# Patient Record
Sex: FEMALE | Race: OTHER | HISPANIC OR LATINO | URBAN - METROPOLITAN AREA
[De-identification: names, ages, dates, MRNs, and addresses within clinical notes are randomized per-mention and may not be internally consistent; named-entity substitution may affect disease eponyms.]

---

## 2017-06-08 ENCOUNTER — OUTPATIENT (OUTPATIENT)
Dept: OUTPATIENT SERVICES | Facility: HOSPITAL | Age: 82
LOS: 1 days | Discharge: HOME | End: 2017-06-08

## 2017-06-08 DIAGNOSIS — G47.33 OBSTRUCTIVE SLEEP APNEA (ADULT) (PEDIATRIC): ICD-10-CM

## 2017-06-08 DIAGNOSIS — K21.9 GASTRO-ESOPHAGEAL REFLUX DISEASE WITHOUT ESOPHAGITIS: ICD-10-CM

## 2017-06-08 DIAGNOSIS — K92.2 GASTROINTESTINAL HEMORRHAGE, UNSPECIFIED: ICD-10-CM

## 2017-06-08 DIAGNOSIS — E03.9 HYPOTHYROIDISM, UNSPECIFIED: ICD-10-CM

## 2017-06-08 DIAGNOSIS — K57.32 DIVERTICULITIS OF LARGE INTESTINE WITHOUT PERFORATION OR ABSCESS WITHOUT BLEEDING: ICD-10-CM

## 2017-06-08 DIAGNOSIS — M35.3 POLYMYALGIA RHEUMATICA: ICD-10-CM

## 2017-06-08 DIAGNOSIS — I25.10 ATHEROSCLEROTIC HEART DISEASE OF NATIVE CORONARY ARTERY WITHOUT ANGINA PECTORIS: ICD-10-CM

## 2017-06-08 DIAGNOSIS — M48.061 SPINAL STENOSIS, LUMBAR REGION WITHOUT NEUROGENIC CLAUDICATION: ICD-10-CM

## 2017-06-08 DIAGNOSIS — I48.91 UNSPECIFIED ATRIAL FIBRILLATION: ICD-10-CM

## 2017-06-08 DIAGNOSIS — K57.30 DIVERTICULOSIS OF LARGE INTESTINE WITHOUT PERFORATION OR ABSCESS WITHOUT BLEEDING: ICD-10-CM

## 2017-06-08 DIAGNOSIS — E11.9 TYPE 2 DIABETES MELLITUS WITHOUT COMPLICATIONS: ICD-10-CM

## 2017-06-28 DIAGNOSIS — E03.9 HYPOTHYROIDISM, UNSPECIFIED: ICD-10-CM

## 2017-06-28 DIAGNOSIS — R06.00 DYSPNEA, UNSPECIFIED: ICD-10-CM

## 2017-06-28 DIAGNOSIS — I25.10 ATHEROSCLEROTIC HEART DISEASE OF NATIVE CORONARY ARTERY WITHOUT ANGINA PECTORIS: ICD-10-CM

## 2017-06-28 DIAGNOSIS — E11.9 TYPE 2 DIABETES MELLITUS WITHOUT COMPLICATIONS: ICD-10-CM

## 2017-06-28 DIAGNOSIS — Z98.61 CORONARY ANGIOPLASTY STATUS: ICD-10-CM

## 2017-06-28 DIAGNOSIS — Z87.891 PERSONAL HISTORY OF NICOTINE DEPENDENCE: ICD-10-CM

## 2017-06-28 DIAGNOSIS — Z82.49 FAMILY HISTORY OF ISCHEMIC HEART DISEASE AND OTHER DISEASES OF THE CIRCULATORY SYSTEM: ICD-10-CM

## 2017-06-28 DIAGNOSIS — Z79.4 LONG TERM (CURRENT) USE OF INSULIN: ICD-10-CM

## 2017-06-28 DIAGNOSIS — I10 ESSENTIAL (PRIMARY) HYPERTENSION: ICD-10-CM

## 2017-06-28 DIAGNOSIS — E78.4 OTHER HYPERLIPIDEMIA: ICD-10-CM

## 2019-07-10 ENCOUNTER — OUTPATIENT (OUTPATIENT)
Dept: OUTPATIENT SERVICES | Facility: HOSPITAL | Age: 84
LOS: 1 days | Discharge: HOME | End: 2019-07-10

## 2019-07-10 VITALS
HEART RATE: 60 BPM | OXYGEN SATURATION: 97 % | SYSTOLIC BLOOD PRESSURE: 123 MMHG | DIASTOLIC BLOOD PRESSURE: 53 MMHG | HEIGHT: 57 IN | RESPIRATION RATE: 12 BRPM | WEIGHT: 179.9 LBS

## 2019-07-10 DIAGNOSIS — E11.9 TYPE 2 DIABETES MELLITUS WITHOUT COMPLICATIONS: ICD-10-CM

## 2019-07-10 DIAGNOSIS — Z90.89 ACQUIRED ABSENCE OF OTHER ORGANS: Chronic | ICD-10-CM

## 2019-07-10 DIAGNOSIS — I20.8 OTHER FORMS OF ANGINA PECTORIS: ICD-10-CM

## 2019-07-10 DIAGNOSIS — Z79.02 LONG TERM (CURRENT) USE OF ANTITHROMBOTICS/ANTIPLATELETS: ICD-10-CM

## 2019-07-10 DIAGNOSIS — K58.9 IRRITABLE BOWEL SYNDROME WITHOUT DIARRHEA: ICD-10-CM

## 2019-07-10 DIAGNOSIS — M19.90 UNSPECIFIED OSTEOARTHRITIS, UNSPECIFIED SITE: ICD-10-CM

## 2019-07-10 DIAGNOSIS — Z90.49 ACQUIRED ABSENCE OF OTHER SPECIFIED PARTS OF DIGESTIVE TRACT: Chronic | ICD-10-CM

## 2019-07-10 DIAGNOSIS — I25.118 ATHEROSCLEROTIC HEART DISEASE OF NATIVE CORONARY ARTERY WITH OTHER FORMS OF ANGINA PECTORIS: ICD-10-CM

## 2019-07-10 DIAGNOSIS — Z88.5 ALLERGY STATUS TO NARCOTIC AGENT: ICD-10-CM

## 2019-07-10 DIAGNOSIS — Z96.642 PRESENCE OF LEFT ARTIFICIAL HIP JOINT: Chronic | ICD-10-CM

## 2019-07-10 DIAGNOSIS — H26.9 UNSPECIFIED CATARACT: Chronic | ICD-10-CM

## 2019-07-10 DIAGNOSIS — E03.9 HYPOTHYROIDISM, UNSPECIFIED: ICD-10-CM

## 2019-07-10 DIAGNOSIS — Z79.82 LONG TERM (CURRENT) USE OF ASPIRIN: ICD-10-CM

## 2019-07-10 DIAGNOSIS — I10 ESSENTIAL (PRIMARY) HYPERTENSION: ICD-10-CM

## 2019-07-10 DIAGNOSIS — Z95.5 PRESENCE OF CORONARY ANGIOPLASTY IMPLANT AND GRAFT: ICD-10-CM

## 2019-07-10 LAB
GLUCOSE BLDC GLUCOMTR-MCNC: 145 MG/DL — HIGH (ref 70–99)
HCT VFR BLD CALC: 37.7 % — SIGNIFICANT CHANGE UP (ref 37–47)
HGB BLD-MCNC: 12.3 G/DL — SIGNIFICANT CHANGE UP (ref 12–16)
MCHC RBC-ENTMCNC: 30.3 PG — SIGNIFICANT CHANGE UP (ref 27–31)
MCHC RBC-ENTMCNC: 32.6 G/DL — SIGNIFICANT CHANGE UP (ref 32–37)
MCV RBC AUTO: 92.9 FL — SIGNIFICANT CHANGE UP (ref 81–99)
NRBC # BLD: 0 /100 WBCS — SIGNIFICANT CHANGE UP (ref 0–0)
PLATELET # BLD AUTO: 192 K/UL — SIGNIFICANT CHANGE UP (ref 130–400)
RBC # BLD: 4.06 M/UL — LOW (ref 4.2–5.4)
RBC # FLD: 13.6 % — SIGNIFICANT CHANGE UP (ref 11.5–14.5)
WBC # BLD: 8 K/UL — SIGNIFICANT CHANGE UP (ref 4.8–10.8)
WBC # FLD AUTO: 8 K/UL — SIGNIFICANT CHANGE UP (ref 4.8–10.8)

## 2019-07-10 NOTE — CHART NOTE - NSCHARTNOTEFT_GEN_A_CORE
PRE-OP DIAGNOSIS: suspected CAD    PROCEDURE:  [x ] [facility] with coronary angiography                           [ ] [facility]  Physician: Dr Sutton  Assistant: Celina Back    ANESTHESIA TYPE:  [  ]General Anesthesia  [x] Sedation  [  ] Local/Regional    ESTIMATED BLOOD LOSS:    10   mL    CONDITION  [  ] Critical  [  ] Serious  [  ]Fair  [ x ]Good      SPECIMENS REMOVED (IF APPLICABLE):      IV CONTRAST:      50       mL      IMPLANTS (IF APPLICABLE)      FINDINGS    Left Heart Catheterization:  LVEF%: 60%  LVEDP: WNL  [ ] Normal Coronary Arteries  [ ] Luminal Irregularities  [x] Non-obstructive CAD    ACCESS:    [x] right radial artery  [ ] right femoral artery    LEFT HEART CATHETERIZATION:         Hemodynamics: Hemodynamic assessment demonstrates normal LVEDP.     Valves: Aortic valve: No significant aortic gradient.     Coronary circulation: The coronary circulation is right dominant. Left main: Normal. The vessel was normal sized. Angiography showed minor luminal irregularities with no flow limiting lesions. LAD: The vessel was normal sized. Angiography showed minor luminal irregularities with no flow limiting lesions. Patent stent. Circumflex: The vessel was medium sized. Angiography showed minor luminal irregularities with no flow limiting lesions. Patent stent. RCA: The vessel was large sized (dominant). There was a discrete 50 % stenosis at a site with no prior intervention, in the distal third of the vessel segment. There was KEELY grade 3 flow through the vessel (brisk flow).                                  POST-OP DIAGNOSIS    Non-obstructive CAD      PLAN OF CARE  [x] D/C Home today  [ ]  D/C in AM  [ ] Return to In-patient bed  [ ] Admit for observation  [ ] Return for staged procedure:  [ ] CT Surgery consult called  [ ]  Continue DAPT, B-blocker & Statin therapy PRE-OP DIAGNOSIS: suspected CAD    PROCEDURE:  [x ] [facility] with coronary angiography                           [ ] [facility]  Physician: Dr Sutton  Assistant: Celina Back    ANESTHESIA TYPE:  [  ]General Anesthesia  [x] Sedation  [  ] Local/Regional    ESTIMATED BLOOD LOSS:    10   mL    CONDITION  [  ] Critical  [  ] Serious  [  ]Fair  [ x ]Good      SPECIMENS REMOVED (IF APPLICABLE):      IV CONTRAST:      50       mL      IMPLANTS (IF APPLICABLE)      FINDINGS    Left Heart Catheterization:  LVEF%: 60%  LVEDP: WNL  [ ] Normal Coronary Arteries  [ ] Luminal Irregularities  [x] Non-obstructive CAD    ACCESS:    [x] right radial artery  [ ] right femoral artery    LEFT HEART CATHETERIZATION:         Hemodynamics: Hemodynamic assessment demonstrates normal LVEDP.     Valves: Aortic valve: No significant aortic gradient.     Coronary circulation: The coronary circulation is right dominant. Left main: Normal. The vessel was normal sized. Angiography showed minor luminal irregularities with no flow limiting lesions. LAD: The vessel was normal sized. Angiography showed minor luminal irregularities with no flow limiting lesions. Patent stent. Circumflex: The vessel was medium sized. Angiography showed minor luminal irregularities with no flow limiting lesions. Patent stent. RCA: The vessel was large sized (dominant). There was a discrete 50 % stenosis at a site with no prior intervention, in the distal third of the vessel segment. There was KEELY grade 3 flow through the vessel (brisk flow).                                  POST-OP DIAGNOSIS    Non-obstructive CAD      PLAN OF CARE  [x] D/C Home today  [ ]  D/C in AM  [ ] Return to In-patient bed  [ ] Admit for observation  [ ] Return for staged procedure:  [ ] CT Surgery consult called  [ ]  Continue DAPT, B-blocker & Statin therapy    agree

## 2019-07-10 NOTE — H&P CARDIOLOGY - HISTORY OF PRESENT ILLNESS
84 y/old F here for LHC due to CP and + Nuclear stress Test   PMH: MI 2014, Paroxysmal A-Fib, DM, CAD MEAGAN x3 Stents, NSTEMI 10/30/2014, GI bleeding, Hypothyroid, Spinal stenosis, Arthritis, herniated disc, Polymyalgia, Bursitis   PSH: Tonsils, Appendectomy, Gallbladder, cataract, Left hip replacement   FH: (CAD)

## 2019-07-10 NOTE — ASU PATIENT PROFILE, ADULT - PMH
Acute MI    Arthritis    CAD (coronary artery disease)    CAD S/P percutaneous coronary angioplasty    DM (diabetes mellitus), secondary    H/O spinal stenosis    Hypothyroid    IBS (irritable bowel syndrome)    Polymyalgia

## 2019-07-10 NOTE — ASU PATIENT PROFILE, ADULT - PSH
Cataract of both eyes    History of appendectomy    History of cholecystectomy    History of hip replacement, total, left    S/P tonsillectomy

## 2020-03-09 ENCOUNTER — OUTPATIENT (OUTPATIENT)
Dept: OUTPATIENT SERVICES | Facility: HOSPITAL | Age: 85
LOS: 1 days | Discharge: HOME | End: 2020-03-09
Payer: MEDICARE

## 2020-03-09 DIAGNOSIS — Z90.89 ACQUIRED ABSENCE OF OTHER ORGANS: Chronic | ICD-10-CM

## 2020-03-09 DIAGNOSIS — Z12.31 ENCOUNTER FOR SCREENING MAMMOGRAM FOR MALIGNANT NEOPLASM OF BREAST: ICD-10-CM

## 2020-03-09 DIAGNOSIS — Z90.49 ACQUIRED ABSENCE OF OTHER SPECIFIED PARTS OF DIGESTIVE TRACT: Chronic | ICD-10-CM

## 2020-03-09 DIAGNOSIS — H26.9 UNSPECIFIED CATARACT: Chronic | ICD-10-CM

## 2020-03-09 DIAGNOSIS — Z96.642 PRESENCE OF LEFT ARTIFICIAL HIP JOINT: Chronic | ICD-10-CM

## 2020-03-09 PROBLEM — E03.9 HYPOTHYROIDISM, UNSPECIFIED: Chronic | Status: ACTIVE | Noted: 2019-07-10

## 2020-03-09 PROBLEM — Z87.39 PERSONAL HISTORY OF OTHER DISEASES OF THE MUSCULOSKELETAL SYSTEM AND CONNECTIVE TISSUE: Chronic | Status: ACTIVE | Noted: 2019-07-10

## 2020-03-09 PROBLEM — M19.90 UNSPECIFIED OSTEOARTHRITIS, UNSPECIFIED SITE: Chronic | Status: ACTIVE | Noted: 2019-07-10

## 2020-03-09 PROBLEM — K58.9 IRRITABLE BOWEL SYNDROME WITHOUT DIARRHEA: Chronic | Status: ACTIVE | Noted: 2019-07-10

## 2020-03-09 PROBLEM — M35.3 POLYMYALGIA RHEUMATICA: Chronic | Status: ACTIVE | Noted: 2019-07-10

## 2020-03-09 PROBLEM — I21.9 ACUTE MYOCARDIAL INFARCTION, UNSPECIFIED: Chronic | Status: ACTIVE | Noted: 2019-07-10

## 2020-03-09 PROBLEM — I25.10 ATHEROSCLEROTIC HEART DISEASE OF NATIVE CORONARY ARTERY WITHOUT ANGINA PECTORIS: Chronic | Status: ACTIVE | Noted: 2019-07-10

## 2020-03-09 PROBLEM — E13.9 OTHER SPECIFIED DIABETES MELLITUS WITHOUT COMPLICATIONS: Chronic | Status: ACTIVE | Noted: 2019-07-10

## 2020-03-09 PROBLEM — K58.9 IRRITABLE BOWEL SYNDROME, UNSPECIFIED: Chronic | Status: ACTIVE | Noted: 2019-07-10

## 2020-03-09 PROCEDURE — 77063 BREAST TOMOSYNTHESIS BI: CPT | Mod: 26

## 2020-03-09 PROCEDURE — 77067 SCR MAMMO BI INCL CAD: CPT | Mod: 26

## 2021-02-04 ENCOUNTER — EMERGENCY (EMERGENCY)
Facility: HOSPITAL | Age: 86
LOS: 0 days | Discharge: HOME | End: 2021-02-05
Attending: STUDENT IN AN ORGANIZED HEALTH CARE EDUCATION/TRAINING PROGRAM | Admitting: EMERGENCY MEDICINE
Payer: MEDICARE

## 2021-02-04 VITALS
SYSTOLIC BLOOD PRESSURE: 164 MMHG | DIASTOLIC BLOOD PRESSURE: 86 MMHG | RESPIRATION RATE: 17 BRPM | HEART RATE: 77 BPM | TEMPERATURE: 99 F | OXYGEN SATURATION: 99 %

## 2021-02-04 DIAGNOSIS — H26.9 UNSPECIFIED CATARACT: Chronic | ICD-10-CM

## 2021-02-04 DIAGNOSIS — Z96.642 PRESENCE OF LEFT ARTIFICIAL HIP JOINT: Chronic | ICD-10-CM

## 2021-02-04 DIAGNOSIS — Z20.822 CONTACT WITH AND (SUSPECTED) EXPOSURE TO COVID-19: ICD-10-CM

## 2021-02-04 DIAGNOSIS — E11.9 TYPE 2 DIABETES MELLITUS WITHOUT COMPLICATIONS: ICD-10-CM

## 2021-02-04 DIAGNOSIS — R00.2 PALPITATIONS: ICD-10-CM

## 2021-02-04 DIAGNOSIS — Z88.8 ALLERGY STATUS TO OTHER DRUGS, MEDICAMENTS AND BIOLOGICAL SUBSTANCES: ICD-10-CM

## 2021-02-04 DIAGNOSIS — Z90.49 ACQUIRED ABSENCE OF OTHER SPECIFIED PARTS OF DIGESTIVE TRACT: Chronic | ICD-10-CM

## 2021-02-04 DIAGNOSIS — I10 ESSENTIAL (PRIMARY) HYPERTENSION: ICD-10-CM

## 2021-02-04 DIAGNOSIS — E03.9 HYPOTHYROIDISM, UNSPECIFIED: ICD-10-CM

## 2021-02-04 DIAGNOSIS — Z90.89 ACQUIRED ABSENCE OF OTHER ORGANS: Chronic | ICD-10-CM

## 2021-02-04 LAB
ALBUMIN SERPL ELPH-MCNC: 4.3 G/DL — SIGNIFICANT CHANGE UP (ref 3.5–5.2)
ALP SERPL-CCNC: 65 U/L — SIGNIFICANT CHANGE UP (ref 30–115)
ALT FLD-CCNC: 15 U/L — SIGNIFICANT CHANGE UP (ref 0–41)
ANION GAP SERPL CALC-SCNC: 11 MMOL/L — SIGNIFICANT CHANGE UP (ref 7–14)
AST SERPL-CCNC: 18 U/L — SIGNIFICANT CHANGE UP (ref 0–41)
BASOPHILS # BLD AUTO: 0.07 K/UL — SIGNIFICANT CHANGE UP (ref 0–0.2)
BASOPHILS NFR BLD AUTO: 0.8 % — SIGNIFICANT CHANGE UP (ref 0–1)
BILIRUB SERPL-MCNC: 1 MG/DL — SIGNIFICANT CHANGE UP (ref 0.2–1.2)
BUN SERPL-MCNC: 24 MG/DL — HIGH (ref 10–20)
CALCIUM SERPL-MCNC: 9.9 MG/DL — SIGNIFICANT CHANGE UP (ref 8.5–10.1)
CHLORIDE SERPL-SCNC: 103 MMOL/L — SIGNIFICANT CHANGE UP (ref 98–110)
CO2 SERPL-SCNC: 25 MMOL/L — SIGNIFICANT CHANGE UP (ref 17–32)
CREAT SERPL-MCNC: 1.1 MG/DL — SIGNIFICANT CHANGE UP (ref 0.7–1.5)
EOSINOPHIL # BLD AUTO: 0.3 K/UL — SIGNIFICANT CHANGE UP (ref 0–0.7)
EOSINOPHIL NFR BLD AUTO: 3.6 % — SIGNIFICANT CHANGE UP (ref 0–8)
GLUCOSE SERPL-MCNC: 180 MG/DL — HIGH (ref 70–99)
HCT VFR BLD CALC: 39.1 % — SIGNIFICANT CHANGE UP (ref 37–47)
HGB BLD-MCNC: 12.8 G/DL — SIGNIFICANT CHANGE UP (ref 12–16)
IMM GRANULOCYTES NFR BLD AUTO: 0.4 % — HIGH (ref 0.1–0.3)
LIDOCAIN IGE QN: 26 U/L — SIGNIFICANT CHANGE UP (ref 7–60)
LYMPHOCYTES # BLD AUTO: 2.49 K/UL — SIGNIFICANT CHANGE UP (ref 1.2–3.4)
LYMPHOCYTES # BLD AUTO: 29.5 % — SIGNIFICANT CHANGE UP (ref 20.5–51.1)
MAGNESIUM SERPL-MCNC: 2.3 MG/DL — SIGNIFICANT CHANGE UP (ref 1.8–2.4)
MCHC RBC-ENTMCNC: 30.1 PG — SIGNIFICANT CHANGE UP (ref 27–31)
MCHC RBC-ENTMCNC: 32.7 G/DL — SIGNIFICANT CHANGE UP (ref 32–37)
MCV RBC AUTO: 92 FL — SIGNIFICANT CHANGE UP (ref 81–99)
MONOCYTES # BLD AUTO: 0.66 K/UL — HIGH (ref 0.1–0.6)
MONOCYTES NFR BLD AUTO: 7.8 % — SIGNIFICANT CHANGE UP (ref 1.7–9.3)
NEUTROPHILS # BLD AUTO: 4.9 K/UL — SIGNIFICANT CHANGE UP (ref 1.4–6.5)
NEUTROPHILS NFR BLD AUTO: 57.9 % — SIGNIFICANT CHANGE UP (ref 42.2–75.2)
NRBC # BLD: 0 /100 WBCS — SIGNIFICANT CHANGE UP (ref 0–0)
NT-PROBNP SERPL-SCNC: 372 PG/ML — HIGH (ref 0–300)
PLATELET # BLD AUTO: 203 K/UL — SIGNIFICANT CHANGE UP (ref 130–400)
POTASSIUM SERPL-MCNC: 4.3 MMOL/L — SIGNIFICANT CHANGE UP (ref 3.5–5)
POTASSIUM SERPL-SCNC: 4.3 MMOL/L — SIGNIFICANT CHANGE UP (ref 3.5–5)
PROT SERPL-MCNC: 6.6 G/DL — SIGNIFICANT CHANGE UP (ref 6–8)
RBC # BLD: 4.25 M/UL — SIGNIFICANT CHANGE UP (ref 4.2–5.4)
RBC # FLD: 13.9 % — SIGNIFICANT CHANGE UP (ref 11.5–14.5)
SODIUM SERPL-SCNC: 139 MMOL/L — SIGNIFICANT CHANGE UP (ref 135–146)
TROPONIN T SERPL-MCNC: <0.01 NG/ML — SIGNIFICANT CHANGE UP
WBC # BLD: 8.45 K/UL — SIGNIFICANT CHANGE UP (ref 4.8–10.8)
WBC # FLD AUTO: 8.45 K/UL — SIGNIFICANT CHANGE UP (ref 4.8–10.8)

## 2021-02-04 PROCEDURE — 99220: CPT

## 2021-02-04 PROCEDURE — 71045 X-RAY EXAM CHEST 1 VIEW: CPT | Mod: 26

## 2021-02-04 PROCEDURE — 93010 ELECTROCARDIOGRAM REPORT: CPT

## 2021-02-04 NOTE — ED CDU PROVIDER INITIAL DAY NOTE - NS ED ROS FT
Constitutional: No fever, chills.  Eyes: No visual changes.  ENT: No hearing changes.  Neck: No neck pain or stiffness.  Cardiovascular:  see hpi  Pulmonary: No SOB, cough. No hemoptysis.  Abdominal:  No nausea, vomiting, diarrhea.  : No dysuria, frequency.  Neuro: No headache, syncope, dizziness.  MS: No back pain. No calf pain/swelling.  Psych: No suicidal ideations.

## 2021-02-04 NOTE — ED ADULT NURSE NOTE - OBJECTIVE STATEMENT
patient complaints of intermittent chest palpitation x 2 days. Patient denies n/v, SOB, chest pain.  Patient reports that "she has been moving houses and feels that it maybe from the stress of doing this at her age".  Patient denies any injury.

## 2021-02-04 NOTE — ED PROVIDER NOTE - ATTENDING CONTRIBUTION TO CARE
pt co intermitt palps today, worse after dinner. feeling better now. she was afraid she was in afib. called 911. no cp, sob, ab pain, fever, chills, cough. no new leg swelling.    pt is elderly, comfortable, in nad. pink conj neck sup, ctab, rrr, ab soft, nt, nd. no ptting edema. no foc def.  ekg, labs, monitoring.

## 2021-02-04 NOTE — ED PROVIDER NOTE - NS ED ROS FT
Eyes:  No visual changes, eye pain or discharge.  ENMT:  No hearing changes, pain, no sore throat or runny nose, no difficulty swallowing  Cardiac: see HPI  Respiratory:  No cough or respiratory distress. No hemoptysis. No history of asthma or RAD.  GI:  No nausea, vomiting, diarrhea or abdominal pain.  :  No dysuria, frequency or burning.  MS:  No myalgia, muscle weakness, joint pain or back pain.  Neuro:  No headache or weakness.  No LOC.  Skin:  No skin rash.

## 2021-02-04 NOTE — ED CDU PROVIDER INITIAL DAY NOTE - MEDICAL DECISION MAKING DETAILS
pt placed in obs under 2mn protocol for c/f palpitations which felt like her rapid af.  in ed, pt was in NSR. plan for overnight tele, serial ekg/trop. am cards c/s and will c/s pcp to discuss DM meds/insulin

## 2021-02-04 NOTE — ED CDU PROVIDER INITIAL DAY NOTE - PROGRESS NOTE DETAILS
received signout from Dr. Pirere - pt in obs under 2mdt protocol c/o palpitations with hx of paroxysmal Afib; pt denies signif chest pain; last cath done by Dr. Vides 7/2019 - non obstructive cad; will repeat ce/ekg, cardiac monitoring overnight and cardiologist consult in am.

## 2021-02-04 NOTE — ED ADULT NURSE NOTE - ISOLATION TYPE:
Patient tolerated 1 unit PRBC well without complications  Patient to return tomorrow for central labs  Confirmed appt 
None

## 2021-02-04 NOTE — ED CDU PROVIDER INITIAL DAY NOTE - ATTENDING CONTRIBUTION TO CARE
86 yold female to Ed [facility] Paroxysmal afib, Dm, Cad with stents x 3, Gi Bleed, OA, hypothyroidism, spinal stenosis presents w/ palpitations. pt was concerned for rapid afib. no associated cp, sob, n,v,back pain, fever, cough or syncope.  pt also states for the past several weeks, she has had a labile blood sugar. pt follows up with endo but has not been able to get a hold of them for the past few days.     vss  gen- NAD, aaox3  card-rrr  lungs-ctab, no wheezing or rhonchi  abd-sntnd, no guarding or rebound  neuro- full str/sensation, cn ii-xii grossly intact, normal coordination    in ed, labs grossly normal, trop neg, ekg NSR

## 2021-02-04 NOTE — ED PROVIDER NOTE - OBJECTIVE STATEMENT
86y F pmh CAD s/p 3 stents, T2D, HTN presenting with palpitations x1 day.. intermittent. pt denies significant chest pain, n/v/sob, back pain,fever,cough, diaphoresis; pain not similar to prior cad type pain; pt last cath 2019 with non-obstructive cad;

## 2021-02-04 NOTE — ED CDU PROVIDER INITIAL DAY NOTE - PHYSICAL EXAMINATION
Constitutional: Well developed, well nourished. NAD  Head: Normocephalic, atraumatic.  Eyes: PERRL, EOMI.  ENT: No nasal discharge. Mucous membranes dry.  Neck: Supple. Painless ROM.  Cardiovascular:   Regular rate and rhythm.    Pulmonary:   Lungs clear to auscultation bilaterally.   Abdominal: Soft. Nondistended. No rebound, guarding, rigidity.  Extremities. Pelvis stable. No lower extremity edema, symmetric calves.  Skin: No rashes, cyanosis.  Neuro: AAOx3. No focal neurological deficits.  Psych: Normal mood. Normal affect.

## 2021-02-04 NOTE — ED ADULT TRIAGE NOTE - CHIEF COMPLAINT QUOTE
intermittent palpitations for past couple of days, pt reports blood sugar has been either very high or very low.

## 2021-02-04 NOTE — ED CDU PROVIDER INITIAL DAY NOTE - OBJECTIVE STATEMENT
86 yold female to Ed [facility]x Paroxysmal afib, Dm, Cad with stents x 3, Gi Bleed, OA, hypothyroidism, spinal stenosis, c/o palpitations started yesteday described as irreg HR similar to prior episodes of afib; pt denies significant chest pain, n/v/sob, back pain,fever,cough, diaphoresis; pain not similar to prior cad type pain; pt last cath 2019 with non-obstructive cad;

## 2021-02-04 NOTE — ED CDU PROVIDER INITIAL DAY NOTE - INDICATION FOR OBSERVATION
Topical Clindamycin Counseling: Patient counseled that this medication may cause skin irritation or allergic reactions.  In the event of skin irritation, the patient was advised to reduce the amount of the drug applied or use it less frequently.   The patient verbalized understanding of the proper use and possible adverse effects of clindamycin.  All of the patient's questions and concerns were addressed. Rifampin Counseling: I discussed with the patient the risks of rifampin including but not limited to liver damage, kidney damage, red-orange body fluids, nausea/vomiting and severe allergy. Acitretin Counseling:  I discussed with the patient the risks of acitretin including but not limited to hair loss, dry lips/skin/eyes, liver damage, hyperlipidemia, depression/suicidal ideation, photosensitivity.  Serious rare side effects can include but are not limited to pancreatitis, pseudotumor cerebri, bony changes, clot formation/stroke/heart attack.  Patient understands that alcohol is contraindicated since it can result in liver toxicity and significantly prolong the elimination of the drug by many years. Birth Control Pills Pregnancy And Lactation Text: This medication should be avoided if pregnant and for the first 30 days post-partum. Diagnostic Uncertainty Bactrim Pregnancy And Lactation Text: This medication is Pregnancy Category D and is known to cause fetal risk.  It is also excreted in breast milk. Cimetidine Pregnancy And Lactation Text: This medication is Pregnancy Category B and is considered safe during pregnancy. It is also excreted in breast milk and breast feeding isn't recommended. Siliq Counseling:  I discussed with the patient the risks of Siliq including but not limited to new or worsening depression, suicidal thoughts and behavior, immunosuppression, malignancy, posterior leukoencephalopathy syndrome, and serious infections.  The patient understands that monitoring is required including a PPD at baseline and must alert us or the primary physician if symptoms of infection or other concerning signs are noted. There is also a special program designed to monitor depression which is required with Siliq. Azathioprine Pregnancy And Lactation Text: This medication is Pregnancy Category D and isn't considered safe during pregnancy. It is unknown if this medication is excreted in breast milk. Gabapentin Counseling: I discussed with the patient the risks of gabapentin including but not limited to dizziness, somnolence, fatigue and ataxia. Hydroquinone Counseling:  Patient advised that medication may result in skin irritation, lightening (hypopigmentation), dryness, and burning.  In the event of skin irritation, the patient was advised to reduce the amount of the drug applied or use it less frequently.  Rarely, spots that are treated with hydroquinone can become darker (pseudoochronosis).  Should this occur, patient instructed to stop medication and call the office. The patient verbalized understanding of the proper use and possible adverse effects of hydroquinone.  All of the patient's questions and concerns were addressed. Rifampin Pregnancy And Lactation Text: This medication is Pregnancy Category C and it isn't know if it is safe during pregnancy. It is also excreted in breast milk and should not be used if you are breast feeding. Cimzia Counseling:  I discussed with the patient the risks of Cimzia including but not limited to immunosuppression, allergic reactions and infections.  The patient understands that monitoring is required including a PPD at baseline and must alert us or the primary physician if symptoms of infection or other concerning signs are noted. Acitretin Pregnancy And Lactation Text: This medication is Pregnancy Category X and should not be given to women who are pregnant or may become pregnant in the future. This medication is excreted in breast milk. Cellcept Counseling:  I discussed with the patient the risks of mycophenolate mofetil including but not limited to infection/immunosuppression, GI upset, hypokalemia, hypercholesterolemia, bone marrow suppression, lymphoproliferative disorders, malignancy, GI ulceration/bleed/perforation, colitis, interstitial lung disease, kidney failure, progressive multifocal leukoencephalopathy, and birth defects.  The patient understands that monitoring is required including a baseline creatinine and regular CBC testing. In addition, patient must alert us immediately if symptoms of infection or other concerning signs are noted. Cephalexin Counseling: I counseled the patient regarding use of cephalexin as an antibiotic for prophylactic and/or therapeutic purposes. Cephalexin (commonly prescribed under brand name Keflex) is a cephalosporin antibiotic which is active against numerous classes of bacteria, including most skin bacteria. Side effects may include nausea, diarrhea, gastrointestinal upset, rash, hives, yeast infections, and in rare cases, hepatitis, kidney disease, seizures, fever, confusion, neurologic symptoms, and others. Patients with severe allergies to penicillin medications are cautioned that there is about a 10% incidence of cross-reactivity with cephalosporins. When possible, patients with penicillin allergies should use alternatives to cephalosporins for antibiotic therapy. Topical Clindamycin Pregnancy And Lactation Text: This medication is Pregnancy Category B and is considered safe during pregnancy. It is unknown if it is excreted in breast milk. Hydroquinone Pregnancy And Lactation Text: This medication has not been assigned a Pregnancy Risk Category but animal studies failed to show danger with the topical medication. It is unknown if the medication is excreted in breast milk. Spironolactone Counseling: Patient advised regarding risks of diarrhea, abdominal pain, hyperkalemia, birth defects (for female patients), liver toxicity and renal toxicity. The patient may need blood work to monitor liver and kidney function and potassium levels while on therapy. The patient verbalized understanding of the proper use and possible adverse effects of spironolactone.  All of the patient's questions and concerns were addressed. Siliq Pregnancy And Lactation Text: The risk during pregnancy and breastfeeding is uncertain with this medication. Doxepin Counseling:  Patient advised that the medication is sedating and not to drive a car after taking this medication. Patient informed of potential adverse effects including but not limited to dry mouth, urinary retention, and blurry vision.  The patient verbalized understanding of the proper use and possible adverse effects of doxepin.  All of the patient's questions and concerns were addressed. Gabapentin Pregnancy And Lactation Text: This medication is Pregnancy Category C and isn't considered safe during pregnancy. It is excreted in breast milk. Topical Sulfur Applications Counseling: Topical Sulfur Counseling: Patient counseled that this medication may cause skin irritation or allergic reactions.  In the event of skin irritation, the patient was advised to reduce the amount of the drug applied or use it less frequently.   The patient verbalized understanding of the proper use and possible adverse effects of topical sulfur application.  All of the patient's questions and concerns were addressed. Imiquimod Counseling:  I discussed with the patient the risks of imiquimod including but not limited to erythema, scaling, itching, weeping, crusting, and pain.  Patient understands that the inflammatory response to imiquimod is variable from person to person and was educated regarded proper titration schedule.  If flu-like symptoms develop, patient knows to discontinue the medication and contact us. Tetracycline Counseling: Patient counseled regarding possible photosensitivity and increased risk for sunburn.  Patient instructed to avoid sunlight, if possible.  When exposed to sunlight, patients should wear protective clothing, sunglasses, and sunscreen.  The patient was instructed to call the office immediately if the following severe adverse effects occur:  hearing changes, easy bruising/bleeding, severe headache, or vision changes.  The patient verbalized understanding of the proper use and possible adverse effects of tetracycline.  All of the patient's questions and concerns were addressed. Patient understands to avoid pregnancy while on therapy due to potential birth defects. Cimzia Pregnancy And Lactation Text: This medication crosses the placenta but can be considered safe in certain situations. Cimzia may be excreted in breast milk. Bexarotene Counseling:  I discussed with the patient the risks of bexarotene including but not limited to hair loss, dry lips/skin/eyes, liver abnormalities, hyperlipidemia, pancreatitis, depression/suicidal ideation, photosensitivity, drug rash/allergic reactions, hypothyroidism, anemia, leukopenia, infection, cataracts, and teratogenicity.  Patient understands that they will need regular blood tests to check lipid profile, liver function tests, white blood cell count, thyroid function tests and pregnancy test if applicable. Simponi Counseling:  I discussed with the patient the risks of golimumab including but not limited to myelosuppression, immunosuppression, autoimmune hepatitis, demyelinating diseases, lymphoma, and serious infections.  The patient understands that monitoring is required including a PPD at baseline and must alert us or the primary physician if symptoms of infection or other concerning signs are noted. Doxepin Pregnancy And Lactation Text: This medication is Pregnancy Category C and it isn't known if it is safe during pregnancy. It is also excreted in breast milk and breast feeding isn't recommended. Cephalexin Pregnancy And Lactation Text: This medication is Pregnancy Category B and considered safe during pregnancy.  It is also excreted in breast milk but can be used safely for shorter doses. Spironolactone Pregnancy And Lactation Text: This medication can cause feminization of the male fetus and should be avoided during pregnancy. The active metabolite is also found in breast milk. Glycopyrrolate Counseling:  I discussed with the patient the risks of glycopyrrolate including but not limited to skin rash, drowsiness, dry mouth, difficulty urinating, and blurred vision. Cosentyx Counseling:  I discussed with the patient the risks of Cosentyx including but not limited to worsening of Crohn's disease, immunosuppression, allergic reactions and infections.  The patient understands that monitoring is required including a PPD at baseline and must alert us or the primary physician if symptoms of infection or other concerning signs are noted. Bexarotene Pregnancy And Lactation Text: This medication is Pregnancy Category X and should not be given to women who are pregnant or may become pregnant. This medication should not be used if you are breast feeding. Tetracycline Pregnancy And Lactation Text: This medication is Pregnancy Category D and not consider safe during pregnancy. It is also excreted in breast milk. Topical Sulfur Applications Pregnancy And Lactation Text: This medication is Pregnancy Category C and has an unknown safety profile during pregnancy. It is unknown if this topical medication is excreted in breast milk. Imiquimod Pregnancy And Lactation Text: This medication is Pregnancy Category C. It is unknown if this medication is excreted in breast milk. Clindamycin Counseling: I counseled the patient regarding use of clindamycin as an antibiotic for prophylactic and/or therapeutic purposes. Clindamycin is active against numerous classes of bacteria, including skin bacteria. Side effects may include nausea, diarrhea, gastrointestinal upset, rash, hives, yeast infections, and in rare cases, colitis. Hydroxyzine Counseling: Patient advised that the medication is sedating and not to drive a car after taking this medication.  Patient informed of potential adverse effects including but not limited to dry mouth, urinary retention, and blurry vision.  The patient verbalized understanding of the proper use and possible adverse effects of hydroxyzine.  All of the patient's questions and concerns were addressed. Cyclophosphamide Counseling:  I discussed with the patient the risks of cyclophosphamide including but not limited to hair loss, hormonal abnormalities, decreased fertility, abdominal pain, diarrhea, nausea and vomiting, bone marrow suppression and infection. The patient understands that monitoring is required while taking this medication. SSKI Counseling:  I discussed with the patient the risks of SSKI including but not limited to thyroid abnormalities, metallic taste, GI upset, fever, headache, acne, arthralgias, paraesthesias, lymphadenopathy, easy bleeding, arrhythmias, and allergic reaction. Isotretinoin Counseling: Patient should get monthly blood tests, not donate blood, not drive at night if vision affected, not share medication, and not undergo elective surgery for 6 months after tx completed. Side effects reviewed, pt to contact office should one occur. Glycopyrrolate Pregnancy And Lactation Text: This medication is Pregnancy Category B and is considered safe during pregnancy. It is unknown if it is excreted breast milk. Cosentyx Pregnancy And Lactation Text: This medication is Pregnancy Category B and is considered safe during pregnancy. It is unknown if this medication is excreted in breast milk. Cyclophosphamide Pregnancy And Lactation Text: This medication is Pregnancy Category D and it isn't considered safe during pregnancy. This medication is excreted in breast milk. Detail Level: Detailed Zyclara Counseling:  I discussed with the patient the risks of imiquimod including but not limited to erythema, scaling, itching, weeping, crusting, and pain.  Patient understands that the inflammatory response to imiquimod is variable from person to person and was educated regarded proper titration schedule.  If flu-like symptoms develop, patient knows to discontinue the medication and contact us. Minoxidil Counseling: Minoxidil is a topical medication which can increase blood flow where it is applied. It is uncertain how this medication increases hair growth. Side effects are uncommon and include stinging and allergic reactions. Stelara Counseling:  I discussed with the patient the risks of ustekinumab including but not limited to immunosuppression, malignancy, posterior leukoencephalopathy syndrome, and serious infections.  The patient understands that monitoring is required including a PPD at baseline and must alert us or the primary physician if symptoms of infection or other concerning signs are noted. Hydroxychloroquine Counseling:  I discussed with the patient that a baseline ophthalmologic exam is needed at the start of therapy and every year thereafter while on therapy. A CBC may also be warranted for monitoring.  The side effects of this medication were discussed with the patient, including but not limited to agranulocytosis, aplastic anemia, seizures, rashes, retinopathy, and liver toxicity. Patient instructed to call the office should any adverse effect occur.  The patient verbalized understanding of the proper use and possible adverse effects of Plaquenil.  All the patient's questions and concerns were addressed. Hydroxyzine Pregnancy And Lactation Text: This medication is not safe during pregnancy and should not be taken. It is also excreted in breast milk and breast feeding isn't recommended. Clindamycin Pregnancy And Lactation Text: This medication can be used in pregnancy if certain situations. Clindamycin is also present in breast milk. Sski Pregnancy And Lactation Text: This medication is Pregnancy Category D and isn't considered safe during pregnancy. It is excreted in breast milk. Isotretinoin Pregnancy And Lactation Text: This medication is Pregnancy Category X and is considered extremely dangerous during pregnancy. It is unknown if it is excreted in breast milk. Fluconazole Counseling:  Patient counseled regarding adverse effects of fluconazole including but not limited to headache, diarrhea, nausea, upset stomach, liver function test abnormalities, taste disturbance, and stomach pain.  There is a rare possibility of liver failure that can occur when taking fluconazole.  The patient understands that monitoring of LFTs and kidney function test may be required, especially at baseline. The patient verbalized understanding of the proper use and possible adverse effects of fluconazole.  All of the patient's questions and concerns were addressed. Dupixent Counseling: I discussed with the patient the risks of dupilumab including but not limited to eye infection and irritation, cold sores, injection site reactions, worsening of asthma, allergic reactions and increased risk of parasitic infection.  Live vaccines should be avoided while taking dupilumab. Dupilumab will also interact with certain medications such as warfarin and cyclosporine. The patient understands that monitoring is required and they must alert us or the primary physician if symptoms of infection or other concerning signs are noted. Thalidomide Counseling: I discussed with the patient the risks of thalidomide including but not limited to birth defects, anxiety, weakness, chest pain, dizziness, cough and severe allergy. Cyclosporine Counseling:  I discussed with the patient the risks of cyclosporine including but not limited to hypertension, gingival hyperplasia,myelosuppression, immunosuppression, liver damage, kidney damage, neurotoxicity, lymphoma, and serious infections. The patient understands that monitoring is required including baseline blood pressure, CBC, CMP, lipid panel and uric acid, and then 1-2 times monthly CMP and blood pressure. Dupixent Pregnancy And Lactation Text: This medication likely crosses the placenta but the risk for the fetus is uncertain. This medication is excreted in breast milk. Fluconazole Pregnancy And Lactation Text: This medication is Pregnancy Category C and it isn't know if it is safe during pregnancy. It is also excreted in breast milk. Doxycycline Counseling:  Patient counseled regarding possible photosensitivity and increased risk for sunburn.  Patient instructed to avoid sunlight, if possible.  When exposed to sunlight, patients should wear protective clothing, sunglasses, and sunscreen.  The patient was instructed to call the office immediately if the following severe adverse effects occur:  hearing changes, easy bruising/bleeding, severe headache, or vision changes.  The patient verbalized understanding of the proper use and possible adverse effects of doxycycline.  All of the patient's questions and concerns were addressed. High Dose Vitamin A Counseling: Side effects reviewed, pt to contact office should one occur. Hydroxychloroquine Pregnancy And Lactation Text: This medication has been shown to cause fetal harm but it isn't assigned a Pregnancy Risk Category. There are small amounts excreted in breast milk. Albendazole Counseling:  I discussed with the patient the risks of albendazole including but not limited to cytopenia, kidney damage, nausea/vomiting and severe allergy.  The patient understands that this medication is being used in an off-label manner. Cyclosporine Pregnancy And Lactation Text: This medication is Pregnancy Category C and it isn't know if it is safe during pregnancy. This medication is excreted in breast milk. Picato Counseling:  I discussed with the patient the risks of Picato including but not limited to erythema, scaling, itching, weeping, crusting, and pain. Doxycycline Pregnancy And Lactation Text: This medication is Pregnancy Category D and not consider safe during pregnancy. It is also excreted in breast milk but is considered safe for shorter treatment courses. Thalidomide Pregnancy And Lactation Text: This medication is Pregnancy Category X and is absolutely contraindicated during pregnancy. It is unknown if it is excreted in breast milk. Carac Counseling:  I discussed with the patient the risks of Carac including but not limited to erythema, scaling, itching, weeping, crusting, and pain. Taltz Counseling: I discussed with the patient the risks of ixekizumab including but not limited to immunosuppression, serious infections, worsening of inflammatory bowel disease and drug reactions.  The patient understands that monitoring is required including a PPD at baseline and must alert us or the primary physician if symptoms of infection or other concerning signs are noted. Nsaids Counseling: NSAID Counseling: I discussed with the patient that NSAIDs should be taken with food. Prolonged use of NSAIDs can result in the development of stomach ulcers.  Patient advised to stop taking NSAIDs if abdominal pain occurs.  The patient verbalized understanding of the proper use and possible adverse effects of NSAIDs.  All of the patient's questions and concerns were addressed. High Dose Vitamin A Pregnancy And Lactation Text: High dose vitamin A therapy is contraindicated during pregnancy and breast feeding. Griseofulvin Counseling:  I discussed with the patient the risks of griseofulvin including but not limited to photosensitivity, cytopenia, liver damage, nausea/vomiting and severe allergy.  The patient understands that this medication is best absorbed when taken with a fatty meal (e.g., ice cream or french fries). Enbrel Counseling:  I discussed with the patient the risks of etanercept including but not limited to myelosuppression, immunosuppression, autoimmune hepatitis, demyelinating diseases, lymphoma, and infections.  The patient understands that monitoring is required including a PPD at baseline and must alert us or the primary physician if symptoms of infection or other concerning signs are noted. Methotrexate Counseling:  Patient counseled regarding adverse effects of methotrexate including but not limited to nausea, vomiting, abnormalities in liver function tests. Patients may develop mouth sores, rash, diarrhea, and abnormalities in blood counts. The patient understands that monitoring is required including LFT's and blood counts.  There is a rare possibility of scarring of the liver and lung problems that can occur when taking methotrexate. Persistent nausea, loss of appetite, pale stools, dark urine, cough, and shortness of breath should be reported immediately. Patient advised to discontinue methotrexate treatment at least three months before attempting to become pregnant.  I discussed the need for folate supplements while taking methotrexate.  These supplements can decrease side effects during methotrexate treatment. The patient verbalized understanding of the proper use and possible adverse effects of methotrexate.  All of the patient's questions and concerns were addressed. Arava Counseling:  Patient counseled regarding adverse effects of Arava including but not limited to nausea, vomiting, abnormalities in liver function tests. Patients may develop mouth sores, rash, diarrhea, and abnormalities in blood counts. The patient understands that monitoring is required including LFTs and blood counts.  There is a rare possibility of scarring of the liver and lung problems that can occur when taking methotrexate. Persistent nausea, loss of appetite, pale stools, dark urine, cough, and shortness of breath should be reported immediately. Patient advised to discontinue Arava treatment and consult with a physician prior to attempting conception. The patient will have to undergo a treatment to eliminate Arava from the body prior to conception. Valtrex Counseling: I discussed with the patient the risks of valacyclovir including but not limited to kidney damage, nausea, vomiting and severe allergy.  The patient understands that if the infection seems to be worsening or is not improving, they are to call. Albendazole Pregnancy And Lactation Text: This medication is Pregnancy Category C and it isn't known if it is safe during pregnancy. It is also excreted in breast milk. Carac Pregnancy And Lactation Text: This medication is Pregnancy Category X and contraindicated in pregnancy and in women who may become pregnant. It is unknown if this medication is excreted in breast milk. Erythromycin Counseling:  I discussed with the patient the risks of erythromycin including but not limited to GI upset, allergic reaction, drug rash, diarrhea, increase in liver enzymes, and yeast infections. Nsaids Pregnancy And Lactation Text: These medications are considered safe up to 30 weeks gestation. It is excreted in breast milk. Griseofulvin Pregnancy And Lactation Text: This medication is Pregnancy Category X and is known to cause serious birth defects. It is unknown if this medication is excreted in breast milk but breast feeding should be avoided. Ivermectin Counseling:  Patient instructed to take medication on an empty stomach with a full glass of water.  Patient informed of potential adverse effects including but not limited to nausea, diarrhea, dizziness, itching, and swelling of the extremities or lymph nodes.  The patient verbalized understanding of the proper use and possible adverse effects of ivermectin.  All of the patient's questions and concerns were addressed. Include Pregnancy/Lactation Warning?: No Protopic Counseling: Patient may experience a mild burning sensation during topical application. Protopic is not approved in children less than 2 years of age. There have been case reports of hematologic and skin malignancies in patients using topical calcineurin inhibitors although causality is questionable. 5-Fu Counseling: 5-Fluorouracil Counseling:  I discussed with the patient the risks of 5-fluorouracil including but not limited to erythema, scaling, itching, weeping, crusting, and pain. Humira Counseling:  I discussed with the patient the risks of adalimumab including but not limited to myelosuppression, immunosuppression, autoimmune hepatitis, demyelinating diseases, lymphoma, and serious infections.  The patient understands that monitoring is required including a PPD at baseline and must alert us or the primary physician if symptoms of infection or other concerning signs are noted. Benzoyl Peroxide Counseling: Patient counseled that medicine may cause skin irritation and bleach clothing.  In the event of skin irritation, the patient was advised to reduce the amount of the drug applied or use it less frequently.   The patient verbalized understanding of the proper use and possible adverse effects of benzoyl peroxide.  All of the patient's questions and concerns were addressed. Tremfya Counseling: I discussed with the patient the risks of guselkumab including but not limited to immunosuppression, serious infections, worsening of inflammatory bowel disease and drug reactions.  The patient understands that monitoring is required including a PPD at baseline and must alert us or the primary physician if symptoms of infection or other concerning signs are noted. Valtrex Pregnancy And Lactation Text: this medication is Pregnancy Category B and is considered safe during pregnancy. This medication is not directly found in breast milk but it's metabolite acyclovir is present. Erythromycin Pregnancy And Lactation Text: This medication is Pregnancy Category B and is considered safe during pregnancy. It is also excreted in breast milk. Clofazimine Counseling:  I discussed with the patient the risks of clofazimine including but not limited to skin and eye pigmentation, liver damage, nausea/vomiting, gastrointestinal bleeding and allergy. Protopic Pregnancy And Lactation Text: This medication is Pregnancy Category C. It is unknown if this medication is excreted in breast milk when applied topically. Odomzo Counseling- I discussed with the patient the risks of Odomzo including but not limited to nausea, vomiting, diarrhea, constipation, weight loss, changes in the sense of taste, decreased appetite, muscle spasms, and hair loss.  The patient verbalized understanding of the proper use and possible adverse effects of Odomzo.  All of the patient's questions and concerns were addressed. Itraconazole Counseling:  I discussed with the patient the risks of itraconazole including but not limited to liver damage, nausea/vomiting, neuropathy, and severe allergy.  The patient understands that this medication is best absorbed when taken with acidic beverages such as non-diet cola or ginger ale.  The patient understands that monitoring is required including baseline LFTs and repeat LFTs at intervals.  The patient understands that they are to contact us or the primary physician if concerning signs are noted. Benzoyl Peroxide Pregnancy And Lactation Text: This medication is Pregnancy Category C. It is unknown if benzoyl peroxide is excreted in breast milk. Metronidazole Counseling:  I discussed with the patient the risks of metronidazole including but not limited to seizures, nausea/vomiting, a metallic taste in the mouth, nausea/vomiting and severe allergy. Drysol Counseling:  I discussed with the patient the risks of drysol/aluminum chloride including but not limited to skin rash, itching, irritation, burning. Solaraze Counseling:  I discussed with the patient the risks of Solaraze including but not limited to erythema, scaling, itching, weeping, crusting, and pain. Metronidazole Pregnancy And Lactation Text: This medication is Pregnancy Category B and considered safe during pregnancy.  It is also excreted in breast milk. Ketoconazole Counseling:   Patient counseled regarding improving absorption with orange juice.  Adverse effects include but are not limited to breast enlargement, headache, diarrhea, nausea, upset stomach, liver function test abnormalities, taste disturbance, and stomach pain.  There is a rare possibility of liver failure that can occur when taking ketoconazole. The patient understands that monitoring of LFTs may be required, especially at baseline. The patient verbalized understanding of the proper use and possible adverse effects of ketoconazole.  All of the patient's questions and concerns were addressed. Ilumya Counseling: I discussed with the patient the risks of tildrakizumab including but not limited to immunosuppression, malignancy, posterior leukoencephalopathy syndrome, and serious infections.  The patient understands that monitoring is required including a PPD at baseline and must alert us or the primary physician if symptoms of infection or other concerning signs are noted. Xeljanz Counseling: I discussed with the patient the risks of Xeljanz therapy including increased risk of infection, liver issues, headache, diarrhea, or cold symptoms. Live vaccines should be avoided. They were instructed to call if they have any problems. Colchicine Counseling:  Patient counseled regarding adverse effects including but not limited to stomach upset (nausea, vomiting, stomach pain, or diarrhea).  Patient instructed to limit alcohol consumption while taking this medication.  Colchicine may reduce blood counts especially with prolonged use.  The patient understands that monitoring of kidney function and blood counts may be required, especially at baseline. The patient verbalized understanding of the proper use and possible adverse effects of colchicine.  All of the patient's questions and concerns were addressed. Otezla Counseling: The side effects of Otezla were discussed with the patient, including but not limited to worsening or new depression, weight loss, diarrhea, nausea, upper respiratory tract infection, and headache. Patient instructed to call the office should any adverse effect occur.  The patient verbalized understanding of the proper use and possible adverse effects of Otezla.  All the patient's questions and concerns were addressed. Solaraze Pregnancy And Lactation Text: This medication is Pregnancy Category B and is considered safe. There is some data to suggest avoiding during the third trimester. It is unknown if this medication is excreted in breast milk. Drysol Pregnancy And Lactation Text: This medication is considered safe during pregnancy and breast feeding. Xelnguyenz Pregnancy And Lactation Text: This medication is Pregnancy Category D and is not considered safe during pregnancy.  The risk during breast feeding is also uncertain. Ketoconazole Pregnancy And Lactation Text: This medication is Pregnancy Category C and it isn't know if it is safe during pregnancy. It is also excreted in breast milk and breast feeding isn't recommended. Minocycline Counseling: Patient advised regarding possible photosensitivity and discoloration of the teeth, skin, lips, tongue and gums.  Patient instructed to avoid sunlight, if possible.  When exposed to sunlight, patients should wear protective clothing, sunglasses, and sunscreen.  The patient was instructed to call the office immediately if the following severe adverse effects occur:  hearing changes, easy bruising/bleeding, severe headache, or vision changes.  The patient verbalized understanding of the proper use and possible adverse effects of minocycline.  All of the patient's questions and concerns were addressed. Otezla Pregnancy And Lactation Text: This medication is Pregnancy Category C and it isn't known if it is safe during pregnancy. It is unknown if it is excreted in breast milk. Topical Retinoid counseling:  Patient advised to apply a pea-sized amount only at bedtime and wait 30 minutes after washing their face before applying.  If too drying, patient may add a non-comedogenic moisturizer. The patient verbalized understanding of the proper use and possible adverse effects of retinoids.  All of the patient's questions and concerns were addressed. Xolair Counseling:  Patient informed of potential adverse effects including but not limited to fever, muscle aches, rash and allergic reactions.  The patient verbalized understanding of the proper use and possible adverse effects of Xolair.  All of the patient's questions and concerns were addressed. Elidel Counseling: Patient may experience a mild burning sensation during topical application. Elidel is not approved in children less than 2 years of age. There have been case reports of hematologic and skin malignancies in patients using topical calcineurin inhibitors although causality is questionable. Methotrexate Pregnancy And Lactation Text: This medication is Pregnancy Category X and is known to cause fetal harm. This medication is excreted in breast milk. Infliximab Counseling:  I discussed with the patient the risks of infliximab including but not limited to myelosuppression, immunosuppression, autoimmune hepatitis, demyelinating diseases, lymphoma, and serious infections.  The patient understands that monitoring is required including a PPD at baseline and must alert us or the primary physician if symptoms of infection or other concerning signs are noted. Terbinafine Counseling: Patient counseling regarding adverse effects of terbinafine including but not limited to headache, diarrhea, rash, upset stomach, liver function test abnormalities, itching, taste/smell disturbance, nausea, abdominal pain, and flatulence.  There is a rare possibility of liver failure that can occur when taking terbinafine.  The patient understands that a baseline LFT and kidney function test may be required. The patient verbalized understanding of the proper use and possible adverse effects of terbinafine.  All of the patient's questions and concerns were addressed. Azithromycin Counseling:  I discussed with the patient the risks of azithromycin including but not limited to GI upset, allergic reaction, drug rash, diarrhea, and yeast infections. Dapsone Counseling: I discussed with the patient the risks of dapsone including but not limited to hemolytic anemia, agranulocytosis, rashes, methemoglobinemia, kidney failure, peripheral neuropathy, headaches, GI upset, and liver toxicity.  Patients who start dapsone require monitoring including baseline LFTs and weekly CBCs for the first month, then every month thereafter.  The patient verbalized understanding of the proper use and possible adverse effects of dapsone.  All of the patient's questions and concerns were addressed. Prednisone Counseling:  I discussed with the patient the risks of prolonged use of prednisone including but not limited to weight gain, insomnia, osteoporosis, mood changes, diabetes, susceptibility to infection, glaucoma and high blood pressure.  In cases where prednisone use is prolonged, patients should be monitored with blood pressure checks, serum glucose levels and an eye exam.  Additionally, the patient may need to be placed on GI prophylaxis, PCP prophylaxis, and calcium and vitamin D supplementation and/or a bisphosphonate.  The patient verbalized understanding of the proper use and the possible adverse effects of prednisone.  All of the patient's questions and concerns were addressed. Oxybutynin Counseling:  I discussed with the patient the risks of oxybutynin including but not limited to skin rash, drowsiness, dry mouth, difficulty urinating, and blurred vision. Dapsone Pregnancy And Lactation Text: This medication is Pregnancy Category C and is not considered safe during pregnancy or breast feeding. Xolair Pregnancy And Lactation Text: This medication is Pregnancy Category B and is considered safe during pregnancy. This medication is excreted in breast milk. Quinolones Counseling:  I discussed with the patient the risks of fluoroquinolones including but not limited to GI upset, allergic reaction, drug rash, diarrhea, dizziness, photosensitivity, yeast infections, liver function test abnormalities, tendonitis/tendon rupture. Azithromycin Pregnancy And Lactation Text: This medication is considered safe during pregnancy and is also secreted in breast milk. Tazorac Counseling:  Patient advised that medication is irritating and drying.  Patient may need to apply sparingly and wash off after an hour before eventually leaving it on overnight.  The patient verbalized understanding of the proper use and possible adverse effects of tazorac.  All of the patient's questions and concerns were addressed. Eucrisa Counseling: Patient may experience a mild burning sensation during topical application. Eucrisa is not approved in children less than 2 years of age. Rituxan Counseling:  I discussed with the patient the risks of Rituxan infusions. Side effects can include infusion reactions, severe drug rashes including mucocutaneous reactions, reactivation of latent hepatitis and other infections and rarely progressive multifocal leukoencephalopathy.  All of the patient's questions and concerns were addressed. Erivedge Counseling- I discussed with the patient the risks of Erivedge including but not limited to nausea, vomiting, diarrhea, constipation, weight loss, changes in the sense of taste, decreased appetite, muscle spasms, and hair loss.  The patient verbalized understanding of the proper use and possible adverse effects of Erivedge.  All of the patient's questions and concerns were addressed. Bactrim Counseling:  I discussed with the patient the risks of sulfa antibiotics including but not limited to GI upset, allergic reaction, drug rash, diarrhea, dizziness, photosensitivity, and yeast infections.  Rarely, more serious reactions can occur including but not limited to aplastic anemia, agranulocytosis, methemoglobinemia, blood dyscrasias, liver or kidney failure, lung infiltrates or desquamative/blistering drug rashes. Azathioprine Counseling:  I discussed with the patient the risks of azathioprine including but not limited to myelosuppression, immunosuppression, hepatotoxicity, lymphoma, and infections.  The patient understands that monitoring is required including baseline LFTs, Creatinine, possible TPMP genotyping and weekly CBCs for the first month and then every 2 weeks thereafter.  The patient verbalized understanding of the proper use and possible adverse effects of azathioprine.  All of the patient's questions and concerns were addressed. Tazorac Pregnancy And Lactation Text: This medication is not safe during pregnancy. It is unknown if this medication is excreted in breast milk. Birth Control Pills Counseling: Birth Control Pill Counseling: I discussed with the patient the potential side effects of OCPs including but not limited to increased risk of stroke, heart attack, thrombophlebitis, deep venous thrombosis, hepatic adenomas, breast changes, GI upset, headaches, and depression.  The patient verbalized understanding of the proper use and possible adverse effects of OCPs. All of the patient's questions and concerns were addressed. Rituxan Pregnancy And Lactation Text: This medication is Pregnancy Category C and it isn't know if it is safe during pregnancy. It is unknown if this medication is excreted in breast milk but similar antibodies are known to be excreted. Cimetidine Counseling:  I discussed with the patient the risks of Cimetidine including but not limited to gynecomastia, headache, diarrhea, nausea, drowsiness, arrhythmias, pancreatitis, skin rashes, psychosis, bone marrow suppression and kidney toxicity.

## 2021-02-05 VITALS
OXYGEN SATURATION: 98 % | HEART RATE: 63 BPM | SYSTOLIC BLOOD PRESSURE: 184 MMHG | DIASTOLIC BLOOD PRESSURE: 79 MMHG | TEMPERATURE: 98 F | RESPIRATION RATE: 18 BRPM

## 2021-02-05 LAB
A1C WITH ESTIMATED AVERAGE GLUCOSE RESULT: 7.3 % — HIGH (ref 4–5.6)
ESTIMATED AVERAGE GLUCOSE: 163 MG/DL — HIGH (ref 68–114)
RAPID RVP RESULT: SIGNIFICANT CHANGE UP
SARS-COV-2 RNA SPEC QL NAA+PROBE: SIGNIFICANT CHANGE UP
TROPONIN T SERPL-MCNC: <0.01 NG/ML — SIGNIFICANT CHANGE UP

## 2021-02-05 PROCEDURE — 93010 ELECTROCARDIOGRAM REPORT: CPT

## 2021-02-05 PROCEDURE — 99217: CPT

## 2021-02-05 RX ORDER — LEVOTHYROXINE SODIUM 125 MCG
25 TABLET ORAL DAILY
Refills: 0 | Status: DISCONTINUED | OUTPATIENT
Start: 2021-02-05 | End: 2021-02-05

## 2021-02-05 RX ORDER — CLOPIDOGREL BISULFATE 75 MG/1
75 TABLET, FILM COATED ORAL DAILY
Refills: 0 | Status: COMPLETED | OUTPATIENT
Start: 2021-02-05 | End: 2021-02-05

## 2021-02-05 RX ORDER — DEXTROSE 50 % IN WATER 50 %
25 SYRINGE (ML) INTRAVENOUS ONCE
Refills: 0 | Status: DISCONTINUED | OUTPATIENT
Start: 2021-02-05 | End: 2021-02-05

## 2021-02-05 RX ORDER — INSULIN GLARGINE 100 [IU]/ML
16 INJECTION, SOLUTION SUBCUTANEOUS AT BEDTIME
Refills: 0 | Status: DISCONTINUED | OUTPATIENT
Start: 2021-02-05 | End: 2021-02-05

## 2021-02-05 RX ORDER — DEXTROSE 50 % IN WATER 50 %
15 SYRINGE (ML) INTRAVENOUS ONCE
Refills: 0 | Status: DISCONTINUED | OUTPATIENT
Start: 2021-02-05 | End: 2021-02-05

## 2021-02-05 RX ORDER — SODIUM CHLORIDE 9 MG/ML
1000 INJECTION, SOLUTION INTRAVENOUS
Refills: 0 | Status: DISCONTINUED | OUTPATIENT
Start: 2021-02-05 | End: 2021-02-05

## 2021-02-05 RX ORDER — METOPROLOL TARTRATE 50 MG
25 TABLET ORAL ONCE
Refills: 0 | Status: COMPLETED | OUTPATIENT
Start: 2021-02-05 | End: 2021-02-05

## 2021-02-05 RX ORDER — DEXTROSE 50 % IN WATER 50 %
12.5 SYRINGE (ML) INTRAVENOUS ONCE
Refills: 0 | Status: DISCONTINUED | OUTPATIENT
Start: 2021-02-05 | End: 2021-02-05

## 2021-02-05 RX ORDER — GLUCAGON INJECTION, SOLUTION 0.5 MG/.1ML
1 INJECTION, SOLUTION SUBCUTANEOUS ONCE
Refills: 0 | Status: DISCONTINUED | OUTPATIENT
Start: 2021-02-05 | End: 2021-02-05

## 2021-02-05 RX ORDER — ASPIRIN/CALCIUM CARB/MAGNESIUM 324 MG
81 TABLET ORAL DAILY
Refills: 0 | Status: DISCONTINUED | OUTPATIENT
Start: 2021-02-05 | End: 2021-02-05

## 2021-02-05 RX ADMIN — Medication 25 MILLIGRAM(S): at 01:19

## 2021-02-05 RX ADMIN — INSULIN GLARGINE 16 UNIT(S): 100 INJECTION, SOLUTION SUBCUTANEOUS at 01:19

## 2021-02-05 RX ADMIN — CLOPIDOGREL BISULFATE 75 MILLIGRAM(S): 75 TABLET, FILM COATED ORAL at 00:39

## 2021-02-05 RX ADMIN — Medication 25 MICROGRAM(S): at 06:43

## 2021-02-05 NOTE — ED CDU PROVIDER DISPOSITION NOTE - ATTENDING CONTRIBUTION TO CARE
86 yold female to Ed [facility] Paroxysmal afib, Dm, Cad with stents x 3, Gi Bleed, OA, hypothyroidism, spinal stenosis presents w/ palpitations. pt was concerned for rapid afib. no associated cp, sob, n,v,back pain, fever, cough or syncope.  pt also states for the past several weeks, she has had a labile blood sugar. pt follows up with endo but has not been able to get a hold of them for the past few days.

## 2021-02-05 NOTE — ED CDU PROVIDER SUBSEQUENT DAY NOTE - HISTORY
pt resting in obs without complaints - 2nd ce/ekg @ 2am; currently in nsr; cards consult placed; will continue to monitor.

## 2021-02-05 NOTE — ED CDU PROVIDER DISPOSITION NOTE - CARE PROVIDER_API CALL
Emmett Galloway  Cardiovascular Disease  [address] [place] [address]. [address] [place] [address]  Phone: [phone]  Fax: [phone]  Follow Up Time:

## 2021-02-05 NOTE — ED CDU PROVIDER DISPOSITION NOTE - PATIENT PORTAL LINK FT
You can access the FollowMyHealth Patient Portal offered by [facility] by registering at the following website: [email or web address]. By joining [employer]’s FollowMyHealth portal, you will also be able to view your health information using other applications (apps) compatible with our system.

## 2021-02-05 NOTE — ED CDU PROVIDER DISPOSITION NOTE - CLINICAL COURSE
In obs- serial trops neg, no tele events. BGL monitored. discussed w/ pt's cardiologist who will schedule pt for event monitor. discussed SW c/s w/ pt who declined it. pt understands to continue to f/u w/ pcp/endo for DM management

## 2021-02-05 NOTE — ED CDU PROVIDER SUBSEQUENT DAY NOTE - PROGRESS NOTE DETAILS
pt resting comfortably, placed call for pt's cardiologist, awaiting call back discussed case with Dr. Whitley recommends outpatient f/u for possible event monitor, pt made aware and comfortable with plan

## 2021-02-27 ENCOUNTER — OUTPATIENT (OUTPATIENT)
Dept: OUTPATIENT SERVICES | Facility: HOSPITAL | Age: 86
LOS: 1 days | Discharge: HOME | End: 2021-02-27

## 2021-02-27 DIAGNOSIS — Z11.59 ENCOUNTER FOR SCREENING FOR OTHER VIRAL DISEASES: ICD-10-CM

## 2021-02-27 DIAGNOSIS — Z90.49 ACQUIRED ABSENCE OF OTHER SPECIFIED PARTS OF DIGESTIVE TRACT: Chronic | ICD-10-CM

## 2021-02-27 DIAGNOSIS — Z90.89 ACQUIRED ABSENCE OF OTHER ORGANS: Chronic | ICD-10-CM

## 2021-02-27 DIAGNOSIS — H26.9 UNSPECIFIED CATARACT: Chronic | ICD-10-CM

## 2021-02-27 DIAGNOSIS — Z96.642 PRESENCE OF LEFT ARTIFICIAL HIP JOINT: Chronic | ICD-10-CM

## 2021-03-02 ENCOUNTER — OUTPATIENT (OUTPATIENT)
Dept: OUTPATIENT SERVICES | Facility: HOSPITAL | Age: 86
LOS: 1 days | Discharge: HOME | End: 2021-03-02
Payer: MEDICARE

## 2021-03-02 DIAGNOSIS — Z90.49 ACQUIRED ABSENCE OF OTHER SPECIFIED PARTS OF DIGESTIVE TRACT: Chronic | ICD-10-CM

## 2021-03-02 DIAGNOSIS — Z96.642 PRESENCE OF LEFT ARTIFICIAL HIP JOINT: Chronic | ICD-10-CM

## 2021-03-02 DIAGNOSIS — H26.9 UNSPECIFIED CATARACT: Chronic | ICD-10-CM

## 2021-03-02 DIAGNOSIS — Z90.89 ACQUIRED ABSENCE OF OTHER ORGANS: Chronic | ICD-10-CM

## 2021-03-02 PROCEDURE — 95811 POLYSOM 6/>YRS CPAP 4/> PARM: CPT | Mod: 26

## 2021-03-03 DIAGNOSIS — G47.33 OBSTRUCTIVE SLEEP APNEA (ADULT) (PEDIATRIC): ICD-10-CM

## 2021-03-04 PROBLEM — Z00.00 ENCOUNTER FOR PREVENTIVE HEALTH EXAMINATION: Status: ACTIVE | Noted: 2021-03-04

## 2021-04-02 ENCOUNTER — OUTPATIENT (OUTPATIENT)
Dept: OUTPATIENT SERVICES | Facility: HOSPITAL | Age: 86
LOS: 1 days | Discharge: HOME | End: 2021-04-02
Payer: MEDICARE

## 2021-04-02 DIAGNOSIS — Z90.89 ACQUIRED ABSENCE OF OTHER ORGANS: Chronic | ICD-10-CM

## 2021-04-02 DIAGNOSIS — Z90.49 ACQUIRED ABSENCE OF OTHER SPECIFIED PARTS OF DIGESTIVE TRACT: Chronic | ICD-10-CM

## 2021-04-02 DIAGNOSIS — R10.9 UNSPECIFIED ABDOMINAL PAIN: ICD-10-CM

## 2021-04-02 DIAGNOSIS — H26.9 UNSPECIFIED CATARACT: Chronic | ICD-10-CM

## 2021-04-02 DIAGNOSIS — Z96.642 PRESENCE OF LEFT ARTIFICIAL HIP JOINT: Chronic | ICD-10-CM

## 2021-04-02 PROCEDURE — 76700 US EXAM ABDOM COMPLETE: CPT | Mod: 26

## 2021-04-19 ENCOUNTER — APPOINTMENT (OUTPATIENT)
Dept: PULMONOLOGY | Facility: CLINIC | Age: 86
End: 2021-04-19
Payer: MEDICARE

## 2021-04-19 VITALS
RESPIRATION RATE: 14 BRPM | SYSTOLIC BLOOD PRESSURE: 126 MMHG | OXYGEN SATURATION: 96 % | WEIGHT: 185 LBS | HEIGHT: 58 IN | DIASTOLIC BLOOD PRESSURE: 64 MMHG | BODY MASS INDEX: 38.83 KG/M2 | HEART RATE: 64 BPM

## 2021-04-19 DIAGNOSIS — G47.33 OBSTRUCTIVE SLEEP APNEA (ADULT) (PEDIATRIC): ICD-10-CM

## 2021-04-19 PROCEDURE — 99213 OFFICE O/P EST LOW 20 MIN: CPT

## 2021-04-19 NOTE — HISTORY OF PRESENT ILLNESS
[Follow-Up - Routine Clinic] : a routine clinic follow-up of [Witnessed Gasping During Sleep] : witnessed gasping during sleep [Snoring] : snoring [Unrefreshing Sleep] : unrefreshing sleep [Sleepy When Sedentary] : sleepy when sedentary [None] : The patient is not currently being treated for this problem [de-identified] : had recent BiPAP titration had dramatic improvement in sleep quality

## 2021-05-05 ENCOUNTER — OUTPATIENT (OUTPATIENT)
Dept: OUTPATIENT SERVICES | Facility: HOSPITAL | Age: 86
LOS: 1 days | Discharge: HOME | End: 2021-05-05
Payer: MEDICARE

## 2021-05-05 DIAGNOSIS — M25.559 PAIN IN UNSPECIFIED HIP: ICD-10-CM

## 2021-05-05 DIAGNOSIS — H26.9 UNSPECIFIED CATARACT: Chronic | ICD-10-CM

## 2021-05-05 DIAGNOSIS — Z90.49 ACQUIRED ABSENCE OF OTHER SPECIFIED PARTS OF DIGESTIVE TRACT: Chronic | ICD-10-CM

## 2021-05-05 DIAGNOSIS — Z96.642 PRESENCE OF LEFT ARTIFICIAL HIP JOINT: Chronic | ICD-10-CM

## 2021-05-05 DIAGNOSIS — Z90.89 ACQUIRED ABSENCE OF OTHER ORGANS: Chronic | ICD-10-CM

## 2021-05-05 PROCEDURE — 73522 X-RAY EXAM HIPS BI 3-4 VIEWS: CPT | Mod: 26

## 2021-05-08 ENCOUNTER — OUTPATIENT (OUTPATIENT)
Dept: OUTPATIENT SERVICES | Facility: HOSPITAL | Age: 86
LOS: 1 days | Discharge: HOME | End: 2021-05-08
Payer: MEDICARE

## 2021-05-08 DIAGNOSIS — C25.9 MALIGNANT NEOPLASM OF PANCREAS, UNSPECIFIED: ICD-10-CM

## 2021-05-08 DIAGNOSIS — H26.9 UNSPECIFIED CATARACT: Chronic | ICD-10-CM

## 2021-05-08 DIAGNOSIS — Z90.49 ACQUIRED ABSENCE OF OTHER SPECIFIED PARTS OF DIGESTIVE TRACT: Chronic | ICD-10-CM

## 2021-05-08 DIAGNOSIS — Z96.642 PRESENCE OF LEFT ARTIFICIAL HIP JOINT: Chronic | ICD-10-CM

## 2021-05-08 DIAGNOSIS — Z90.89 ACQUIRED ABSENCE OF OTHER ORGANS: Chronic | ICD-10-CM

## 2021-05-08 DIAGNOSIS — R97.8 OTHER ABNORMAL TUMOR MARKERS: ICD-10-CM

## 2021-05-08 PROCEDURE — 74183 MRI ABD W/O CNTR FLWD CNTR: CPT | Mod: 26,MH

## 2021-07-26 ENCOUNTER — APPOINTMENT (OUTPATIENT)
Age: 86
End: 2021-07-26

## 2021-08-31 ENCOUNTER — INPATIENT (INPATIENT)
Facility: HOSPITAL | Age: 86
LOS: 0 days | Discharge: HOME HEALTH | End: 2021-09-01
Attending: INTERNAL MEDICINE | Admitting: INTERNAL MEDICINE
Payer: MEDICARE

## 2021-08-31 VITALS
HEART RATE: 124 BPM | OXYGEN SATURATION: 94 % | RESPIRATION RATE: 20 BRPM | SYSTOLIC BLOOD PRESSURE: 118 MMHG | DIASTOLIC BLOOD PRESSURE: 82 MMHG | TEMPERATURE: 98 F | WEIGHT: 184.97 LBS

## 2021-08-31 DIAGNOSIS — Z90.49 ACQUIRED ABSENCE OF OTHER SPECIFIED PARTS OF DIGESTIVE TRACT: Chronic | ICD-10-CM

## 2021-08-31 DIAGNOSIS — H26.9 UNSPECIFIED CATARACT: Chronic | ICD-10-CM

## 2021-08-31 DIAGNOSIS — Z96.642 PRESENCE OF LEFT ARTIFICIAL HIP JOINT: Chronic | ICD-10-CM

## 2021-08-31 DIAGNOSIS — Z90.89 ACQUIRED ABSENCE OF OTHER ORGANS: Chronic | ICD-10-CM

## 2021-08-31 LAB
ALBUMIN SERPL ELPH-MCNC: 3.8 G/DL — SIGNIFICANT CHANGE UP (ref 3.5–5.2)
ALP SERPL-CCNC: 80 U/L — SIGNIFICANT CHANGE UP (ref 30–115)
ALT FLD-CCNC: 15 U/L — SIGNIFICANT CHANGE UP (ref 0–41)
ANION GAP SERPL CALC-SCNC: 20 MMOL/L — HIGH (ref 7–14)
APPEARANCE UR: CLEAR — SIGNIFICANT CHANGE UP
AST SERPL-CCNC: 21 U/L — SIGNIFICANT CHANGE UP (ref 0–41)
BACTERIA # UR AUTO: NEGATIVE — SIGNIFICANT CHANGE UP
BASOPHILS # BLD AUTO: 0.1 K/UL — SIGNIFICANT CHANGE UP (ref 0–0.2)
BASOPHILS NFR BLD AUTO: 0.6 % — SIGNIFICANT CHANGE UP (ref 0–1)
BILIRUB SERPL-MCNC: 0.4 MG/DL — SIGNIFICANT CHANGE UP (ref 0.2–1.2)
BILIRUB UR-MCNC: NEGATIVE — SIGNIFICANT CHANGE UP
BUN SERPL-MCNC: 14 MG/DL — SIGNIFICANT CHANGE UP (ref 10–20)
CALCIUM SERPL-MCNC: 9.4 MG/DL — SIGNIFICANT CHANGE UP (ref 8.5–10.1)
CHLORIDE SERPL-SCNC: 98 MMOL/L — SIGNIFICANT CHANGE UP (ref 98–110)
CO2 SERPL-SCNC: 21 MMOL/L — SIGNIFICANT CHANGE UP (ref 17–32)
COLOR SPEC: SIGNIFICANT CHANGE UP
CREAT SERPL-MCNC: 0.8 MG/DL — SIGNIFICANT CHANGE UP (ref 0.7–1.5)
DIFF PNL FLD: NEGATIVE — SIGNIFICANT CHANGE UP
EOSINOPHIL # BLD AUTO: 0.35 K/UL — SIGNIFICANT CHANGE UP (ref 0–0.7)
EOSINOPHIL NFR BLD AUTO: 2 % — SIGNIFICANT CHANGE UP (ref 0–8)
EPI CELLS # UR: 4 /HPF — SIGNIFICANT CHANGE UP (ref 0–5)
GLUCOSE BLDC GLUCOMTR-MCNC: 122 MG/DL — HIGH (ref 70–99)
GLUCOSE BLDC GLUCOMTR-MCNC: 128 MG/DL — HIGH (ref 70–99)
GLUCOSE BLDC GLUCOMTR-MCNC: 153 MG/DL — HIGH (ref 70–99)
GLUCOSE BLDC GLUCOMTR-MCNC: 209 MG/DL — HIGH (ref 70–99)
GLUCOSE SERPL-MCNC: 235 MG/DL — HIGH (ref 70–99)
GLUCOSE UR QL: ABNORMAL
HCT VFR BLD CALC: 38 % — SIGNIFICANT CHANGE UP (ref 37–47)
HGB BLD-MCNC: 12.4 G/DL — SIGNIFICANT CHANGE UP (ref 12–16)
HYALINE CASTS # UR AUTO: 1 /LPF — SIGNIFICANT CHANGE UP (ref 0–7)
IMM GRANULOCYTES NFR BLD AUTO: 0.9 % — HIGH (ref 0.1–0.3)
INR BLD: 0.89 RATIO — SIGNIFICANT CHANGE UP (ref 0.65–1.3)
KETONES UR-MCNC: ABNORMAL
LEUKOCYTE ESTERASE UR-ACNC: ABNORMAL
LYMPHOCYTES # BLD AUTO: 1.93 K/UL — SIGNIFICANT CHANGE UP (ref 1.2–3.4)
LYMPHOCYTES # BLD AUTO: 11.1 % — LOW (ref 20.5–51.1)
MAGNESIUM SERPL-MCNC: 1.7 MG/DL — LOW (ref 1.8–2.4)
MCHC RBC-ENTMCNC: 30.2 PG — SIGNIFICANT CHANGE UP (ref 27–31)
MCHC RBC-ENTMCNC: 32.6 G/DL — SIGNIFICANT CHANGE UP (ref 32–37)
MCV RBC AUTO: 92.7 FL — SIGNIFICANT CHANGE UP (ref 81–99)
MONOCYTES # BLD AUTO: 1.54 K/UL — HIGH (ref 0.1–0.6)
MONOCYTES NFR BLD AUTO: 8.9 % — SIGNIFICANT CHANGE UP (ref 1.7–9.3)
NEUTROPHILS # BLD AUTO: 13.32 K/UL — HIGH (ref 1.4–6.5)
NEUTROPHILS NFR BLD AUTO: 76.5 % — HIGH (ref 42.2–75.2)
NITRITE UR-MCNC: NEGATIVE — SIGNIFICANT CHANGE UP
NRBC # BLD: 0 /100 WBCS — SIGNIFICANT CHANGE UP (ref 0–0)
NT-PROBNP SERPL-SCNC: 808 PG/ML — HIGH (ref 0–300)
PH UR: 6.5 — SIGNIFICANT CHANGE UP (ref 5–8)
PLATELET # BLD AUTO: 507 K/UL — HIGH (ref 130–400)
POTASSIUM SERPL-MCNC: 3.6 MMOL/L — SIGNIFICANT CHANGE UP (ref 3.5–5)
POTASSIUM SERPL-SCNC: 3.6 MMOL/L — SIGNIFICANT CHANGE UP (ref 3.5–5)
PROT SERPL-MCNC: 6.5 G/DL — SIGNIFICANT CHANGE UP (ref 6–8)
PROT UR-MCNC: SIGNIFICANT CHANGE UP
PROTHROM AB SERPL-ACNC: 10.2 SEC — SIGNIFICANT CHANGE UP (ref 9.95–12.87)
RBC # BLD: 4.1 M/UL — LOW (ref 4.2–5.4)
RBC # FLD: 14.5 % — SIGNIFICANT CHANGE UP (ref 11.5–14.5)
RBC CASTS # UR COMP ASSIST: 6 /HPF — HIGH (ref 0–4)
SARS-COV-2 RNA SPEC QL NAA+PROBE: SIGNIFICANT CHANGE UP
SODIUM SERPL-SCNC: 139 MMOL/L — SIGNIFICANT CHANGE UP (ref 135–146)
SP GR SPEC: 1.01 — SIGNIFICANT CHANGE UP (ref 1.01–1.03)
TROPONIN T SERPL-MCNC: <0.01 NG/ML — SIGNIFICANT CHANGE UP
UROBILINOGEN FLD QL: SIGNIFICANT CHANGE UP
WBC # BLD: 17.4 K/UL — HIGH (ref 4.8–10.8)
WBC # FLD AUTO: 17.4 K/UL — HIGH (ref 4.8–10.8)
WBC UR QL: 5 /HPF — SIGNIFICANT CHANGE UP (ref 0–5)

## 2021-08-31 PROCEDURE — 71275 CT ANGIOGRAPHY CHEST: CPT | Mod: 26,MA

## 2021-08-31 PROCEDURE — 93010 ELECTROCARDIOGRAM REPORT: CPT

## 2021-08-31 PROCEDURE — 99222 1ST HOSP IP/OBS MODERATE 55: CPT

## 2021-08-31 PROCEDURE — 71045 X-RAY EXAM CHEST 1 VIEW: CPT | Mod: 26

## 2021-08-31 PROCEDURE — 93010 ELECTROCARDIOGRAM REPORT: CPT | Mod: 76

## 2021-08-31 PROCEDURE — 93306 TTE W/DOPPLER COMPLETE: CPT | Mod: 26

## 2021-08-31 PROCEDURE — 99291 CRITICAL CARE FIRST HOUR: CPT | Mod: GC

## 2021-08-31 RX ORDER — METOPROLOL TARTRATE 50 MG
5 TABLET ORAL ONCE
Refills: 0 | Status: COMPLETED | OUTPATIENT
Start: 2021-08-31 | End: 2021-08-31

## 2021-08-31 RX ORDER — ASPIRIN/CALCIUM CARB/MAGNESIUM 324 MG
81 TABLET ORAL ONCE
Refills: 0 | Status: COMPLETED | OUTPATIENT
Start: 2021-08-31 | End: 2021-08-31

## 2021-08-31 RX ORDER — SODIUM CHLORIDE 9 MG/ML
1000 INJECTION INTRAMUSCULAR; INTRAVENOUS; SUBCUTANEOUS
Refills: 0 | Status: DISCONTINUED | OUTPATIENT
Start: 2021-08-31 | End: 2021-09-01

## 2021-08-31 RX ORDER — NITROGLYCERIN 6.5 MG
0.4 CAPSULE, EXTENDED RELEASE ORAL ONCE
Refills: 0 | Status: COMPLETED | OUTPATIENT
Start: 2021-08-31 | End: 2021-08-31

## 2021-08-31 RX ORDER — CLOPIDOGREL BISULFATE 75 MG/1
75 TABLET, FILM COATED ORAL DAILY
Refills: 0 | Status: DISCONTINUED | OUTPATIENT
Start: 2021-08-31 | End: 2021-09-01

## 2021-08-31 RX ORDER — MAGNESIUM SULFATE 500 MG/ML
2 VIAL (ML) INJECTION ONCE
Refills: 0 | Status: COMPLETED | OUTPATIENT
Start: 2021-08-31 | End: 2021-08-31

## 2021-08-31 RX ORDER — CHLORHEXIDINE GLUCONATE 213 G/1000ML
1 SOLUTION TOPICAL
Refills: 0 | Status: DISCONTINUED | OUTPATIENT
Start: 2021-08-31 | End: 2021-09-01

## 2021-08-31 RX ORDER — ASPIRIN/CALCIUM CARB/MAGNESIUM 324 MG
81 TABLET ORAL DAILY
Refills: 0 | Status: DISCONTINUED | OUTPATIENT
Start: 2021-08-31 | End: 2021-09-01

## 2021-08-31 RX ORDER — PANTOPRAZOLE SODIUM 20 MG/1
40 TABLET, DELAYED RELEASE ORAL
Refills: 0 | Status: DISCONTINUED | OUTPATIENT
Start: 2021-08-31 | End: 2021-09-01

## 2021-08-31 RX ORDER — ASPIRIN/CALCIUM CARB/MAGNESIUM 324 MG
81 TABLET ORAL AT BEDTIME
Refills: 0 | Status: DISCONTINUED | OUTPATIENT
Start: 2021-08-31 | End: 2021-08-31

## 2021-08-31 RX ORDER — HEPARIN SODIUM 5000 [USP'U]/ML
INJECTION INTRAVENOUS; SUBCUTANEOUS
Qty: 25000 | Refills: 0 | Status: DISCONTINUED | OUTPATIENT
Start: 2021-08-31 | End: 2021-08-31

## 2021-08-31 RX ORDER — VANCOMYCIN HCL 1 G
125 VIAL (EA) INTRAVENOUS EVERY 6 HOURS
Refills: 0 | Status: DISCONTINUED | OUTPATIENT
Start: 2021-08-31 | End: 2021-09-01

## 2021-08-31 RX ORDER — ATORVASTATIN CALCIUM 80 MG/1
40 TABLET, FILM COATED ORAL AT BEDTIME
Refills: 0 | Status: DISCONTINUED | OUTPATIENT
Start: 2021-08-31 | End: 2021-08-31

## 2021-08-31 RX ORDER — SODIUM CHLORIDE 9 MG/ML
500 INJECTION, SOLUTION INTRAVENOUS ONCE
Refills: 0 | Status: COMPLETED | OUTPATIENT
Start: 2021-08-31 | End: 2021-08-31

## 2021-08-31 RX ORDER — FOLIC ACID 0.8 MG
1 TABLET ORAL DAILY
Refills: 0 | Status: DISCONTINUED | OUTPATIENT
Start: 2021-08-31 | End: 2021-09-01

## 2021-08-31 RX ORDER — METOPROLOL TARTRATE 50 MG
25 TABLET ORAL DAILY
Refills: 0 | Status: DISCONTINUED | OUTPATIENT
Start: 2021-08-31 | End: 2021-09-01

## 2021-08-31 RX ADMIN — Medication 5 MILLIGRAM(S): at 04:28

## 2021-08-31 RX ADMIN — HEPARIN SODIUM 1000 UNIT(S)/HR: 5000 INJECTION INTRAVENOUS; SUBCUTANEOUS at 05:06

## 2021-08-31 RX ADMIN — Medication 81 MILLIGRAM(S): at 05:06

## 2021-08-31 RX ADMIN — Medication 5 MILLIGRAM(S): at 02:51

## 2021-08-31 RX ADMIN — Medication 25 GRAM(S): at 04:28

## 2021-08-31 RX ADMIN — Medication 125 MILLIGRAM(S): at 19:26

## 2021-08-31 RX ADMIN — Medication 125 MILLIGRAM(S): at 23:07

## 2021-08-31 RX ADMIN — CLOPIDOGREL BISULFATE 75 MILLIGRAM(S): 75 TABLET, FILM COATED ORAL at 10:37

## 2021-08-31 RX ADMIN — Medication 0.4 MILLIGRAM(S): at 05:06

## 2021-08-31 RX ADMIN — Medication 25 MILLIGRAM(S): at 06:58

## 2021-08-31 RX ADMIN — Medication 1 MILLIGRAM(S): at 14:29

## 2021-08-31 RX ADMIN — PANTOPRAZOLE SODIUM 40 MILLIGRAM(S): 20 TABLET, DELAYED RELEASE ORAL at 06:58

## 2021-08-31 RX ADMIN — SODIUM CHLORIDE 500 MILLILITER(S): 9 INJECTION, SOLUTION INTRAVENOUS at 02:51

## 2021-08-31 RX ADMIN — Medication 125 MILLIGRAM(S): at 14:29

## 2021-08-31 NOTE — ED PROVIDER NOTE - NS ED ROS FT
Constitutional: No fevers, chills, or malaise.  HEENT: No headache, visual changes, eye pain, hearing changes, ear pain, running nose, or sore throat.  Cardiac:  Reports chest pain and leg edema. No chest pain or leg pain.  Respiratory:  No cough, respiratory distress, or hemoptysis.  GI:  No nausea, vomiting, diarrhea, or abdominal pain.  :  No dysuria, frequency, or urgency.  MS:  No myalgia, muscle weakness, joint pain or back pain.  Neuro:  No dizziness, LOC, paralysis, or N/T.  Skin:  No skin rash.   Endocrine: No polyuria, polyphagia, or polydipsia.

## 2021-08-31 NOTE — ED PROVIDER NOTE - ST/T WAVE
ST elevation aVL, hyperacute T waves in I, ST depressions II, III, avF, V4-V6 ST elevations I, avL.  ST depressions in III, avF, V4-V6

## 2021-08-31 NOTE — H&P ADULT - ATTENDING COMMENTS
85 y/o F with h/o CAD s/p PCI, afib, recent diagnosis of pancreatic cancer admitted with STEMI s/p MEAGAN to OM1.     Continuous telemetry monitoring   DAPT and statin therapy  Get TTE

## 2021-08-31 NOTE — CONSULT NOTE ADULT - ASSESSMENT
Impression   -Acute coronary syndrome  -Afib w RVR converted to NSR with dynamic EKG changes  -CAD s/p PCI x3, last cath in 2019 showed non-obstructive CAD  -DM II  -hypothyroidism   -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 at [facility]      Recommendations   -code stemi canceled   -admit to CCU  -trend troponin   -2d echo  -load with aspirin  -start heparin drip acs protocol   -cont with home meds plavix, metoprolol   -if recurrent chest pain can start on nitro drip     discussed with STEMI attending on call Impression   -Acute coronary syndrome  -Afib w RVR converted to NSR with dynamic EKG changes  -CAD s/p PCI x3, last cath in 2019 showed non-obstructive CAD  -DM II  -hypothyroidism   -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 at [facility]      Recommendations   -code stemi canceled   -admit to CCU  -trend troponin   -2d echo  -load with aspirin  -start heparin drip acs protocol   -cont with home meds plavix, metoprolol   -scheduled for cardiac cath today     Impression   -Acute coronary syndrome  -Afib w RVR converted to NSR with dynamic EKG changes  -CAD s/p PCI x3, last cath in 2019 showed non-obstructive CAD  -DM II  -hypothyroidism   -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

## 2021-08-31 NOTE — CONSULT NOTE ADULT - SUBJECTIVE AND OBJECTIVE BOX
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 This was associated with chest pressure, jaw pain and left arm pain. patient denies fever, n/v/d, bowel and urinary symptoms.   [place] [facility] [place].   IN the ED patient was found to be in Afib w RVR, s/p 10 of IV metoprolol. patient converted into NSR and EKG showed elevation in leads AVL and lead I. STEMI code was called.     PAST MEDICAL & SURGICAL HISTORY  Hypothyroid    CAD (coronary artery disease)    Acute MI    CAD S/P percutaneous coronary angioplasty    DM (diabetes mellitus), secondary    H/O spinal stenosis    Polymyalgia    Arthritis    IBS (irritable bowel syndrome)    S/P tonsillectomy    History of cholecystectomy    History of appendectomy    Cataract of both eyes    History of hip replacement, total, left        FAMILY HISTORY:  FAMILY HISTORY:  No pertinent family history in first degree relatives        SOCIAL HISTORY:  []smoker  []Alcohol  []Drug    ALLERGIES:  Percocet 5/325 (Unknown)      MEDICATIONS:  MEDICATIONS  (STANDING):  aspirin  chewable 81 milliGRAM(s) Oral once  aspirin  chewable 81 milliGRAM(s) Oral once  aspirin  chewable 81 milliGRAM(s) Oral once  heparin  Infusion.  Unit(s)/Hr (10 mL/Hr) IV Continuous <Continuous>  nitroglycerin     SubLingual 0.4 milliGRAM(s) SubLingual Once    MEDICATIONS  (PRN):      HOME MEDICATIONS:  Home Medications:  Jeffry Thyroid 15 mg oral tablet: 1 tab(s) orally once a day (10 Jul 2019 10:47)  aspirin 81 mg oral tablet, chewable: orally once a day (at bedtime) (10 Jul 2019 10:47)  clopidogrel 75 mg oral tablet: 1 tab(s) orally once a day (at bedtime) (10 Jul 2019 10:47)  escitalopram 20 mg oral tablet: 1 tab(s) orally once a day (10 Jul 2019 10:47)  folic acid 1 mg oral tablet: 1 tab(s) orally once a day (10 Jul 2019 10:47)  glimepiride 1 mg oral tablet: 1 tab(s) orally once a day (10 Jul 2019 10:47)  Jardiance 10 mg oral tablet: 1 tab(s) orally once a day (in the morning) (10 Jul 2019 10:47)  metoprolol succinate 25 mg oral tablet, extended release: orally once a day (at bedtime) (10 Jul 2019 10:47)  Victoza 18 mg/3 mL subcutaneous solution: subcutaneous once a day (10 Jul 2019 10:47)      VITALS:   T(F): 98.2 (08-31 @ 02:10), Max: 98.2 (08-31 @ 02:10)  HR: 78 (08-31 @ 04:36) (78 - 124)  BP: 136/60 (08-31 @ 04:36) (106/68 - 136/60)  BP(mean): --  RR: 20 (08-31 @ 04:36) (20 - 20)  SpO2: 96% (08-31 @ 04:36) (94% - 96%)    I&O's Summary      REVIEW OF SYSTEMS:  CONSTITUTIONAL: No weakness, fevers or chills  EYES: No visual changes  ENT: No vertigo or throat pain   NECK: No pain or stiffness  RESPIRATORY: Positive for SOB  CARDIOVASCULAR: Positive for chest pain or palpitations  GASTROINTESTINAL: No abdominal or epigastric pain. No nausea, vomiting, or hematemesis; No diarrhea or constipation. No melena or hematochezia.  GENITOURINARY: No dysuria, frequency or hematuria  NEUROLOGICAL: No numbness or weakness  SKIN: No itching, no rashes  MSK: No pain    PHYSICAL EXAM:  NEURO: patient is awake , alert and oriented  GEN: Not in acute distress  NECK: no thyroid enlargement, no JVD  LUNGS: Clear to auscultation bilaterally   CARDIOVASCULAR: S1/S2 present, RRR , no murmurs or rubs, no carotid bruits,  + PP bilaterally  ABD: Soft, non-tender, non-distended, +BS  EXT: No JOSE  SKIN: Intact    LABS:                        12.4   17.40 )-----------( 507      ( 31 Aug 2021 02:30 )             38.0     08-31    139  |  98  |  14  ----------------------------<  235<H>  3.6   |  21  |  0.8    Ca    9.4      31 Aug 2021 02:30  Mg     1.7     08-31    TPro  6.5  /  Alb  3.8  /  TBili  0.4  /  DBili  x   /  AST  21  /  ALT  15  /  AlkPhos  80  08-31      Troponin T, Serum: <0.01 ng/mL (08-31-21 @ 02:30)    CARDIAC MARKERS ( 31 Aug 2021 02:30 )  x     / <0.01 ng/mL / x     / x     / x            Troponin trend:    Serum Pro-Brain Natriuretic Peptide: 808 pg/mL (08-31-21 @ 02:30)          RADIOLOGY:  -CXR:  mild intersitial opacities     -cath  < from: Cardiac Cath Lab - Adult (07.10.19 @ 12:26) >       CORONARY CIRCULATION: The coronary circulation is right dominant. Left    main: Normal. The vessel was normal sized. Angiography showed minor    luminal irregularities with no flow limiting lesions. LAD: The vessel was    normal sized. Angiography showed minor luminal irregularities with no flow    limiting lesions. Patent stent. Circumflex: The vessel was medium sized.    Angiography showed minor luminal irregularities with no flow limiting    lesions. Patent stent. RCA: The vessel was large sized (dominant). There    was a discrete 50 % stenosis at a site with no prior intervention, in the    distal third of the vessel segment. There was KEELY grade 3 flow through    the vessel (brisk flow).         COMPLICATIONS: There were no complications.         IMPRESSIONS: There is no significant coronary artery disease. There is no    evidence of obstruction to explain the reported symptoms; the stress test    appears to be a false positive. Non-obstructive coronary artery disease.           < end of copied text >      ECG:  -Afib w RVR  -NSR with PACs, ST segment elevation in I and AVL

## 2021-08-31 NOTE — H&P ADULT - NSHPPHYSICALEXAM_GEN_ALL_CORE
GENERAL: NAD, lying in bed complained of chest discomfort  HEAD:  Atraumatic, Normocephalic  EYES: EOMI, PERRLA, conjunctiva and sclera clear  ENT: Moist mucous membranes  NECK: Supple, No JVD  CHEST/LUNG: Clear to auscultation bilaterally; No rales, rhonchi, wheezing, or rubs. Unlabored respirations  HEART: Regular rate and rhythm; No murmurs, rubs, or gallops  ABDOMEN: Bowel sounds present; Soft, Nontender, Nondistended  EXTREMITIES:  2+ Peripheral Pulses, brisk capillary refill.  NERVOUS SYSTEM:  Alert & Oriented X3, speech clear. No deficits   MSK: FROM all 4 extremities, full and equal strength  SKIN: No rashes or lesions

## 2021-08-31 NOTE — H&P ADULT - ASSESSMENT
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 This was associated with chest pressure, jaw pain and left arm pain. Patient denies fever, n/v/d, bowel and urinary symptoms. Patient had no other complaints. She is on vancomycin oral course for recently diagnosed c. diff.    IN the ED patient was found to be in Afib w RVR, s/p 10 of IV metoprolol. He converted into NSR and EKG showed elevation in leads AVL and lead I. STEMI code was called. Patient was assessed by cardiology and was admitted to CCU for further workup.      Acute coronary syndrome:CAD s/p PCI x3, last cath in 2019 showed non-obstructive CAD  -Afib w RVR converted to NSR with dynamic EKG changes  -trend troponin   -2d echo  -loaded with aspirin  -c/ w heparin drip acs protocol, baseline ptt requested from ER  -f/u PTT at 11 am and 4 pm  -cont with home meds plavix, metoprolol   -if recurrent chest pain can start on nitro drip   -possible cath during this admission    DM II  -hold oral antihyperglycemics  --180  -DASH diet    hypothyroidism   -c/w home meds.    Recent diagnosis of adenoCA of pancrease s/p resection complicated by c,. diff [place] 2 weeks ago [place] [facility] [place]  -c/w oncology workup OP.   -finish the course of oral vancomycin       DVT PPX  FULL CODE  CHG  AIT  NPO until plan for cath is clear

## 2021-08-31 NOTE — PATIENT PROFILE ADULT - IS THERE A SUSPICION OF ABUSE/NEGLIGENCE?
Pt CO spon Epistaxis this morning.  Pt states "I woke up, had my morning coffee, and it just started bleeding."  Pt endorses Hx of HTN and states "I haven't taken my medications in a while."  Pt denies falls, dizziness, N/V/D, SOB, Feves and CP.  Speaking in complete sentences without difficulty. no

## 2021-08-31 NOTE — ED PROVIDER NOTE - PHYSICAL EXAMINATION
GENERAL: Well-developed; well-nourished; in no acute distress.   SKIN: warm, dry  HEAD: Normocephalic; atraumatic.  EYES: PERRLA, EOMI  CARD: S1, S2 normal; no murmurs, gallops, or rubs. Tachycardic irregular rate   RESP: LCTAB; No wheezes, rales, rhonchi, or stridor.  ABD: soft, nontender, and nondistended  EXT: No LE TTP or edema bilaterally.  NEURO: Alert, oriented, grossly unremarkable  PSYCH: Cooperative, appropriate.

## 2021-08-31 NOTE — PATIENT PROFILE ADULT - NSPROPTRIGHTNOTIFY_GEN_A_NUR
[facility] Daily Progress     Patient Name: Liana Briceño  Date: 3/20/2018  Visit #: 5  Referral Diagnosis: carpal tunnel   Referring provider: Mimi Fuentes MD  Visit Diagnosis:     ICD-10-CM    1. Wrist pain, right M25.531    2. Weakness of right hand R29.898    3. Incoordination R27.9    4. Decreased activities of daily living (ADL) Z78.9        Assessment:     Patient demonstrates understanding/independence with home program. She has not made much progress and has seen her PMD last week. She reports that they discussed he symptoms and that she will be staying on the Gabapentin.    Goal Status:  Patient will be independent with home exercise program in: 4 weeks  Patient will be able to: lift;carry;reach;for housework;for dressing;for grooming/hygiene;for grocery shopping;with less pain;with less difficulty;in 12 weeks  Patient will be able to  & pinch: ;hold;pinch;for dressing;for hygiene;for meal prep;for grooming;for household chores;for writing;with less pain;with less difficulty;in 12 weeks  Patient will be able to: lift;carry;for household chores;for grocery shopping;with less pain;with less difficulty;in 12 weeks  Patient will improve hand/finger coordination for: fasteners;writing;typing;meal prep;dressing;for grooming/hygiene;with less pain;with less difficulty;in 12 weeks    Plan / Patient Education:     Continue with initial plan of care.  Progress with home program as tolerated.    Subjective:     Pain rating at rest: 5 tingling not pain  Pain rating with activity: 10      Objective:     Treatment Today: Applied kinesiotape from palm to elbow. Reviewed isometric strengthening with 2# in pronation/supination, wrist flexion/extension and  strength. Scar massage to right incision area.  TREATMENT MINUTES COMMENTS   Evaluation     Self-care/ Home management     Manual therapy 6    Neuromuscular Re-education 4    Therapeutic Exercises 13    Iontophoresis     Orthotic Fitting      Total 23    Blank areas are intentional and mean the treatment did not include these items.       Sunshine Bates  3/20/2018  11:06-11:29 AM         yes

## 2021-08-31 NOTE — PATIENT PROFILE ADULT - IS PATIENT POST-MENOPAUSAL?
ED Medical Screen (RME)





- General


Chief Complaint: Leg Pain


Stated Complaint: LEFT LEG PAIN


Time Seen by Provider: 10/19/20 16:55


Primary Care Provider: 


FELIPE PLUNKETT DO [Primary Care Provider] - Follow up as needed


TRAVEL OUTSIDE OF THE [place] IN LAST 30 DAYS: No





- HPI


Notes: 





10/19/20 17:03


55-year-old male to the emergency department with complaints of left foot pain 

for the past several weeks that has now gotten significantly worse and also left

lower leg pain for the past several days.  Wife states that the leg felt warm to

touch last night.  Patient states it is worse when he walks on it.  He does have

a past medical history pertinent for DVT in his leg in 2018.  He was on Lovenox 

for several weeks and then Xarelto for about 6 months.  He is not currently on a

blood thinner.  He does take a daily baby aspirin but otherwise no other 

dedicated blood thinners.  Denies any chest pain or shortness of breath.  He has

noted significant varicosities in both legs.  There is tenderness to palpation 

and medical screening exam to the bottom of the foot and to the calf.  He has 

good DP pulses bilaterally.  Skin is not dusky or cool to touch.  Cap refill is 

less than 2 seconds in all toes.  I performed a brief medical screening exam on 

the patient determined that the patient needs further evaluation and management 

by main side provider.  I have placed initial orders to help expedite care.





- Related Data


Allergies/Adverse Reactions: 


                                        





No Known Allergies Allergy (Verified 10/19/20 16:56)


   











Past Medical History





- Social History


Frequency of alcohol use: None


Drug Abuse: None





- Past Medical History


Cardiac Medical History: Reports: Hx Coronary Artery Disease, Hx DVT - 2016, Hx 

Heart Attack, Hx Hypertension


   Denies: Hx Atrial Fibrillation, Hx Congestive Heart Failure, Hx 

Hypercholesterolemia, Hx Pulmonary Embolism


Pulmonary Medical History: 


   Denies: Hx Asthma, Hx COPD, Hx Sleep Apnea


Neurological Medical History: Denies: Hx Seizures


Endocrine Medical History: Denies: Hx Diabetes Mellitus Type 1, Hx Diabetes 

Mellitus Type 2, Hx Hyperthyroidism, Hx Hypothyroidism


Renal/ Medical History: Denies: Hx Peritoneal Dialysis


GI Medical History: Reports: Hx Gastroesophageal Reflux Disease, Hx Colonoscopy,

Hx Endoscopy.  Denies: Hx Cirrhosis, Hx Hepatitis


Musculoskeltal Medical History: Reports Hx Arthritis, Reports Hx Musculoskeletal

Trauma - Fracture hand bilateral toes  foot nose


Psychiatric Medical History: 


   Denies: Hx Depression


Traumatic Medical History: Reports: Hx Fractures - Fracture hand bilateral toes 

foot nose


Infectious Medical History: Denies: Hx Hepatitis


Past Surgical History: Reports: Hx Appendectomy, Hx Cardiac Catheterization, Hx 

Orthopedic Surgery - R foot/knee, L hand, R arm, Hx Tonsillectomy, Other - 

Removal of foreign body from my.  Denies: Hx Pacemaker





- Immunizations


Immunizations up to date: No - tetnus given in ED


Hx Diphtheria, Pertussis, Tetanus Vaccination: Yes





Physical Exam





- Vital signs


Vitals: 





                                        











Temp Pulse Resp BP Pulse Ox


 


 98.1 F   75   16   130/82 H  94 


 


 10/19/20 16:23  10/19/20 16:23  10/19/20 16:23  10/19/20 16:23  10/19/20 16:23














Course





- Vital Signs


Vital signs: 





                                        











Temp Pulse Resp BP Pulse Ox


 


 98.1 F   75   16   130/82 H  94 


 


 10/19/20 16:23  10/19/20 16:23  10/19/20 16:23  10/19/20 16:23  10/19/20 16:23














Doctor's Discharge





- Discharge


Referrals: 


FELIPE PLUNKETT DO [Primary Care Provider] - Follow up as needed
yes

## 2021-08-31 NOTE — H&P ADULT - HISTORY OF PRESENT ILLNESS
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 This was associated with chest pressure, jaw pain and left arm pain. Patient denies fever, n/v/d, bowel and urinary symptoms. Patient had no other complaints. She is on vancomycin oral course for recently diagnosed c. diff.     Vital Signs (24 Hrs):  T(C): 36.8 (08-31-21 @ 02:10), Max: 36.8 (08-31-21 @ 02:10)  HR: 78 (08-31-21 @ 04:36) (78 - 124)  BP: 136/60 (08-31-21 @ 04:36) (106/68 - 136/60)  RR: 20 (08-31-21 @ 04:36) (20 - 20)  SpO2: 96% (08-31-21 @ 04:36) (94% - 96%)  I&O's Summary    IN the ED patient was found to be in Afib w RVR, s/p 10 of IV metoprolol. He converted into NSR and EKG showed elevation in leads AVL and lead I. STEMI code was called. Patient was assessed by cardiology and was admitted to CCU for further workup.   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 This was associated with chest pressure, jaw pain and left arm pain. Patient denies fever, n/v/d, bowel and urinary symptoms. Patient had no other complaints. She is on vancomycin oral course for recently diagnosed c. diff. She is supposed to take vancomcyin 125 mg q6h for 9 more days.     Vital Signs (24 Hrs):  T(C): 36.8 (08-31-21 @ 02:10), Max: 36.8 (08-31-21 @ 02:10)  HR: 78 (08-31-21 @ 04:36) (78 - 124)  BP: 136/60 (08-31-21 @ 04:36) (106/68 - 136/60)  RR: 20 (08-31-21 @ 04:36) (20 - 20)  SpO2: 96% (08-31-21 @ 04:36) (94% - 96%)  I&O's Summary    IN the ED patient was found to be in Afib w RVR, s/p 10 of IV metoprolol. She converted into NSR and EKG showed elevation in leads AVL and lead I. STEMI code was called. Patient was assessed by cardiology and was admitted to CCU for further workup. Plan of care discussed with patient and daughter at bedside.

## 2021-08-31 NOTE — H&P ADULT - NSHPLABSRESULTS_GEN_ALL_CORE
.  LABS:                         12.4   17.40 )-----------( 507      ( 31 Aug 2021 02:30 )             38.0     08-31    139  |  98  |  14  ----------------------------<  235<H>  3.6   |  21  |  0.8    Ca    9.4      31 Aug 2021 02:30  Mg     1.7     08-31    TPro  6.5  /  Alb  3.8  /  TBili  0.4  /  DBili  x   /  AST  21  /  ALT  15  /  AlkPhos  80  08-31    PT/INR - ( 31 Aug 2021 04:50 )   PT: 10.20 sec;   INR: 0.89 ratio             Serum Pro-Brain Natriuretic Peptide: 808 pg/mL (08-31 @ 02:30)        RADIOLOGY, EKG & ADDITIONAL TESTS: Reviewed.

## 2021-08-31 NOTE — CHART NOTE - NSCHARTNOTEFT_GEN_A_CORE
patient given 30 day supply of Aspirin 81 mg PO Daily and Plavix 75 mg PO Daily, to be taken at home only
Preliminary Cardiac Catheterization Post-Procedure Report    PRE-OP DIAGNOSIS: ACS     PROCEDURE: Coronary angiogram, [facility], PCI    Attending: Dr. Peters   Interventional Fellow: Dr. Go  Fellow: Dr. Monroy    ANESTHESIA TYPE  [  ]General Anesthesia  [x  ] Sedation  [x  ] Local/Regional    ESTIMATED BLOOD LOSS:   < 20 mL    CONDITION  [  ] Critical  [  ] Serious  [  ]Fair  [ x ]Good    ACCESS  [x  ] Right radial   [  ] Right femoral  [  ] Left radial  [  ] Left femoral     HEMOSTASIS  [x  ] D stat  [  ] Perclose  [  ] Angioseal  [  ] Manual     FINDINGS                           Hemodynamics: Hemodynamic assessment demonstrates mildly to moderately elevated LVEDP.     Ventricles: EF calculated by contrast ventriculography was 60 %.     Coronary circulation: The coronary circulation is right dominant. There was significant 1-vessel coronary artery disease. Left main: Angiography showed no evidence of disease. LAD: Angiography showed mild diffuse atherosclerosis. Patent prior stent in mid LAD. 1st diagonal: Angiography showed no evidence of disease. 2nd diagonal: Angiography showed mild atherosclerosis with no flow limiting lesions. Proximal circumflex: There was a discrete 50 % stenosis. Distal circumflex: Angiography showed mild atherosclerosis with no flow limiting lesions. Patent prior stent. 1st obtuse marginal: The vessel was medium sized. There was a discrete 90 % stenosis in the proximal third of the vessel segment. The lesion was hazy. There was KEELY grade 3 flow through the vessel (brisk flow). This is a likely culprit for the patient's clinical presentation. An intervention was performed. RCA: The vessel was large sized (dominant). Proximal RCA: Angiography showed mild atherosclerosis with no flow limiting lesions. Mid RCA: Angiography showed mild atherosclerosis with no flow limiting lesions. Patent prior stent. Distal RCA: There was a discrete 50 % stenosis. Right PDA: There was a discrete 50 % stenosis in the middle third of the vessel segment. Right posterolateral segment: Angiography showed mild atherosclerosis with no flow limiting lesions.     PROCEDURE SUMMARY  The coronary anatomy is normal. There is significant single vessel coronary artery disease.  Successful PCI of OM1 with MEAGAN x 1 (AUC score 8).       RECOMMENDATIONS    -IV hydration post procedure   -Aggressive medical therapy including DAPT and risk factor modification. Pt states that she never took statin till date and does not want to take it. Explained pt about the benefits of statin in CAD and ACS in detail, however pt still refused statin.   -CCU monitoring

## 2021-08-31 NOTE — ED PROVIDER NOTE - OBJECTIVE STATEMENT
87 yo female w/ PMH of CAD s/p 3 stents, T2D, HTN, afib not on anticoagulation, pancreatic cancer s/p distal duodenectomy 2 weeks ago, C. diff currently on oral vancomycin presents for palpitations that started 3 hours pta.  Woke up with palpitations, associated SOB, states similar to past afib with rvr episodes.  Denies chest pain.  States that her legs feel a little more swollen than usual since her surgery. Sees Dr. Olivia for cardiology.

## 2021-08-31 NOTE — ED PROVIDER NOTE - ATTENDING CONTRIBUTION TO CARE
85 yo F, hx of CAD sp stent x 3, DM II, HTN, afib on eliquis, pancreatic ca 2 weeks sp distal duodenectomy, previous C diff on oral vanco, here for palpitations. Sx woke patient from sleep, associated with dyspnea. No true chest pain, just discomfort. Reports sx are very typical of her previous episodes of afib with RVR.    VS notable for HR 130s, on exam looks well, clear lungs, HR is rapid and irregular, has healing laparoscopic incisions to abdomen with some bruising, no calf ttp or swelling.    Initial EKG rapid afib with elevated in AVR and diffuse depressions. Rate was controlled with metoprolol 5mg x 2 and patient reported feeling better, however repeat EKG demonstrated evolving ST changes and STEMI code was called.    Patient seen by Cardiology fellow Dr. Bolton, spoke with Dr. Vides, recommends heparin drip and will cath in AM.    Labs with negative trop, low Mg which was repleted.    Given surgery and malignancy, also had concern for PE -- CTA done, no PE noted.

## 2021-08-31 NOTE — ED PROVIDER NOTE - CARE PLAN
1 Principal Discharge DX:	Atrial fibrillation with RVR   Principal Discharge DX:	ACS (acute coronary syndrome)  Secondary Diagnosis:	Atrial fibrillation

## 2021-08-31 NOTE — ED ADULT NURSE NOTE - NSIMPLEMENTINTERV_GEN_ALL_ED
Implemented All Fall with Harm Risk Interventions:  Ray to call system. Call bell, personal items and telephone within reach. Instruct patient to call for assistance. Room bathroom lighting operational. Non-slip footwear when patient is off stretcher. Physically safe environment: no spills, clutter or unnecessary equipment. Stretcher in lowest position, wheels locked, appropriate side rails in place. Provide visual cue, wrist band, yellow gown, etc. Monitor gait and stability. Monitor for mental status changes and reorient to person, place, and time. Review medications for side effects contributing to fall risk. Reinforce activity limits and safety measures with patient and family. Provide visual clues: red socks.

## 2021-09-01 ENCOUNTER — TRANSCRIPTION ENCOUNTER (OUTPATIENT)
Age: 86
End: 2021-09-01

## 2021-09-01 VITALS
DIASTOLIC BLOOD PRESSURE: 59 MMHG | OXYGEN SATURATION: 97 % | RESPIRATION RATE: 18 BRPM | SYSTOLIC BLOOD PRESSURE: 131 MMHG | HEART RATE: 64 BPM

## 2021-09-01 LAB
A1C WITH ESTIMATED AVERAGE GLUCOSE RESULT: 6.9 % — HIGH (ref 4–5.6)
ALBUMIN SERPL ELPH-MCNC: 3.4 G/DL — LOW (ref 3.5–5.2)
ALP SERPL-CCNC: 67 U/L — SIGNIFICANT CHANGE UP (ref 30–115)
ALT FLD-CCNC: 14 U/L — SIGNIFICANT CHANGE UP (ref 0–41)
ANION GAP SERPL CALC-SCNC: 11 MMOL/L — SIGNIFICANT CHANGE UP (ref 7–14)
AST SERPL-CCNC: 18 U/L — SIGNIFICANT CHANGE UP (ref 0–41)
BASOPHILS # BLD AUTO: 0.13 K/UL — SIGNIFICANT CHANGE UP (ref 0–0.2)
BASOPHILS NFR BLD AUTO: 0.9 % — SIGNIFICANT CHANGE UP (ref 0–1)
BILIRUB SERPL-MCNC: 0.5 MG/DL — SIGNIFICANT CHANGE UP (ref 0.2–1.2)
BUN SERPL-MCNC: 17 MG/DL — SIGNIFICANT CHANGE UP (ref 10–20)
CALCIUM SERPL-MCNC: 8.7 MG/DL — SIGNIFICANT CHANGE UP (ref 8.5–10.1)
CHLORIDE SERPL-SCNC: 102 MMOL/L — SIGNIFICANT CHANGE UP (ref 98–110)
CHOLEST SERPL-MCNC: 151 MG/DL — SIGNIFICANT CHANGE UP
CO2 SERPL-SCNC: 26 MMOL/L — SIGNIFICANT CHANGE UP (ref 17–32)
COVID-19 SPIKE DOMAIN AB INTERP: POSITIVE
COVID-19 SPIKE DOMAIN ANTIBODY RESULT: >250 U/ML — HIGH
CREAT SERPL-MCNC: 0.8 MG/DL — SIGNIFICANT CHANGE UP (ref 0.7–1.5)
CULTURE RESULTS: SIGNIFICANT CHANGE UP
EOSINOPHIL # BLD AUTO: 0.54 K/UL — SIGNIFICANT CHANGE UP (ref 0–0.7)
EOSINOPHIL NFR BLD AUTO: 3.8 % — SIGNIFICANT CHANGE UP (ref 0–8)
ESTIMATED AVERAGE GLUCOSE: 151 MG/DL — HIGH (ref 68–114)
GLUCOSE BLDC GLUCOMTR-MCNC: 161 MG/DL — HIGH (ref 70–99)
GLUCOSE BLDC GLUCOMTR-MCNC: 176 MG/DL — HIGH (ref 70–99)
GLUCOSE SERPL-MCNC: 171 MG/DL — HIGH (ref 70–99)
HCT VFR BLD CALC: 33.3 % — LOW (ref 37–47)
HCT VFR BLD CALC: 35.7 % — LOW (ref 37–47)
HDLC SERPL-MCNC: 37 MG/DL — LOW
HGB BLD-MCNC: 10.7 G/DL — LOW (ref 12–16)
HGB BLD-MCNC: 11.4 G/DL — LOW (ref 12–16)
IMM GRANULOCYTES NFR BLD AUTO: 0.6 % — HIGH (ref 0.1–0.3)
LIPID PNL WITH DIRECT LDL SERPL: 79 MG/DL — SIGNIFICANT CHANGE UP
LYMPHOCYTES # BLD AUTO: 17 % — LOW (ref 20.5–51.1)
LYMPHOCYTES # BLD AUTO: 2.45 K/UL — SIGNIFICANT CHANGE UP (ref 1.2–3.4)
MCHC RBC-ENTMCNC: 29.7 PG — SIGNIFICANT CHANGE UP (ref 27–31)
MCHC RBC-ENTMCNC: 30.2 PG — SIGNIFICANT CHANGE UP (ref 27–31)
MCHC RBC-ENTMCNC: 31.9 G/DL — LOW (ref 32–37)
MCHC RBC-ENTMCNC: 32.1 G/DL — SIGNIFICANT CHANGE UP (ref 32–37)
MCV RBC AUTO: 92.5 FL — SIGNIFICANT CHANGE UP (ref 81–99)
MCV RBC AUTO: 94.4 FL — SIGNIFICANT CHANGE UP (ref 81–99)
MONOCYTES # BLD AUTO: 1.53 K/UL — HIGH (ref 0.1–0.6)
MONOCYTES NFR BLD AUTO: 10.6 % — HIGH (ref 1.7–9.3)
NEUTROPHILS # BLD AUTO: 9.64 K/UL — HIGH (ref 1.4–6.5)
NEUTROPHILS NFR BLD AUTO: 67.1 % — SIGNIFICANT CHANGE UP (ref 42.2–75.2)
NON HDL CHOLESTEROL: 114 MG/DL — SIGNIFICANT CHANGE UP
NRBC # BLD: 0 /100 WBCS — SIGNIFICANT CHANGE UP (ref 0–0)
NRBC # BLD: 0 /100 WBCS — SIGNIFICANT CHANGE UP (ref 0–0)
PLATELET # BLD AUTO: 336 K/UL — SIGNIFICANT CHANGE UP (ref 130–400)
PLATELET # BLD AUTO: 449 K/UL — HIGH (ref 130–400)
POTASSIUM SERPL-MCNC: 3.5 MMOL/L — SIGNIFICANT CHANGE UP (ref 3.5–5)
POTASSIUM SERPL-SCNC: 3.5 MMOL/L — SIGNIFICANT CHANGE UP (ref 3.5–5)
PROT SERPL-MCNC: 5.6 G/DL — LOW (ref 6–8)
RBC # BLD: 3.6 M/UL — LOW (ref 4.2–5.4)
RBC # BLD: 3.78 M/UL — LOW (ref 4.2–5.4)
RBC # FLD: 14.5 % — SIGNIFICANT CHANGE UP (ref 11.5–14.5)
RBC # FLD: 14.6 % — HIGH (ref 11.5–14.5)
SARS-COV-2 IGG+IGM SERPL QL IA: >250 U/ML — HIGH
SARS-COV-2 IGG+IGM SERPL QL IA: POSITIVE
SODIUM SERPL-SCNC: 139 MMOL/L — SIGNIFICANT CHANGE UP (ref 135–146)
SPECIMEN SOURCE: SIGNIFICANT CHANGE UP
TRIGL SERPL-MCNC: 248 MG/DL — HIGH
TROPONIN T SERPL-MCNC: 0.09 NG/ML — CRITICAL HIGH
WBC # BLD: 12.9 K/UL — HIGH (ref 4.8–10.8)
WBC # BLD: 14.38 K/UL — HIGH (ref 4.8–10.8)
WBC # FLD AUTO: 12.9 K/UL — HIGH (ref 4.8–10.8)
WBC # FLD AUTO: 14.38 K/UL — HIGH (ref 4.8–10.8)

## 2021-09-01 PROCEDURE — 93010 ELECTROCARDIOGRAM REPORT: CPT

## 2021-09-01 PROCEDURE — 99232 SBSQ HOSP IP/OBS MODERATE 35: CPT

## 2021-09-01 RX ORDER — GLUCAGON INJECTION, SOLUTION 0.5 MG/.1ML
1 INJECTION, SOLUTION SUBCUTANEOUS ONCE
Refills: 0 | Status: DISCONTINUED | OUTPATIENT
Start: 2021-09-01 | End: 2021-09-01

## 2021-09-01 RX ORDER — DEXTROSE 50 % IN WATER 50 %
12.5 SYRINGE (ML) INTRAVENOUS ONCE
Refills: 0 | Status: DISCONTINUED | OUTPATIENT
Start: 2021-09-01 | End: 2021-09-01

## 2021-09-01 RX ORDER — ASPIRIN/CALCIUM CARB/MAGNESIUM 324 MG
0 TABLET ORAL
Qty: 0 | Refills: 0 | DISCHARGE

## 2021-09-01 RX ORDER — DEXTROSE 50 % IN WATER 50 %
15 SYRINGE (ML) INTRAVENOUS ONCE
Refills: 0 | Status: DISCONTINUED | OUTPATIENT
Start: 2021-09-01 | End: 2021-09-01

## 2021-09-01 RX ORDER — LISINOPRIL 2.5 MG/1
1 TABLET ORAL
Qty: 30 | Refills: 1
Start: 2021-09-01 | End: 2021-10-30

## 2021-09-01 RX ORDER — ASPIRIN/CALCIUM CARB/MAGNESIUM 324 MG
1 TABLET ORAL
Qty: 30 | Refills: 0
Start: 2021-09-01 | End: 2021-09-30

## 2021-09-01 RX ORDER — METOPROLOL TARTRATE 50 MG
0 TABLET ORAL
Qty: 0 | Refills: 0 | DISCHARGE

## 2021-09-01 RX ORDER — DEXTROSE 50 % IN WATER 50 %
25 SYRINGE (ML) INTRAVENOUS ONCE
Refills: 0 | Status: DISCONTINUED | OUTPATIENT
Start: 2021-09-01 | End: 2021-09-01

## 2021-09-01 RX ORDER — INSULIN GLARGINE 100 [IU]/ML
14 INJECTION, SOLUTION SUBCUTANEOUS EVERY MORNING
Refills: 0 | Status: DISCONTINUED | OUTPATIENT
Start: 2021-09-01 | End: 2021-09-01

## 2021-09-01 RX ORDER — FOLIC ACID 0.8 MG
1 TABLET ORAL
Qty: 0 | Refills: 0 | DISCHARGE
Start: 2021-09-01

## 2021-09-01 RX ORDER — INSULIN LISPRO 100/ML
VIAL (ML) SUBCUTANEOUS
Refills: 0 | Status: DISCONTINUED | OUTPATIENT
Start: 2021-09-01 | End: 2021-09-01

## 2021-09-01 RX ORDER — LISINOPRIL 2.5 MG/1
5 TABLET ORAL DAILY
Refills: 0 | Status: DISCONTINUED | OUTPATIENT
Start: 2021-09-01 | End: 2021-09-01

## 2021-09-01 RX ORDER — METOPROLOL TARTRATE 50 MG
1 TABLET ORAL
Qty: 0 | Refills: 0 | DISCHARGE
Start: 2021-09-01

## 2021-09-01 RX ORDER — FOLIC ACID 0.8 MG
1 TABLET ORAL
Qty: 0 | Refills: 0 | DISCHARGE

## 2021-09-01 RX ORDER — POTASSIUM CHLORIDE 20 MEQ
40 PACKET (EA) ORAL ONCE
Refills: 0 | Status: COMPLETED | OUTPATIENT
Start: 2021-09-01 | End: 2021-09-01

## 2021-09-01 RX ORDER — METOPROLOL TARTRATE 50 MG
1 TABLET ORAL
Qty: 30 | Refills: 0
Start: 2021-09-01 | End: 2021-09-30

## 2021-09-01 RX ORDER — CLOPIDOGREL BISULFATE 75 MG/1
1 TABLET, FILM COATED ORAL
Qty: 0 | Refills: 0 | DISCHARGE

## 2021-09-01 RX ORDER — SODIUM CHLORIDE 9 MG/ML
1000 INJECTION, SOLUTION INTRAVENOUS
Refills: 0 | Status: DISCONTINUED | OUTPATIENT
Start: 2021-09-01 | End: 2021-09-01

## 2021-09-01 RX ORDER — CLOPIDOGREL BISULFATE 75 MG/1
1 TABLET, FILM COATED ORAL
Qty: 30 | Refills: 0
Start: 2021-09-01 | End: 2021-09-30

## 2021-09-01 RX ORDER — ENOXAPARIN SODIUM 100 MG/ML
40 INJECTION SUBCUTANEOUS DAILY
Refills: 0 | Status: DISCONTINUED | OUTPATIENT
Start: 2021-09-01 | End: 2021-09-01

## 2021-09-01 RX ORDER — CLOPIDOGREL BISULFATE 75 MG/1
1 TABLET, FILM COATED ORAL
Qty: 0 | Refills: 0 | DISCHARGE
Start: 2021-09-01

## 2021-09-01 RX ORDER — ASPIRIN/CALCIUM CARB/MAGNESIUM 324 MG
1 TABLET ORAL
Qty: 0 | Refills: 0 | DISCHARGE
Start: 2021-09-01

## 2021-09-01 RX ORDER — INSULIN LISPRO 100/ML
3 VIAL (ML) SUBCUTANEOUS
Refills: 0 | Status: DISCONTINUED | OUTPATIENT
Start: 2021-09-01 | End: 2021-09-01

## 2021-09-01 RX ADMIN — Medication 1 MILLIGRAM(S): at 11:09

## 2021-09-01 RX ADMIN — Medication 25 MILLIGRAM(S): at 05:15

## 2021-09-01 RX ADMIN — CLOPIDOGREL BISULFATE 75 MILLIGRAM(S): 75 TABLET, FILM COATED ORAL at 11:09

## 2021-09-01 RX ADMIN — Medication 2: at 11:41

## 2021-09-01 RX ADMIN — ENOXAPARIN SODIUM 40 MILLIGRAM(S): 100 INJECTION SUBCUTANEOUS at 06:14

## 2021-09-01 RX ADMIN — PANTOPRAZOLE SODIUM 40 MILLIGRAM(S): 20 TABLET, DELAYED RELEASE ORAL at 06:03

## 2021-09-01 RX ADMIN — LISINOPRIL 5 MILLIGRAM(S): 2.5 TABLET ORAL at 11:10

## 2021-09-01 RX ADMIN — INSULIN GLARGINE 14 UNIT(S): 100 INJECTION, SOLUTION SUBCUTANEOUS at 09:13

## 2021-09-01 RX ADMIN — Medication 3 UNIT(S): at 11:41

## 2021-09-01 RX ADMIN — Medication 81 MILLIGRAM(S): at 11:09

## 2021-09-01 RX ADMIN — Medication 40 MILLIEQUIVALENT(S): at 07:55

## 2021-09-01 RX ADMIN — Medication 125 MILLIGRAM(S): at 11:10

## 2021-09-01 RX ADMIN — Medication 125 MILLIGRAM(S): at 05:16

## 2021-09-01 NOTE — DISCHARGE NOTE PROVIDER - CARE PROVIDER_API CALL
Emmett Galloway  CARDIOVASCULAR DISEASE  [address]  [place] [address]  Phone: [phone]  Fax: [phone]  Follow Up Time: 2 weeks

## 2021-09-01 NOTE — DISCHARGE NOTE PROVIDER - HOSPITAL COURSE
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 This was associated with chest pressure, jaw pain and left arm pain. Patient denies fever, n/v/d, bowel and urinary symptoms. Patient had no other complaints. She is on vancomycin oral course for recently diagnosed c. diff. She is supposed to take vancomycin 125 mg q6h for 9 more days. In the  ED, pt hemodynamically stable. Pt found to be in Afib w RVR, s/p 10 of IV metoprolol. She converted into NSR and EKG showed elevation in leads AVL and lead I. STEMI code was called. Coronary angiography done on 8/31/21 showed  significant single vessel coronary artery disease s/p Successful PCI of OM1 with MEAGAN x 1. Pt transferred to CCU with no complications or events overnight. Pt counseled on the importance of being on statins, but she refused.

## 2021-09-01 NOTE — PROGRESS NOTE ADULT - SUBJECTIVE AND OBJECTIVE BOX
PATIENT:  ANNE VILLATORO  [id number]      CHIEF COMPLAINT:  Patient is a 86y old  Female who presents with a chief complaint of Afib with RVR (31 Aug 2021 04:58)        HPI:      INTERVAL HISTORY/OVERNIGHT EVENTS:      MEDICATIONS:  MEDICATIONS  (STANDING):  aspirin  chewable 81 milliGRAM(s) Oral daily  chlorhexidine 4% Liquid 1 Application(s) Topical <User Schedule>  clopidogrel Tablet 75 milliGRAM(s) Oral daily  folic acid 1 milliGRAM(s) Oral daily  metoprolol succinate ER 25 milliGRAM(s) Oral daily  pantoprazole    Tablet 40 milliGRAM(s) Oral before breakfast  sodium chloride 0.9%. 1000 milliLiter(s) (100 mL/Hr) IV Continuous <Continuous>  vancomycin    Solution 125 milliGRAM(s) Oral every 6 hours    MEDICATIONS  (PRN):      ALLERGIES:  Allergies    Percocet 5/325 (Unknown)    Intolerances        OBJECTIVE:  ICU Vital Signs Last 24 Hrs  T(C): 36.2 (01 Sep 2021 04:00), Max: 36.4 (31 Aug 2021 09:00)  T(F): 97.2 (01 Sep 2021 04:00), Max: 97.5 (31 Aug 2021 09:00)  HR: 58 (01 Sep 2021 05:00) (56 - 70)  BP: 149/57 (01 Sep 2021 05:00) (132/61 - 167/57)  BP(mean): 88 (01 Sep 2021 05:00) (84 - 116)  ABP: --  ABP(mean): --  RR: 18 (01 Sep 2021 05:00) (16 - 20)  SpO2: 95% (01 Sep 2021 05:00) (94% - 98%)      Adult Advanced Hemodynamics Last 24 Hrs  CVP(mm Hg): --  CVP(cm H2O): --  CO: --  CI: --  PA: --  PA(mean): --  PCWP: --  SVR: --  SVRI: --  PVR: --  PVRI: --  CAPILLARY BLOOD GLUCOSE      POCT Blood Glucose.: 209 mg/dL (31 Aug 2021 21:22)  POCT Blood Glucose.: 122 mg/dL (31 Aug 2021 17:17)  POCT Blood Glucose.: 128 mg/dL (31 Aug 2021 13:03)  POCT Blood Glucose.: 153 mg/dL (31 Aug 2021 10:40)    CAPILLARY BLOOD GLUCOSE      POCT Blood Glucose.: 209 mg/dL (31 Aug 2021 21:22)    I&O's Summary    31 Aug 2021 07:01  -  01 Sep 2021 05:38  --------------------------------------------------------  IN: 160 mL / OUT: 480 mL / NET: -320 mL      Daily Height in cm: 142.24 (31 Aug 2021 18:00)    Daily Weight in k.5 (01 Sep 2021 04:00)    PHYSICAL EXAMINATION:  General: WN/WD NAD  HEENT: PERRLA, EOMI, moist mucous membranes  Neurology: A&Ox3, nonfocal, SHAH x 4  Respiratory: CTA B/L, normal respiratory effort, no wheezes, crackles, rales  CV: RRR, S1S2, no murmurs, rubs or gallops  Abdominal: Soft, NT, ND +BS, Last BM  Extremities: No edema, + peripheral pulses  Incisions:   Tubes:    LABS:                          10.7   14.38 )-----------( 449      ( 01 Sep 2021 05:10 )             33.3         139  |  98  |  14  ----------------------------<  235<H>  3.6   |  21  |  0.8    Ca    9.4      31 Aug 2021 02:30  Mg     1.7         TPro  6.5  /  Alb  3.8  /  TBili  0.4  /  DBili  x   /  AST  21  /  ALT  15  /  AlkPhos  80  08    LIVER FUNCTIONS - ( 31 Aug 2021 02:30 )  Alb: 3.8 g/dL / Pro: 6.5 g/dL / ALK PHOS: 80 U/L / ALT: 15 U/L / AST: 21 U/L / GGT: x           PT/INR - ( 31 Aug 2021 04:50 )   PT: TNP sec;   INR: TNP ratio             CARDIAC MARKERS ( 31 Aug 2021 02:30 )  x     / <0.01 ng/mL / x     / x     / x          Urinalysis Basic - ( 31 Aug 2021 05:16 )    Color: Light Yellow / Appearance: Clear / S.009 / pH: x  Gluc: x / Ketone: Moderate  / Bili: Negative / Urobili: <2 mg/dL   Blood: x / Protein: Trace / Nitrite: Negative   Leuk Esterase: Small / RBC: 6 /HPF / WBC 5 /HPF   Sq Epi: x / Non Sq Epi: 4 /HPF / Bacteria: Negative        TELEMETRY:    EKG:     IMAGING:   PATIENT:  ANNE VILLATORO  [id number]      CHIEF COMPLAINT:  Patient is a 86y old  Female who presents with a chief complaint of Afib with RVR (31 Aug 2021 04:58)      INTERVAL HISTORY/OVERNIGHT EVENTS: Pt is day 1 s/p MEAGAN to OM1. Today pt was seen sitting comfortably on the chair and eating breakfast. She denies pain or palpitations. She reports that her diarrhea had improved --> 2 semi formed BM yesterday.       MEDICATIONS:  MEDICATIONS  (STANDING):  aspirin  chewable 81 milliGRAM(s) Oral daily  chlorhexidine 4% Liquid 1 Application(s) Topical <User Schedule>  clopidogrel Tablet 75 milliGRAM(s) Oral daily  folic acid 1 milliGRAM(s) Oral daily  metoprolol succinate ER 25 milliGRAM(s) Oral daily  pantoprazole    Tablet 40 milliGRAM(s) Oral before breakfast  sodium chloride 0.9%. 1000 milliLiter(s) (100 mL/Hr) IV Continuous <Continuous>  vancomycin    Solution 125 milliGRAM(s) Oral every 6 hours    MEDICATIONS  (PRN):      ALLERGIES:  Allergies    Percocet 5/325 (Unknown)    Intolerances        OBJECTIVE:  ICU Vital Signs Last 24 Hrs  T(C): 36.2 (01 Sep 2021 04:00), Max: 36.4 (31 Aug 2021 09:00)  T(F): 97.2 (01 Sep 2021 04:00), Max: 97.5 (31 Aug 2021 09:00)  HR: 58 (01 Sep 2021 05:00) (56 - 70)  BP: 149/57 (01 Sep 2021 05:00) (132/61 - 167/57)  BP(mean): 88 (01 Sep 2021 05:00) (84 - 116)  ABP: --  ABP(mean): --  RR: 18 (01 Sep 2021 05:00) (16 - 20)  SpO2: 95% (01 Sep 2021 05:00) (94% - 98%)      Adult Advanced Hemodynamics Last 24 Hrs  CVP(mm Hg): --  CVP(cm H2O): --  CO: --  CI: --  PA: --  PA(mean): --  PCWP: --  SVR: --  SVRI: --  PVR: --  PVRI: --  CAPILLARY BLOOD GLUCOSE      POCT Blood Glucose.: 209 mg/dL (31 Aug 2021 21:22)  POCT Blood Glucose.: 122 mg/dL (31 Aug 2021 17:17)  POCT Blood Glucose.: 128 mg/dL (31 Aug 2021 13:03)  POCT Blood Glucose.: 153 mg/dL (31 Aug 2021 10:40)    CAPILLARY BLOOD GLUCOSE      POCT Blood Glucose.: 209 mg/dL (31 Aug 2021 21:22)    I&O's Summary    31 Aug 2021 07:01  -  01 Sep 2021 05:38  --------------------------------------------------------  IN: 160 mL / OUT: 480 mL / NET: -320 mL      Daily Height in cm: 142.24 (31 Aug 2021 18:00)    Daily Weight in k.5 (01 Sep 2021 04:00)    PHYSICAL EXAMINATION:  General: WN/WD NAD  HEENT: PERRLA, EOMI, moist mucous membranes  Neurology: A&Ox3, nonfocal, SHAH x 4  Respiratory: CTA B/L, normal respiratory effort, no wheezes, crackles, rales  CV: RRR, S1S2, no murmurs, rubs or gallops  Abdominal: Soft, NT, ND +BS, Last BM  Extremities: No edema, + peripheral pulses      LABS:                          10.7   14.38 )-----------( 449      ( 01 Sep 2021 05:10 )             33.3         139  |  98  |  14  ----------------------------<  235<H>  3.6   |  21  |  0.8    Ca    9.4      31 Aug 2021 02:30  Mg     1.7         TPro  6.5  /  Alb  3.8  /  TBili  0.4  /  DBili  x   /  AST  21  /  ALT  15  /  AlkPhos  80      LIVER FUNCTIONS - ( 31 Aug 2021 02:30 )  Alb: 3.8 g/dL / Pro: 6.5 g/dL / ALK PHOS: 80 U/L / ALT: 15 U/L / AST: 21 U/L / GGT: x           PT/INR - ( 31 Aug 2021 04:50 )   PT: TNP sec;   INR: TNP ratio             CARDIAC MARKERS ( 31 Aug 2021 02:30 )  x     / <0.01 ng/mL / x     / x     / x          Urinalysis Basic - ( 31 Aug 2021 05:16 )    Color: Light Yellow / Appearance: Clear / S.009 / pH: x  Gluc: x / Ketone: Moderate  / Bili: Negative / Urobili: <2 mg/dL   Blood: x / Protein: Trace / Nitrite: Negative   Leuk Esterase: Small / RBC: 6 /HPF / WBC 5 /HPF   Sq Epi: x / Non Sq Epi: 4 /HPF / Bacteria: Negative        TELEMETRY:    EKG:     IMAGING:   PATIENT:  ANNE VILLATORO  [id number]      CHIEF COMPLAINT:  Patient is a 86y old  Female who presents with a chief complaint of chest pain found to have NSTEMI and Afib with RVR (31 Aug 2021 04:58)      INTERVAL HISTORY/OVERNIGHT EVENTS: Pt is day 1 s/p MEAGAN to OM1. Today pt was seen sitting comfortably on the chair and eating breakfast. She denies pain or palpitations. She reports that her diarrhea had improved --> 2 semi formed BM yesterday.       MEDICATIONS:  MEDICATIONS  (STANDING):  aspirin  chewable 81 milliGRAM(s) Oral daily  chlorhexidine 4% Liquid 1 Application(s) Topical <User Schedule>  clopidogrel Tablet 75 milliGRAM(s) Oral daily  folic acid 1 milliGRAM(s) Oral daily  metoprolol succinate ER 25 milliGRAM(s) Oral daily  pantoprazole    Tablet 40 milliGRAM(s) Oral before breakfast  sodium chloride 0.9%. 1000 milliLiter(s) (100 mL/Hr) IV Continuous <Continuous>  vancomycin    Solution 125 milliGRAM(s) Oral every 6 hours    MEDICATIONS  (PRN):      ALLERGIES:  Allergies    Percocet 5/325 (Unknown)    Intolerances        OBJECTIVE:  ICU Vital Signs Last 24 Hrs  T(C): 36.2 (01 Sep 2021 04:00), Max: 36.4 (31 Aug 2021 09:00)  T(F): 97.2 (01 Sep 2021 04:00), Max: 97.5 (31 Aug 2021 09:00)  HR: 58 (01 Sep 2021 05:00) (56 - 70)  BP: 149/57 (01 Sep 2021 05:00) (132/61 - 167/57)  BP(mean): 88 (01 Sep 2021 05:00) (84 - 116)  ABP: --  ABP(mean): --  RR: 18 (01 Sep 2021 05:00) (16 - 20)  SpO2: 95% (01 Sep 2021 05:00) (94% - 98%)      Adult Advanced Hemodynamics Last 24 Hrs  CVP(mm Hg): --  CVP(cm H2O): --  CO: --  CI: --  PA: --  PA(mean): --  PCWP: --  SVR: --  SVRI: --  PVR: --  PVRI: --  CAPILLARY BLOOD GLUCOSE      POCT Blood Glucose.: 209 mg/dL (31 Aug 2021 21:22)  POCT Blood Glucose.: 122 mg/dL (31 Aug 2021 17:17)  POCT Blood Glucose.: 128 mg/dL (31 Aug 2021 13:03)  POCT Blood Glucose.: 153 mg/dL (31 Aug 2021 10:40)    CAPILLARY BLOOD GLUCOSE      POCT Blood Glucose.: 209 mg/dL (31 Aug 2021 21:22)    I&O's Summary    31 Aug 2021 07:01  -  01 Sep 2021 05:38  --------------------------------------------------------  IN: 160 mL / OUT: 480 mL / NET: -320 mL      Daily Height in cm: 142.24 (31 Aug 2021 18:00)    Daily Weight in k.5 (01 Sep 2021 04:00)    PHYSICAL EXAMINATION:  General: WN/WD NAD  HEENT: PERRLA, EOMI, moist mucous membranes  Neurology: A&Ox3, nonfocal, SHAH x 4  Respiratory: CTA B/L, normal respiratory effort, no wheezes, crackles, rales  CV: RRR, S1S2, no murmurs, rubs or gallops  Abdominal: Soft, NT, ND +BS, Last BM  Extremities: No edema, + peripheral pulses      LABS:                          10.7   14.38 )-----------( 449      ( 01 Sep 2021 05:10 )             33.3         139  |  98  |  14  ----------------------------<  235<H>  3.6   |  21  |  0.8    Ca    9.4      31 Aug 2021 02:30  Mg     1.7         TPro  6.5  /  Alb  3.8  /  TBili  0.4  /  DBili  x   /  AST  21  /  ALT  15  /  AlkPhos  80      LIVER FUNCTIONS - ( 31 Aug 2021 02:30 )  Alb: 3.8 g/dL / Pro: 6.5 g/dL / ALK PHOS: 80 U/L / ALT: 15 U/L / AST: 21 U/L / GGT: x           PT/INR - ( 31 Aug 2021 04:50 )   PT: TNP sec;   INR: TNP ratio             CARDIAC MARKERS ( 31 Aug 2021 02:30 )  x     / <0.01 ng/mL / x     / x     / x          Urinalysis Basic - ( 31 Aug 2021 05:16 )    Color: Light Yellow / Appearance: Clear / S.009 / pH: x  Gluc: x / Ketone: Moderate  / Bili: Negative / Urobili: <2 mg/dL   Blood: x / Protein: Trace / Nitrite: Negative   Leuk Esterase: Small / RBC: 6 /HPF / WBC 5 /HPF   Sq Epi: x / Non Sq Epi: 4 /HPF / Bacteria: Negative        TELEMETRY:    EKG:     IMAGING:

## 2021-09-01 NOTE — PROGRESS NOTE ADULT - ASSESSMENT
IMPRESSION:  - Non-ST elevation ACS s/p PCI to OM 8/31/21  - Pancreatic adenocarcinoma s/p resection 2w ago  - CAD s/p PCIs in the past  - Afib  - DMII    PLAN:    CNS:  - Avoid CNS depressants    HEENT:  - Oral care    PULMONARY:    - HOB @ 45 degrees    CARDIOVASCULAR:  - s/p PCI to OM 8/31/21  - Continue apsirin and plavix  - Continue Toprol 25mg daily  - Patient refusing statin therapy  - TTE (8/31/21): EF 70%, Mild to mod MR    GI:   - GI prophylaxis  - DASH diet    RENAL:    - Follow up lytes.  Correct as needed  - Keep Mg>2 and K>4    INFECTIOUS DISEASE:   - Monitor vital signs    HEMATOLOGICAL:  - DVT prophylaxis    ENDOCRINE:    - Follow up FS.  Insulin protocol if needed    MUSCULOSKELETAL:  - Increase as tolerated    DISPO:  - Monitor in CCU       IMPRESSION:  - ACS (likely STEMI) s/p PCI to OM 8/31/21  - Pancreatic adenocarcinoma s/p resection 2w ago  - CAD s/p PCIs in the past  - Afib  - DMII    PLAN:    CNS:  - Avoid CNS depressants    HEENT:  - Oral care    PULMONARY:    - HOB @ 45 degrees    CARDIOVASCULAR:  - s/p PCI to OM 8/31/21  - Continue apsirin and plavix  - Continue Toprol 25mg daily  - Patient refusing statin therapy  - TTE (8/31/21): EF 70%, Mild to mod MR    GI:   - GI prophylaxis  - DASH diet    RENAL:    - Follow up lytes.  Correct as needed  - Keep Mg>2 and K>4    INFECTIOUS DISEASE:   - Monitor vital signs    HEMATOLOGICAL:  - DVT prophylaxis    ENDOCRINE:    - Follow up FS.  Insulin protocol if needed    MUSCULOSKELETAL:  - Increase as tolerated    DISPO:  - Monitor in CCU       IMPRESSION:  - ACS (likely STEMI) s/p PCI to OM 8/31/21  - Pancreatic adenocarcinoma s/p resection 2w ago  - CAD s/p PCIs in the past  - Afib  - DMII    PLAN:    CNS:  - Avoid CNS depressants    HEENT:  - Oral care    PULMONARY:    - HOB @ 45 degrees    CARDIOVASCULAR:  - s/p PCI to OM 8/31/21  - Continue apsirin and plavix  - Continue Toprol 25mg daily  - Patient refusing statin therapy despite talking to her in depth about the benefits  - Start lisinopril 5mg daily  - TTE (8/31/21): EF 70%, Mild to mod MR    GI:   - GI prophylaxis  - DASH diet    RENAL:    - Follow up lytes.  Correct as needed  - Keep Mg>2 and K>4    INFECTIOUS DISEASE:   - Monitor vital signs    HEMATOLOGICAL:  - DVT prophylaxis    ENDOCRINE:    - Follow up FS.  Insulin protocol if needed    MUSCULOSKELETAL:  - Increase as tolerated    DISPO:  - Discharge home today

## 2021-09-01 NOTE — PROGRESS NOTE ADULT - SUBJECTIVE AND OBJECTIVE BOX
Cardiology Follow up    ANNE VILLATORO   86y Female  PAST MEDICAL & SURGICAL HISTORY:  Hypothyroid    CAD (coronary artery disease)    Acute MI    CAD S/P percutaneous coronary angioplasty    DM (diabetes mellitus), secondary    H/O spinal stenosis    Polymyalgia    Arthritis    IBS (irritable bowel syndrome)    S/P tonsillectomy    History of cholecystectomy    History of appendectomy    Cataract of both eyes    History of hip replacement, total, left         HPI:  87 y/o female with PMHx of DM II, CAD s/p PCI x3, Afib not on AC, hypothyroidism, arthritis and recent diagnosis of adenocarcinoma of the pancrease s/p resction 2 weeks ago presented to the ED with palpitations and SOB since midnight. This was associated with chest pressure, jaw pain and left arm pain. Patient denies fever, n/v/d, bowel and urinary symptoms. Patient had no other complaints. She is on vancomycin oral course for recently diagnosed c. diff. She is supposed to take vancomcyin 125 mg q6h for 9 more days.     Vital Signs (24 Hrs):  T(C): 36.8 (08-31-21 @ 02:10), Max: 36.8 (08-31-21 @ 02:10)  HR: 78 (08-31-21 @ 04:36) (78 - 124)  BP: 136/60 (08-31-21 @ 04:36) (106/68 - 136/60)  RR: 20 (08-31-21 @ 04:36) (20 - 20)  SpO2: 96% (08-31-21 @ 04:36) (94% - 96%)  I&O's Summary    IN the ED patient was found to be in Afib w RVR, s/p 10 of IV metoprolol. She converted into NSR and EKG showed elevation in leads AVL and lead I. STEMI code was called. Patient was assessed by cardiology and was admitted to CCU for further workup. Plan of care discussed with patient and daughter at bedside.    (31 Aug 2021 05:26)    Allergies    Percocet 5/325 (Unknown)    Intolerances    Patient seen and examined at bedside. No acute events overnight.  Patient without complaints. Pt ambulated without issues/symptoms  Denies CP, SOB, palpitations, or dizziness  NSVT 9 beats noted on telemetry overnight    Vital Signs Last 24 Hrs  T(C): 36.2 (01 Sep 2021 08:00), Max: 36.3 (31 Aug 2021 20:00)  T(F): 97.1 (01 Sep 2021 08:00), Max: 97.3 (31 Aug 2021 20:00)  HR: 62 (01 Sep 2021 10:05) (56 - 70)  BP: 126/56 (01 Sep 2021 10:05) (126/56 - 167/57)  BP(mean): 88 (01 Sep 2021 10:05) (70 - 116)  RR: 18 (01 Sep 2021 10:05) (16 - 20)  SpO2: 97% (01 Sep 2021 10:05) (94% - 98%)    MEDICATIONS  (STANDING):  aspirin  chewable 81 milliGRAM(s) Oral daily  chlorhexidine 4% Liquid 1 Application(s) Topical <User Schedule>  clopidogrel Tablet 75 milliGRAM(s) Oral daily  dextrose 40% Gel 15 Gram(s) Oral once  dextrose 5%. 1000 milliLiter(s) (50 mL/Hr) IV Continuous <Continuous>  dextrose 5%. 1000 milliLiter(s) (100 mL/Hr) IV Continuous <Continuous>  dextrose 50% Injectable 25 Gram(s) IV Push once  dextrose 50% Injectable 12.5 Gram(s) IV Push once  dextrose 50% Injectable 25 Gram(s) IV Push once  enoxaparin Injectable 40 milliGRAM(s) SubCutaneous daily  folic acid 1 milliGRAM(s) Oral daily  glucagon  Injectable 1 milliGRAM(s) IntraMuscular once  insulin glargine Injectable (LANTUS) 14 Unit(s) SubCutaneous every morning  insulin lispro (ADMELOG) corrective regimen sliding scale   SubCutaneous three times a day before meals  insulin lispro Injectable (ADMELOG) 3 Unit(s) SubCutaneous three times a day before meals  lisinopril 5 milliGRAM(s) Oral daily  metoprolol succinate ER 25 milliGRAM(s) Oral daily  pantoprazole    Tablet 40 milliGRAM(s) Oral before breakfast  vancomycin    Solution 125 milliGRAM(s) Oral every 6 hours    MEDICATIONS  (PRN):      REVIEW OF SYSTEMS:          All negative except as mentioned in HPI    PHYSICAL EXAM:           CONSTITUTIONAL: Well-developed; well-nourished; in no acute distress  	SKIN: warm, dry  	HEAD: Normocephalic; atraumatic  	EYES: PERRL.  	ENT: No nasal discharge, airway clear, mucous membranes moist  	NECK: Supple; non tender.  	CARD: +S1, +S2, no murmurs, gallops, or rubs. Regular rate and rhythm    	RESP: No wheezes, rales or rhonchi. CTA B/L  	ABD: soft ntnd, + BS x 4 quadrants  	EXT: moves all extremities,  no clubbing, cyanosis or edema  	NEURO: Alert and oriented x3, no focal deficits          PSYCH: Cooperative, appropriate          VASCULAR:  + Rad / + PTs / +  DPs          EXTREMITY:             Right Radial: pressure dressing removed, access site soft, no hematoma, no pain, + pulses, no sign of infection, no numbness            ECG:   < from: 12 Lead ECG (09.01.21 @ 05:58) >    Ventricular Rate 61 BPM    Atrial Rate 61 BPM    P-R Interval 232 ms    QRS Duration 100 ms    Q-T Interval 466 ms    QTC Calculation(Bazett) 469 ms    P Axis 44 degrees    R Axis -22 degrees    T Axis 25 degrees    Diagnosis Line Sinus rhythm [id number] degree A-V block  Moderate voltage criteria for LVH, may be normal variant  Borderline ECG    Confirmed by Renan Wadsworth ([id number]) on 9/1/2021 7:09:01 AM                                                                                                               2D ECHO:  < from: TTE Echo Complete w/o Contrast w/ Doppler (08.31.21 @ 16:19) >  Summary:   1. Hyperdynamic global left ventricular systolic function.   2. LV Ejection Fraction by Pacheco's Method with a biplane EF of 71 %.   3. Spectral Doppler shows impaired relaxation pattern of left ventricular myocardial filling (Grade I diastolic dysfunction).   4. Moderately enlarged left atrium.   5. Normal right atrial size.   6. Mild to moderate mitral valve regurgitation.   7. Moderate to severe mitral annular calcification.   8. Mild tricuspid regurgitation.   9. Sclerotic aortic valve with normal opening.  10. Estimated pulmonary artery systolic pressure is 36.3 mmHg assuming a right atrial pressure of 5 mmHg, which is consistent with borderline pulmonary hypertension.  11. LA volume Index is 39.2 ml/m² ml/m2.  12. There is mild aortic root calcification.    LABS:                        10.7   14.38 )-----------( 449      ( 01 Sep 2021 05:10 )             33.3     09-01    139  |  102  |  17  ----------------------------<  171<H>  3.5   |  26  |  0.8    Ca    8.7      01 Sep 2021 05:10  Mg     1.7     08-31    TPro  5.6<L>  /  Alb  3.4<L>  /  TBili  0.5  /  DBili  x   /  AST  18  /  ALT  14  /  AlkPhos  67  09-01    CARDIAC MARKERS ( 01 Sep 2021 05:10 )  x     / 0.09 ng/mL / x     / x     / x      CARDIAC MARKERS ( 31 Aug 2021 02:30 )  x     / <0.01 ng/mL / x     / x     / x          LIVER FUNCTIONS - ( 01 Sep 2021 05:10 )  Alb: 3.4 g/dL / Pro: 5.6 g/dL / ALK PHOS: 67 U/L / ALT: 14 U/L / AST: 18 U/L / GGT: x             A/P:  I discussed the case with Cardiologist Dr. Peters and recommend the following:    S/P PCI:   PROCEDURE SUMMARY  The coronary anatomy is normal. There is significant single vessel coronary artery disease.  Successful PCI of OM1 with MEAGAN x 1 (AUC score 8).                      Keep K = 4, Mg = 2                   Ambulate patient with assistance                   Patient is refusing Statin Therapy, risks and benefits explained                    Continue DAPT ( Aspirin 81 mg daily and Plavix 75 mg daily ), ACEi, B-Blocker                   Patient given 30 day supply of ( Aspirin 81 mg daily and Plavix 75 mg daily ) to take at home                   Patient agreeing to take DAPT for at least one year or as directed by cardiologist                    Pt given instructions on importance of taking antiplatelet medication or risk acute stent thrombosis/death                   Post cath instructions, access site care and activity restrictions reviewed with patient                     Discussed with patient to return to hospital if experience chest pain, shortness breath, dizziness and site bleeding                   Aggressive risk factor modification, diet counseling, smoking cessation discussed with patient                       Cardiac rehab information provided/ referral and communication to cardiac rehab provided                   Can discharge patient from cardiac standpoint after ambulating without symptoms and access site wnl, ECG and blood work reviewed                    Follow up with Cardiology Dr. Peters in two weeks. Instructed to call and make an appointment

## 2021-09-01 NOTE — DISCHARGE NOTE NURSING/CASE MANAGEMENT/SOCIAL WORK - NSDCPEFALRISK_GEN_ALL_CORE
For information on Fall & injury Prevention, visit [email or web address]

## 2021-09-01 NOTE — PROGRESS NOTE ADULT - ATTENDING COMMENTS
agree with above POC. discussed with team on rounds. outpatient follow up with Dr. Whitley
Hemodynamically stable, no events overnight. BP elevated.     DAPT therapy   Refusing statin   Start ACEi   Follow up with cardiology 1-2 weeks   DC planning

## 2021-09-01 NOTE — DISCHARGE NOTE PROVIDER - NSDCMRMEDTOKEN_GEN_ALL_CORE_FT
Jeffry Thyroid 15 mg oral tablet: 1 tab(s) orally once a day  aspirin 81 mg oral tablet, chewable: 1 tab(s) orally once a day  clopidogrel 75 mg oral tablet: 1 tab(s) orally once a day  escitalopram 20 mg oral tablet: 1 tab(s) orally once a day  folic acid 1 mg oral tablet: 1 tab(s) orally once a day  glimepiride 1 mg oral tablet: 1 tab(s) orally once a day  Jardiance 10 mg oral tablet: 1 tab(s) orally once a day (in the morning)  metoprolol succinate 25 mg oral tablet, extended release: 1 tab(s) orally once a day  Victoza 18 mg/3 mL subcutaneous solution: subcutaneous once a day   Jeffry Thyroid 15 mg oral tablet: 1 tab(s) orally once a day  aspirin 81 mg oral tablet, chewable: 1 tab(s) orally once a day  clopidogrel 75 mg oral tablet: 1 tab(s) orally once a day  escitalopram 20 mg oral tablet: 1 tab(s) orally once a day  folic acid 1 mg oral tablet: 1 tab(s) orally once a day  glimepiride 1 mg oral tablet: 1 tab(s) orally once a day  Jardiance 10 mg oral tablet: 1 tab(s) orally once a day (in the morning)  metoprolol succinate 25 mg oral tablet, extended release: 1 tab(s) orally once a day  vancomycin 125 mg oral capsule: 1 cap(s) orally every 6 hours  Victoza 18 mg/3 mL subcutaneous solution: subcutaneous once a day

## 2021-09-01 NOTE — DISCHARGE NOTE NURSING/CASE MANAGEMENT/SOCIAL WORK - PATIENT PORTAL LINK FT
You can access the FollowMyHealth Patient Portal offered by [facility] by registering at the following website: [email or web address]. By joining [employer]’s FollowMyHealth portal, you will also be able to view your health information using other applications (apps) compatible with our system.

## 2021-09-01 NOTE — DISCHARGE NOTE PROVIDER - NSDCCPCAREPLAN_GEN_ALL_CORE_FT
PRINCIPAL DISCHARGE DIAGNOSIS  Diagnosis: ACS (acute coronary syndrome)  Assessment and Plan of Treatment: A heart attack happens when the blood vessels that supply blood to your heart are blocked. This can damage your heart or lead to an abnormal heart rhythm or heart failure. A heart attack is also called a myocardial infarction.  DISCHARGE INSTRUCTIONS:  Call your local emergency number (911 in the [place]) for any of the following:   •You have any of the following signs of a heart attack: ?Squeezing, pressure, or pain in your chest  ?You may also have any of the following: ?Discomfort or pain in your back, neck, jaw, stomach, or arm  ?Shortness of breath  ?Nausea or vomiting  ?Lightheadedness or a sudden cold sweat  •Take your medicine as directed. Contact your healthcare provider if you think your medicine is not helping or if you have side effects. Tell him or her if you are allergic to any medicine. Keep a list of the medicines, vitamins, and herbs you take. Include the amounts, and when and why you take them. Bring the list or the pill bottles to follow-up visits. Carry your medicine list with you in case of an emergency.  Avoid smoking or drinking alcohol and follow a low salt and low fat diet.        SECONDARY DISCHARGE DIAGNOSES  Diagnosis: Atrial fibrillation  Assessment and Plan of Treatment:      PRINCIPAL DISCHARGE DIAGNOSIS  Diagnosis: ACS (acute coronary syndrome)  Assessment and Plan of Treatment: A heart attack happens when the blood vessels that supply blood to your heart are blocked. This can damage your heart or lead to an abnormal heart rhythm or heart failure. A heart attack is also called a myocardial infarction. You refused to take statin. Please follow up with  in 2 weeks for ACEI and statin  DISCHARGE INSTRUCTIONS:  Call your local emergency number (911 in the ) for any of the following:   •You have any of the following signs of a heart attack: ?Squeezing, pressure, or pain in your chest  ?You may also have any of the following: ?Discomfort or pain in your back, neck, jaw, stomach, or arm  ?Shortness of breath  ?Nausea or vomiting  ?Lightheadedness or a sudden cold sweat  •Take your medicine as directed. Contact your healthcare provider if you think your medicine is not helping or if you have side effects. Tell him or her if you are allergic to any medicine. Keep a list of the medicines, vitamins, and herbs you take. Include the amounts, and when and why you take them. Bring the list or the pill bottles to follow-up visits. Carry your medicine list with you in case of an emergency.  Avoid smoking or drinking alcohol and follow a low salt and low fat diet.

## 2021-09-07 DIAGNOSIS — Z79.01 LONG TERM (CURRENT) USE OF ANTICOAGULANTS: ICD-10-CM

## 2021-09-07 DIAGNOSIS — Z95.5 PRESENCE OF CORONARY ANGIOPLASTY IMPLANT AND GRAFT: ICD-10-CM

## 2021-09-07 DIAGNOSIS — I10 ESSENTIAL (PRIMARY) HYPERTENSION: ICD-10-CM

## 2021-09-07 DIAGNOSIS — Z79.2 LONG TERM (CURRENT) USE OF ANTIBIOTICS: ICD-10-CM

## 2021-09-07 DIAGNOSIS — I48.91 UNSPECIFIED ATRIAL FIBRILLATION: ICD-10-CM

## 2021-09-07 DIAGNOSIS — Z79.899 OTHER LONG TERM (CURRENT) DRUG THERAPY: ICD-10-CM

## 2021-09-07 DIAGNOSIS — I27.20 PULMONARY HYPERTENSION, UNSPECIFIED: ICD-10-CM

## 2021-09-07 DIAGNOSIS — I25.2 OLD MYOCARDIAL INFARCTION: ICD-10-CM

## 2021-09-07 DIAGNOSIS — M48.00 SPINAL STENOSIS, SITE UNSPECIFIED: ICD-10-CM

## 2021-09-07 DIAGNOSIS — I49.1 ATRIAL PREMATURE DEPOLARIZATION: ICD-10-CM

## 2021-09-07 DIAGNOSIS — E11.9 TYPE 2 DIABETES MELLITUS WITHOUT COMPLICATIONS: ICD-10-CM

## 2021-09-07 DIAGNOSIS — Z79.82 LONG TERM (CURRENT) USE OF ASPIRIN: ICD-10-CM

## 2021-09-07 DIAGNOSIS — I34.0 NONRHEUMATIC MITRAL (VALVE) INSUFFICIENCY: ICD-10-CM

## 2021-09-07 DIAGNOSIS — Z88.5 ALLERGY STATUS TO NARCOTIC AGENT: ICD-10-CM

## 2021-09-07 DIAGNOSIS — E03.9 HYPOTHYROIDISM, UNSPECIFIED: ICD-10-CM

## 2021-09-07 DIAGNOSIS — A04.72 ENTEROCOLITIS DUE TO CLOSTRIDIUM DIFFICILE, NOT SPECIFIED AS RECURRENT: ICD-10-CM

## 2021-09-07 DIAGNOSIS — M35.3 POLYMYALGIA RHEUMATICA: ICD-10-CM

## 2021-09-07 DIAGNOSIS — C25.9 MALIGNANT NEOPLASM OF PANCREAS, UNSPECIFIED: ICD-10-CM

## 2021-09-07 DIAGNOSIS — K58.0 IRRITABLE BOWEL SYNDROME WITH DIARRHEA: ICD-10-CM

## 2021-09-07 DIAGNOSIS — Z96.642 PRESENCE OF LEFT ARTIFICIAL HIP JOINT: ICD-10-CM

## 2021-09-07 DIAGNOSIS — I21.21 ST ELEVATION (STEMI) MYOCARDIAL INFARCTION INVOLVING LEFT CIRCUMFLEX CORONARY ARTERY: ICD-10-CM

## 2021-09-07 DIAGNOSIS — Z87.891 PERSONAL HISTORY OF NICOTINE DEPENDENCE: ICD-10-CM

## 2021-09-07 DIAGNOSIS — I44.0 ATRIOVENTRICULAR BLOCK, FIRST DEGREE: ICD-10-CM

## 2021-09-07 DIAGNOSIS — I25.110 ATHEROSCLEROTIC HEART DISEASE OF NATIVE CORONARY ARTERY WITH UNSTABLE ANGINA PECTORIS: ICD-10-CM

## 2021-09-07 DIAGNOSIS — Z79.84 LONG TERM (CURRENT) USE OF ORAL HYPOGLYCEMIC DRUGS: ICD-10-CM

## 2022-04-04 ENCOUNTER — INPATIENT (INPATIENT)
Facility: HOSPITAL | Age: 87
LOS: 2 days | Discharge: ANOTHER HOSPITAL | End: 2022-04-07
Attending: STUDENT IN AN ORGANIZED HEALTH CARE EDUCATION/TRAINING PROGRAM | Admitting: STUDENT IN AN ORGANIZED HEALTH CARE EDUCATION/TRAINING PROGRAM
Payer: MEDICARE

## 2022-04-04 VITALS
TEMPERATURE: 100 F | HEIGHT: 56 IN | DIASTOLIC BLOOD PRESSURE: 78 MMHG | SYSTOLIC BLOOD PRESSURE: 183 MMHG | OXYGEN SATURATION: 96 % | RESPIRATION RATE: 18 BRPM | HEART RATE: 83 BPM

## 2022-04-04 DIAGNOSIS — H26.9 UNSPECIFIED CATARACT: Chronic | ICD-10-CM

## 2022-04-04 DIAGNOSIS — Z90.89 ACQUIRED ABSENCE OF OTHER ORGANS: Chronic | ICD-10-CM

## 2022-04-04 DIAGNOSIS — Z96.642 PRESENCE OF LEFT ARTIFICIAL HIP JOINT: Chronic | ICD-10-CM

## 2022-04-04 DIAGNOSIS — Z90.49 ACQUIRED ABSENCE OF OTHER SPECIFIED PARTS OF DIGESTIVE TRACT: Chronic | ICD-10-CM

## 2022-04-04 PROCEDURE — 99285 EMERGENCY DEPT VISIT HI MDM: CPT | Mod: FS

## 2022-04-04 NOTE — ED PROVIDER NOTE - NS ED ATTENDING STATEMENT MOD
This was a shared visit with the ELISSA. I reviewed and verified the documentation and independently performed the documented:

## 2022-04-04 NOTE — ED PROVIDER NOTE - ATTENDING CONTRIBUTION TO CARE
87yF s/p liver excision ~1wk ago for cancer p/w palpitations and fatigue.  Pt denies CP, fever, SOB, cough, URI.  Says she has remote hx of afib and EMS told her her EKG looked like she was back in afib.

## 2022-04-04 NOTE — ED PROVIDER NOTE - NSICDXPASTSURGICALHX_GEN_ALL_CORE_FT
PAST SURGICAL HISTORY:  Cataract of both eyes     History of appendectomy     History of cholecystectomy     History of hip replacement, total, left     S/P tonsillectomy

## 2022-04-04 NOTE — ED ADULT TRIAGE NOTE - CHIEF COMPLAINT QUOTE
pt c/o chest discomfort for a cple of ddays. pt c/o fluttering. had pt had liver resection a week ago

## 2022-04-04 NOTE — ED PROVIDER NOTE - CLINICAL SUMMARY MEDICAL DECISION MAKING FREE TEXT BOX
87yF DM CAD afib not on a/c liver cancer s/p partial liver resection 1wk ago p/w weakness x 3d and palpitations (w/o CP or SOB) since yesterday.  Pt well appearing w/o resp distress or hemodynamic instability, though pt likely intermittently in afib/flutter contributing to her palpitations.  Labs otherwise w/ marked leukocytosis WBC 18 of unclear etiology - BCx and UA/UCx obtained.  CXR w/o ptx or pan.  Will adm for close monitoring and further care.

## 2022-04-04 NOTE — ED PROVIDER NOTE - CARE PLAN
Principal Discharge DX:	Leukocytosis  Secondary Diagnosis:	Palpitations  Secondary Diagnosis:	Weakness   1 Principal Discharge DX:	Weakness  Secondary Diagnosis:	Palpitations  Secondary Diagnosis:	Leukocytosis

## 2022-04-04 NOTE — ED PROVIDER NOTE - OBJECTIVE STATEMENT
87 year old female with pmhx of DM II, CAD s/p PCI x3, Afib not on AC, hypothyroidism, arthritis and liver CA, s/p recent partial liver resection at [place] on mon (not on active chemo), presents with palpitations and weakness since fri (3 days). Pt denies fever, chills, sob, abd pain, or nausea, vomiting, diarrhea. no urinary symptoms.

## 2022-04-04 NOTE — ED PROVIDER NOTE - NSICDXPASTMEDICALHX_GEN_ALL_CORE_FT
PAST MEDICAL HISTORY:  Acute MI     Arthritis     CAD (coronary artery disease)     CAD S/P percutaneous coronary angioplasty     DM (diabetes mellitus), secondary     H/O spinal stenosis     Hypothyroid     IBS (irritable bowel syndrome)     Polymyalgia

## 2022-04-05 LAB
ALBUMIN SERPL ELPH-MCNC: 3.4 G/DL — LOW (ref 3.5–5.2)
ALBUMIN SERPL ELPH-MCNC: 3.5 G/DL — SIGNIFICANT CHANGE UP (ref 3.5–5.2)
ALP SERPL-CCNC: 194 U/L — HIGH (ref 30–115)
ALP SERPL-CCNC: 204 U/L — HIGH (ref 30–115)
ALT FLD-CCNC: 56 U/L — HIGH (ref 0–41)
ALT FLD-CCNC: 64 U/L — HIGH (ref 0–41)
ANION GAP SERPL CALC-SCNC: 16 MMOL/L — HIGH (ref 7–14)
ANION GAP SERPL CALC-SCNC: 21 MMOL/L — HIGH (ref 7–14)
APPEARANCE UR: CLEAR — SIGNIFICANT CHANGE UP
AST SERPL-CCNC: 27 U/L — SIGNIFICANT CHANGE UP (ref 0–41)
AST SERPL-CCNC: 33 U/L — SIGNIFICANT CHANGE UP (ref 0–41)
BACTERIA # UR AUTO: NEGATIVE — SIGNIFICANT CHANGE UP
BASOPHILS # BLD AUTO: 0.11 K/UL — SIGNIFICANT CHANGE UP (ref 0–0.2)
BASOPHILS NFR BLD AUTO: 0.6 % — SIGNIFICANT CHANGE UP (ref 0–1)
BILIRUB SERPL-MCNC: 0.6 MG/DL — SIGNIFICANT CHANGE UP (ref 0.2–1.2)
BILIRUB SERPL-MCNC: 0.7 MG/DL — SIGNIFICANT CHANGE UP (ref 0.2–1.2)
BILIRUB UR-MCNC: NEGATIVE — SIGNIFICANT CHANGE UP
BUN SERPL-MCNC: 12 MG/DL — SIGNIFICANT CHANGE UP (ref 10–20)
BUN SERPL-MCNC: 14 MG/DL — SIGNIFICANT CHANGE UP (ref 10–20)
CALCIUM SERPL-MCNC: 8.7 MG/DL — SIGNIFICANT CHANGE UP (ref 8.5–10.1)
CALCIUM SERPL-MCNC: 8.9 MG/DL — SIGNIFICANT CHANGE UP (ref 8.5–10.1)
CHLORIDE SERPL-SCNC: 104 MMOL/L — SIGNIFICANT CHANGE UP (ref 98–110)
CHLORIDE SERPL-SCNC: 98 MMOL/L — SIGNIFICANT CHANGE UP (ref 98–110)
CO2 SERPL-SCNC: 22 MMOL/L — SIGNIFICANT CHANGE UP (ref 17–32)
CO2 SERPL-SCNC: 24 MMOL/L — SIGNIFICANT CHANGE UP (ref 17–32)
COLOR SPEC: YELLOW — SIGNIFICANT CHANGE UP
CREAT SERPL-MCNC: 0.6 MG/DL — LOW (ref 0.7–1.5)
CREAT SERPL-MCNC: 0.7 MG/DL — SIGNIFICANT CHANGE UP (ref 0.7–1.5)
DIFF PNL FLD: NEGATIVE — SIGNIFICANT CHANGE UP
EGFR: 84 ML/MIN/1.73M2 — SIGNIFICANT CHANGE UP
EGFR: 87 ML/MIN/1.73M2 — SIGNIFICANT CHANGE UP
EOSINOPHIL # BLD AUTO: 0.88 K/UL — HIGH (ref 0–0.7)
EOSINOPHIL NFR BLD AUTO: 4.6 % — SIGNIFICANT CHANGE UP (ref 0–8)
EPI CELLS # UR: 7 /HPF — HIGH (ref 0–5)
GLUCOSE BLDC GLUCOMTR-MCNC: 141 MG/DL — HIGH (ref 70–99)
GLUCOSE BLDC GLUCOMTR-MCNC: 75 MG/DL — SIGNIFICANT CHANGE UP (ref 70–99)
GLUCOSE SERPL-MCNC: 104 MG/DL — HIGH (ref 70–99)
GLUCOSE SERPL-MCNC: 118 MG/DL — HIGH (ref 70–99)
GLUCOSE UR QL: ABNORMAL
HCT VFR BLD CALC: 30 % — LOW (ref 37–47)
HCT VFR BLD CALC: 30.3 % — LOW (ref 37–47)
HGB BLD-MCNC: 10.1 G/DL — LOW (ref 12–16)
HGB BLD-MCNC: 10.3 G/DL — LOW (ref 12–16)
HYALINE CASTS # UR AUTO: 2 /LPF — SIGNIFICANT CHANGE UP (ref 0–7)
IMM GRANULOCYTES NFR BLD AUTO: 1.2 % — HIGH (ref 0.1–0.3)
INR BLD: 0.92 RATIO — SIGNIFICANT CHANGE UP (ref 0.65–1.3)
KETONES UR-MCNC: ABNORMAL
LACTATE SERPL-SCNC: 0.8 MMOL/L — SIGNIFICANT CHANGE UP (ref 0.7–2)
LEUKOCYTE ESTERASE UR-ACNC: ABNORMAL
LYMPHOCYTES # BLD AUTO: 1.85 K/UL — SIGNIFICANT CHANGE UP (ref 1.2–3.4)
LYMPHOCYTES # BLD AUTO: 9.8 % — LOW (ref 20.5–51.1)
MAGNESIUM SERPL-MCNC: 1.9 MG/DL — SIGNIFICANT CHANGE UP (ref 1.8–2.4)
MCHC RBC-ENTMCNC: 29.4 PG — SIGNIFICANT CHANGE UP (ref 27–31)
MCHC RBC-ENTMCNC: 29.6 PG — SIGNIFICANT CHANGE UP (ref 27–31)
MCHC RBC-ENTMCNC: 33.7 G/DL — SIGNIFICANT CHANGE UP (ref 32–37)
MCHC RBC-ENTMCNC: 34 G/DL — SIGNIFICANT CHANGE UP (ref 32–37)
MCV RBC AUTO: 87.1 FL — SIGNIFICANT CHANGE UP (ref 81–99)
MCV RBC AUTO: 87.5 FL — SIGNIFICANT CHANGE UP (ref 81–99)
MONOCYTES # BLD AUTO: 2.11 K/UL — HIGH (ref 0.1–0.6)
MONOCYTES NFR BLD AUTO: 11.1 % — HIGH (ref 1.7–9.3)
NEUTROPHILS # BLD AUTO: 13.79 K/UL — HIGH (ref 1.4–6.5)
NEUTROPHILS NFR BLD AUTO: 72.7 % — SIGNIFICANT CHANGE UP (ref 42.2–75.2)
NITRITE UR-MCNC: NEGATIVE — SIGNIFICANT CHANGE UP
NRBC # BLD: 0 /100 WBCS — SIGNIFICANT CHANGE UP (ref 0–0)
NRBC # BLD: 0 /100 WBCS — SIGNIFICANT CHANGE UP (ref 0–0)
PH UR: 6 — SIGNIFICANT CHANGE UP (ref 5–8)
PLATELET # BLD AUTO: 317 K/UL — SIGNIFICANT CHANGE UP (ref 130–400)
PLATELET # BLD AUTO: 398 K/UL — SIGNIFICANT CHANGE UP (ref 130–400)
POTASSIUM SERPL-MCNC: 3.8 MMOL/L — SIGNIFICANT CHANGE UP (ref 3.5–5)
POTASSIUM SERPL-MCNC: 3.9 MMOL/L — SIGNIFICANT CHANGE UP (ref 3.5–5)
POTASSIUM SERPL-SCNC: 3.8 MMOL/L — SIGNIFICANT CHANGE UP (ref 3.5–5)
POTASSIUM SERPL-SCNC: 3.9 MMOL/L — SIGNIFICANT CHANGE UP (ref 3.5–5)
PROT SERPL-MCNC: 5.5 G/DL — LOW (ref 6–8)
PROT SERPL-MCNC: 5.5 G/DL — LOW (ref 6–8)
PROT UR-MCNC: SIGNIFICANT CHANGE UP
PROTHROM AB SERPL-ACNC: 10.6 SEC — SIGNIFICANT CHANGE UP (ref 9.95–12.87)
RBC # BLD: 3.43 M/UL — LOW (ref 4.2–5.4)
RBC # BLD: 3.48 M/UL — LOW (ref 4.2–5.4)
RBC # FLD: 17.9 % — HIGH (ref 11.5–14.5)
RBC # FLD: 17.9 % — HIGH (ref 11.5–14.5)
RBC CASTS # UR COMP ASSIST: 0 /HPF — SIGNIFICANT CHANGE UP (ref 0–4)
SARS-COV-2 RNA SPEC QL NAA+PROBE: SIGNIFICANT CHANGE UP
SODIUM SERPL-SCNC: 141 MMOL/L — SIGNIFICANT CHANGE UP (ref 135–146)
SODIUM SERPL-SCNC: 144 MMOL/L — SIGNIFICANT CHANGE UP (ref 135–146)
SP GR SPEC: 1.02 — SIGNIFICANT CHANGE UP (ref 1.01–1.03)
TROPONIN T SERPL-MCNC: <0.01 NG/ML — SIGNIFICANT CHANGE UP
UROBILINOGEN FLD QL: ABNORMAL
WBC # BLD: 16.82 K/UL — HIGH (ref 4.8–10.8)
WBC # BLD: 18.96 K/UL — HIGH (ref 4.8–10.8)
WBC # FLD AUTO: 16.82 K/UL — HIGH (ref 4.8–10.8)
WBC # FLD AUTO: 18.96 K/UL — HIGH (ref 4.8–10.8)
WBC UR QL: 16 /HPF — HIGH (ref 0–5)

## 2022-04-05 PROCEDURE — 99221 1ST HOSP IP/OBS SF/LOW 40: CPT

## 2022-04-05 PROCEDURE — 74177 CT ABD & PELVIS W/CONTRAST: CPT | Mod: 26

## 2022-04-05 PROCEDURE — 93970 EXTREMITY STUDY: CPT | Mod: 26

## 2022-04-05 PROCEDURE — 93010 ELECTROCARDIOGRAM REPORT: CPT

## 2022-04-05 PROCEDURE — 71045 X-RAY EXAM CHEST 1 VIEW: CPT | Mod: 26

## 2022-04-05 RX ORDER — ASPIRIN/CALCIUM CARB/MAGNESIUM 324 MG
81 TABLET ORAL DAILY
Refills: 0 | Status: DISCONTINUED | OUTPATIENT
Start: 2022-04-05 | End: 2022-04-06

## 2022-04-05 RX ORDER — METOPROLOL TARTRATE 50 MG
0.5 TABLET ORAL
Qty: 0 | Refills: 0 | DISCHARGE

## 2022-04-05 RX ORDER — GLIMEPIRIDE 1 MG
1 TABLET ORAL
Qty: 0 | Refills: 0 | DISCHARGE

## 2022-04-05 RX ORDER — CEFEPIME 1 G/1
2000 INJECTION, POWDER, FOR SOLUTION INTRAMUSCULAR; INTRAVENOUS EVERY 8 HOURS
Refills: 0 | Status: DISCONTINUED | OUTPATIENT
Start: 2022-04-05 | End: 2022-04-06

## 2022-04-05 RX ORDER — METRONIDAZOLE 500 MG
500 TABLET ORAL EVERY 8 HOURS
Refills: 0 | Status: DISCONTINUED | OUTPATIENT
Start: 2022-04-05 | End: 2022-04-07

## 2022-04-05 RX ORDER — LACTOBACILLUS ACIDOPHILUS 100MM CELL
1 CAPSULE ORAL
Refills: 0 | Status: DISCONTINUED | OUTPATIENT
Start: 2022-04-05 | End: 2022-04-07

## 2022-04-05 RX ORDER — CEFEPIME 1 G/1
INJECTION, POWDER, FOR SOLUTION INTRAMUSCULAR; INTRAVENOUS
Refills: 0 | Status: DISCONTINUED | OUTPATIENT
Start: 2022-04-05 | End: 2022-04-06

## 2022-04-05 RX ORDER — VANCOMYCIN HCL 1 G
1 VIAL (EA) INTRAVENOUS
Qty: 0 | Refills: 0 | DISCHARGE
End: 2021-09-07

## 2022-04-05 RX ORDER — LEVOTHYROXINE SODIUM 125 MCG
25 TABLET ORAL DAILY
Refills: 0 | Status: DISCONTINUED | OUTPATIENT
Start: 2022-04-05 | End: 2022-04-07

## 2022-04-05 RX ORDER — ESCITALOPRAM OXALATE 10 MG/1
1 TABLET, FILM COATED ORAL
Qty: 0 | Refills: 0 | DISCHARGE

## 2022-04-05 RX ORDER — LANOLIN ALCOHOL/MO/W.PET/CERES
3 CREAM (GRAM) TOPICAL AT BEDTIME
Refills: 0 | Status: DISCONTINUED | OUTPATIENT
Start: 2022-04-05 | End: 2022-04-07

## 2022-04-05 RX ORDER — CEFEPIME 1 G/1
2000 INJECTION, POWDER, FOR SOLUTION INTRAMUSCULAR; INTRAVENOUS ONCE
Refills: 0 | Status: COMPLETED | OUTPATIENT
Start: 2022-04-05 | End: 2022-04-05

## 2022-04-05 RX ORDER — VANCOMYCIN HCL 1 G
VIAL (EA) INTRAVENOUS
Refills: 0 | Status: DISCONTINUED | OUTPATIENT
Start: 2022-04-05 | End: 2022-04-05

## 2022-04-05 RX ORDER — PANTOPRAZOLE SODIUM 20 MG/1
40 TABLET, DELAYED RELEASE ORAL
Refills: 0 | Status: DISCONTINUED | OUTPATIENT
Start: 2022-04-05 | End: 2022-04-07

## 2022-04-05 RX ORDER — ENOXAPARIN SODIUM 100 MG/ML
40 INJECTION SUBCUTANEOUS EVERY 24 HOURS
Refills: 0 | Status: DISCONTINUED | OUTPATIENT
Start: 2022-04-05 | End: 2022-04-06

## 2022-04-05 RX ORDER — ONDANSETRON 8 MG/1
4 TABLET, FILM COATED ORAL EVERY 8 HOURS
Refills: 0 | Status: DISCONTINUED | OUTPATIENT
Start: 2022-04-05 | End: 2022-04-07

## 2022-04-05 RX ORDER — VANCOMYCIN HCL 1 G
1250 VIAL (EA) INTRAVENOUS EVERY 8 HOURS
Refills: 0 | Status: DISCONTINUED | OUTPATIENT
Start: 2022-04-05 | End: 2022-04-05

## 2022-04-05 RX ORDER — CLOPIDOGREL BISULFATE 75 MG/1
75 TABLET, FILM COATED ORAL DAILY
Refills: 0 | Status: DISCONTINUED | OUTPATIENT
Start: 2022-04-05 | End: 2022-04-06

## 2022-04-05 RX ORDER — VANCOMYCIN HCL 1 G
1250 VIAL (EA) INTRAVENOUS ONCE
Refills: 0 | Status: COMPLETED | OUTPATIENT
Start: 2022-04-05 | End: 2022-04-05

## 2022-04-05 RX ORDER — LIRAGLUTIDE 6 MG/ML
0 INJECTION SUBCUTANEOUS
Qty: 0 | Refills: 0 | DISCHARGE

## 2022-04-05 RX ORDER — ACETAMINOPHEN 500 MG
650 TABLET ORAL EVERY 6 HOURS
Refills: 0 | Status: DISCONTINUED | OUTPATIENT
Start: 2022-04-05 | End: 2022-04-07

## 2022-04-05 RX ORDER — METOPROLOL TARTRATE 50 MG
12.5 TABLET ORAL AT BEDTIME
Refills: 0 | Status: DISCONTINUED | OUTPATIENT
Start: 2022-04-05 | End: 2022-04-06

## 2022-04-05 RX ADMIN — Medication 166.67 MILLIGRAM(S): at 08:48

## 2022-04-05 RX ADMIN — Medication 100 MILLIGRAM(S): at 16:55

## 2022-04-05 RX ADMIN — Medication 650 MILLIGRAM(S): at 21:23

## 2022-04-05 RX ADMIN — CEFEPIME 100 MILLIGRAM(S): 1 INJECTION, POWDER, FOR SOLUTION INTRAMUSCULAR; INTRAVENOUS at 21:24

## 2022-04-05 RX ADMIN — Medication 12.5 MILLIGRAM(S): at 21:24

## 2022-04-05 RX ADMIN — Medication 1 TABLET(S): at 09:30

## 2022-04-05 RX ADMIN — ENOXAPARIN SODIUM 40 MILLIGRAM(S): 100 INJECTION SUBCUTANEOUS at 06:38

## 2022-04-05 RX ADMIN — CEFEPIME 100 MILLIGRAM(S): 1 INJECTION, POWDER, FOR SOLUTION INTRAMUSCULAR; INTRAVENOUS at 08:20

## 2022-04-05 RX ADMIN — Medication 1 TABLET(S): at 11:54

## 2022-04-05 RX ADMIN — Medication 81 MILLIGRAM(S): at 11:54

## 2022-04-05 RX ADMIN — Medication 100 MILLIGRAM(S): at 21:25

## 2022-04-05 RX ADMIN — Medication 1 TABLET(S): at 17:09

## 2022-04-05 RX ADMIN — Medication 100 MILLIGRAM(S): at 06:39

## 2022-04-05 RX ADMIN — PANTOPRAZOLE SODIUM 40 MILLIGRAM(S): 20 TABLET, DELAYED RELEASE ORAL at 06:38

## 2022-04-05 RX ADMIN — CEFEPIME 100 MILLIGRAM(S): 1 INJECTION, POWDER, FOR SOLUTION INTRAMUSCULAR; INTRAVENOUS at 13:16

## 2022-04-05 RX ADMIN — Medication 650 MILLIGRAM(S): at 06:40

## 2022-04-05 RX ADMIN — Medication 166.67 MILLIGRAM(S): at 14:53

## 2022-04-05 RX ADMIN — Medication 25 MICROGRAM(S): at 06:39

## 2022-04-05 RX ADMIN — CLOPIDOGREL BISULFATE 75 MILLIGRAM(S): 75 TABLET, FILM COATED ORAL at 11:54

## 2022-04-05 NOTE — PATIENT PROFILE ADULT - FALL HARM RISK - HARM RISK INTERVENTIONS

## 2022-04-05 NOTE — H&P ADULT - NSHPPHYSICALEXAM_GEN_ALL_CORE
GENERAL: NAD, speaks in full sentences, no signs of respiratory distress  HEAD:  Atraumatic, Normocephalic  EYES: EOMI, PERRLA, conjunctiva and sclera clear  NECK: Supple, No JVD  CHEST/LUNG: Clear to auscultation bilaterally; No wheeze; No crackles; No accessory muscles used  HEART: Regular rate and rhythm; No murmurs;   ABDOMEN: Soft, RUQ tenderness to palpation, Nondistended; Bowel sounds present; No guarding  EXTREMITIES:  2+ Peripheral Pulses, No cyanosis or edema  PSYCH: AAOx3  NEUROLOGY: non-focal

## 2022-04-05 NOTE — H&P ADULT - ATTENDING COMMENTS
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 p/w weakness and intermittent palpitations. Pt states that she has been weak since her surgery on Monday but she started feeling a sensation of fluttering in her chest yesterday.     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

## 2022-04-05 NOTE — H&P ADULT - ASSESSMENT
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 Pt states that she has been weak since her surgery on Monday but she started feeling a sensation of fluttering in her chest yesterday. She called her nurse who told her to come in to the ED for evaluation.      Pt states that she has had increased RUQ pain since discharge on Friday. Attributes her worsened pain to stopping oxycodone because of constipation and fear of becoming dependent. Pt endorses mild pain at surgical sites. Denies drainage or pus. Denies fever, chills, sob, chest pain, urinary symptoms. Recently had a BM 4/4. states she has an MI 2 weeks s/p pancreatectomy in Aug 2021 but currently does not feel the way she felt then.       In the ED, pt hemodynamically stable, T 99.5. 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

## 2022-04-05 NOTE — H&P ADULT - NSHPLABSRESULTS_GEN_ALL_CORE
10.1   18.96 )-----------( 317      ( 2022 22:11 )             30.0           141  |  98  |  14  ----------------------------<  104<H>  3.9   |  22  |  0.6<L>    Ca    8.9      2022 22:11  Mg     1.9         TPro  5.5<L>  /  Alb  3.5  /  TBili  0.6  /  DBili  x   /  AST  33  /  ALT  64<H>  /  AlkPhos  204<H>  04-              Urinalysis Basic - ( 2022 02:50 )    Color: Yellow / Appearance: Clear / S.016 / pH: x  Gluc: x / Ketone: Large  / Bili: Negative / Urobili: 3 mg/dL   Blood: x / Protein: Trace / Nitrite: Negative   Leuk Esterase: Moderate / RBC: 0 /HPF / WBC 16 /HPF   Sq Epi: x / Non Sq Epi: 7 /HPF / Bacteria: Negative        PT/INR - ( 2022 01:50 )   PT: 10.60 sec;   INR: 0.92 ratio             Lactate Trend      CARDIAC MARKERS ( 2022 22:11 )  x     / <0.01 ng/mL / x     / x     / x            CAPILLARY BLOOD GLUCOSE

## 2022-04-05 NOTE — CONSULT NOTE ADULT - SUBJECTIVE AND OBJECTIVE BOX
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 Pt states that she has been weak since her surgery on Monday but she started feeling a sensation of fluttering in her chest yesterday. She called her nurse who told her to come in to the ED for evaluation.      Pt states that she has had increased RUQ pain since discharge on Friday. Attributes her worsened pain to stopping oxycodone because of constipation and fear of becoming dependent. Pt endorses mild pain at surgical sites. Denies drainage or pus. Denies fever, chills, sob, chest pain, urinary symptoms. Recently had a BM . states she has an MI 2 weeks s/p pancreatectomy in Aug 2021 but currently does not feel the way she felt then.       In the ED, pt hemodynamically stable, T 99.5. Labs WBC 18 (WBC 16 ), Hb 10.1 at baseline  UA -mod leuk, neg nitrite, WBC 16, negative hoang     (2022 04:44)      Infectious Diseases History:  Old Micro Data/Cultures: none    FAMILY HISTORY:  No pertinent family history in first degree relatives      PAST MEDICAL & SURGICAL HISTORY:  Hypothyroid    CAD (coronary artery disease)    Acute MI    CAD S/P percutaneous coronary angioplasty    DM (diabetes mellitus), secondary    H/O spinal stenosis    Polymyalgia    Arthritis    IBS (irritable bowel syndrome)    S/P tonsillectomy    History of cholecystectomy    History of appendectomy    Cataract of both eyes    History of hip replacement, total, left        SOCIAL HISTORY  Social History:  negative  ex smoker, last cigarette smoked more than 40 years ago (31 Aug 2021 05:26)      Recent Travel: none  Other Exposures: none    ROS  General: Denies rigors, nightsweats  HEENT: Denies headache, rhinorrhea, sore throat, eye pain  CV: Denies CP, palpitations  PULM: Denies wheezing, hemoptysis  GI: Denies hematemesis, hematochezia, melena  : Denies discharge, hematuria  MSK: Denies arthralgias, myalgias  SKIN: Denies rash, lesions  NEURO: Denies paresthesias, weakness  PSYCH: Denies depression, anxiety    VITALS:  T(F): 98.1, Max: 99.7 (22 @ 21:28)  HR: 75  BP: 163/72  RR: 18Vital Signs Last 24 Hrs  T(C): 36.7 (2022 04:45), Max: 37.6 (2022 21:28)  T(F): 98.1 (2022 04:45), Max: 99.7 (2022 21:28)  HR: 75 (2022 04:45) (75 - 83)  BP: 163/72 (2022 04:45) (120/74 - 183/78)  BP(mean): --  RR: 18 (2022 04:45) (18 - 18)  SpO2: 96% (2022 21:28) (96% - 96%)    PHYSICAL EXAM:  Gen: NAD, resting in bed  HEENT: Normocephalic, atraumatic  Neck: supple, no lymphadenopathy  CV: Regular rate & regular rhythm  Lungs: CTAB  Abdomen: Soft, BS present  Ext: Warm, well perfused  Neuro: non focal, awake  Skin: no rash, no lesions  Lines: no phlebitis    TESTS & MEASUREMENTS:                        10.1   18.96 )-----------( 317      ( 2022 22:11 )             30.0     04-    141  |  98  |  14  ----------------------------<  104<H>  3.9   |  22  |  0.6<L>    Ca    8.9      2022 22:11  Mg     1.9     04-    TPro  5.5<L>  /  Alb  3.5  /  TBili  0.6  /  DBili  x   /  AST  33  /  ALT  64<H>  /  AlkPhos  204<H>  04-      LIVER FUNCTIONS - ( 2022 22:11 )  Alb: 3.5 g/dL / Pro: 5.5 g/dL / ALK PHOS: 204 U/L / ALT: 64 U/L / AST: 33 U/L / GGT: x           Urinalysis Basic - ( 2022 02:50 )    Color: Yellow / Appearance: Clear / S.016 / pH: x  Gluc: x / Ketone: Large  / Bili: Negative / Urobili: 3 mg/dL   Blood: x / Protein: Trace / Nitrite: Negative   Leuk Esterase: Moderate / RBC: 0 /HPF / WBC 16 /HPF   Sq Epi: x / Non Sq Epi: 7 /HPF / Bacteria: Negative  INFECTIOUS DISEASES TESTING      RADIOLOGY & ADDITIONAL TESTS:  CT abdomen and pelvis pending      CARDIOLOGY TESTING  12 Lead ECG:   Ventricular Rate 71 BPM    Atrial Rate 71 BPM    P-R Interval 210 ms    QRS Duration 96 ms    Q-T Interval 428 ms    QTC Calculation(Bazett) 465 ms    P Axis 55 degrees    R Axis -25 degrees    T Axis 23 degrees    Diagnosis Line Sinus rhythm with 1st degree A-V block  Moderate voltage criteria for LVH, may be normal variant  Borderline ECG    Confirmed by KENY BENNETT MD ([id number]) on 2022 8:34:39 AM (22 @ 06:33)      All available historical records have been reviewed    MEDICATIONS  aspirin  chewable 81  cefepime   IVPB 2000  cefepime   IVPB   clopidogrel Tablet 75  enoxaparin Injectable 40  lactobacillus acidophilus 1  levothyroxine 25  metoprolol tartrate 12.5  metroNIDAZOLE  IVPB 500  pantoprazole    Tablet 40  vancomycin  IVPB 1250  vancomycin  IVPB       ANTIBIOTICS:  cefepime   IVPB 2000 milliGRAM(s) IV Intermittent every 8 hours  cefepime   IVPB      metroNIDAZOLE  IVPB 500 milliGRAM(s) IV Intermittent every 8 hours  vancomycin  IVPB 1250 milliGRAM(s) IV Intermittent every 8 hours  vancomycin  IVPB          All available historical data has been reviewed    ASSESSMENT  87yFemale with a PMH of....... (fill in)    IMPRESSION:  #  #    RECOMMENDATIONS:   - follow up blood cultures and urine cultures  -     This is a pended note. 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 Pt states that she has been weak since her surgery on Monday but she started feeling a sensation of fluttering in her chest yesterday. She called her nurse who told her to come in to the ED for evaluation.      Pt states that she has had increased RUQ pain since discharge on Friday. Attributes her worsened pain to stopping oxycodone because of constipation and fear of becoming dependent. Pt endorses mild pain at surgical sites. Denies drainage or pus. Denies fever, chills, sob, chest pain, urinary symptoms. Recently had a BM . states she has an MI 2 weeks s/p pancreatectomy in Aug 2021 but currently does not feel the way she felt then.       In the ED, pt hemodynamically stable, T 99.5. Labs WBC 18 (WBC 16 ), Hb 10.1 at baseline  UA -mod leuk, neg nitrite, WBC 16, negative hoang     (2022 04:44)      Infectious Diseases History:  Old Micro Data/Cultures: none    FAMILY HISTORY:  No pertinent family history in first degree relatives      PAST MEDICAL & SURGICAL HISTORY:  Hypothyroid    CAD (coronary artery disease)    Acute MI    CAD S/P percutaneous coronary angioplasty    DM (diabetes mellitus), secondary    H/O spinal stenosis    Polymyalgia    Arthritis    IBS (irritable bowel syndrome)    S/P tonsillectomy    History of cholecystectomy    History of appendectomy    Cataract of both eyes    History of hip replacement, total, left        SOCIAL HISTORY  Social History:  negative  ex smoker, last cigarette smoked more than 40 years ago (31 Aug 2021 05:26)      Recent Travel: none  Other Exposures: none    ROS  General: Denies rigors, nightsweats  HEENT: Denies headache, rhinorrhea, sore throat, eye pain  CV: Denies CP, palpitations  PULM: Denies wheezing, hemoptysis  GI: Denies hematemesis, hematochezia, melena  : Denies discharge, hematuria  MSK: Denies arthralgias, myalgias  SKIN: Denies rash, lesions  NEURO: Denies paresthesias, weakness  PSYCH: Denies depression, anxiety    VITALS:  T(F): 98.1, Max: 99.7 (22 @ 21:28)  HR: 75  BP: 163/72  RR: 18Vital Signs Last 24 Hrs  T(C): 36.7 (2022 04:45), Max: 37.6 (2022 21:28)  T(F): 98.1 (2022 04:45), Max: 99.7 (2022 21:28)  HR: 75 (2022 04:45) (75 - 83)  BP: 163/72 (2022 04:45) (120/74 - 183/78)  BP(mean): --  RR: 18 (2022 04:45) (18 - 18)  SpO2: 96% (2022 21:28) (96% - 96%)    PHYSICAL EXAM:  Gen: NAD, resting in bed  HEENT: Normocephalic, atraumatic  Neck: supple, no lymphadenopathy  CV: Regular rate & regular rhythm  Lungs: CTAB  Abdomen: Soft, BS present  Ext: Warm, well perfused  Neuro: non focal, awake  Skin: no rash, no lesions  Lines: no phlebitis    TESTS & MEASUREMENTS:                        10.1   18.96 )-----------( 317      ( 2022 22:11 )             30.0     04-    141  |  98  |  14  ----------------------------<  104<H>  3.9   |  22  |  0.6<L>    Ca    8.9      2022 22:11  Mg     1.9     04-    TPro  5.5<L>  /  Alb  3.5  /  TBili  0.6  /  DBili  x   /  AST  33  /  ALT  64<H>  /  AlkPhos  204<H>  04-      LIVER FUNCTIONS - ( 2022 22:11 )  Alb: 3.5 g/dL / Pro: 5.5 g/dL / ALK PHOS: 204 U/L / ALT: 64 U/L / AST: 33 U/L / GGT: x           Urinalysis Basic - ( 2022 02:50 )    Color: Yellow / Appearance: Clear / S.016 / pH: x  Gluc: x / Ketone: Large  / Bili: Negative / Urobili: 3 mg/dL   Blood: x / Protein: Trace / Nitrite: Negative   Leuk Esterase: Moderate / RBC: 0 /HPF / WBC 16 /HPF   Sq Epi: x / Non Sq Epi: 7 /HPF / Bacteria: Negative  INFECTIOUS DISEASES TESTING      RADIOLOGY & ADDITIONAL TESTS:  CT abdomen and pelvis pending      CARDIOLOGY TESTING  12 Lead ECG:   Ventricular Rate 71 BPM    Atrial Rate 71 BPM    P-R Interval 210 ms    QRS Duration 96 ms    Q-T Interval 428 ms    QTC Calculation(Bazett) 465 ms    P Axis 55 degrees    R Axis -25 degrees    T Axis 23 degrees    Diagnosis Line Sinus rhythm with 1st degree A-V block  Moderate voltage criteria for LVH, may be normal variant  Borderline ECG    Confirmed by KENY BENNETT MD ([id number]) on 2022 8:34:39 AM (22 @ 06:33)      All available historical records have been reviewed    MEDICATIONS  aspirin  chewable 81  cefepime   IVPB 2000  cefepime   IVPB   clopidogrel Tablet 75  enoxaparin Injectable 40  lactobacillus acidophilus 1  levothyroxine 25  metoprolol tartrate 12.5  metroNIDAZOLE  IVPB 500  pantoprazole    Tablet 40  vancomycin  IVPB 1250  vancomycin  IVPB       ANTIBIOTICS:  cefepime   IVPB 2000 milliGRAM(s) IV Intermittent every 8 hours  cefepime   IVPB      metroNIDAZOLE  IVPB 500 milliGRAM(s) IV Intermittent every 8 hours  vancomycin  IVPB 1250 milliGRAM(s) IV Intermittent every 8 hours  vancomycin  IVPB          All available historical data has been reviewed    ASSESSMENT  87yFemale with a PMH of....... (fill in)    IMPRESSION:  # leukocytosis in the setting of no objective evidence of post operative infection; afebrile, clean clear surgical incision, possible hematoma,  #    RECOMMENDATIONS:   - follow up blood cultures and urine cultures  - follow up CT Abdomen and pelvis   - no antibiotics at this time     This is a pended note. 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 Pt states that she has been weak since her surgery on Monday but she started feeling a sensation of fluttering in her chest yesterday. She called her nurse who told her to come in to the ED for evaluation.      Pt states that she has had increased RUQ pain since discharge on Friday. Attributes her worsened pain to stopping oxycodone because of constipation and fear of becoming dependent. Pt endorses mild pain at surgical sites. Denies drainage or pus. Denies fever, chills, sob, chest pain, urinary symptoms. Recently had a BM . states she has an MI 2 weeks s/p pancreatectomy in Aug 2021 but currently does not feel the way she felt then.       In the ED, pt hemodynamically stable, T 99.5. Labs WBC 18 (WBC 16 ), Hb 10.1 at baseline  UA -mod leuk, neg nitrite, WBC 16, negative hoang     (2022 04:44)      Infectious Diseases History:  Old Micro Data/Cultures: none    FAMILY HISTORY:  No pertinent family history in first degree relatives      PAST MEDICAL & SURGICAL HISTORY:  Hypothyroid    CAD (coronary artery disease)    Acute MI    CAD S/P percutaneous coronary angioplasty    DM (diabetes mellitus), secondary    H/O spinal stenosis    Polymyalgia    Arthritis    IBS (irritable bowel syndrome)    S/P tonsillectomy    History of cholecystectomy    History of appendectomy    Cataract of both eyes    History of hip replacement, total, left        SOCIAL HISTORY  Social History:  negative  ex smoker, last cigarette smoked more than 40 years ago (31 Aug 2021 05:26)      Recent Travel: none  Other Exposures: none    ROS  General: Denies rigors, nightsweats  HEENT: Denies headache, rhinorrhea, sore throat, eye pain  CV: Denies CP, palpitations  PULM: Denies wheezing, hemoptysis  GI: Denies hematemesis, hematochezia, melena  : Denies discharge, hematuria  MSK: Denies arthralgias, myalgias  SKIN: Denies rash, lesions  NEURO: Denies paresthesias, weakness  PSYCH: Denies depression, anxiety    VITALS:  T(F): 98.1, Max: 99.7 (22 @ 21:28)  HR: 75  BP: 163/72  RR: 18Vital Signs Last 24 Hrs  T(C): 36.7 (2022 04:45), Max: 37.6 (2022 21:28)  T(F): 98.1 (2022 04:45), Max: 99.7 (2022 21:28)  HR: 75 (2022 04:45) (75 - 83)  BP: 163/72 (2022 04:45) (120/74 - 183/78)  BP(mean): --  RR: 18 (2022 04:45) (18 - 18)  SpO2: 96% (2022 21:28) (96% - 96%)    PHYSICAL EXAM:  Gen: NAD, resting in bed  HEENT: Normocephalic, atraumatic  Neck: supple, no lymphadenopathy  CV: Regular rate & regular rhythm  Lungs: CTAB  Abdomen: Soft, BS present  Ext: Warm, well perfused  Neuro: non focal, awake  Skin: no rash, no lesions  Lines: no phlebitis    TESTS & MEASUREMENTS:                        10.1   18.96 )-----------( 317      ( 2022 22:11 )             30.0     04-    141  |  98  |  14  ----------------------------<  104<H>  3.9   |  22  |  0.6<L>    Ca    8.9      2022 22:11  Mg     1.9     04-    TPro  5.5<L>  /  Alb  3.5  /  TBili  0.6  /  DBili  x   /  AST  33  /  ALT  64<H>  /  AlkPhos  204<H>  04-      LIVER FUNCTIONS - ( 2022 22:11 )  Alb: 3.5 g/dL / Pro: 5.5 g/dL / ALK PHOS: 204 U/L / ALT: 64 U/L / AST: 33 U/L / GGT: x           Urinalysis Basic - ( 2022 02:50 )    Color: Yellow / Appearance: Clear / S.016 / pH: x  Gluc: x / Ketone: Large  / Bili: Negative / Urobili: 3 mg/dL   Blood: x / Protein: Trace / Nitrite: Negative   Leuk Esterase: Moderate / RBC: 0 /HPF / WBC 16 /HPF   Sq Epi: x / Non Sq Epi: 7 /HPF / Bacteria: Negative  INFECTIOUS DISEASES TESTING      RADIOLOGY & ADDITIONAL TESTS:  CT abdomen and pelvis pending      CARDIOLOGY TESTING  12 Lead ECG:   Ventricular Rate 71 BPM    Atrial Rate 71 BPM    P-R Interval 210 ms    QRS Duration 96 ms    Q-T Interval 428 ms    QTC Calculation(Bazett) 465 ms    P Axis 55 degrees    R Axis -25 degrees    T Axis 23 degrees    Diagnosis Line Sinus rhythm with 1st degree A-V block  Moderate voltage criteria for LVH, may be normal variant  Borderline ECG    Confirmed by KENY BENNETT MD ([id number]) on 2022 8:34:39 AM (22 @ 06:33)      All available historical records have been reviewed    MEDICATIONS  aspirin  chewable 81  cefepime   IVPB 2000  cefepime   IVPB   clopidogrel Tablet 75  enoxaparin Injectable 40  lactobacillus acidophilus 1  levothyroxine 25  metoprolol tartrate 12.5  metroNIDAZOLE  IVPB 500  pantoprazole    Tablet 40  vancomycin  IVPB 1250  vancomycin  IVPB       ANTIBIOTICS:  cefepime   IVPB 2000 milliGRAM(s) IV Intermittent every 8 hours  cefepime   IVPB      metroNIDAZOLE  IVPB 500 milliGRAM(s) IV Intermittent every 8 hours  vancomycin  IVPB 1250 milliGRAM(s) IV Intermittent every 8 hours  vancomycin  IVPB          All available historical data has been reviewed    ASSESSMENT  87yFemale with a PMH of....... (fill in)    IMPRESSION:  # leukocytosis in the setting of no objective evidence of post operative infection; afebrile, clean clear surgical incision, possible hematoma,  #    RECOMMENDATIONS:   - follow up blood cultures and urine cultures  - follow up CT Abdomen and pelvis   - no antibiotics at this time

## 2022-04-05 NOTE — PROGRESS NOTE ADULT - ASSESSMENT
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 and cholangiocarcinoma  No sepsis POA   start cefepime + ESPINOZA + Vanc due to concern for post-op infection   F/U ID consult  f/u CT abdomen and pelvis  f/u BCX + UCx  Pain control PRN     Note: Hx of c-diff in 2021 s/p abx treatment  Monitor for now     #Hx of afib likely paroxysmal  #CAD s/p stents  Cont metoprolol, plavix, aspirin  Rpt EKG in AM     #DM  Keep FS <180  Start SS if indicated    #Hypothyroidism  f/u TSH   Resume home meds     #DVT PPX : Lovenox   #Diet: DASH/CC  #GI PPX: Protonix  #Activity:IAT  FULL CODE   #Progress Note Handoff  Pending (specify):  Family discussion:   Disposition:        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

## 2022-04-05 NOTE — PROGRESS NOTE ADULT - SUBJECTIVE AND OBJECTIVE BOX
ANNE VILLATORO 87y Female  MRN#: [id number]   CODE STATUS:________    Hospital Day:     Pt is currently admitted with the primary diagnosis of     SUBJECTIVE  Overnight events   -No major overnight events  Subjective complaints                                             ----------------------------------------------------------  OBJECTIVE  PAST MEDICAL & SURGICAL HISTORY  Hypothyroid    CAD (coronary artery disease)    Acute MI    CAD S/P percutaneous coronary angioplasty    DM (diabetes mellitus), secondary    H/O spinal stenosis    Polymyalgia    Arthritis    IBS (irritable bowel syndrome)    S/P tonsillectomy    History of cholecystectomy    History of appendectomy    Cataract of both eyes    History of hip replacement, total, left                                              -----------------------------------------------------------  ALLERGIES:  Percocet 5/325 (Unknown)                                            ------------------------------------------------------------    HOME MEDICATIONS  Home Medications:  [place] Thyroid 15 mg oral tablet: 1 tab(s) orally once a day (10 Jul 2019 10:47)  HumuLIN 70/30 KwikPen:  (2022 05:24)  Jardiance 10 mg oral tablet: 1 tab(s) orally once a day (in the morning) (10 Jul 2019 10:47)  Metoprolol Tartrate 25 mg oral tablet: 0.5 tab(s) orally once a day (at bedtime) (2022 05:21)  pantoprazole 40 mg oral delayed release tablet: 1 tab(s) orally once a day (2022 05:25)  Tresiba 100 units/mL subcutaneous solution: 10 unit(s) subcutaneous once a day (at bedtime) (2022 05:24)  Tylenol 500 mg oral tablet: 2 tab(s) orally 3 times a day, As Needed (2022 05:25)                           MEDICATIONS:  STANDING MEDICATIONS  aspirin  chewable 81 milliGRAM(s) Oral daily  cefepime   IVPB 2000 milliGRAM(s) IV Intermittent once  cefepime   IVPB 2000 milliGRAM(s) IV Intermittent every 8 hours  cefepime   IVPB      clopidogrel Tablet 75 milliGRAM(s) Oral daily  enoxaparin Injectable 40 milliGRAM(s) SubCutaneous every 24 hours  lactobacillus acidophilus 1 Tablet(s) Oral three times a day with meals  levothyroxine 25 MICROGram(s) Oral daily  metoprolol tartrate 12.5 milliGRAM(s) Oral at bedtime  metroNIDAZOLE  IVPB 500 milliGRAM(s) IV Intermittent every 8 hours  pantoprazole    Tablet 40 milliGRAM(s) Oral before breakfast  vancomycin  IVPB 1250 milliGRAM(s) IV Intermittent once  vancomycin  IVPB 1250 milliGRAM(s) IV Intermittent every 8 hours  vancomycin  IVPB        PRN MEDICATIONS  acetaminophen     Tablet .. 650 milliGRAM(s) Oral every 6 hours PRN  aluminum hydroxide/magnesium hydroxide/simethicone Suspension 30 milliLiter(s) Oral every 4 hours PRN  melatonin 3 milliGRAM(s) Oral at bedtime PRN  ondansetron Injectable 4 milliGRAM(s) IV Push every 8 hours PRN                                            ------------------------------------------------------------  VITAL SIGNS: Last 24 Hours  T(C): 36.7 (2022 04:45), Max: 37.6 (2022 21:28)  T(F): 98.1 (2022 04:45), Max: 99.7 (2022 21:28)  HR: 75 (2022 04:45) (75 - 83)  BP: 163/72 (2022 04:45) (120/74 - 183/78)  BP(mean): --  RR: 18 (2022 04:45) (18 - 18)  SpO2: 96% (2022 21:28) (96% - 96%)                                             --------------------------------------------------------------  LABS:                        10.1   18.96 )-----------( 317      ( 2022 22:11 )             30.0     04-    141  |  98  |  14  ----------------------------<  104<H>  3.9   |  22  |  0.6<L>    Ca    8.9      2022 22:11  Mg     1.9         TPro  5.5<L>  /  Alb  3.5  /  TBili  0.6  /  DBili  x   /  AST  33  /  ALT  64<H>  /  AlkPhos  204<H>  04-    PT/INR - ( 2022 01:50 )   PT: 10.60 sec;   INR: 0.92 ratio           Urinalysis Basic - ( 2022 02:50 )    Color: Yellow / Appearance: Clear / S.016 / pH: x  Gluc: x / Ketone: Large  / Bili: Negative / Urobili: 3 mg/dL   Blood: x / Protein: Trace / Nitrite: Negative   Leuk Esterase: Moderate / RBC: 0 /HPF / WBC 16 /HPF   Sq Epi: x / Non Sq Epi: 7 /HPF / Bacteria: Negative        Troponin T, Serum: <0.01 ng/mL (22 @ 22:11)          CARDIAC MARKERS ( 2022 22:11 )  x     / <0.01 ng/mL / x     / x     / x                                                    --------------------------------------------------------------    PHYSICAL EXAM:  GENERAL: AAOx3. Well-nourished, laying in bed appearing in no acute distress  HEENT: No FNDs, atraumatic, normocephalic, moist mucus membranes  LUNGS: Clear to auscultation bilaterally  HEART: S1/S2. No heaves or thrills  ABD: Soft, non-tender, non-distended. Bowel sounds present in all quadrants.  EXT/NEURO: 5/5 strength in all extremity joints. Sensation and ROM grossly intact.  SKIN: No breaks, erythema, edema                                           --------------------------------------------------------------  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 and cholangiocarcinoma  No sepsis POA   start cefepime + ESPINOZA + Vanc due to concern for post-op infection   F/U ID consult  f/u CT abdomen and pelvis  f/u BCX + UCx  Pain control PRN     Note: Hx of c-diff in  s/p abx treatment  Monitor for now     #Hx of afib likely paroxysmal  #CAD s/p stents  Cont metoprolol, plavix, aspirin  Rpt EKG in AM     #DM  Keep FS <180  Start SS if indicated    #Hypothyroidism  f/u TSH   Resume home meds     #DVT PPX : Lovenox   #Diet: DASH/CC  #GI PPX: Protonix  #Activity:IAT  FULL CODE   #Progress Note Handoff  Pending (specify):  Family discussion:   Disposition:        ANNE VILLATORO 87y Female  MRN#: [id number]   CODE STATUS:________    Hospital Day:     Pt is currently admitted with the primary diagnosis of heart palpitations    SUBJECTIVE  Overnight events   -No major overnight events  Subjective complaints   -well this AM, says spot fluttering on and off overnight                                          ----------------------------------------------------------  OBJECTIVE  PAST MEDICAL & SURGICAL HISTORY  Hypothyroid    CAD (coronary artery disease)    Acute MI    CAD S/P percutaneous coronary angioplasty    DM (diabetes mellitus), secondary    H/O spinal stenosis    Polymyalgia    Arthritis    IBS (irritable bowel syndrome)    S/P tonsillectomy    History of cholecystectomy    History of appendectomy    Cataract of both eyes    History of hip replacement, total, left                                              -----------------------------------------------------------  ALLERGIES:  Percocet 5/325 (Unknown)                                            ------------------------------------------------------------    HOME MEDICATIONS  Home Medications:  [place] Thyroid 15 mg oral tablet: 1 tab(s) orally once a day (10 Jul 2019 10:47)  HumuLIN 70/30 KwikPen:  (2022 05:24)  Jardiance 10 mg oral tablet: 1 tab(s) orally once a day (in the morning) (10 Jul 2019 10:47)  Metoprolol Tartrate 25 mg oral tablet: 0.5 tab(s) orally once a day (at bedtime) (2022 05:21)  pantoprazole 40 mg oral delayed release tablet: 1 tab(s) orally once a day (2022 05:25)  Tresiba 100 units/mL subcutaneous solution: 10 unit(s) subcutaneous once a day (at bedtime) (2022 05:24)  Tylenol 500 mg oral tablet: 2 tab(s) orally 3 times a day, As Needed (2022 05:25)                           MEDICATIONS:  STANDING MEDICATIONS  aspirin  chewable 81 milliGRAM(s) Oral daily  cefepime   IVPB 2000 milliGRAM(s) IV Intermittent once  cefepime   IVPB 2000 milliGRAM(s) IV Intermittent every 8 hours  cefepime   IVPB      clopidogrel Tablet 75 milliGRAM(s) Oral daily  enoxaparin Injectable 40 milliGRAM(s) SubCutaneous every 24 hours  lactobacillus acidophilus 1 Tablet(s) Oral three times a day with meals  levothyroxine 25 MICROGram(s) Oral daily  metoprolol tartrate 12.5 milliGRAM(s) Oral at bedtime  metroNIDAZOLE  IVPB 500 milliGRAM(s) IV Intermittent every 8 hours  pantoprazole    Tablet 40 milliGRAM(s) Oral before breakfast  vancomycin  IVPB 1250 milliGRAM(s) IV Intermittent once  vancomycin  IVPB 1250 milliGRAM(s) IV Intermittent every 8 hours  vancomycin  IVPB        PRN MEDICATIONS  acetaminophen     Tablet .. 650 milliGRAM(s) Oral every 6 hours PRN  aluminum hydroxide/magnesium hydroxide/simethicone Suspension 30 milliLiter(s) Oral every 4 hours PRN  melatonin 3 milliGRAM(s) Oral at bedtime PRN  ondansetron Injectable 4 milliGRAM(s) IV Push every 8 hours PRN                                            ------------------------------------------------------------  VITAL SIGNS: Last 24 Hours  T(C): 36.7 (2022 04:45), Max: 37.6 (2022 21:28)  T(F): 98.1 (2022 04:45), Max: 99.7 (2022 21:28)  HR: 75 (2022 04:45) (75 - 83)  BP: 163/72 (2022 04:45) (120/74 - 183/78)  BP(mean): --  RR: 18 (2022 04:45) (18 - 18)  SpO2: 96% (2022 21:28) (96% - 96%)                                             --------------------------------------------------------------  LABS:                        10.1   18.96 )-----------( 317      ( 2022 22:11 )             30.0         141  |  98  |  14  ----------------------------<  104<H>  3.9   |  22  |  0.6<L>    Ca    8.9      2022 22:11  Mg     1.9         TPro  5.5<L>  /  Alb  3.5  /  TBili  0.6  /  DBili  x   /  AST  33  /  ALT  64<H>  /  AlkPhos  204<H>      PT/INR - ( 2022 01:50 )   PT: 10.60 sec;   INR: 0.92 ratio           Urinalysis Basic - ( 2022 02:50 )    Color: Yellow / Appearance: Clear / S.016 / pH: x  Gluc: x / Ketone: Large  / Bili: Negative / Urobili: 3 mg/dL   Blood: x / Protein: Trace / Nitrite: Negative   Leuk Esterase: Moderate / RBC: 0 /HPF / WBC 16 /HPF   Sq Epi: x / Non Sq Epi: 7 /HPF / Bacteria: Negative        Troponin T, Serum: <0.01 ng/mL (22 @ 22:11)          CARDIAC MARKERS ( 2022 22:11 )  x     / <0.01 ng/mL / x     / x     / x                                                    --------------------------------------------------------------    PHYSICAL EXAM:  GENERAL: AAOx3. Well-nourished, laying in bed appearing in no acute distress  HEENT: No FNDs, atraumatic, normocephalic, moist mucus membranes  LUNGS: Clear to auscultation bilaterally  HEART: S1/S2. No heaves or thrills  ABD: Soft, non-tender, non-distended. Bowel sounds present in all quadrants.  EXT/NEURO: 5/5 strength in all extremity joints. Sensation and ROM grossly intact.  SKIN: LE edema, pitting on R                                           --------------------------------------------------------------

## 2022-04-05 NOTE — H&P ADULT - HISTORY OF PRESENT ILLNESS
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 Pt states that she has been weak since her surgery on Monday but she started feeling a sensation of fluttering in her chest yesterday. She called her nurse who told her to come in to the ED for evaluation.      Pt states that she has had increased RUQ pain since discharge on Friday. Attributes her worsened pain to stopping oxycodone because of constipation and fear of becoming dependent. Pt endorses mild pain at surgical sites. Denies drainage or pus. Denies fever, chills, sob, chest pain, urinary symptoms. Recently had a BM 4/4. states she has an MI 2 weeks s/p pancreatectomy in Aug 2021 but currently does not feel the way she felt then.       In the ED, pt hemodynamically stable, T 99.5. Labs WBC 18 (WBC 16 04/01), Hb 10.1 at baseline  UA -mod leuk, neg nitrite, WBC 16, negative hoang

## 2022-04-06 LAB
ALBUMIN SERPL ELPH-MCNC: 3.3 G/DL — LOW (ref 3.5–5.2)
ALP SERPL-CCNC: 177 U/L — HIGH (ref 30–115)
ALT FLD-CCNC: 44 U/L — HIGH (ref 0–41)
ANION GAP SERPL CALC-SCNC: 19 MMOL/L — HIGH (ref 7–14)
AST SERPL-CCNC: 21 U/L — SIGNIFICANT CHANGE UP (ref 0–41)
BASOPHILS # BLD AUTO: 0.03 K/UL — SIGNIFICANT CHANGE UP (ref 0–0.2)
BASOPHILS NFR BLD AUTO: 0.2 % — SIGNIFICANT CHANGE UP (ref 0–1)
BILIRUB SERPL-MCNC: 0.6 MG/DL — SIGNIFICANT CHANGE UP (ref 0.2–1.2)
BUN SERPL-MCNC: 16 MG/DL — SIGNIFICANT CHANGE UP (ref 10–20)
CALCIUM SERPL-MCNC: 8.6 MG/DL — SIGNIFICANT CHANGE UP (ref 8.5–10.1)
CHLORIDE SERPL-SCNC: 98 MMOL/L — SIGNIFICANT CHANGE UP (ref 98–110)
CO2 SERPL-SCNC: 21 MMOL/L — SIGNIFICANT CHANGE UP (ref 17–32)
CREAT SERPL-MCNC: 0.8 MG/DL — SIGNIFICANT CHANGE UP (ref 0.7–1.5)
EGFR: 71 ML/MIN/1.73M2 — SIGNIFICANT CHANGE UP
EOSINOPHIL # BLD AUTO: 1.43 K/UL — HIGH (ref 0–0.7)
EOSINOPHIL NFR BLD AUTO: 8.6 % — HIGH (ref 0–8)
GLUCOSE BLDC GLUCOMTR-MCNC: 150 MG/DL — HIGH (ref 70–99)
GLUCOSE BLDC GLUCOMTR-MCNC: 151 MG/DL — HIGH (ref 70–99)
GLUCOSE BLDC GLUCOMTR-MCNC: 183 MG/DL — HIGH (ref 70–99)
GLUCOSE BLDC GLUCOMTR-MCNC: 216 MG/DL — HIGH (ref 70–99)
GLUCOSE BLDC GLUCOMTR-MCNC: 274 MG/DL — HIGH (ref 70–99)
GLUCOSE SERPL-MCNC: 123 MG/DL — HIGH (ref 70–99)
HCT VFR BLD CALC: 33.1 % — LOW (ref 37–47)
HGB BLD-MCNC: 10.8 G/DL — LOW (ref 12–16)
IMM GRANULOCYTES NFR BLD AUTO: 1.3 % — HIGH (ref 0.1–0.3)
LYMPHOCYTES # BLD AUTO: 0.62 K/UL — LOW (ref 1.2–3.4)
LYMPHOCYTES # BLD AUTO: 3.7 % — LOW (ref 20.5–51.1)
MAGNESIUM SERPL-MCNC: 1.7 MG/DL — LOW (ref 1.8–2.4)
MCHC RBC-ENTMCNC: 28.7 PG — SIGNIFICANT CHANGE UP (ref 27–31)
MCHC RBC-ENTMCNC: 32.6 G/DL — SIGNIFICANT CHANGE UP (ref 32–37)
MCV RBC AUTO: 88 FL — SIGNIFICANT CHANGE UP (ref 81–99)
MONOCYTES # BLD AUTO: 0.38 K/UL — SIGNIFICANT CHANGE UP (ref 0.1–0.6)
MONOCYTES NFR BLD AUTO: 2.3 % — SIGNIFICANT CHANGE UP (ref 1.7–9.3)
NEUTROPHILS # BLD AUTO: 13.98 K/UL — HIGH (ref 1.4–6.5)
NEUTROPHILS NFR BLD AUTO: 83.9 % — HIGH (ref 42.2–75.2)
NRBC # BLD: 0 /100 WBCS — SIGNIFICANT CHANGE UP (ref 0–0)
PLATELET # BLD AUTO: 475 K/UL — HIGH (ref 130–400)
POTASSIUM SERPL-MCNC: 3.7 MMOL/L — SIGNIFICANT CHANGE UP (ref 3.5–5)
POTASSIUM SERPL-SCNC: 3.7 MMOL/L — SIGNIFICANT CHANGE UP (ref 3.5–5)
PROT SERPL-MCNC: 5.5 G/DL — LOW (ref 6–8)
RBC # BLD: 3.76 M/UL — LOW (ref 4.2–5.4)
RBC # FLD: 18.2 % — HIGH (ref 11.5–14.5)
SODIUM SERPL-SCNC: 138 MMOL/L — SIGNIFICANT CHANGE UP (ref 135–146)
TSH SERPL-MCNC: 0.98 UIU/ML — SIGNIFICANT CHANGE UP (ref 0.27–4.2)
WBC # BLD: 16.65 K/UL — HIGH (ref 4.8–10.8)
WBC # FLD AUTO: 16.65 K/UL — HIGH (ref 4.8–10.8)

## 2022-04-06 PROCEDURE — 99233 SBSQ HOSP IP/OBS HIGH 50: CPT

## 2022-04-06 PROCEDURE — 93010 ELECTROCARDIOGRAM REPORT: CPT

## 2022-04-06 PROCEDURE — 99223 1ST HOSP IP/OBS HIGH 75: CPT

## 2022-04-06 PROCEDURE — 99221 1ST HOSP IP/OBS SF/LOW 40: CPT

## 2022-04-06 RX ORDER — MAGNESIUM SULFATE 500 MG/ML
2 VIAL (ML) INJECTION ONCE
Refills: 0 | Status: COMPLETED | OUTPATIENT
Start: 2022-04-06 | End: 2022-04-06

## 2022-04-06 RX ORDER — CEFEPIME 1 G/1
2000 INJECTION, POWDER, FOR SOLUTION INTRAMUSCULAR; INTRAVENOUS EVERY 12 HOURS
Refills: 0 | Status: DISCONTINUED | OUTPATIENT
Start: 2022-04-06 | End: 2022-04-07

## 2022-04-06 RX ORDER — ASPIRIN/CALCIUM CARB/MAGNESIUM 324 MG
81 TABLET ORAL DAILY
Refills: 0 | Status: DISCONTINUED | OUTPATIENT
Start: 2022-04-06 | End: 2022-04-07

## 2022-04-06 RX ORDER — METOPROLOL TARTRATE 50 MG
12.5 TABLET ORAL EVERY 12 HOURS
Refills: 0 | Status: DISCONTINUED | OUTPATIENT
Start: 2022-04-06 | End: 2022-04-07

## 2022-04-06 RX ORDER — VANCOMYCIN HCL 1 G
1250 VIAL (EA) INTRAVENOUS EVERY 12 HOURS
Refills: 0 | Status: DISCONTINUED | OUTPATIENT
Start: 2022-04-06 | End: 2022-04-07

## 2022-04-06 RX ADMIN — CEFEPIME 100 MILLIGRAM(S): 1 INJECTION, POWDER, FOR SOLUTION INTRAMUSCULAR; INTRAVENOUS at 05:37

## 2022-04-06 RX ADMIN — Medication 1 TABLET(S): at 11:32

## 2022-04-06 RX ADMIN — Medication 100 MILLIGRAM(S): at 05:37

## 2022-04-06 RX ADMIN — Medication 1 TABLET(S): at 17:03

## 2022-04-06 RX ADMIN — Medication 100 MILLIGRAM(S): at 22:51

## 2022-04-06 RX ADMIN — CEFEPIME 100 MILLIGRAM(S): 1 INJECTION, POWDER, FOR SOLUTION INTRAMUSCULAR; INTRAVENOUS at 17:03

## 2022-04-06 RX ADMIN — Medication 100 MILLIGRAM(S): at 15:14

## 2022-04-06 RX ADMIN — Medication 12.5 MILLIGRAM(S): at 17:03

## 2022-04-06 RX ADMIN — Medication 1 TABLET(S): at 08:59

## 2022-04-06 RX ADMIN — Medication 25 MICROGRAM(S): at 05:36

## 2022-04-06 RX ADMIN — PANTOPRAZOLE SODIUM 40 MILLIGRAM(S): 20 TABLET, DELAYED RELEASE ORAL at 08:59

## 2022-04-06 RX ADMIN — Medication 81 MILLIGRAM(S): at 17:03

## 2022-04-06 RX ADMIN — Medication 25 GRAM(S): at 11:32

## 2022-04-06 RX ADMIN — Medication 650 MILLIGRAM(S): at 05:43

## 2022-04-06 RX ADMIN — Medication 166.67 MILLIGRAM(S): at 20:18

## 2022-04-06 NOTE — DIETITIAN INITIAL EVALUATION ADULT. - OTHER CALCULATIONS
Using ABW 81.8 KG: ENERGY: 7999-1408 kcal/day (MSJ 1.1-1.3 AF); PROTEIN: 61-82 g/day (1.5-2g/kg IBW); FLUID: 2045 mL/day (25mL/kg) -- with consideration for CA, age, and poor PO intake

## 2022-04-06 NOTE — CONSULT NOTE ADULT - SUBJECTIVE AND OBJECTIVE BOX
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 Pt states that she has been weak since her surgery on Monday but she started feeling a sensation of fluttering in her chest yesterday. She called her nurse who told her to come in to the ED for evaluation.      Pt states that she has had increased RUQ pain since discharge on Friday. Attributes her worsened pain to stopping oxycodone because of constipation and fear of becoming dependent. Pt endorses mild pain at surgical sites. Denies drainage or pus. Denies fever, chills, sob, chest pain, urinary symptoms. Recently had a BM 4/4. states she has an MI 2 weeks s/p pancreatectomy in Aug 2021 but currently does not feel the way she felt then.     In the ED, pt hemodynamically stable, T 99.5. Labs WBC 18 (WBC 16 04/01), Hb 10.1 at baseline  UA -mod leuk, neg nitrite, WBC 16, negative hoang     (05 Apr 2022 04:44)    PAST MEDICAL & SURGICAL HISTORY:  Hypothyroid  CAD (coronary artery disease)  Acute MI  CAD S/P percutaneous coronary angioplasty  DM (diabetes mellitus), secondary  H/O spinal stenosis  Polymyalgia  Arthritis  IBS (irritable bowel syndrome)  S/P tonsillectomy  History of cholecystectomy  History of appendectomy  Cataract of both eyes  History of hip replacement, total, left      MEDICATIONS  (STANDING):  cefepime   IVPB 2000 milliGRAM(s) IV Intermittent every 12 hours  lactobacillus acidophilus 1 Tablet(s) Oral three times a day with meals  levothyroxine 25 MICROGram(s) Oral daily  magnesium sulfate  IVPB 2 Gram(s) IV Intermittent once  metoprolol tartrate 12.5 milliGRAM(s) Oral at bedtime  metroNIDAZOLE  IVPB 500 milliGRAM(s) IV Intermittent every 8 hours  pantoprazole    Tablet 40 milliGRAM(s) Oral before breakfast  vancomycin  IVPB 1250 milliGRAM(s) IV Intermittent every 12 hours    MEDICATIONS  (PRN):  acetaminophen     Tablet .. 650 milliGRAM(s) Oral every 6 hours PRN Temp greater or equal to 38C (100.4F), Mild Pain (1 - 3)  aluminum hydroxide/magnesium hydroxide/simethicone Suspension 30 milliLiter(s) Oral every 4 hours PRN Dyspepsia  melatonin 3 milliGRAM(s) Oral at bedtime PRN Insomnia  ondansetron Injectable 4 milliGRAM(s) IV Push every 8 hours PRN Nausea and/or Vomiting      Allergies  Percocet 5/325 (Unknown)      FAMILY HISTORY:  No pertinent family history in first degree relatives      Vital Signs Last 24 Hrs  T(C): 37.5 (05 Apr 2022 21:50), Max: 37.5 (05 Apr 2022 21:50)  T(F): 99.5 (05 Apr 2022 21:50), Max: 99.5 (05 Apr 2022 21:50)  HR: 87 (05 Apr 2022 21:50) (87 - 87)  BP: 157/70 (05 Apr 2022 21:50) (157/70 - 157/70)  BP(mean): --  RR: 18 (05 Apr 2022 21:50) (18 - 18)  SpO2: --      Labs:                         10.8   16.65 )-----------( 475      ( 06 Apr 2022 07:18 )             33.1     04-06    138  |  98  |  16  ----------------------------<  123<H>  3.7   |  21  |  0.8    Ca    8.6      06 Apr 2022 07:18  Mg     1.7     04-06    TPro  5.5<L>  /  Alb  3.3<L>  /  TBili  0.6  /  DBili  x   /  AST  21  /  ALT  44<H>  /  AlkPhos  177<H>  04-06    PT/INR - ( 05 Apr 2022 01:50 )   PT: 10.60 sec;   INR: 0.92 ratio         Pertinent labs:                      10.8   16.65 )-----------( 475      ( 06 Apr 2022 07:18 )             33.1     04-06    138  |  98  |  16  ----------------------------<  123<H>  3.7   |  21  |  0.8    Ca    8.6      06 Apr 2022 07:18  Mg     1.7     04-06    TPro  5.5<L>  /  Alb  3.3<L>  /  TBili  0.6  /  DBili  x   /  AST  21  /  ALT  44<H>  /  AlkPhos  177<H>  04-06    PT/INR - ( 05 Apr 2022 01:50 )   PT: 10.60 sec;   INR: 0.92 ratio      Radiology & Additional Studies:     Radiology imaging reviewed.

## 2022-04-06 NOTE — CONSULT NOTE ADULT - ASSESSMENT
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 Surgery consulted for new  8 x 6 x 5.5 cm collection at the site of the right hepatic resection consistent with abscess.     Plan:   -Follow-up IR for potential drainage of abscess   -Continue to monitor for fevers/hemodynamic changes   -Trend CBC   
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    #Perihepatic fluid collection:  -Received Aspirin and clopidogrel on 4/5/22 and eGR  -Discontinue Clopidogrel until Monday 4/11/22 and discontinue ASA on Friday 4/8/22  -Plan to perform CT guided aspiration of fluid collection on Monday 4/11/22  -On day of procedure:  -----NPO after midnight   -----CBC/CMP/COAGs prior to procedure  -----Hold anticoagulation until after procedure      Please call Interventional Radiology with questions or concerns:   - M-F [phone]: [phone]   - All other hours: [phone]

## 2022-04-06 NOTE — PROGRESS NOTE ADULT - ASSESSMENT
86yo F with recent liver resection presenting for palpitations admitted for elevated WBC count    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 and cholangiocarcinoma  -CTAP w IV (4/5/22)- 8x6x5.5cm abcess near liver resection. pyeloral and duodenal thickening likely due to perihepatic inflammation  -No sepsis POA   -ID- start IV ABx, IVR for possible drainage  -BC 4/5- NGTDx2  -c/w cefepime/vanc/flagyl  -fu IR eval, Sx eval, Dr. Davis ([place]) eval/recs  f/u BCX + UCx    #Hx of afib likely paroxysmal  #CAD s/p stents (last 8/2021)  -EKG on admission, NSR  -EKG 4/6/22- NSR with PACs  -Curbside eval per O'jacques: do not discontinue ASA, given >6mo since stent placement may hold plavix if necessary  -c/w ASA, will hold Plavix  -will increase metoprolol to 12.5 BID  -tele monitoring    #Hx of c-diff in 2021 s/p abx treatment  #Loose BM  -pt c/o loose BM 4/6/22 following initiation of ABx  -monitor, may need to repeat PCR if continues/worsening    #DM  Keep FS <180  Start SS if indicated    #Hypothyroidism  -TSH 0.98  Resume home meds     #DVT PPX : Lovenox   #Diet: DASH/CC  #GI PPX: Protonix  #Activity:IAT  FULL CODE     #Progress Note Handoff  Pending (specify): SOPHY cortes  Family discussion:   Disposition:   -from home  -PT 3-5x wk, gait training 75ft RW

## 2022-04-06 NOTE — PROGRESS NOTE ADULT - SUBJECTIVE AND OBJECTIVE BOX
ANNE VILLATORO 87y Female  MRN#: [id number]   CODE STATUS:________    Hospital Day: 1d    Pt is currently admitted with the primary diagnosis of     SUBJECTIVE  Overnight events   -No major overnight events  Subjective complaints                                             ----------------------------------------------------------  OBJECTIVE  PAST MEDICAL & SURGICAL HISTORY  Hypothyroid    CAD (coronary artery disease)    Acute MI    CAD S/P percutaneous coronary angioplasty    DM (diabetes mellitus), secondary    H/O spinal stenosis    Polymyalgia    Arthritis    IBS (irritable bowel syndrome)    S/P tonsillectomy    History of cholecystectomy    History of appendectomy    Cataract of both eyes    History of hip replacement, total, left                                              -----------------------------------------------------------  ALLERGIES:  Percocet 5/325 (Unknown)                                            ------------------------------------------------------------    HOME MEDICATIONS  Home Medications:  [place] Thyroid 15 mg oral tablet: 1 tab(s) orally once a day (10 Jul 2019 10:47)  HumuLIN 70/30 KwikPen:  (2022 05:24)  Jardiance 10 mg oral tablet: 1 tab(s) orally once a day (in the morning) (10 Jul 2019 10:47)  Metoprolol Tartrate 25 mg oral tablet: 0.5 tab(s) orally once a day (at bedtime) (2022 05:21)  pantoprazole 40 mg oral delayed release tablet: 1 tab(s) orally once a day (2022 05:25)  Tresiba 100 units/mL subcutaneous solution: 10 unit(s) subcutaneous once a day (at bedtime) (2022 05:24)  Tylenol 500 mg oral tablet: 2 tab(s) orally 3 times a day, As Needed (2022 05:25)                           MEDICATIONS:  STANDING MEDICATIONS  cefepime   IVPB 2000 milliGRAM(s) IV Intermittent every 12 hours  lactobacillus acidophilus 1 Tablet(s) Oral three times a day with meals  levothyroxine 25 MICROGram(s) Oral daily  metoprolol tartrate 12.5 milliGRAM(s) Oral at bedtime  metroNIDAZOLE  IVPB 500 milliGRAM(s) IV Intermittent every 8 hours  pantoprazole    Tablet 40 milliGRAM(s) Oral before breakfast  vancomycin  IVPB 1250 milliGRAM(s) IV Intermittent every 12 hours    PRN MEDICATIONS  acetaminophen     Tablet .. 650 milliGRAM(s) Oral every 6 hours PRN  aluminum hydroxide/magnesium hydroxide/simethicone Suspension 30 milliLiter(s) Oral every 4 hours PRN  melatonin 3 milliGRAM(s) Oral at bedtime PRN  ondansetron Injectable 4 milliGRAM(s) IV Push every 8 hours PRN                                            ------------------------------------------------------------  VITAL SIGNS: Last 24 Hours  T(C): 37.5 (2022 21:50), Max: 37.5 (2022 21:50)  T(F): 99.5 (2022 21:50), Max: 99.5 (2022 21:50)  HR: 87 (2022 21:50) (87 - 87)  BP: 157/70 (2022 21:50) (157/70 - 157/70)  BP(mean): --  RR: 18 (2022 21:50) (18 - 18)  SpO2: --                                             --------------------------------------------------------------  LABS:                        10.8   16.65 )-----------( 475      ( 2022 07:18 )             33.1     04-06    138  |  98  |  16  ----------------------------<  123<H>  3.7   |  21  |  0.8    Ca    8.6      2022 07:18  Mg     1.7         TPro  5.5<L>  /  Alb  3.3<L>  /  TBili  0.6  /  DBili  x   /  AST  21  /  ALT  44<H>  /  AlkPhos  177<H>      PT/INR - ( 2022 01:50 )   PT: 10.60 sec;   INR: 0.92 ratio           Urinalysis Basic - ( 2022 02:50 )    Color: Yellow / Appearance: Clear / S.016 / pH: x  Gluc: x / Ketone: Large  / Bili: Negative / Urobili: 3 mg/dL   Blood: x / Protein: Trace / Nitrite: Negative   Leuk Esterase: Moderate / RBC: 0 /HPF / WBC 16 /HPF   Sq Epi: x / Non Sq Epi: 7 /HPF / Bacteria: Negative              Culture - Blood (collected 2022 00:30)  Source: .Blood Blood  Preliminary Report (2022 08:02):    No growth to date.    Culture - Blood (collected 2022 00:30)  Source: .Blood Blood  Preliminary Report (2022 08:02):    No growth to date.        CARDIAC MARKERS ( 2022 22:11 )  x     / <0.01 ng/mL / x     / x     / x                                                    --------------------------------------------------------------    PHYSICAL EXAM:  GENERAL: AAOx3. Well-nourished, laying in bed appearing in no acute distress  HEENT: No FNDs, atraumatic, normocephalic, moist mucus membranes  LUNGS: Clear to auscultation bilaterally  HEART: S1/S2. No heaves or thrills  ABD: Soft, non-tender, non-distended. Bowel sounds present in all quadrants.  EXT/NEURO: 5/5 strength in all extremity joints. Sensation and ROM grossly intact.  SKIN: No breaks, erythema, edema                                           --------------------------------------------------------------   ANNE VILLATORO 87y Female  MRN#: [id number]   CODE STATUS:________    Hospital Day: 1d    Pt is currently admitted with the primary diagnosis of     SUBJECTIVE  Overnight events   -No major overnight events, mild right abdominal discomfort.                                               ----------------------------------------------------------  OBJECTIVE  PAST MEDICAL & SURGICAL HISTORY  Hypothyroid    CAD (coronary artery disease)    Acute MI    CAD S/P percutaneous coronary angioplasty    DM (diabetes mellitus), secondary    H/O spinal stenosis    Polymyalgia    Arthritis    IBS (irritable bowel syndrome)    S/P tonsillectomy    History of cholecystectomy    History of appendectomy    Cataract of both eyes    History of hip replacement, total, left                                              -----------------------------------------------------------  ALLERGIES:  Percocet 5/325 (Unknown)                                            ------------------------------------------------------------    HOME MEDICATIONS  Home Medications:  [place] Thyroid 15 mg oral tablet: 1 tab(s) orally once a day (10 Jul 2019 10:47)  HumuLIN 70/30 KwikPen:  (2022 05:24)  Jardiance 10 mg oral tablet: 1 tab(s) orally once a day (in the morning) (10 Jul 2019 10:47)  Metoprolol Tartrate 25 mg oral tablet: 0.5 tab(s) orally once a day (at bedtime) (2022 05:21)  pantoprazole 40 mg oral delayed release tablet: 1 tab(s) orally once a day (2022 05:25)  Tresiba 100 units/mL subcutaneous solution: 10 unit(s) subcutaneous once a day (at bedtime) (2022 05:24)  Tylenol 500 mg oral tablet: 2 tab(s) orally 3 times a day, As Needed (2022 05:25)                           MEDICATIONS:  STANDING MEDICATIONS  cefepime   IVPB 2000 milliGRAM(s) IV Intermittent every 12 hours  lactobacillus acidophilus 1 Tablet(s) Oral three times a day with meals  levothyroxine 25 MICROGram(s) Oral daily  metoprolol tartrate 12.5 milliGRAM(s) Oral at bedtime  metroNIDAZOLE  IVPB 500 milliGRAM(s) IV Intermittent every 8 hours  pantoprazole    Tablet 40 milliGRAM(s) Oral before breakfast  vancomycin  IVPB 1250 milliGRAM(s) IV Intermittent every 12 hours    PRN MEDICATIONS  acetaminophen     Tablet .. 650 milliGRAM(s) Oral every 6 hours PRN  aluminum hydroxide/magnesium hydroxide/simethicone Suspension 30 milliLiter(s) Oral every 4 hours PRN  melatonin 3 milliGRAM(s) Oral at bedtime PRN  ondansetron Injectable 4 milliGRAM(s) IV Push every 8 hours PRN                                            ------------------------------------------------------------  VITAL SIGNS: Last 24 Hours  T(C): 37.5 (2022 21:50), Max: 37.5 (2022 21:50)  T(F): 99.5 (2022 21:50), Max: 99.5 (2022 21:50)  HR: 87 (2022 21:50) (87 - 87)  BP: 157/70 (2022 21:50) (157/70 - 157/70)  BP(mean): --  RR: 18 (2022 21:50) (18 - 18)  SpO2: --                                             --------------------------------------------------------------  LABS:                        10.8   16.65 )-----------( 475      ( 2022 07:18 )             33.1     04-06    138  |  98  |  16  ----------------------------<  123<H>  3.7   |  21  |  0.8    Ca    8.6      2022 07:18  Mg     1.7         TPro  5.5<L>  /  Alb  3.3<L>  /  TBili  0.6  /  DBili  x   /  AST  21  /  ALT  44<H>  /  AlkPhos  177<H>      PT/INR - ( 2022 01:50 )   PT: 10.60 sec;   INR: 0.92 ratio           Urinalysis Basic - ( 2022 02:50 )    Color: Yellow / Appearance: Clear / S.016 / pH: x  Gluc: x / Ketone: Large  / Bili: Negative / Urobili: 3 mg/dL   Blood: x / Protein: Trace / Nitrite: Negative   Leuk Esterase: Moderate / RBC: 0 /HPF / WBC 16 /HPF   Sq Epi: x / Non Sq Epi: 7 /HPF / Bacteria: Negative              Culture - Blood (collected 2022 00:30)  Source: .Blood Blood  Preliminary Report (2022 08:02):    No growth to date.    Culture - Blood (collected 2022 00:30)  Source: .Blood Blood  Preliminary Report (2022 08:02):    No growth to date.        CARDIAC MARKERS ( 2022 22:11 )  x     / <0.01 ng/mL / x     / x     / x                                                    --------------------------------------------------------------    PHYSICAL EXAM:  GENERAL: AAOx3. Well-nourished, laying in bed appearing in no acute distress  HEENT: No FNDs, atraumatic, normocephalic, moist mucus membranes  LUNGS: Clear to auscultation bilaterally  HEART: S1/S2. No heaves or thrills  ABD: Soft, non-tender, non-distended. Bowel sounds present in all quadrants.  EXT/NEURO: 5/5 strength in all extremity joints. Sensation and ROM grossly intact.  SKIN: No breaks, erythema, edema                                           --------------------------------------------------------------

## 2022-04-06 NOTE — DIETITIAN INITIAL EVALUATION ADULT. - PERTINENT LABORATORY DATA
04/06 @ 07:18:  WBC 16.65  RBC 3.76  H/H 10.8/33.1  Glu 123  Alkaline Phosphatase 177  Alanine Aminotransferase 44  Mg 1.7    CAPILLARY BLOOD GLUCOSE:  POCT Blood Glucose.: 151 mg/dL (06 Apr 2022 07:21)  POCT Blood Glucose.: 141 mg/dL (05 Apr 2022 11:08)

## 2022-04-06 NOTE — CONSULT NOTE ADULT - SUBJECTIVE AND OBJECTIVE BOX
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 Pt states that she has been weak since her surgery on Monday but she started feeling a sensation of fluttering in her chest yesterday. She called her nurse who told her to come in to the ED for evaluation.      Pt states that she has had increased RUQ pain since discharge on Friday. Attributes her worsened pain to stopping oxycodone because of constipation and fear of becoming dependent. Pt endorses mild pain at surgical sites. Denies drainage or pus. Denies fever, chills, sob, chest pain, urinary symptoms. Recently had a BM . states she has an MI 2 weeks s/p pancreatectomy in Aug 2021 but currently does not feel the way she felt then.     In the ED, pt hemodynamically stable, T 99.5. Labs WBC 18 (WBC 16 ), Hb 10.1 at baseline  UA -mod leuk, neg nitrite, WBC 16, negative hoang    Reason for Consult: Patient is s/p partial liver resection of the right lobe at [facility] for metastatic cholangiocarcinoma on 3/28, presents with increasing RUQ. She is found to have an abscess at the site of resection. Surgery consulted for recommendations regarding management.     PAST MEDICAL & SURGICAL HISTORY:  Hypothyroid  CAD (coronary artery disease)  Acute MI  CAD S/P percutaneous coronary angioplasty  DM (diabetes mellitus), secondary  H/O spinal stenosis  Polymyalgia  Arthritis  IBS (irritable bowel syndrome)  S/P tonsillectomy  History of cholecystectomy  History of appendectomy  Cataract of both eyes  History of hip replacement, total, left    MEDICATIONS  (STANDING):  cefepime   IVPB 2000 milliGRAM(s) IV Intermittent every 12 hours  lactobacillus acidophilus 1 Tablet(s) Oral three times a day with meals  levothyroxine 25 MICROGram(s) Oral daily  magnesium sulfate  IVPB 2 Gram(s) IV Intermittent once  metoprolol tartrate 12.5 milliGRAM(s) Oral at bedtime  metroNIDAZOLE  IVPB 500 milliGRAM(s) IV Intermittent every 8 hours  pantoprazole    Tablet 40 milliGRAM(s) Oral before breakfast  vancomycin  IVPB 1250 milliGRAM(s) IV Intermittent every 12 hours    MEDICATIONS  (PRN):  acetaminophen     Tablet .. 650 milliGRAM(s) Oral every 6 hours PRN Temp greater or equal to 38C (100.4F), Mild Pain (1 - 3)  aluminum hydroxide/magnesium hydroxide/simethicone Suspension 30 milliLiter(s) Oral every 4 hours PRN Dyspepsia  melatonin 3 milliGRAM(s) Oral at bedtime PRN Insomnia  ondansetron Injectable 4 milliGRAM(s) IV Push every 8 hours PRN Nausea and/or Vomiting    Allergies  Percocet 5/325 (Unknown)    REVIEW OF SYSTEMS    [ X ] A ten-point review of systems was otherwise negative except as noted.  [ ] Due to altered mental status/intubation, subjective information were not able to be obtained from the patient. History was obtained, to the extent possible, from review of the chart and collateral sources of information.    Vital Signs Last 24 Hrs  T(C): 37.5 (2022 21:50), Max: 37.5 (2022 21:50)  T(F): 99.5 (2022 21:50), Max: 99.5 (2022 21:50)  HR: 87 (2022 21:50) (68 - 87)  BP: 157/70 (2022 21:50) (145/66 - 157/70)  BP(mean): --  RR: 18 (2022 21:50) (18 - 18)  SpO2: --    PHYSICAL EXAM:  GENERAL: NAD, well-appearing  CHEST/LUNG: Clear to auscultation bilaterally  HEART: Regular rate and rhythm  ABDOMEN: Soft, Nontender, Nondistended;   EXTREMITIES:  No clubbing, cyanosis, or edema    LABS:    POCT Blood Glucose.: 150 mg/dL (2022 11:02)  POCT Blood Glucose.: 151 mg/dL (2022 07:21)                        10.8   16.65 )-----------( 475      ( 2022 07:18 )             33.1       Auto Neutrophil %: 83.9 % (22 @ 07:18)  Auto Immature Granulocyte %: 1.3 % (22 @ 07:18)        138  |  98  |  16  ----------------------------<  123<H>  3.7   |  21  |  0.8    Calcium, Total Serum: 8.6 mg/dL (22 @ 07:18)    LFTs:             5.5  | 0.6  | 21       ------------------[177     ( 2022 07:18 )  3.3  | x    | 44            Lactate, Blood: 0.8 mmol/L (22 @ 12:10)    Coags:     10.60  ----< 0.92    ( 2022 01:50 )     x         CARDIAC MARKERS ( 2022 22:11 )  x     / <0.01 ng/mL / x     / x     / x        Urinalysis Basic - ( 2022 02:50 )    Color: Yellow / Appearance: Clear / S.016 / pH: x  Gluc: x / Ketone: Large  / Bili: Negative / Urobili: 3 mg/dL   Blood: x / Protein: Trace / Nitrite: Negative   Leuk Esterase: Moderate / RBC: 0 /HPF / WBC 16 /HPF   Sq Epi: x / Non Sq Epi: 7 /HPF / Bacteria: Negative    Culture - Blood (collected 2022 00:30)  Source: .Blood Blood  Preliminary Report (2022 08:02):    No growth to date.    Culture - Blood (collected 2022 00:30)  Source: .Blood Blood  Preliminary Report (2022 08:02):    No growth to date.    RADIOLOGY & ADDITIONAL STUDIES:  < from: CT Abdomen and Pelvis w/ IV Cont (22 @ 18:21) >  IMPRESSION:  Status post partial resection of right hepatic lobe. There is an 8 x 6 x   5.5 cm collection at the site of the right hepatic resection. The   collection extends along the margin of the resection. Gas bubbles are   seen within the collection. The findings are consistent with an abscess.  There is thickening of the region of the pylorus and first and second   portions of the duodenum most likely secondary to the perihepatic   collection and inflammation.  Status post cholecystectomy  Status post splenectomy.  Status post distal pancreatectomy.

## 2022-04-06 NOTE — DIETITIAN INITIAL EVALUATION ADULT. - PHYSCIAL ASSESSMENT
Oral: no chewing or swallowing difficulties; Cognitive: alert; Skin: surgical incision, intact; GI: rounded, soft/nontender, abdominal discomfort to right side; Edema: no edema noted per flowsheet obese

## 2022-04-06 NOTE — PHYSICAL THERAPY INITIAL EVALUATION ADULT - PERTINENT HX OF CURRENT PROBLEM, REHAB EVAL
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

## 2022-04-06 NOTE — DIETITIAN INITIAL EVALUATION ADULT. - OTHER INFO
Weight Hx: UBW 81.81 KG (180#) - last checked before 3/28 (day of surgery).     Pertinent Medical Information:   -- 87 year old feamle with PMHx of DM,  Afib, CAD s/p 4 stents,  pancreatic cancer s/p distal pancreatectomy Aug 2021, cholangiocarcinoma with mets to the liver s/p partial liver resection 1wk ago (03/28) at [facility] p/w weakness and intermittent palpitations. Pt states that she has been weak since her surgery on Monday, but she started feeling a sensation of fluttering in her chest yesterday. She called her nurse who told her to come in to the ED for evaluation. Pt states that she has had increased RUQ pain since discharge on Friday. Attributes her worsened pain to stopping oxycodone because of constipation and fear of becoming dependent. Pt endorses mild pain at surgical sites. Denies drainage or pus. Denies fever, chills, sob, chest pain, urinary symptoms. Recently had a BM 4/4. states she has an MI 2 weeks s/p pancreatectomy in Aug 2021 but currently does not feel the way she felt then.  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 and cholangiocarcinoma  -No sepsis POA   #Hx of afib likely paroxysmal  #CAD s/p stents  #DM  #Hypothyroidism  #DVT    Pertinent Subjective Information: Diet order pending: Consistent Carbohydrate with evening snack. Discontinued renal restrictions as renal labs are within normal range; pt is no on HD. Pt has poor appetite; consuming <50% of meals d/t feeling weak. Reports feeling too weak/tired to chew. Denies constipation, diarrhea, nausea and vomiting. Will continue to monitor po intake with adjusted diet order. Weight Hx: UBW 81.81 KG (180#) - last checked before 3/28 (day of surgery). Current dosing weight is not documented per flowsheet. Previous admission weight is 80.3 KG (176.66#); 1.85% unintentional weight loss in 9 days. Pt reports she is retaining a large amount of fluid?? No edema noted per flowsheet.    Pertinent Medical Information:   -- 87 year old feamle with PMHx of DM,  Afib, CAD s/p 4 stents,  pancreatic cancer s/p distal pancreatectomy Aug 2021, cholangiocarcinoma with mets to the liver s/p partial liver resection 1wk ago (03/28) at [facility] p/w weakness and intermittent palpitations. Pt states that she has been weak since her surgery on Monday, but she started feeling a sensation of fluttering in her chest yesterday. She called her nurse who told her to come in to the ED for evaluation. Pt states that she has had increased RUQ pain since discharge on Friday. Attributes her worsened pain to stopping oxycodone because of constipation and fear of becoming dependent. Pt endorses mild pain at surgical sites. Denies drainage or pus. Denies fever, chills, sob, chest pain, urinary symptoms. Recently had a BM 4/4. states she has an MI 2 weeks s/p pancreatectomy in Aug 2021 but currently does not feel the way she felt then.  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 and cholangiocarcinoma  -No sepsis POA   #Hx of afib likely paroxysmal  #CAD s/p stents  #DM  #Hypothyroidism  #DVT    Pertinent Subjective Information: Diet order pending: Consistent Carbohydrate with evening snack. Discontinued renal restrictions as renal labs are within normal range; pt is no on HD. Pt has poor appetite; consuming <50% of meals d/t feeling weak. Reports feeling too weak/tired to chew. Denies constipation, diarrhea, nausea and vomiting. Will continue to monitor po intake with adjusted diet order. Weight Hx: UBW 81.81 KG (180#) - last checked before 3/28 (day of surgery). Current dosing weight was not documented per flowsheet. Pt was sitting in a chair; unable to check bedscale weight. Previous admission weight was 80.3 KG (176.66#); 1.85% unintentional weight loss in 9 days. Pt reports she is retaining a large amount of fluid?? No edema noted per flowsheet.    Pertinent Medical Information:   -- 87 year old feamle with PMHx of DM,  Afib, CAD s/p 4 stents,  pancreatic cancer s/p distal pancreatectomy Aug 2021, cholangiocarcinoma with mets to the liver s/p partial liver resection 1wk ago (03/28) at [facility] p/w weakness and intermittent palpitations. Pt states that she has been weak since her surgery on Monday, but she started feeling a sensation of fluttering in her chest yesterday. She called her nurse who told her to come in to the ED for evaluation. Pt states that she has had increased RUQ pain since discharge on Friday. Attributes her worsened pain to stopping oxycodone because of constipation and fear of becoming dependent. Pt endorses mild pain at surgical sites. Denies drainage or pus. Denies fever, chills, sob, chest pain, urinary symptoms. Recently had a BM 4/4. states she has an MI 2 weeks s/p pancreatectomy in Aug 2021 but currently does not feel the way she felt then.  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 and cholangiocarcinoma  -No sepsis POA   #Hx of afib likely paroxysmal  #CAD s/p stents  #DM  #Hypothyroidism  #DVT    Pertinent Subjective Information: Diet order pending: Consistent Carbohydrate with evening snack. Discontinued renal restrictions as renal labs are within normal range; pt is no on HD. Pt has poor appetite; consuming <50% of meals d/t feeling weak. Reports feeling too weak/tired to chew. Denies constipation, diarrhea, nausea and vomiting. Will continue to monitor po intake with adjusted diet order.

## 2022-04-06 NOTE — CONSULT NOTE ADULT - ATTENDING COMMENTS
Pt examined  admitted after a partaial righ hepatectomy by Dr. Davis.   Had started Plavix and then developed pain .     On CT fluid collection in the surgical bed.     Started on ABX     Awaiting transfer to [facility].    If unstable IR drainage    NO need of any emergent surgical treatment.     Will follow.
Counseled patient about diagnostic testing and treatment plan. All questions answered.

## 2022-04-06 NOTE — PROGRESS NOTE ADULT - SUBJECTIVE AND OBJECTIVE BOX
ANNE VILLATORO  87y, Female    All available historical data reviewed    OVERNIGHT EVENTS:  no fevers  feels well and has no new complaints   no abdominal pain    ROS:  General: Denies rigors, nightsweats  HEENT: Denies headache, rhinorrhea, sore throat, eye pain  CV: Denies CP, palpitations  PULM: Denies wheezing, hemoptysis  GI: Denies hematemesis, hematochezia, melena  : Denies discharge, hematuria  MSK: Denies arthralgias, myalgias  SKIN: Denies rash, lesions  NEURO: Denies paresthesias, weakness  PSYCH: Denies depression, anxiety    VITALS:  T(F): 99.5, Max: 99.5 (22 @ 21:50)  HR: 87  BP: 157/70  RR: 18Vital Signs Last 24 Hrs  T(C): 37.5 (2022 21:50), Max: 37.5 (2022 21:50)  T(F): 99.5 (2022 21:50), Max: 99.5 (2022 21:50)  HR: 87 (2022 21:50) (68 - 87)  BP: 157/70 (2022 21:50) (145/66 - 157/70)  BP(mean): --  RR: 18 (2022 21:50) (18 - 18)  SpO2: 95% (2022 10:11) (95% - 95%)    TESTS & MEASUREMENTS:                        10.8   16.65 )-----------( 475      ( 2022 07:18 )             33.1         144  |  104  |  12  ----------------------------<  118<H>  3.8   |  24  |  0.7    Ca    8.7      2022 12:10  Mg     1.9         TPro  5.5<L>  /  Alb  3.4<L>  /  TBili  0.7  /  DBili  x   /  AST  27  /  ALT  56<H>  /  AlkPhos  194<H>  04-05    LIVER FUNCTIONS - ( 2022 12:10 )  Alb: 3.4 g/dL / Pro: 5.5 g/dL / ALK PHOS: 194 U/L / ALT: 56 U/L / AST: 27 U/L / GGT: x             Culture - Blood (collected 22 @ 00:30)  Source: .Blood Blood  Preliminary Report (22 @ 08:02):    No growth to date.    Culture - Blood (collected 22 @ 00:30)  Source: .Blood Blood  Preliminary Report (22 @ 08:02):    No growth to date.      Urinalysis Basic - ( 2022 02:50 )    Color: Yellow / Appearance: Clear / S.016 / pH: x  Gluc: x / Ketone: Large  / Bili: Negative / Urobili: 3 mg/dL   Blood: x / Protein: Trace / Nitrite: Negative   Leuk Esterase: Moderate / RBC: 0 /HPF / WBC 16 /HPF   Sq Epi: x / Non Sq Epi: 7 /HPF / Bacteria: Negative          RADIOLOGY & ADDITIONAL TESTS:  Personal review of radiological diagnostics performed  Echo and EKG results noted when applicable.     MEDICATIONS:  acetaminophen     Tablet .. 650 milliGRAM(s) Oral every 6 hours PRN  aluminum hydroxide/magnesium hydroxide/simethicone Suspension 30 milliLiter(s) Oral every 4 hours PRN  aspirin  chewable 81 milliGRAM(s) Oral daily  cefepime   IVPB 2000 milliGRAM(s) IV Intermittent every 8 hours  cefepime   IVPB      clopidogrel Tablet 75 milliGRAM(s) Oral daily  enoxaparin Injectable 40 milliGRAM(s) SubCutaneous every 24 hours  lactobacillus acidophilus 1 Tablet(s) Oral three times a day with meals  levothyroxine 25 MICROGram(s) Oral daily  melatonin 3 milliGRAM(s) Oral at bedtime PRN  metoprolol tartrate 12.5 milliGRAM(s) Oral at bedtime  metroNIDAZOLE  IVPB 500 milliGRAM(s) IV Intermittent every 8 hours  ondansetron Injectable 4 milliGRAM(s) IV Push every 8 hours PRN  pantoprazole    Tablet 40 milliGRAM(s) Oral before breakfast      ANTIBIOTICS:  cefepime   IVPB 2000 milliGRAM(s) IV Intermittent every 8 hours  cefepime   IVPB      metroNIDAZOLE  IVPB 500 milliGRAM(s) IV Intermittent every 8 hours

## 2022-04-06 NOTE — DIETITIAN INITIAL EVALUATION ADULT. - ORAL INTAKE PTA/DIET HISTORY
Pt reports having a good appetite at home; consuming 3 meals + snacks daily. Reports taking calcium, VIT C, VIT D, zinc, K+, and Mg daily. Pt did not drink protein shake supplements. NKFA; does not have any restrictive Confucianism/cultural food preferences. Pt watches carbohydrate intake at home; pt has DM.

## 2022-04-06 NOTE — PROGRESS NOTE ADULT - ASSESSMENT
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 Pt states that she has been weak since her surgery on Monday but she started feeling a sensation of fluttering in her chest yesterday. She called her nurse who told her to come in to the ED for evaluation. Had abdominal pain    4/5 CT Abdomen and Pelvis w/ IV Cont   Status post partial resection of right hepatic lobe. There is an 8 x 6 x   5.5 cm collection at the site of the right hepatic resection. The   collection extends along the margin of the resection. Gas bubbles are   seen within the collection. The findings are consistent with an abscess.    There is thickening of the region of the pylorus and first and second   portions of the duodenum most likely secondary to the perihepatic   collection and inflammation.    Status post cholecystectomy    Status post splenectomy.    Status post distal pancreatectomy.    < end of copied text >      IMPRESSION;  8 x 6 x  5.5 cm abscess  at the site of the right hepatic resection.    RECOMMENDATIONS;  BCx  IVR for diagnostic/therapeutic aspiration of abscess  Surgical evaluation  Dr Katy Davis from [place] needs to be notified  Vancomycin 1250 mg iv q12h with trough before the 3rd dose  Cefepime 2 gm iv q12h  Flagyl 500 mg iv q8h

## 2022-04-06 NOTE — DIETITIAN INITIAL EVALUATION ADULT. - NAME AND PHONE
Nutrition Intervention: Meals, snacks, medical food supplements; Nutrition Monitoring: Diet order, PO intake, weights, labs, NFPF, body composition, tolerance to medical food supplements.

## 2022-04-06 NOTE — DIETITIAN INITIAL EVALUATION ADULT. - PERTINENT MEDS FT
MEDICATIONS  (STANDING):  aspirin  chewable 81 milliGRAM(s) Oral daily  cefepime   IVPB 2000 milliGRAM(s) IV Intermittent every 12 hours  clopidogrel Tablet 75 milliGRAM(s) Oral daily  enoxaparin Injectable 40 milliGRAM(s) SubCutaneous every 24 hours  lactobacillus acidophilus 1 Tablet(s) Oral three times a day with meals  levothyroxine 25 MICROGram(s) Oral daily  magnesium sulfate  IVPB 2 Gram(s) IV Intermittent once  metoprolol tartrate 12.5 milliGRAM(s) Oral at bedtime  metroNIDAZOLE  IVPB 500 milliGRAM(s) IV Intermittent every 8 hours  pantoprazole    Tablet 40 milliGRAM(s) Oral before breakfast  vancomycin  IVPB 1250 milliGRAM(s) IV Intermittent every 12 hours    MEDICATIONS  (PRN):  aluminum hydroxide/magnesium hydroxide/simethicone Suspension 30 milliLiter(s) Oral every 4 hours PRN Dyspepsia  melatonin 3 milliGRAM(s) Oral at bedtime PRN Insomnia  ondansetron Injectable 4 milliGRAM(s) IV Push every 8 hours PRN Nausea and/or Vomiting

## 2022-04-06 NOTE — DIETITIAN INITIAL EVALUATION ADULT. - ADD RECOMMEND
1. Add Glucerna Shake 3x/day -- will provide 660kcal/day total, 30g pro/day total. 2. Continue with a consistent carbohydrate with evening snack diet order. 3. Encourage PO intake and assist during meals as needed. 4. Pt is at high risk; will f/u in 4 days.

## 2022-04-06 NOTE — PROGRESS NOTE ADULT - ATTENDING COMMENTS
88yo F with recent liver resection presenting for palpitations admitted for elevated WBC count.     # Pancreatic cancer s/p distal pancreatectomy Aug 2021  # Cholangiocarcinoma with mets to the  liver s/p partial liver resection 1wk ago (03/28)  # Intra-abdominal Abscess   -CTAP w IV (4/5/22)- 8x6x5.5cm abcess near liver resection. pyeloral and duodenal thickening likely due to perihepatic inflammation  -No sepsis POA   -ID following- Vanc Cefepime Flagyl   -IR planning for drainage on monday- dc clopidogrel , cont asa   -Sx following  -[place] [facility]   -Blood Cx negative     #Hx of afib likely paroxysmal  #CAD s/p stents (last 8/2021)  -EKG on admission, NSR  -EKG 4/6/22- NSR with PACs  -Cami cortes per O'jacques: do not discontinue ASA, given >6mo since stent placement may hold plavix if necessary  -c/w ASA, will hold Plavix  -will increase metoprolol to 12.5 BID  -tele monitoring    #Hx of c-diff in 2021 s/p abx treatment  #Loose BM  -pt c/o loose BM 4/6/22 following initiation of ABx  -monitor, may need to repeat PCR if continues/worsening    #DM  Keep FS <180  Start SS if indicated    #Hypothyroidism  -TSH 0.98  Resume home meds     #DVT PPX : Lovenox   #Diet: DASH/CC  #GI PPX: Protonix  #Activity:IAT  FULL CODE     #Progress Note Handoff  Pending (specify): SOPHY cortes  Family discussion:   Disposition:   -from home  -PT 3-5x wk, gait training 75ft RW 88yo F with recent liver resection presenting for palpitations admitted for elevated WBC count.     # Pancreatic cancer s/p distal pancreatectomy Aug 2021  # Cholangiocarcinoma with mets to the  liver s/p partial liver resection 1wk ago (03/28)  # Intra-abdominal Abscess   -CTAP w IV (4/5/22)- 8x6x5.5cm abcess near liver resection. pyeloral and duodenal thickening likely due to perihepatic inflammation  -No sepsis POA   -ID following- Vanc Cefepime Flagyl   -IR planning for drainage on monday- dc clopidogrel , cont asa   -Sx following  -Dr Davis will accept patient to [facility] pending transfer process - will follow up in am   -Blood Cx negative     #Hx of afib likely paroxysmal  #CAD s/p stents (last 8/2021)  -EKG on admission, NSR  -EKG 4/6/22- NSR with PACs  -Curbside eval per O'jacques: do not discontinue ASA, given >6mo since stent placement may hold plavix if necessary  -c/w ASA, will hold Plavix  -will increase metoprolol to 12.5 BID  -tele monitoring    #Hx of c-diff in 2021 s/p abx treatment  #Loose BM  -pt c/o loose BM 4/6/22 following initiation of ABx  -monitor, may need to repeat PCR if continues/worsening    #DM  Keep FS <180  Start SS if indicated    #Hypothyroidism  -TSH 0.98  Resume home meds     #DVT PPX : Lovenox       #Progress Note Handoff  Pending: transfer to [facility]       Katy Zuniga DO     Total time spent to complete patient's bedside assessment, review medical chart, discuss medical plan of care with covering medical team was more than 35 minutes  with >50% of time spent face to face with patient, discussion with patient/family and/or coordination of care

## 2022-04-06 NOTE — PHYSICAL THERAPY INITIAL EVALUATION ADULT - GENERAL OBSERVATIONS, REHAB EVAL
[id number] - [id number] Pt rec in b/s chair in NAD, reports NSG assisted with OOBTC earlier in am. Pt agreeable to b/s PT, noted with LE edema - pt reports SOB during activity.

## 2022-04-06 NOTE — DIETITIAN INITIAL EVALUATION ADULT. - ORAL NUTRITION SUPPLEMENTS
Petra Shake 3x/day to optimize kcal/pro intake -- will provide 660kcal/day total, 30g pro/day total.

## 2022-04-07 ENCOUNTER — TRANSCRIPTION ENCOUNTER (OUTPATIENT)
Age: 87
End: 2022-04-07

## 2022-04-07 VITALS
DIASTOLIC BLOOD PRESSURE: 91 MMHG | OXYGEN SATURATION: 96 % | SYSTOLIC BLOOD PRESSURE: 155 MMHG | RESPIRATION RATE: 18 BRPM | TEMPERATURE: 98 F | HEART RATE: 74 BPM

## 2022-04-07 DIAGNOSIS — I25.10 ATHEROSCLEROTIC HEART DISEASE OF NATIVE CORONARY ARTERY WITHOUT ANGINA PECTORIS: ICD-10-CM

## 2022-04-07 DIAGNOSIS — K76.89 OTHER SPECIFIED DISEASES OF LIVER: ICD-10-CM

## 2022-04-07 LAB
-  AMPICILLIN: SIGNIFICANT CHANGE UP
-  CIPROFLOXACIN: SIGNIFICANT CHANGE UP
-  LEVOFLOXACIN: SIGNIFICANT CHANGE UP
-  NITROFURANTOIN: SIGNIFICANT CHANGE UP
-  TETRACYCLINE: SIGNIFICANT CHANGE UP
-  VANCOMYCIN: SIGNIFICANT CHANGE UP
ALBUMIN SERPL ELPH-MCNC: 3.2 G/DL — LOW (ref 3.5–5.2)
ALP SERPL-CCNC: 148 U/L — HIGH (ref 30–115)
ALT FLD-CCNC: 33 U/L — SIGNIFICANT CHANGE UP (ref 0–41)
ANION GAP SERPL CALC-SCNC: 16 MMOL/L — HIGH (ref 7–14)
AST SERPL-CCNC: 16 U/L — SIGNIFICANT CHANGE UP (ref 0–41)
BASOPHILS # BLD AUTO: 0.05 K/UL — SIGNIFICANT CHANGE UP (ref 0–0.2)
BASOPHILS NFR BLD AUTO: 0.3 % — SIGNIFICANT CHANGE UP (ref 0–1)
BILIRUB SERPL-MCNC: 0.6 MG/DL — SIGNIFICANT CHANGE UP (ref 0.2–1.2)
BUN SERPL-MCNC: 14 MG/DL — SIGNIFICANT CHANGE UP (ref 10–20)
CALCIUM SERPL-MCNC: 8.3 MG/DL — LOW (ref 8.5–10.1)
CHLORIDE SERPL-SCNC: 99 MMOL/L — SIGNIFICANT CHANGE UP (ref 98–110)
CO2 SERPL-SCNC: 23 MMOL/L — SIGNIFICANT CHANGE UP (ref 17–32)
CREAT SERPL-MCNC: 0.9 MG/DL — SIGNIFICANT CHANGE UP (ref 0.7–1.5)
CULTURE RESULTS: SIGNIFICANT CHANGE UP
EGFR: 62 ML/MIN/1.73M2 — SIGNIFICANT CHANGE UP
EOSINOPHIL # BLD AUTO: 1.85 K/UL — HIGH (ref 0–0.7)
EOSINOPHIL NFR BLD AUTO: 10.2 % — HIGH (ref 0–8)
GLUCOSE BLDC GLUCOMTR-MCNC: 233 MG/DL — HIGH (ref 70–99)
GLUCOSE BLDC GLUCOMTR-MCNC: 238 MG/DL — HIGH (ref 70–99)
GLUCOSE SERPL-MCNC: 181 MG/DL — HIGH (ref 70–99)
HCT VFR BLD CALC: 29 % — LOW (ref 37–47)
HGB BLD-MCNC: 9.8 G/DL — LOW (ref 12–16)
IMM GRANULOCYTES NFR BLD AUTO: 1.4 % — HIGH (ref 0.1–0.3)
LYMPHOCYTES # BLD AUTO: 0.89 K/UL — LOW (ref 1.2–3.4)
LYMPHOCYTES # BLD AUTO: 4.9 % — LOW (ref 20.5–51.1)
MAGNESIUM SERPL-MCNC: 2 MG/DL — SIGNIFICANT CHANGE UP (ref 1.8–2.4)
MCHC RBC-ENTMCNC: 29.9 PG — SIGNIFICANT CHANGE UP (ref 27–31)
MCHC RBC-ENTMCNC: 33.8 G/DL — SIGNIFICANT CHANGE UP (ref 32–37)
MCV RBC AUTO: 88.4 FL — SIGNIFICANT CHANGE UP (ref 81–99)
METHOD TYPE: SIGNIFICANT CHANGE UP
MONOCYTES # BLD AUTO: 0.89 K/UL — HIGH (ref 0.1–0.6)
MONOCYTES NFR BLD AUTO: 4.9 % — SIGNIFICANT CHANGE UP (ref 1.7–9.3)
NEUTROPHILS # BLD AUTO: 14.15 K/UL — HIGH (ref 1.4–6.5)
NEUTROPHILS NFR BLD AUTO: 78.3 % — HIGH (ref 42.2–75.2)
NRBC # BLD: 0 /100 WBCS — SIGNIFICANT CHANGE UP (ref 0–0)
ORGANISM # SPEC MICROSCOPIC CNT: SIGNIFICANT CHANGE UP
ORGANISM # SPEC MICROSCOPIC CNT: SIGNIFICANT CHANGE UP
PLATELET # BLD AUTO: 499 K/UL — HIGH (ref 130–400)
POTASSIUM SERPL-MCNC: 3.8 MMOL/L — SIGNIFICANT CHANGE UP (ref 3.5–5)
POTASSIUM SERPL-SCNC: 3.8 MMOL/L — SIGNIFICANT CHANGE UP (ref 3.5–5)
PROT SERPL-MCNC: 5.1 G/DL — LOW (ref 6–8)
RBC # BLD: 3.28 M/UL — LOW (ref 4.2–5.4)
RBC # FLD: 18.4 % — HIGH (ref 11.5–14.5)
SODIUM SERPL-SCNC: 138 MMOL/L — SIGNIFICANT CHANGE UP (ref 135–146)
SPECIMEN SOURCE: SIGNIFICANT CHANGE UP
WBC # BLD: 18.09 K/UL — HIGH (ref 4.8–10.8)
WBC # FLD AUTO: 18.09 K/UL — HIGH (ref 4.8–10.8)

## 2022-04-07 PROCEDURE — 99232 SBSQ HOSP IP/OBS MODERATE 35: CPT

## 2022-04-07 PROCEDURE — 99239 HOSP IP/OBS DSCHRG MGMT >30: CPT

## 2022-04-07 RX ORDER — METOPROLOL TARTRATE 50 MG
12.5 TABLET ORAL
Qty: 0 | Refills: 0 | DISCHARGE
Start: 2022-04-07

## 2022-04-07 RX ORDER — GLUCAGON INJECTION, SOLUTION 0.5 MG/.1ML
1 INJECTION, SOLUTION SUBCUTANEOUS ONCE
Refills: 0 | Status: DISCONTINUED | OUTPATIENT
Start: 2022-04-07 | End: 2022-04-07

## 2022-04-07 RX ORDER — VANCOMYCIN HCL 1 G
1.25 VIAL (EA) INTRAVENOUS
Qty: 0 | Refills: 0 | DISCHARGE
Start: 2022-04-07

## 2022-04-07 RX ORDER — ONDANSETRON 8 MG/1
1 TABLET, FILM COATED ORAL
Qty: 0 | Refills: 0 | DISCHARGE
Start: 2022-04-07

## 2022-04-07 RX ORDER — SODIUM CHLORIDE 9 MG/ML
1000 INJECTION, SOLUTION INTRAVENOUS
Refills: 0 | Status: DISCONTINUED | OUTPATIENT
Start: 2022-04-07 | End: 2022-04-07

## 2022-04-07 RX ORDER — METOPROLOL TARTRATE 50 MG
0.5 TABLET ORAL
Qty: 0 | Refills: 0 | DISCHARGE

## 2022-04-07 RX ORDER — INSULIN NPH HUM/REG INSULIN HM 70-30/ML
0 VIAL (ML) SUBCUTANEOUS
Qty: 0 | Refills: 0 | DISCHARGE

## 2022-04-07 RX ORDER — INSULIN LISPRO 100/ML
VIAL (ML) SUBCUTANEOUS
Refills: 0 | Status: DISCONTINUED | OUTPATIENT
Start: 2022-04-07 | End: 2022-04-07

## 2022-04-07 RX ORDER — INSULIN GLARGINE 100 [IU]/ML
5 INJECTION, SOLUTION SUBCUTANEOUS AT BEDTIME
Refills: 0 | Status: DISCONTINUED | OUTPATIENT
Start: 2022-04-07 | End: 2022-04-07

## 2022-04-07 RX ORDER — LANOLIN ALCOHOL/MO/W.PET/CERES
1 CREAM (GRAM) TOPICAL
Qty: 0 | Refills: 0 | DISCHARGE
Start: 2022-04-07

## 2022-04-07 RX ORDER — INSULIN LISPRO 100/ML
3 VIAL (ML) SUBCUTANEOUS
Refills: 0 | Status: DISCONTINUED | OUTPATIENT
Start: 2022-04-07 | End: 2022-04-07

## 2022-04-07 RX ORDER — METRONIDAZOLE 500 MG
100 TABLET ORAL
Qty: 0 | Refills: 0 | DISCHARGE
Start: 2022-04-07

## 2022-04-07 RX ORDER — CEFEPIME 1 G/1
2 INJECTION, POWDER, FOR SOLUTION INTRAMUSCULAR; INTRAVENOUS
Qty: 0 | Refills: 0 | DISCHARGE
Start: 2022-04-07

## 2022-04-07 RX ORDER — DEXTROSE 50 % IN WATER 50 %
25 SYRINGE (ML) INTRAVENOUS ONCE
Refills: 0 | Status: DISCONTINUED | OUTPATIENT
Start: 2022-04-07 | End: 2022-04-07

## 2022-04-07 RX ORDER — LACTOBACILLUS ACIDOPHILUS 100MM CELL
1 CAPSULE ORAL
Qty: 0 | Refills: 0 | DISCHARGE
Start: 2022-04-07

## 2022-04-07 RX ORDER — DEXTROSE 50 % IN WATER 50 %
15 SYRINGE (ML) INTRAVENOUS ONCE
Refills: 0 | Status: DISCONTINUED | OUTPATIENT
Start: 2022-04-07 | End: 2022-04-07

## 2022-04-07 RX ORDER — SACCHAROMYCES BOULARDII 250 MG
1 POWDER IN PACKET (EA) ORAL
Qty: 0 | Refills: 0 | DISCHARGE
Start: 2022-04-07

## 2022-04-07 RX ORDER — SACCHAROMYCES BOULARDII 250 MG
250 POWDER IN PACKET (EA) ORAL
Refills: 0 | Status: DISCONTINUED | OUTPATIENT
Start: 2022-04-07 | End: 2022-04-07

## 2022-04-07 RX ORDER — DEXTROSE 50 % IN WATER 50 %
12.5 SYRINGE (ML) INTRAVENOUS ONCE
Refills: 0 | Status: DISCONTINUED | OUTPATIENT
Start: 2022-04-07 | End: 2022-04-07

## 2022-04-07 RX ADMIN — Medication 100 MILLIGRAM(S): at 06:34

## 2022-04-07 RX ADMIN — Medication 650 MILLIGRAM(S): at 00:12

## 2022-04-07 RX ADMIN — Medication 166.67 MILLIGRAM(S): at 06:50

## 2022-04-07 RX ADMIN — Medication 25 MICROGRAM(S): at 06:08

## 2022-04-07 RX ADMIN — Medication 1 TABLET(S): at 07:47

## 2022-04-07 RX ADMIN — Medication 2: at 11:14

## 2022-04-07 RX ADMIN — Medication 1 TABLET(S): at 11:15

## 2022-04-07 RX ADMIN — PANTOPRAZOLE SODIUM 40 MILLIGRAM(S): 20 TABLET, DELAYED RELEASE ORAL at 06:08

## 2022-04-07 RX ADMIN — Medication 650 MILLIGRAM(S): at 01:12

## 2022-04-07 RX ADMIN — Medication 100 MILLIGRAM(S): at 13:22

## 2022-04-07 RX ADMIN — Medication 12.5 MILLIGRAM(S): at 06:08

## 2022-04-07 RX ADMIN — Medication 81 MILLIGRAM(S): at 11:14

## 2022-04-07 RX ADMIN — CEFEPIME 100 MILLIGRAM(S): 1 INJECTION, POWDER, FOR SOLUTION INTRAMUSCULAR; INTRAVENOUS at 06:05

## 2022-04-07 NOTE — DISCHARGE NOTE PROVIDER - ATTENDING DISCHARGE PHYSICAL EXAMINATION:
GENERAL: AAOx3. Well-nourished, laying in bed appearing in no acute distress  HEENT: No FNDs, atraumatic, normocephalic, moist mucus membranes  LUNGS: Clear to auscultation bilaterally  HEART: S1/S2. No heaves or thrills  ABD: Soft, non-tender, non-distended. Bowel sounds present in all quadrants.  EXT/NEURO: 5/5 strength in all extremity joints. Sensation and ROM grossly intact.  SKIN: No breaks, erythema, edema

## 2022-04-07 NOTE — DISCHARGE NOTE NURSING/CASE MANAGEMENT/SOCIAL WORK - PATIENT PORTAL LINK FT
You can access the FollowMyHealth Patient Portal offered by [facility] by registering at the following website: [email or web address]. By joining [facility]’s FollowMyHealth portal, you will also be able to view your health information using other applications (apps) compatible with our system.

## 2022-04-07 NOTE — PROGRESS NOTE ADULT - ASSESSMENT
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 Pt states that she has been weak since her surgery on Monday but she started feeling a sensation of fluttering in her chest yesterday. She called her nurse who told her to come in to the ED for evaluation. Had abdominal pain    4/5 CT Abdomen and Pelvis w/ IV Cont   Status post partial resection of right hepatic lobe. There is an 8 x 6 x   5.5 cm collection at the site of the right hepatic resection. The   collection extends along the margin of the resection. Gas bubbles are   seen within the collection. The findings are consistent with an abscess.    There is thickening of the region of the pylorus and first and second   portions of the duodenum most likely secondary to the perihepatic   collection and inflammation.    Status post cholecystectomy    Status post splenectomy.    Status post distal pancreatectomy.    < end of copied text >      IMPRESSION;  8 x 6 x  5.5 cm abscess  at the site of the right hepatic resection.    RECOMMENDATIONS;  Will need post splenectomy vaccines if not given at [place]  BCx  IVR for diagnostic/therapeutic aspiration of abscess  Surgical evaluation  Dr Katy Davis from [place] needs to be notified  Vancomycin 1250 mg iv q12h with trough before the 3rd dose  Cefepime 2 gm iv q12h  Flagyl 500 mg iv q8h  I shall be away from 4/8-4/16 and will be covered by Dr Zavala [id number] and Dr Meade [id number] for that interval.

## 2022-04-07 NOTE — PROGRESS NOTE ADULT - ASSESSMENT
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 Surgery consulted for new  8 x 6 x 5.5 cm collection at the site of the right hepatic resection consistent with abscess.     PLAN:   -Management per primary team  -Needs IR drainage   -Follow up transfer to [facility]    BLUE TEAM SPECTRA: [id number]

## 2022-04-07 NOTE — DISCHARGE NOTE PROVIDER - NSDCMRMEDTOKEN_GEN_ALL_CORE_FT
aluminum hydroxide-magnesium hydroxide 200 mg-200 mg/5 mL oral suspension: 30 milliliter(s) orally every 4 hours, As needed, Dyspepsia  [place] Thyroid 15 mg oral tablet: 1 tab(s) orally once a day  aspirin 81 mg oral tablet, chewable: 1 tab(s) orally once a day  cefepime 2 g intravenous injection: 2 gram(s) intravenous every 12 hours  HumuLIN 70/30 KwikPen:   Jardiance 10 mg oral tablet: 1 tab(s) orally once a day (in the morning)  lactobacillus acidophilus oral capsule: 1 cap(s) orally 3 times a day (with meals)  melatonin 3 mg oral tablet: 1 tab(s) orally once a day (at bedtime), As needed, Insomnia  metoprolol: 12.5 milligram(s) orally every 12 hours  metroNIDAZOLE 500 mg/100 mL intravenous solution: 100 milliliter(s) intravenous every 8 hours  ondansetron 2 mg/mL injectable solution: injectable every 6 hours as needed for nausea  pantoprazole 40 mg oral delayed release tablet: 1 tab(s) orally once a day  saccharomyces boulardii lyo 250 mg oral capsule: 1 cap(s) orally 2 times a day  Tresiba 100 units/mL subcutaneous solution: 10 unit(s) subcutaneous once a day (at bedtime)  Tylenol 500 mg oral tablet: 2 tab(s) orally 3 times a day, As Needed  vancomycin 1.25 g intravenous injection: 1.25 gram(s) intravenous every 12 hours   aluminum hydroxide-magnesium hydroxide 200 mg-200 mg/5 mL oral suspension: 30 milliliter(s) orally every 4 hours, As needed, Dyspepsia  [place] Thyroid 15 mg oral tablet: 1 tab(s) orally once a day  aspirin 81 mg oral tablet, chewable: 1 tab(s) orally once a day  cefepime 2 g intravenous injection: 2 gram(s) intravenous every 12 hours  Jardiance 10 mg oral tablet: 1 tab(s) orally once a day (in the morning)  lactobacillus acidophilus oral capsule: 1 cap(s) orally 3 times a day (with meals)  melatonin 3 mg oral tablet: 1 tab(s) orally once a day (at bedtime), As needed, Insomnia  metoprolol: 12.5 milligram(s) orally every 12 hours  metroNIDAZOLE 500 mg/100 mL intravenous solution: 100 milliliter(s) intravenous every 8 hours  ondansetron 2 mg/mL injectable solution: injectable every 6 hours as needed for nausea  pantoprazole 40 mg oral delayed release tablet: 1 tab(s) orally once a day  saccharomyces boulardii lyo 250 mg oral capsule: 1 cap(s) orally 2 times a day  Tresiba 100 units/mL subcutaneous solution: 10 unit(s) subcutaneous once a day (at bedtime)  Tylenol 500 mg oral tablet: 2 tab(s) orally 3 times a day, As Needed  vancomycin 1.25 g intravenous injection: 1.25 gram(s) intravenous every 12 hours

## 2022-04-07 NOTE — DISCHARGE NOTE PROVIDER - HOSPITAL COURSE
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 Pt states that she has been weak since her surgery on Monday but she started feeling a sensation of fluttering in her chest yesterday. She called her nurse who told her to come in to the ED for evaluation.      Pt states that she has had increased RUQ pain since discharge on Friday. Attributes her worsened pain to stopping oxycodone because of constipation and fear of becoming dependent. Pt endorses mild pain at surgical sites. Denies drainage or pus. Denies fever, chills, sob, chest pain, urinary symptoms. Recently had a BM 4/4. states she has an MI 2 weeks s/p pancreatectomy in Aug 2021 but currently does not feel the way she felt then.       In the ED, pt hemodynamically stable, T 99.5. Labs WBC 18 (WBC 16 04/01), Hb 10.1 at baseline  UA -mod leuk, neg nitrite, WBC 16, negative hoang    Hospital Course:  Pt evaluated for elevated WBC count following intra-abdominal surgery as well as palpitations. Pt found to have 8cm abcess posterior to liver likely new from partial resection at [facility] 3/28/22. Pt evaluated by IR/Surg/ID at [facility], started on vanc/cefepime/flagyl. Pt to be transferred back to [facility] per Dr. Demetri Davis for further management. Pt initial EKG showed NSR, pt complained of palpitations on 4/6/22, found to have frequent PACs controlled with increasing metoprolol tartrate from 12.5 qd to 12.5 BID. Initial UCx is growing G+ organism, pt denies burning/dysuria/nocturia, likely asymptomatic bacteruria. 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 Pt states that she has been weak since her surgery on Monday but she started feeling a sensation of fluttering in her chest yesterday. She called her nurse who told her to come in to the ED for evaluation.      Pt states that she has had increased RUQ pain since discharge on Friday. Attributes her worsened pain to stopping oxycodone because of constipation and fear of becoming dependent. Pt endorses mild pain at surgical sites. Denies drainage or pus. Denies fever, chills, sob, chest pain, urinary symptoms. Recently had a BM 4/4. states she has an MI 2 weeks s/p pancreatectomy in Aug 2021 but currently does not feel the way she felt then.       In the ED, pt hemodynamically stable, T 99.5. Labs WBC 18 (WBC 16 04/01), Hb 10.1 at baseline  UA -mod leuk, neg nitrite, WBC 16, negative hoang    Hospital Course:  Pt evaluated for elevated WBC count following intra-abdominal surgery as well as palpitations. Pt found to have 8cm abcess posterior to liver likely new from partial resection at [facility] 3/28/22. Pt evaluated by IR/Surg/ID at [facility], started on vanc/cefepime/flagyl. Pt to be transferred back to [facility] per Dr. Demetri Davis for further management. Pt initial EKG showed NSR, pt complained of palpitations on 4/6/22, found to have frequent PACs controlled with increasing metoprolol tartrate from 12.5 qd to 12.5 BID. Initial UCx is growing G+ organism, pt denies burning/dysuria/nocturia, likely asymptomatic bacteruria. Pt stable and ready for further workup at [facility]      As per ID:       IMPRESSION;  8 x 6 x  5.5 cm abscess  at the site of the right hepatic resection.    RECOMMENDATIONS;  Will need post splenectomy vaccines if not given at [place]  BCx  IVR for diagnostic/therapeutic aspiration of abscess  Surgical evaluation  Dr Demetri Davis from [place] needs to be notified  Vancomycin 1250 mg iv q12h with trough before the 3rd dose  Cefepime 2 gm iv q12h  Flagyl 500 mg iv q8h      As per Cardiology:   Problem: CAD (coronary artery disease).   ·  Recommendation: pt is preop for abscess drainage by interventional radiology.  pt had a nstemi and an everolimus stent to om1 8/31/21.  plavix may be stopped preop to drainage if surgically required to stop life threatening bleeding but pt must remain on asa 81 mgs periop to prevent stent closure.  discussed with interventional rad office and left my cell number to discuss with the interventional radiologist  correct  mg and repeat ekg to f/u qtc  cont metoprolol 12.5 po q12.

## 2022-04-07 NOTE — CONSULT NOTE ADULT - CONSULT REASON
preop eval mgt of anti plts and apds on ekg
Perihepatic collection drainage
Hepatic abscess s/p right liver resection
concern for post op infection
yes

## 2022-04-07 NOTE — CONSULT NOTE ADULT - PROBLEM SELECTOR RECOMMENDATION 9
pt is preop for abscess drainage by interventional radiology.  pt had a nstemi and an everolimus stent to om1 8/31/21.  plavix may be stopped preop to drainage if surgically required to stop life threatening bleeding but pt must remain on asa 81 mgs periop to prevent stent closure.  discussed with interventional rad office and left my cell number to discuss with the interventional radiologist  correct  mg and repeat ekg to f/u qtc  cont metoprolol 12.5 po q12

## 2022-04-07 NOTE — DISCHARGE NOTE PROVIDER - CARE PROVIDER_API CALL
Emmett Galloway  CARDIOVASCULAR DISEASE  [address]  [place] [address]  Phone: [phone]  Fax: [phone]  Follow Up Time: 1 week

## 2022-04-07 NOTE — PROGRESS NOTE ADULT - ATTENDING COMMENTS
Pt examined  admitted after a partaial righ hepatectomy by Dr. Davis.   Had started Plavix and then developed pain .     On CT fluid collection in the surgical bed.     Started on ABX   Pain much improved  Awaiting [facility] to [facility].      If unstable IR drainage    NO need of any emergent surgical treatment.     Will follow.

## 2022-04-07 NOTE — PROGRESS NOTE ADULT - SUBJECTIVE AND OBJECTIVE BOX
GENERAL SURGERY PROGRESS NOTE    Patient: ANNE VILLATORO , 87y ([birth year])Female   MRN: [id number]  Location: [address]  Visit: 04-05-22 Inpatient  Date: 04-07-22 @ 14:42    Hospital Day #:3    Procedure/Dx/Injuries: [place] [facility] [place]     Events of past 24 hours: WBC 18.09 from 16.65. Remained afebrile overnight. No acute events overnight.      PAST MEDICAL & SURGICAL HISTORY:  Hypothyroid  CAD (coronary artery disease)  Acute MI  CAD S/P percutaneous coronary angioplasty  DM (diabetes mellitus), secondary  H/O spinal stenosis  Polymyalgia  Arthritis  IBS (irritable bowel syndrome)  S/P tonsillectomy  History of cholecystectomy  History of appendectomy  Cataract of both eyes  History of hip replacement, total, left      Vitals:   T(F): 97.6 (04-07-22 @ 11:38), Max: 99 (04-06-22 @ 21:05)  HR: 68 (04-07-22 @ 11:38)  BP: 117/75 (04-07-22 @ 11:38)  RR: 18 (04-07-22 @ 11:38)  SpO2: 96% (04-06-22 @ 20:38)    Diet, Consistent Carbohydrate w/Evening Snack:   Supplement Feeding Modality:  Oral  Glucerna Shake Cans or Servings Per Day:  3       Frequency:  Daily      PHYSICAL EXAM:  General: NAD, AAOx3, calm and cooperative  HEENT: NCAT, ZEYNEP, EOMI, Trachea ML, Neck supple  Cardiac: RRR S1, S2, no Murmurs, rubs or gallops  Respiratory: CTAB, normal respiratory effort, breath sounds equal BL, no wheeze, rhonchi or crackles  Abdomen: Soft, non-distended, non-tender, no rebound, no guarding. +BS.  Rectal: Good tone, +stool, no blood, no abraham-anal masses/lesions, no fistulas, fissures, hemorrhoids  Musculoskeletal: Strength 5/5 BL UE/LE, ROM intact, compartments soft  Neuro: Sensation grossly intact and equal throughout, no focal deficits  Vascular: Pulses 2+ throughout, extremities well perfused  Skin: Warm/dry, normal color, no jaundice  Incision/wound: healing well, dressings in place, clean, dry and intact    MEDICATIONS  (STANDING):  aspirin  chewable 81 milliGRAM(s) Oral daily  cefepime   IVPB 2000 milliGRAM(s) IV Intermittent every 12 hours  dextrose 5%. 1000 milliLiter(s) (50 mL/Hr) IV Continuous <Continuous>  dextrose 5%. 1000 milliLiter(s) (100 mL/Hr) IV Continuous <Continuous>  dextrose 50% Injectable 25 Gram(s) IV Push once  dextrose 50% Injectable 12.5 Gram(s) IV Push once  dextrose 50% Injectable 25 Gram(s) IV Push once  glucagon  Injectable 1 milliGRAM(s) IntraMuscular once  insulin glargine Injectable (LANTUS) 5 Unit(s) SubCutaneous at bedtime  insulin lispro (ADMELOG) corrective regimen sliding scale   SubCutaneous three times a day before meals  insulin lispro Injectable (ADMELOG) 3 Unit(s) SubCutaneous three times a day before meals  lactobacillus acidophilus 1 Tablet(s) Oral three times a day with meals  levothyroxine 25 MICROGram(s) Oral daily  metoprolol tartrate 12.5 milliGRAM(s) Oral every 12 hours  metroNIDAZOLE  IVPB 500 milliGRAM(s) IV Intermittent every 8 hours  pantoprazole    Tablet 40 milliGRAM(s) Oral before breakfast  saccharomyces boulardii 250 milliGRAM(s) Oral two times a day  vancomycin  IVPB 1250 milliGRAM(s) IV Intermittent every 12 hours    MEDICATIONS  (PRN):  acetaminophen     Tablet .. 650 milliGRAM(s) Oral every 6 hours PRN Temp greater or equal to 38C (100.4F), Mild Pain (1 - 3)  aluminum hydroxide/magnesium hydroxide/simethicone Suspension 30 milliLiter(s) Oral every 4 hours PRN Dyspepsia  dextrose Oral Gel 15 Gram(s) Oral once PRN Blood Glucose LESS THAN 70 milliGRAM(s)/deciliter  melatonin 3 milliGRAM(s) Oral at bedtime PRN Insomnia  ondansetron Injectable 4 milliGRAM(s) IV Push every 8 hours PRN Nausea and/or Vomiting    DVT PROPHYLAXIS:   GI PROPHYLAXIS: pantoprazole    Tablet 40 milliGRAM(s) Oral before breakfast    ANTICOAGULATION:   ANTIBIOTICS:  cefepime   IVPB 2000 milliGRAM(s)  metroNIDAZOLE  IVPB 500 milliGRAM(s)  vancomycin  IVPB 1250 milliGRAM(s)    LAB/STUDIES:  Labs:  CAPILLARY BLOOD GLUCOSE  POCT Blood Glucose.: 238 mg/dL (07 Apr 2022 11:01)  POCT Blood Glucose.: 233 mg/dL (07 Apr 2022 07:22)  POCT Blood Glucose.: 216 mg/dL (06 Apr 2022 23:57)  POCT Blood Glucose.: 274 mg/dL (06 Apr 2022 21:26)  POCT Blood Glucose.: 183 mg/dL (06 Apr 2022 16:35)                       9.8    18.09 )-----------( 499      ( 07 Apr 2022 04:30 )             29.0       Auto Neutrophil %: 78.3 % (04-07-22 @ 04:30)  Auto Immature Granulocyte %: 1.4 % (04-07-22 @ 04:30)    04-07    138  |  99  |  14  ----------------------------<  181<H>  3.8   |  23  |  0.9      Calcium, Total Serum: 8.3 mg/dL (04-07-22 @ 04:30)      LFTs:             5.1  | 0.6  | 16       ------------------[148     ( 07 Apr 2022 04:30 )  3.2  | x    | 33          Lipase:x      Amylase:x         Lactate, Blood: 0.8 mmol/L (04-05-22 @ 12:10)      Culture - Urine (collected 05 Apr 2022 02:50)  Source: Clean Catch Clean Catch (Midstream)  Preliminary Report (06 Apr 2022 16:56):    >100,000 CFU/ml Gram positive organisms    Culture - Blood (collected 05 Apr 2022 00:30)  Source: .Blood Blood  Preliminary Report (06 Apr 2022 08:02):    No growth to date.    Culture - Blood (collected 05 Apr 2022 00:30)  Source: .Blood Blood  Preliminary Report (06 Apr 2022 08:02):    No growth to date.        RADIOLOGY & ADDITIONAL STUDIES:  < from: CT Abdomen and Pelvis w/ IV Cont (04.05.22 @ 18:21) >  IMPRESSION:  Status post partial resection of right hepatic lobe. There is an 8 x 6 x   5.5 cm collection at the site of the right hepatic resection. The   collection extends along the margin of the resection. Gas bubbles are   seen within the collection. The findings are consistent with an abscess.  There is thickening of the region of the pylorus and first and second   portions of the duodenum most likely secondary to the perihepatic   collection and inflammation.  Status post cholecystectomy  Status post splenectomy.  Status post distal pancreatectomy.

## 2022-04-07 NOTE — CONSULT NOTE ADULT - SUBJECTIVE AND OBJECTIVE BOX
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 Pt states that she has been weak since her surgery on Monday but she started feeling a sensation of fluttering in her chest yesterday. She called her nurse who told her to come in to the ED for evaluation.      Pt states that she has had increased RUQ pain since discharge on Friday. Attributes her worsened pain to stopping oxycodone because of constipation and fear of becoming dependent. Pt endorses mild pain at surgical sites. Denies drainage or pus. Denies fever, chills, sob, chest pain, urinary symptoms. Recently had a BM 4/4. states she has an MI 2 weeks s/p pancreatectomy in Aug 2021 but currently does not feel the way she felt then.       In the ED, pt hemodynamically stable, T 99.5. Labs WBC 18 (WBC 16 04/01), Hb 10.1 at baseline  UA -mod leuk, neg nitrite, WBC 16, negative hoang     (05 Apr 2022 04:44)      PAST MEDICAL & SURGICAL HISTORY:  Hypothyroid    CAD (coronary artery disease)    Acute MI    CAD S/P percutaneous coronary angioplasty    DM (diabetes mellitus), secondary    H/O spinal stenosis    Polymyalgia    Arthritis    IBS (irritable bowel syndrome)    S/P tonsillectomy    History of cholecystectomy    History of appendectomy    Cataract of both eyes    History of hip replacement, total, left        PREVIOUS DIAGNOSTIC TESTING:      ECHO  FINDINGS:    STRESS TEST  FINDINGS:    CATHETERIZATION  FINDINGS:    MEDICATIONS  (STANDING):  aspirin  chewable 81 milliGRAM(s) Oral daily  cefepime   IVPB 2000 milliGRAM(s) IV Intermittent every 12 hours  dextrose 5%. 1000 milliLiter(s) (50 mL/Hr) IV Continuous <Continuous>  dextrose 5%. 1000 milliLiter(s) (100 mL/Hr) IV Continuous <Continuous>  dextrose 50% Injectable 25 Gram(s) IV Push once  dextrose 50% Injectable 12.5 Gram(s) IV Push once  dextrose 50% Injectable 25 Gram(s) IV Push once  glucagon  Injectable 1 milliGRAM(s) IntraMuscular once  insulin lispro (ADMELOG) corrective regimen sliding scale   SubCutaneous three times a day before meals  lactobacillus acidophilus 1 Tablet(s) Oral three times a day with meals  levothyroxine 25 MICROGram(s) Oral daily  metoprolol tartrate 12.5 milliGRAM(s) Oral every 12 hours  metroNIDAZOLE  IVPB 500 milliGRAM(s) IV Intermittent every 8 hours  pantoprazole    Tablet 40 milliGRAM(s) Oral before breakfast  vancomycin  IVPB 1250 milliGRAM(s) IV Intermittent every 12 hours    MEDICATIONS  (PRN):  acetaminophen     Tablet .. 650 milliGRAM(s) Oral every 6 hours PRN Temp greater or equal to 38C (100.4F), Mild Pain (1 - 3)  aluminum hydroxide/magnesium hydroxide/simethicone Suspension 30 milliLiter(s) Oral every 4 hours PRN Dyspepsia  dextrose Oral Gel 15 Gram(s) Oral once PRN Blood Glucose LESS THAN 70 milliGRAM(s)/deciliter  melatonin 3 milliGRAM(s) Oral at bedtime PRN Insomnia  ondansetron Injectable 4 milliGRAM(s) IV Push every 8 hours PRN Nausea and/or Vomiting      FAMILY HISTORY:  No pertinent family history in first degree relatives        SOCIAL HISTORY:  CIGARETTES:  ALCOHOL:  DRUGS:                      REVIEW OF SYSTEMS:  CONSTITUTIONAL: No distress, Looks stable  NECK: No pain or stiffnes  RESPIRATORY: No cough, wheezing, shortness of breath  CARDIOVASCULAR: No chest pain, SOB, palpitations, leg swelling  GASTROINTESTINAL: No abdominal or epigastric pain. No nausea, vomiting, or hematemesis;  No melena.  NEUROLOGICAL: No dizziness, headaches, memory loss, loss of strength  SKIN: No itching, burning, rashes, or lesions   ENDOCRINE: No heat or cold intolerance  MUSCULOSKELETAL: No joint pain, No  swelling; No muscle pain  PSYCHIATRIC: No depression, anxiety, mood swings, or difficulty sleeping  ALLERGY: No hives, itching, rash          Vital Signs Last 24 Hrs  T(C): 36.4 (07 Apr 2022 06:09), Max: 37.2 (06 Apr 2022 21:05)  T(F): 97.5 (07 Apr 2022 06:09), Max: 99 (06 Apr 2022 21:05)  HR: 77 (07 Apr 2022 06:09) (77 - 87)  BP: 136/63 (07 Apr 2022 06:09) (125/59 - 142/67)  BP(mean): --  RR: 18 (07 Apr 2022 06:09) (18 - 18)  SpO2: 96% (06 Apr 2022 20:38) (95% - 96%)                      PHYSICAL EXAM:  GENERAL: No distress, well developed  HEAD:  Atraumatic, Normocephalic  NECK: Supple, No JVD, No Bruit of either carotid arteries  NERVOUS SYSTEM:  Alert, Awake, Oriented to time, place, person; Normal memory and speech; Normal motor Strength 5/5 B/L upper and lower extremities  CHEST/LUNG: Normal air entry to lung base bilaterally; No wheeze, crackle, rales, rhonchi  HEART: Regular heart beat, S1, A2, P2, No S3, No S4, No gallop, No murmur  ABDOMEN: Soft, Non tender, Non distended; Bowel sounds present  EXTREMITIES:  2+ Peripheral Pulses, No clubbing, No edema  SKIN: No rashes or lesions    TELEMETRY:    ECG:    I&O's Detail      LABS:                        9.8    18.09 )-----------( 499      ( 07 Apr 2022 04:30 )             29.0     04-06    138  |  98  |  16  ----------------------------<  123<H>  3.7   |  21  |  0.8    Ca    8.6      06 Apr 2022 07:18  Mg     1.7     04-06    TPro  5.5<L>  /  Alb  3.3<L>  /  TBili  0.6  /  DBili  x   /  AST  21  /  ALT  44<H>  /  AlkPhos  177<H>  04-06            I&O's Summary      RADIOLOGY & ADDITIONAL STUDIES: Patient is a 87y old  Female who presents with a chief complaint of palpitations (06 Apr 2022 15:30)      HPI:  86 yo F PMHx of DM,  Afib, CAD s/p 4 stents,  s/p nstermi 8/2021 with claudia, pancreatic cancer s/p distal pancreatectomy Aug 2021, 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 p/w weakness and intermittent palpitations. Pt states that she has been weak since her surgery on Monday but she started feeling a sensation of fluttering in her chest yesterday. She called her nurse who told her to come in to the ED for evaluation.      Pt states that she has had increased RUQ pain since discharge on Friday. Attributes her worsened pain to stopping oxycodone because of constipation and fear of becoming dependent. Pt endorses mild pain at surgical sites. Denies drainage or pus. Denies fever, chills, sob, chest pain, urinary symptoms. Recently had a BM 4/4. states she has an MI 2 weeks s/p pancreatectomy in Aug 2021 but currently does not feel the way she felt then.       In the ED, pt hemodynamically stable, T 99.5. Labs WBC 18 (WBC 16 04/01), Hb 10.1 at baseline  UA -mod leuk, neg nitrite, WBC 16, negative hoang     (05 Apr 2022 04:44)      PAST MEDICAL & SURGICAL HISTORY:  Hypothyroid    CAD (coronary artery disease)    Acute MI    CAD S/P percutaneous coronary angioplasty    DM (diabetes mellitus), secondary    H/O spinal stenosis    Polymyalgia    Arthritis    IBS (irritable bowel syndrome)    S/P tonsillectomy    History of cholecystectomy    History of appendectomy    Cataract of both eyes    History of hip replacement, total, left        PREVIOUS DIAGNOSTIC TESTING:      ECHO  FINDINGS:    STRESS TEST  FINDINGS:    CATHETERIZATION  FINDINGS:    MEDICATIONS  (STANDING):  aspirin  chewable 81 milliGRAM(s) Oral daily  cefepime   IVPB 2000 milliGRAM(s) IV Intermittent every 12 hours  dextrose 5%. 1000 milliLiter(s) (50 mL/Hr) IV Continuous <Continuous>  dextrose 5%. 1000 milliLiter(s) (100 mL/Hr) IV Continuous <Continuous>  dextrose 50% Injectable 25 Gram(s) IV Push once  dextrose 50% Injectable 12.5 Gram(s) IV Push once  dextrose 50% Injectable 25 Gram(s) IV Push once  glucagon  Injectable 1 milliGRAM(s) IntraMuscular once  insulin lispro (ADMELOG) corrective regimen sliding scale   SubCutaneous three times a day before meals  lactobacillus acidophilus 1 Tablet(s) Oral three times a day with meals  levothyroxine 25 MICROGram(s) Oral daily  metoprolol tartrate 12.5 milliGRAM(s) Oral every 12 hours  metroNIDAZOLE  IVPB 500 milliGRAM(s) IV Intermittent every 8 hours  pantoprazole    Tablet 40 milliGRAM(s) Oral before breakfast  vancomycin  IVPB 1250 milliGRAM(s) IV Intermittent every 12 hours    MEDICATIONS  (PRN):  acetaminophen     Tablet .. 650 milliGRAM(s) Oral every 6 hours PRN Temp greater or equal to 38C (100.4F), Mild Pain (1 - 3)  aluminum hydroxide/magnesium hydroxide/simethicone Suspension 30 milliLiter(s) Oral every 4 hours PRN Dyspepsia  dextrose Oral Gel 15 Gram(s) Oral once PRN Blood Glucose LESS THAN 70 milliGRAM(s)/deciliter  melatonin 3 milliGRAM(s) Oral at bedtime PRN Insomnia  ondansetron Injectable 4 milliGRAM(s) IV Push every 8 hours PRN Nausea and/or Vomiting      FAMILY HISTORY:  No pertinent family history in first degree relatives        SOCIAL HISTORY:  CIGARETTES: stopped 1976  ALCOHOL: none  DRUGS: none                      REVIEW OF SYSTEMS:  CONSTITUTIONAL: No distress, Looks stable  NECK: No pain or stiffness  RESPIRATORY: No cough, wheezing, shortness of breath  CARDIOVASCULAR: No chest pain, SOB, palpitations, leg swelling  GASTROINTESTINAL: (+) abdominal or epigastric pain. No nausea, vomiting, or hematemesis;  No melena.  NEUROLOGICAL: No dizziness, headaches, memory loss, loss of strength  SKIN: No itching, burning, rashes, or lesions   ENDOCRINE: No heat or cold intolerance  MUSCULOSKELETAL: No joint pain, No  swelling; No muscle pain  PSYCHIATRIC: No depression, anxiety, mood swings, or difficulty sleeping  ALLERGY: No hives, itching, rash          Vital Signs Last 24 Hrs  T(C): 36.4 (07 Apr 2022 06:09), Max: 37.2 (06 Apr 2022 21:05)  T(F): 97.5 (07 Apr 2022 06:09), Max: 99 (06 Apr 2022 21:05)  HR: 77 (07 Apr 2022 06:09) (77 - 87)  BP: 136/63 (07 Apr 2022 06:09) (125/59 - 142/67)  BP(mean): --  RR: 18 (07 Apr 2022 06:09) (18 - 18)  SpO2: 96% (06 Apr 2022 20:38) (95% - 96%)                      PHYSICAL EXAM:  GENERAL: No distress, well developed  HEAD:  Atraumatic, Normocephalic  NECK: Supple, No JVD, No Bruit of either carotid arteries  NERVOUS SYSTEM:  Alert, Awake, Oriented to time, place, person; Normal memory and speech; Normal motor Strength 5/5 B/L upper and lower extremities  CHEST/LUNG: Normal air entry to lung base bilaterally; No wheeze, crackle, rales, rhonchi  HEART: Regular heart beat, S1, A2, P2, No S3, No S4, No gallop, No murmur  ABDOMEN: Soft, Non tender, Non distended; Bowel sounds present  EXTREMITIES:  2+ Peripheral Pulses, No clubbing, No edema  SKIN: No rashes or lesions      ECG:Diagnosis Line Sinus rhythm [id number] degree A-V block with Premature atrial complexes  Left ventricular hypertrophy with repolarization abnormality  Prolonged QT  Abnormal ECG        I&O's Detail      LABS:                        9.8    18.09 )-----------( 499      ( 07 Apr 2022 04:30 )             29.0     04-06    138  |  98  |  16  ----------------------------<  123<H>  3.7   |  21  |  0.8    Ca    8.6      06 Apr 2022 07:18  Mg     1.7     04-06    TPro  5.5<L>  /  Alb  3.3<L>  /  TBili  0.6  /  DBili  x   /  AST  21  /  ALT  44<H>  /  AlkPhos  177<H>  04-06            I&O's Summary      RADIOLOGY & ADDITIONAL STUDIES:

## 2022-04-07 NOTE — DISCHARGE NOTE PROVIDER - NSDCQMAMI_CARD_ALL_CORE
Prior Authorization Retail Medication Request    Medication/Dose: plecanatide (TRULANCE) 3 MG tablet- take one tablet by mouth daily   ICD code (if different than what is on RX):    Previously Tried and Failed:  Is currently taking medication   Rationale: currently taking the medication     Insurance Name:    Insurance ID:        Pharmacy Information (if different than what is on RX)  Name:    Phone:    
No

## 2022-04-07 NOTE — PROGRESS NOTE ADULT - SUBJECTIVE AND OBJECTIVE BOX
ANNE VILLATORO  87y, Female    All available historical data reviewed    OVERNIGHT EVENTS:  no fevers  mini abd pain    ROS:  General: Denies rigors, nightsweats  HEENT: Denies headache, rhinorrhea, sore throat, eye pain  CV: Denies CP, palpitations  PULM: Denies wheezing, hemoptysis  GI: Denies hematemesis, hematochezia, melena  : Denies discharge, hematuria  MSK: Denies arthralgias, myalgias  SKIN: Denies rash, lesions  NEURO: Denies paresthesias, weakness  PSYCH: Denies depression, anxiety    VITALS:  T(F): 97.5, Max: 99 (04-06-22 @ 21:05)  HR: 77  BP: 136/63  RR: 18Vital Signs Last 24 Hrs  T(C): 36.4 (07 Apr 2022 06:09), Max: 37.2 (06 Apr 2022 21:05)  T(F): 97.5 (07 Apr 2022 06:09), Max: 99 (06 Apr 2022 21:05)  HR: 77 (07 Apr 2022 06:09) (77 - 87)  BP: 136/63 (07 Apr 2022 06:09) (125/59 - 142/67)  BP(mean): --  RR: 18 (07 Apr 2022 06:09) (18 - 18)  SpO2: 96% (06 Apr 2022 20:38) (95% - 96%)    TESTS & MEASUREMENTS:                        9.8    18.09 )-----------( 499      ( 07 Apr 2022 04:30 )             29.0     04-07    138  |  99  |  14  ----------------------------<  181<H>  3.8   |  23  |  0.9    Ca    8.3<L>      07 Apr 2022 04:30  Mg     2.0     04-07    TPro  5.1<L>  /  Alb  3.2<L>  /  TBili  0.6  /  DBili  x   /  AST  16  /  ALT  33  /  AlkPhos  148<H>  04-07    LIVER FUNCTIONS - ( 07 Apr 2022 04:30 )  Alb: 3.2 g/dL / Pro: 5.1 g/dL / ALK PHOS: 148 U/L / ALT: 33 U/L / AST: 16 U/L / GGT: x             Culture - Urine (collected 04-05-22 @ 02:50)  Source: Clean Catch Clean Catch (Midstream)  Preliminary Report (04-06-22 @ 16:56):    >100,000 CFU/ml Gram positive organisms    Culture - Blood (collected 04-05-22 @ 00:30)  Source: .Blood Blood  Preliminary Report (04-06-22 @ 08:02):    No growth to date.    Culture - Blood (collected 04-05-22 @ 00:30)  Source: .Blood Blood  Preliminary Report (04-06-22 @ 08:02):    No growth to date.            RADIOLOGY & ADDITIONAL TESTS:  Personal review of radiological diagnostics performed  Echo and EKG results noted when applicable.     MEDICATIONS:  acetaminophen     Tablet .. 650 milliGRAM(s) Oral every 6 hours PRN  aluminum hydroxide/magnesium hydroxide/simethicone Suspension 30 milliLiter(s) Oral every 4 hours PRN  aspirin  chewable 81 milliGRAM(s) Oral daily  cefepime   IVPB 2000 milliGRAM(s) IV Intermittent every 12 hours  dextrose 5%. 1000 milliLiter(s) IV Continuous <Continuous>  dextrose 5%. 1000 milliLiter(s) IV Continuous <Continuous>  dextrose 50% Injectable 25 Gram(s) IV Push once  dextrose 50% Injectable 12.5 Gram(s) IV Push once  dextrose 50% Injectable 25 Gram(s) IV Push once  dextrose Oral Gel 15 Gram(s) Oral once PRN  glucagon  Injectable 1 milliGRAM(s) IntraMuscular once  insulin lispro (ADMELOG) corrective regimen sliding scale   SubCutaneous three times a day before meals  lactobacillus acidophilus 1 Tablet(s) Oral three times a day with meals  levothyroxine 25 MICROGram(s) Oral daily  melatonin 3 milliGRAM(s) Oral at bedtime PRN  metoprolol tartrate 12.5 milliGRAM(s) Oral every 12 hours  metroNIDAZOLE  IVPB 500 milliGRAM(s) IV Intermittent every 8 hours  ondansetron Injectable 4 milliGRAM(s) IV Push every 8 hours PRN  pantoprazole    Tablet 40 milliGRAM(s) Oral before breakfast  saccharomyces boulardii 250 milliGRAM(s) Oral two times a day  vancomycin  IVPB 1250 milliGRAM(s) IV Intermittent every 12 hours      ANTIBIOTICS:  cefepime   IVPB 2000 milliGRAM(s) IV Intermittent every 12 hours  metroNIDAZOLE  IVPB 500 milliGRAM(s) IV Intermittent every 8 hours  vancomycin  IVPB 1250 milliGRAM(s) IV Intermittent every 12 hours

## 2022-04-07 NOTE — DISCHARGE NOTE PROVIDER - NSDCCPTREATMENT_GEN_ALL_CORE_FT
PRINCIPAL PROCEDURE  Procedure: CT abdomen pel wo con  Findings and Treatment: TUBES AND LINES: None.  LOWER CHEST: Small right pleural effusion and trace left pleural   effusion. There is subsegmental atelectasis at the lung bases. No   pericardial effusion.  HEPATIC: Status post partial resection of right hepatic lobe. There is an   8 x 6 x 5.5 cm collection at the site of the right hepatic resection. The   collection extends along the margin of the resection. Gas bubbles are   seen within the collection. The findings are consistent with an abscess.  No lesions are identified within the remainder of the liver. The portal   vein is patent. The hepatic veins are opacified.  BILIARY: Status post cholecystectomy  SPLEEN: Status post splenectomy.  PANCREAS: Status post distal pancreatectomy. No evidence of mass or   pancreatitis.  ADRENAL GLANDS: Unremarkable.  KIDNEYS: There is symmetric renal enhancement. No evidence of   hydronephrosis, calcified stones, or solid mass. (Renal cyst and   bilateral subcentimeter hypodense cortical lesions too small to   characterize.)  ABDOMINOPELVIC NODES: 1.6 cm lymph node para-aortic and para-caval.  PELVIC ORGANS: No evidence of pelvic mass, lymphadenopathy, or fluid   collection.  BLADDER: Unremarkable.  PERITONEUM/MESENTERY/BOWEL: There is thickening of the region of the   pylorus and first and second portions of the duodenum most likely   secondary to the perihepatic collection and inflammation.  Sigmoid diverticula without evidence of diverticulitis. No evidence of   bowel obstruction, colitis, or ascites. No pneumoperitoneum.  Status post   appendectomy.  BONES/SOFT TISSUES: Degenerative changes of the spine are noted. Grade 1   anterolisthesis at L4-5. Status post left total hip arthroplasty.   Fat-containing umbilical hernia.  OTHER: Aortoiliac calcifications are noted with no evidence of abdominal   aortic aneurysm.        SECONDARY PROCEDURE  Procedure: Venous duplex scan of both lower extremities  Findings and Treatment: The common femoral, great saphenous, femoral, popliteal and small   saphenous veins were visualized bilaterally with no evidence of deep   venous thrombosis  All veins were fully compressible.  There was presence of spontaneous   flow, augmentation with distal compression and phasicity.  The anterior tibial veins were  patent  The posterior tibial veins were  patent  The peroneal veins were patent.  Impression:  No evidence of deep venous thrombosis or superficial thrombophlebitis in   the bilateral lower extremities.

## 2022-04-10 LAB
CULTURE RESULTS: SIGNIFICANT CHANGE UP
CULTURE RESULTS: SIGNIFICANT CHANGE UP
SPECIMEN SOURCE: SIGNIFICANT CHANGE UP
SPECIMEN SOURCE: SIGNIFICANT CHANGE UP

## 2022-04-14 DIAGNOSIS — I25.2 OLD MYOCARDIAL INFARCTION: ICD-10-CM

## 2022-04-14 DIAGNOSIS — T81.43XA INFECTION FOLLOWING A PROCEDURE, ORGAN AND SPACE SURGICAL SITE, INITIAL ENCOUNTER: ICD-10-CM

## 2022-04-14 DIAGNOSIS — Z79.02 LONG TERM (CURRENT) USE OF ANTITHROMBOTICS/ANTIPLATELETS: ICD-10-CM

## 2022-04-14 DIAGNOSIS — C25.9 MALIGNANT NEOPLASM OF PANCREAS, UNSPECIFIED: ICD-10-CM

## 2022-04-14 DIAGNOSIS — K75.0 ABSCESS OF LIVER: ICD-10-CM

## 2022-04-14 DIAGNOSIS — E11.9 TYPE 2 DIABETES MELLITUS WITHOUT COMPLICATIONS: ICD-10-CM
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
## 2022-04-14 DIAGNOSIS — Z79.82 LONG TERM (CURRENT) USE OF ASPIRIN: ICD-10-CM

## 2022-04-14 DIAGNOSIS — Z90.49 ACQUIRED ABSENCE OF OTHER SPECIFIED PARTS OF DIGESTIVE TRACT: ICD-10-CM

## 2022-04-14 DIAGNOSIS — Z95.5 PRESENCE OF CORONARY ANGIOPLASTY IMPLANT AND GRAFT: ICD-10-CM

## 2022-04-14 DIAGNOSIS — I25.10 ATHEROSCLEROTIC HEART DISEASE OF NATIVE CORONARY ARTERY WITHOUT ANGINA PECTORIS: ICD-10-CM

## 2022-04-14 DIAGNOSIS — C78.7 SECONDARY MALIGNANT NEOPLASM OF LIVER AND INTRAHEPATIC BILE DUCT: ICD-10-CM

## 2022-04-14 DIAGNOSIS — R53.1 WEAKNESS: ICD-10-CM

## 2022-04-14 DIAGNOSIS — Z88.6 ALLERGY STATUS TO ANALGESIC AGENT: ICD-10-CM

## 2022-04-14 DIAGNOSIS — I48.0 PAROXYSMAL ATRIAL FIBRILLATION: ICD-10-CM

## 2022-11-10 ENCOUNTER — OUTPATIENT (OUTPATIENT)
Dept: OUTPATIENT SERVICES | Facility: HOSPITAL | Age: 87
LOS: 1 days | Discharge: HOME | End: 2022-11-10

## 2022-11-10 DIAGNOSIS — Z12.31 ENCOUNTER FOR SCREENING MAMMOGRAM FOR MALIGNANT NEOPLASM OF BREAST: ICD-10-CM

## 2022-11-10 DIAGNOSIS — H26.9 UNSPECIFIED CATARACT: Chronic | ICD-10-CM

## 2022-11-10 DIAGNOSIS — Z90.49 ACQUIRED ABSENCE OF OTHER SPECIFIED PARTS OF DIGESTIVE TRACT: Chronic | ICD-10-CM

## 2022-11-10 DIAGNOSIS — Z90.89 ACQUIRED ABSENCE OF OTHER ORGANS: Chronic | ICD-10-CM

## 2022-11-10 DIAGNOSIS — Z96.642 PRESENCE OF LEFT ARTIFICIAL HIP JOINT: Chronic | ICD-10-CM

## 2022-11-10 PROCEDURE — 77063 BREAST TOMOSYNTHESIS BI: CPT | Mod: 26

## 2022-11-10 PROCEDURE — 77067 SCR MAMMO BI INCL CAD: CPT | Mod: 26

## 2022-11-12 ENCOUNTER — INPATIENT (INPATIENT)
Facility: HOSPITAL | Age: 87
LOS: 10 days | Discharge: HOME | End: 2022-11-23
Attending: INTERNAL MEDICINE | Admitting: INTERNAL MEDICINE

## 2022-11-12 VITALS
RESPIRATION RATE: 16 BRPM | DIASTOLIC BLOOD PRESSURE: 85 MMHG | WEIGHT: 186.07 LBS | OXYGEN SATURATION: 97 % | TEMPERATURE: 98 F | SYSTOLIC BLOOD PRESSURE: 141 MMHG | HEART RATE: 129 BPM

## 2022-11-12 DIAGNOSIS — Z96.642 PRESENCE OF LEFT ARTIFICIAL HIP JOINT: Chronic | ICD-10-CM

## 2022-11-12 DIAGNOSIS — Z90.49 ACQUIRED ABSENCE OF OTHER SPECIFIED PARTS OF DIGESTIVE TRACT: Chronic | ICD-10-CM

## 2022-11-12 DIAGNOSIS — Z90.89 ACQUIRED ABSENCE OF OTHER ORGANS: Chronic | ICD-10-CM

## 2022-11-12 DIAGNOSIS — H26.9 UNSPECIFIED CATARACT: Chronic | ICD-10-CM

## 2022-11-12 LAB
ALBUMIN SERPL ELPH-MCNC: 4.7 G/DL — SIGNIFICANT CHANGE UP (ref 3.5–5.2)
ALP SERPL-CCNC: 86 U/L — SIGNIFICANT CHANGE UP (ref 30–115)
ALT FLD-CCNC: 27 U/L — SIGNIFICANT CHANGE UP (ref 0–41)
ANION GAP SERPL CALC-SCNC: 9 MMOL/L — SIGNIFICANT CHANGE UP (ref 7–14)
APPEARANCE UR: CLEAR — SIGNIFICANT CHANGE UP
AST SERPL-CCNC: 42 U/L — HIGH (ref 0–41)
BACTERIA # UR AUTO: NEGATIVE — SIGNIFICANT CHANGE UP
BASOPHILS # BLD AUTO: 0.06 K/UL — SIGNIFICANT CHANGE UP (ref 0–0.2)
BASOPHILS NFR BLD AUTO: 0.6 % — SIGNIFICANT CHANGE UP (ref 0–1)
BILIRUB SERPL-MCNC: 0.5 MG/DL — SIGNIFICANT CHANGE UP (ref 0.2–1.2)
BILIRUB UR-MCNC: NEGATIVE — SIGNIFICANT CHANGE UP
BUN SERPL-MCNC: 30 MG/DL — HIGH (ref 10–20)
CALCIUM SERPL-MCNC: 9.5 MG/DL — SIGNIFICANT CHANGE UP (ref 8.4–10.5)
CHLORIDE SERPL-SCNC: 101 MMOL/L — SIGNIFICANT CHANGE UP (ref 98–110)
CO2 SERPL-SCNC: 25 MMOL/L — SIGNIFICANT CHANGE UP (ref 17–32)
COLOR SPEC: YELLOW — SIGNIFICANT CHANGE UP
CREAT SERPL-MCNC: 0.9 MG/DL — SIGNIFICANT CHANGE UP (ref 0.7–1.5)
DIFF PNL FLD: ABNORMAL
EGFR: 62 ML/MIN/1.73M2 — SIGNIFICANT CHANGE UP
EOSINOPHIL # BLD AUTO: 0.21 K/UL — SIGNIFICANT CHANGE UP (ref 0–0.7)
EOSINOPHIL NFR BLD AUTO: 2 % — SIGNIFICANT CHANGE UP (ref 0–8)
EPI CELLS # UR: 2 /HPF — SIGNIFICANT CHANGE UP (ref 0–5)
GLUCOSE SERPL-MCNC: 121 MG/DL — HIGH (ref 70–99)
GLUCOSE UR QL: ABNORMAL
HCT VFR BLD CALC: 43.1 % — SIGNIFICANT CHANGE UP (ref 37–47)
HGB BLD-MCNC: 14.5 G/DL — SIGNIFICANT CHANGE UP (ref 12–16)
HYALINE CASTS # UR AUTO: 2 /LPF — SIGNIFICANT CHANGE UP (ref 0–7)
IMM GRANULOCYTES NFR BLD AUTO: 0.4 % — HIGH (ref 0.1–0.3)
KETONES UR-MCNC: NEGATIVE — SIGNIFICANT CHANGE UP
LEUKOCYTE ESTERASE UR-ACNC: ABNORMAL
LYMPHOCYTES # BLD AUTO: 2.4 K/UL — SIGNIFICANT CHANGE UP (ref 1.2–3.4)
LYMPHOCYTES # BLD AUTO: 22.7 % — SIGNIFICANT CHANGE UP (ref 20.5–51.1)
MAGNESIUM SERPL-MCNC: 2.2 MG/DL — SIGNIFICANT CHANGE UP (ref 1.8–2.4)
MCHC RBC-ENTMCNC: 32.4 PG — HIGH (ref 27–31)
MCHC RBC-ENTMCNC: 33.6 G/DL — SIGNIFICANT CHANGE UP (ref 32–37)
MCV RBC AUTO: 96.4 FL — SIGNIFICANT CHANGE UP (ref 81–99)
MONOCYTES # BLD AUTO: 1.02 K/UL — HIGH (ref 0.1–0.6)
MONOCYTES NFR BLD AUTO: 9.6 % — HIGH (ref 1.7–9.3)
NEUTROPHILS # BLD AUTO: 6.85 K/UL — HIGH (ref 1.4–6.5)
NEUTROPHILS NFR BLD AUTO: 64.7 % — SIGNIFICANT CHANGE UP (ref 42.2–75.2)
NITRITE UR-MCNC: NEGATIVE — SIGNIFICANT CHANGE UP
NRBC # BLD: 0 /100 WBCS — SIGNIFICANT CHANGE UP (ref 0–0)
NT-PROBNP SERPL-SCNC: 2564 PG/ML — HIGH (ref 0–300)
PH UR: 5.5 — SIGNIFICANT CHANGE UP (ref 5–8)
PLATELET # BLD AUTO: 214 K/UL — SIGNIFICANT CHANGE UP (ref 130–400)
POTASSIUM SERPL-MCNC: 6.1 MMOL/L — CRITICAL HIGH (ref 3.5–5)
POTASSIUM SERPL-SCNC: 6.1 MMOL/L — CRITICAL HIGH (ref 3.5–5)
PROT SERPL-MCNC: 7.3 G/DL — SIGNIFICANT CHANGE UP (ref 6–8)
PROT UR-MCNC: ABNORMAL
RBC # BLD: 4.47 M/UL — SIGNIFICANT CHANGE UP (ref 4.2–5.4)
RBC # FLD: 16.2 % — HIGH (ref 11.5–14.5)
RBC CASTS # UR COMP ASSIST: 5 /HPF — HIGH (ref 0–4)
SODIUM SERPL-SCNC: 135 MMOL/L — SIGNIFICANT CHANGE UP (ref 135–146)
SP GR SPEC: 1.03 — HIGH (ref 1.01–1.03)
TROPONIN T SERPL-MCNC: <0.01 NG/ML — SIGNIFICANT CHANGE UP
UROBILINOGEN FLD QL: SIGNIFICANT CHANGE UP
WBC # BLD: 10.58 K/UL — SIGNIFICANT CHANGE UP (ref 4.8–10.8)
WBC # FLD AUTO: 10.58 K/UL — SIGNIFICANT CHANGE UP (ref 4.8–10.8)
WBC UR QL: 13 /HPF — HIGH (ref 0–5)

## 2022-11-12 PROCEDURE — 99285 EMERGENCY DEPT VISIT HI MDM: CPT | Mod: GC

## 2022-11-12 PROCEDURE — 71275 CT ANGIOGRAPHY CHEST: CPT | Mod: 26,MA

## 2022-11-12 PROCEDURE — 71045 X-RAY EXAM CHEST 1 VIEW: CPT | Mod: 26

## 2022-11-12 PROCEDURE — 93010 ELECTROCARDIOGRAM REPORT: CPT | Mod: 76

## 2022-11-12 RX ORDER — CEFTRIAXONE 500 MG/1
1000 INJECTION, POWDER, FOR SOLUTION INTRAMUSCULAR; INTRAVENOUS ONCE
Refills: 0 | Status: COMPLETED | OUTPATIENT
Start: 2022-11-12 | End: 2022-11-12

## 2022-11-12 NOTE — ED PROVIDER NOTE - ATTENDING CONTRIBUTION TO CARE
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  Patient reports she took her blood pressure and systolic was 158 but heart rate was 133 so instead of taking 12.5 mg of her metoprolol took 25 mg at 10 PM but heart rate continue to be elevated so came to the emergency department.  Ex-smoker quit in 1976.  Cardiologist Dr. Huffman.  No fever, chills, n/v, cp,  pleuritic cp, sob,  diaphoresis, cough, ha/lh/dizziness, numbness/tingling, neck pain/ stiffness, abd pain, diarrhea, constipation, melena/brbpr, urinary symptoms, trauma,  edema, calf pain/swelling/erythema, sick contacts, recent travel or rash.    On Exam:  Vital Signs: I have reviewed the initial vital signs. Constitutional: Non toxic appearing pt sitting on stretcher speaking full sentences. Integumentary: No rash. ENT: MMM NECK: Supple, non-tender, no meningeal signs. Cardiovascular: Tachy, radial pulses 2/4 b/l. No JVD. Respiratory: BS present b/l, ctabl, no wheezing or crackles,  no accessory muscle use, no stridor. Gastrointestinal: BS present throughout all 4 quadrants, soft, nd, nt, no rebound tenderness or guarding, no cvat. Musculoskeletal: FROM, no edema, no calf pain/swelling/erythema. Neurologic: AAOx3, motor 5/5 and sensation intact throughout upper and lowe ext, CN II-XII intact, No facial droop or slurring of speech. No focal deficits.

## 2022-11-12 NOTE — ED PROVIDER NOTE - CARE PLAN
Assessment and plan of treatment:	Plan: Monitor, EKG, CXR, labs, urine, imaging, reassess.   1 Principal Discharge DX:	Palpitations  Assessment and plan of treatment:	Plan: Monitor, EKG, CXR, labs, urine, imaging, reassess.  Secondary Diagnosis:	Weakness generalized  Secondary Diagnosis:	Acute UTI  Secondary Diagnosis:	Congestive heart failure with right ventricular systolic dysfunction

## 2022-11-12 NOTE — ED PROVIDER NOTE - CLINICAL SUMMARY MEDICAL DECISION MAKING FREE TEXT BOX
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 labs and imaging reviewed. pt treated for uti, imaging concerning for reflux of contrast into the IVC and hepatic veins indicating possible  component of right heart dysfunction. pt aware of plan for admission. I have fully discussed the medical management and delivery of care with the patient. I have discussed any available labs, imaging and treatment options with the patient.  Pt admitted for further care & management.

## 2022-11-12 NOTE — ED PROVIDER NOTE - PROGRESS NOTE DETAILS
k 4/8 on vbg, pt aware of all results, feeling better at this time, treated for uti, imaging concerning for reflux of contrast into the IVC and hepatic veins indicating possible   component of right heart dysfunction. pt aware of plan for admission. will discuss with medical admitting team and admit.

## 2022-11-12 NOTE — ED PROVIDER NOTE - PHYSICAL EXAMINATION
Vital Signs: I have reviewed the initial vital signs.  Constitutional: appears stated age, no acute distress.  HEENT: Airway patent, moist MM, EOMI,   CV: tachycardic  Lungs: Clear to ascultation bilaterally, no increased work of breathing.  ABD: Non-tender, Non-distended,  MSK: Neck supple, nontender, normal range of motion,   INTEG: Skin warm, dry, no rash.  NEURO: A&Ox3, moving all extremities, normal speech  PSYCH: Calm, cooperative, normal affect and interaction.

## 2022-11-12 NOTE — ED PROVIDER NOTE - OBJECTIVE STATEMENT
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 Patient took her vitals since she felt weak and noticed her HR was elevated in the 130s. She thought it was her AFib and took her metoprolol, but her heart rate did not decrease, so presented to the ED for evaluation. Denies shortness of breath, cough, fever, chills, dysuria.

## 2022-11-12 NOTE — ED PROVIDER NOTE - NS_EDPROVIDERDISPOUSERTYPE_ED_A_ED
I called and left a voicemail to schedule the appointments that are on the order on 01/15/2021 from Cate Castro. -cap-03/31/2021       Attending Attestation (For Attendings USE Only)...

## 2022-11-13 DIAGNOSIS — Z90.49 ACQUIRED ABSENCE OF OTHER SPECIFIED PARTS OF DIGESTIVE TRACT: Chronic | ICD-10-CM

## 2022-11-13 DIAGNOSIS — Z90.411 ACQUIRED PARTIAL ABSENCE OF PANCREAS: Chronic | ICD-10-CM

## 2022-11-13 LAB
ALBUMIN SERPL ELPH-MCNC: 3.9 G/DL — SIGNIFICANT CHANGE UP (ref 3.5–5.2)
ALBUMIN SERPL ELPH-MCNC: 4.2 G/DL — SIGNIFICANT CHANGE UP (ref 3.5–5.2)
ALP SERPL-CCNC: 30 U/L — SIGNIFICANT CHANGE UP (ref 30–115)
ALP SERPL-CCNC: 83 U/L — SIGNIFICANT CHANGE UP (ref 30–115)
ALT FLD-CCNC: 25 U/L — SIGNIFICANT CHANGE UP (ref 0–41)
ALT FLD-CCNC: <5 U/L — SIGNIFICANT CHANGE UP (ref 0–41)
ANION GAP SERPL CALC-SCNC: 13 MMOL/L — SIGNIFICANT CHANGE UP (ref 7–14)
ANION GAP SERPL CALC-SCNC: 16 MMOL/L — HIGH (ref 7–14)
ANION GAP SERPL CALC-SCNC: 3 MMOL/L — LOW (ref 7–14)
AST SERPL-CCNC: 223 U/L — HIGH (ref 0–41)
AST SERPL-CCNC: 36 U/L — SIGNIFICANT CHANGE UP (ref 0–41)
BASE EXCESS BLDV CALC-SCNC: -0.4 MMOL/L — SIGNIFICANT CHANGE UP (ref -2–3)
BILIRUB SERPL-MCNC: 0.6 MG/DL — SIGNIFICANT CHANGE UP (ref 0.2–1.2)
BILIRUB SERPL-MCNC: 0.6 MG/DL — SIGNIFICANT CHANGE UP (ref 0.2–1.2)
BUN SERPL-MCNC: 26 MG/DL — HIGH (ref 10–20)
BUN SERPL-MCNC: 26 MG/DL — HIGH (ref 10–20)
BUN SERPL-MCNC: 28 MG/DL — HIGH (ref 10–20)
CA-I SERPL-SCNC: 1.23 MMOL/L — SIGNIFICANT CHANGE UP (ref 1.15–1.33)
CALCIUM SERPL-MCNC: 7.8 MG/DL — LOW (ref 8.4–10.5)
CALCIUM SERPL-MCNC: 9.2 MG/DL — SIGNIFICANT CHANGE UP (ref 8.4–10.4)
CALCIUM SERPL-MCNC: 9.3 MG/DL — SIGNIFICANT CHANGE UP (ref 8.4–10.4)
CHLORIDE SERPL-SCNC: 100 MMOL/L — SIGNIFICANT CHANGE UP (ref 98–110)
CHLORIDE SERPL-SCNC: 100 MMOL/L — SIGNIFICANT CHANGE UP (ref 98–110)
CHLORIDE SERPL-SCNC: 102 MMOL/L — SIGNIFICANT CHANGE UP (ref 98–110)
CO2 SERPL-SCNC: 21 MMOL/L — SIGNIFICANT CHANGE UP (ref 17–32)
CO2 SERPL-SCNC: 22 MMOL/L — SIGNIFICANT CHANGE UP (ref 17–32)
CO2 SERPL-SCNC: 24 MMOL/L — SIGNIFICANT CHANGE UP (ref 17–32)
CREAT SERPL-MCNC: 0.8 MG/DL — SIGNIFICANT CHANGE UP (ref 0.7–1.5)
CREAT SERPL-MCNC: 0.9 MG/DL — SIGNIFICANT CHANGE UP (ref 0.7–1.5)
CREAT SERPL-MCNC: 0.9 MG/DL — SIGNIFICANT CHANGE UP (ref 0.7–1.5)
EGFR: 62 ML/MIN/1.73M2 — SIGNIFICANT CHANGE UP
EGFR: 62 ML/MIN/1.73M2 — SIGNIFICANT CHANGE UP
EGFR: 71 ML/MIN/1.73M2 — SIGNIFICANT CHANGE UP
GAS PNL BLDV: 133 MMOL/L — LOW (ref 136–145)
GAS PNL BLDV: SIGNIFICANT CHANGE UP
GLUCOSE BLDC GLUCOMTR-MCNC: 118 MG/DL — HIGH (ref 70–99)
GLUCOSE BLDC GLUCOMTR-MCNC: 120 MG/DL — HIGH (ref 70–99)
GLUCOSE BLDC GLUCOMTR-MCNC: 82 MG/DL — SIGNIFICANT CHANGE UP (ref 70–99)
GLUCOSE SERPL-MCNC: 125 MG/DL — HIGH (ref 70–99)
GLUCOSE SERPL-MCNC: 82 MG/DL — SIGNIFICANT CHANGE UP (ref 70–99)
GLUCOSE SERPL-MCNC: 89 MG/DL — SIGNIFICANT CHANGE UP (ref 70–99)
HCO3 BLDV-SCNC: 25 MMOL/L — SIGNIFICANT CHANGE UP (ref 22–29)
HCT VFR BLDA CALC: 39 % — SIGNIFICANT CHANGE UP (ref 39–51)
HGB BLD CALC-MCNC: 13.1 G/DL — SIGNIFICANT CHANGE UP (ref 12.6–17.4)
LACTATE BLDV-MCNC: 0.9 MMOL/L — SIGNIFICANT CHANGE UP (ref 0.5–2)
PCO2 BLDV: 42 MMHG — SIGNIFICANT CHANGE UP (ref 39–42)
PH BLDV: 7.38 — SIGNIFICANT CHANGE UP (ref 7.32–7.43)
PO2 BLDV: 53 MMHG — SIGNIFICANT CHANGE UP
POTASSIUM BLDV-SCNC: 4.8 MMOL/L — SIGNIFICANT CHANGE UP (ref 3.5–5.1)
POTASSIUM SERPL-MCNC: 4.9 MMOL/L — SIGNIFICANT CHANGE UP (ref 3.5–5)
POTASSIUM SERPL-MCNC: 5.6 MMOL/L — HIGH (ref 3.5–5)
POTASSIUM SERPL-MCNC: SIGNIFICANT CHANGE UP MMOL/L (ref 3.5–5)
POTASSIUM SERPL-SCNC: 4.9 MMOL/L — SIGNIFICANT CHANGE UP (ref 3.5–5)
POTASSIUM SERPL-SCNC: 5.6 MMOL/L — HIGH (ref 3.5–5)
POTASSIUM SERPL-SCNC: SIGNIFICANT CHANGE UP MMOL/L (ref 3.5–5)
PROT SERPL-MCNC: 7 G/DL — SIGNIFICANT CHANGE UP (ref 6–8)
PROT SERPL-MCNC: 8.5 G/DL — HIGH (ref 6–8)
SAO2 % BLDV: 83.5 % — SIGNIFICANT CHANGE UP
SARS-COV-2 RNA SPEC QL NAA+PROBE: SIGNIFICANT CHANGE UP
SODIUM SERPL-SCNC: 125 MMOL/L — LOW (ref 135–146)
SODIUM SERPL-SCNC: 137 MMOL/L — SIGNIFICANT CHANGE UP (ref 135–146)
SODIUM SERPL-SCNC: 139 MMOL/L — SIGNIFICANT CHANGE UP (ref 135–146)

## 2022-11-13 PROCEDURE — 93010 ELECTROCARDIOGRAM REPORT: CPT

## 2022-11-13 PROCEDURE — 99223 1ST HOSP IP/OBS HIGH 75: CPT

## 2022-11-13 RX ORDER — METOPROLOL TARTRATE 50 MG
50 TABLET ORAL DAILY
Refills: 0 | Status: DISCONTINUED | OUTPATIENT
Start: 2022-11-13 | End: 2022-11-13

## 2022-11-13 RX ORDER — INSULIN LISPRO 100/ML
VIAL (ML) SUBCUTANEOUS
Refills: 0 | Status: DISCONTINUED | OUTPATIENT
Start: 2022-11-13 | End: 2022-11-21

## 2022-11-13 RX ORDER — METOPROLOL TARTRATE 50 MG
50 TABLET ORAL
Refills: 0 | Status: DISCONTINUED | OUTPATIENT
Start: 2022-11-13 | End: 2022-11-14

## 2022-11-13 RX ORDER — LEVOTHYROXINE SODIUM 125 MCG
25 TABLET ORAL DAILY
Refills: 0 | Status: DISCONTINUED | OUTPATIENT
Start: 2022-11-13 | End: 2022-11-15

## 2022-11-13 RX ORDER — INSULIN GLARGINE 100 [IU]/ML
22 INJECTION, SOLUTION SUBCUTANEOUS AT BEDTIME
Refills: 0 | Status: DISCONTINUED | OUTPATIENT
Start: 2022-11-13 | End: 2022-11-20

## 2022-11-13 RX ORDER — ENOXAPARIN SODIUM 100 MG/ML
40 INJECTION SUBCUTANEOUS EVERY 24 HOURS
Refills: 0 | Status: DISCONTINUED | OUTPATIENT
Start: 2022-11-13 | End: 2022-11-14

## 2022-11-13 RX ORDER — CLOPIDOGREL BISULFATE 75 MG/1
75 TABLET, FILM COATED ORAL DAILY
Refills: 0 | Status: DISCONTINUED | OUTPATIENT
Start: 2022-11-13 | End: 2022-11-14

## 2022-11-13 RX ORDER — ASPIRIN/CALCIUM CARB/MAGNESIUM 324 MG
81 TABLET ORAL DAILY
Refills: 0 | Status: DISCONTINUED | OUTPATIENT
Start: 2022-11-13 | End: 2022-11-15

## 2022-11-13 RX ORDER — METOPROLOL TARTRATE 50 MG
5 TABLET ORAL ONCE
Refills: 0 | Status: COMPLETED | OUTPATIENT
Start: 2022-11-13 | End: 2022-11-13

## 2022-11-13 RX ADMIN — Medication 5 MILLIGRAM(S): at 10:39

## 2022-11-13 RX ADMIN — INSULIN GLARGINE 22 UNIT(S): 100 INJECTION, SOLUTION SUBCUTANEOUS at 21:34

## 2022-11-13 RX ADMIN — Medication 81 MILLIGRAM(S): at 16:07

## 2022-11-13 RX ADMIN — Medication 50 MILLIGRAM(S): at 10:38

## 2022-11-13 RX ADMIN — Medication 50 MILLIGRAM(S): at 18:11

## 2022-11-13 RX ADMIN — Medication 110 MILLIGRAM(S): at 09:08

## 2022-11-13 RX ADMIN — CEFTRIAXONE 100 MILLIGRAM(S): 500 INJECTION, POWDER, FOR SOLUTION INTRAMUSCULAR; INTRAVENOUS at 00:23

## 2022-11-13 RX ADMIN — CLOPIDOGREL BISULFATE 75 MILLIGRAM(S): 75 TABLET, FILM COATED ORAL at 16:07

## 2022-11-13 NOTE — CONSULT NOTE ADULT - SUBJECTIVE AND OBJECTIVE BOX
Date of Admission: 11/13/22    CHIEF COMPLAINT: weakness, tachycardia    HISTORY OF PRESENT ILLNESS: 87yFemale with PMH below presented to the hospital for above CC found to be in afib and with a urinary tract infection. Symptoms started a few days ago and worsened yesterday. Denies chest pain or shortness of breath. Has had afib previously but usually in NSR.     PAST MEDICAL & SURGICAL HISTORY:      FAMILY HISTORY:  [ ] no pertinent family history of premature cardiovascular disease in first degree relatives.  Mother:   Father:   Siblings:     SOCIAL HISTORY:    [ ] Non-smoker  [ ] Smoker  [ ] Alcohol    Allergies    No Known Allergies    Intolerances    	    REVIEW OF SYSTEMS:  CONSTITUTIONAL: No fever, weight loss, or fatigue  CARDIOLOGY: see HPI  RESPIRATORY: see HPI   NEUROLOGICAL: NO weakness, no focal deficits to report.  ENDOCRINOLOGICAL: no recent change in diabetic medications.   GI: no BRBPR, no N,V,diarrhea.    PSYCHIATRY: normal mood and affect  HEENT: no nasal discharge, no ecchymosis  SKIN: no ecchymosis, no breakdown  MUSCULOSKELETAL: Full range of motion x4.     PHYSICAL EXAM:  T(C): 36.8 (11-12-22 @ 23:34), Max: 36.8 (11-12-22 @ 23:34)  HR: 95 (11-12-22 @ 23:34) (95 - 129)  BP: 132/82 (11-12-22 @ 23:34) (132/82 - 141/85)  RR: 16 (11-12-22 @ 23:34) (16 - 16)  SpO2: 97% (11-12-22 @ 23:34) (97% - 97%)  Wt(kg): --  I&O's Summary      General Appearance: well appearing, normal for age and gender. 	  Neck: normal JVP, no bruit.   Eyes: No xanthomalasia, Extra Ocular muscles intact.   Cardiovascular: irregular rate and rhythm S1 S2, No JVD, No murmurs, No edema  Respiratory: Lungs clear to auscultation	  Psychiatry: Alert and oriented x 3, Mood & affect appropriate  Gastrointestinal:  Soft, Non-tender  Skin/Integumen: No rashes, No ecchymoses, No cyanosis	  Neurologic: Non-focal  Musculoskeletal/ extremities: Normal range of motion, No clubbing, cyanosis or edema  Vascular: Peripheral pulses palpable 2+ bilaterally    LABS:	 	                          14.5   10.58 )-----------( 214      ( 12 Nov 2022 19:35 )             43.1     11-13    139  |  102  |  28<H>  ----------------------------<  82  5.6<H>   |  24  |  0.9    Ca    9.2      13 Nov 2022 01:30  Mg     2.2     11-12    TPro  7.0  /  Alb  4.2  /  TBili  0.6  /  DBili  x   /  AST  36  /  ALT  25  /  AlkPhos  83  11-13    CARDIAC MARKERS ( 12 Nov 2022 19:35 )  x     / <0.01 ng/mL / x     / x     / x              CARDIAC MARKERS:            TELEMETRY EVENTS: 	    ECG: Atypical flutter at 132 bpm  RADIOLOGY:  OTHER: 	    PREVIOUS DIAGNOSTIC TESTING:    [x ] Echocardiogram: from our office:    : 1. Left ventricular ejection fraction estimated by 2D at 60-65 percent.  2. Mild right atrial enlargement.  3. Severe mitral annular calcification.  4. Mild mitral valve regurgitation.  5. Mild tricuspid regurgitation.  6. Mildly elevated estimated right ventricular systolic pressure.  7. The aortic valve is sclerotic but opens well.  8. Trace of pulmonic valve regurgitation  [ ]  Catheterization:  [ ] Stress Test:  	  	    Home Medications:    MEDICATIONS  (STANDING):    MEDICATIONS  (PRN):

## 2022-11-13 NOTE — ED ADULT NURSE NOTE - OBJECTIVE STATEMENT
Pt alert and oriented x3. Airway patent with equal respirations. No distress noted. Arom x 4 extremitites.

## 2022-11-13 NOTE — H&P ADULT - ATTENDING COMMENTS
#Palpitations  ekg/ tele with ?ATach with AFib  increase lopressor to 50 bid  no ac per pt preference  tsh  tte  to review tele with cards    #Progress Note Handoff:  Pending (specify):  Consults_________, Tests________, Test Results_______, Other____tte_____  Family discussion: d/w pt at bedside re: treatment plan, primary dx  Disposition: Home___/SNF___/Other________/Unknown at this time___x_____ #Palpitations  ekg/ tele with ?ATach with AFib  increase lopressor to 50 bid  no ac per pt preference  tsh  tte  to review tele with cards  repeat bmp    #Progress Note Handoff:  Pending (specify):  Consults_________, Tests________, Test Results_______, Other____tte_____  Family discussion: d/w pt at bedside re: treatment plan, primary dx  Disposition: Home___/SNF___/Other________/Unknown at this time___x_____

## 2022-11-13 NOTE — H&P ADULT - NSICDXPASTMEDICALHX_GEN_ALL_CORE_FT
PAST MEDICAL HISTORY:  CAD (coronary artery disease) s/p 4 stents    Cholangiocarcinoma mets to liver; s/p partial liver resection Mar 2022 @ [facility]    Chronic atrial fibrillation not on AC    Diabetes mellitus     Pancreatic cancer s/p distal pancreatectomy Aug 2021

## 2022-11-13 NOTE — H&P ADULT - NSHPPHYSICALEXAM_GEN_ALL_CORE
VITALS  T(C): 36.4 (11-13-22 @ 10:41), Max: 36.8 (11-12-22 @ 23:34)  HR: 135 (11-13-22 @ 10:41) (95 - 135)  BP: 119/75 (11-13-22 @ 10:41) (119/75 - 141/85)  RR: 18 (11-13-22 @ 10:41) (16 - 18)  SpO2: 96% (11-13-22 @ 10:41) (96% - 97%)    PHYSICAL EXAM  GENERAL: NAD, well-developed  NEURO: AO x3, PERRLA, EOMI, motor strength intact in 4/4 extremities, sensation intact  HEAD:  Atraumatic, Normocephalic  EYES: conjunctiva and sclera clear  NECK: Supple, No JVD, no lymphadenopathy, no thyromegaly  CHEST/LUNG: Clear to auscultation bilaterally; No wheezes, rales or rhonchi  HEART: tachycardic  ABDOMEN: Soft, Nontender, Nondistended; Bowel sounds present, no masses.  EXTREMITIES:  2+ Peripheral Pulses, No clubbing, cyanosis, or edema  SKIN: Warm, dry, intact, no rashes or lesions  PSYCH: affect appropriate

## 2022-11-13 NOTE — H&P ADULT - ASSESSMENT
87F w/ h/o pAF (not on AC), CAD (s/p 4 stents), HTN, DLD, IDDM, and pancreatic cancer (s/p distal pancreatectomy Aug 2021), cholangio carcinoma (mets to liver; s/p partial liver resection Mar 2022 @ [facility]) p/w intermittent palpitations    #Chronic Atrial Fibrillation/Flutter  CHADS-VASC score >6. Initially p/w intermittent palpitations. Found to be in aflutter on admission EKG. UTI suspected cause of pt's current afib relapse. Had pAF in 2021, but did not want to be on anticoagulation for bleeding issues and thus was placed on aspirin and Plavix per cardiology. As pt does not want to be on AC, cannot cardiovert as this would require uninterrupted AC for at least one month post-cardioversion.   - will give 5mg IV metoprolol x2 as need now  - increase home metoprolol dose to Toprol XL 50mg PO QD  - continues to decline anticoagulation due to concern for bleeding issues despite elevated risk for stroke; will continue DAPT instead as per cardiology  - obtain thyroid panel (takes iMOSPHERE Thyroid)  - Target HR <110    #Coronary Artery Disease  #Hypertension  #Dyslipidemia  Follows w/ Dr. Peters. S/P PCI x4 (mLAD, dLCX, mRCA, and most recently OM1). Has consistently and continuously refused statin per cardiology.  - c/w ASA 81mg PO QD  - c/w Plavix 75mg PO QD  - c/w Toprol XL 50mg PO QD    #Hypothyroidism  Managed via iMOSPHERE Thyroid 15qd  - start levothyroxine 25mcg PO QD  - obtain thyroid panel (including T3 studies as pt is on iMOSPHERE Thyroid)    #IDDM  Likely secondary to distal pancreatectomy. Unknown A1c. Managed via Tresiba 22u qhs.  - Monitor FS TIDAC and QHS  - c/w Lantus 22U SQ QHS  - c/w low-dose ISS  - obtain A1c    DVT PPX: Lovenox 40mg SQ QD   GI PPX: none indicated  DIET: DASH/TLC + CC  ACTIVITY: IAT  CODE STATUS: Full Code  DISPOSITION: From Home; likely d/c home    PENDING: afib rate control

## 2022-11-13 NOTE — H&P ADULT - NSHPSOCIALHISTORY_GEN_ALL_CORE
Currently lives alone. Retired. Previously worked for [employer], but retired 15yrs ago. Able to perform all ADL's independently.

## 2022-11-13 NOTE — H&P ADULT - HISTORY OF PRESENT ILLNESS
87F w/ h/o pAF (not on AC), CAD (s/p 4 stents), IDDM, and pancreatic cancer (s/p distal pancreatectomy Aug 2021), cholangio carcinoma (mets to liver; s/p partial liver resection Mar 2022 @ [facility]) p/w intermittent palpitations. Symptoms started yesterday AM when pt felt weak. Noticed HR elevated to 130's at the time. Took increase dose of home metoprolol, without symptom resolution, thus prompting ED visit. Reports some urinary frequency (symptom similar to prior UTI's). No reported dysuria or hematuria. No reported fevers, chills, CP, SOB, abdominal pain, n/v/d, or LE swelling.     In ED, pt tachycardic to 130's, but otherwise HD stable on vitals. EKG demonstrated 2:1 aflutter. UA concerning for UTI, so given ceftriaxone 1g IV x1. CTA Chest ruled out PE, but demonstrated reflux of contrast into IVC and hepatic veins, indicating possible component of right heart dysfunction.     NOTE: Patient has two charts. Main chart has MRN [id number] with last name Matt (not Alfonso as currently listed)

## 2022-11-13 NOTE — H&P ADULT - NSICDXPASTSURGICALHX_GEN_ALL_CORE_FT
PAST SURGICAL HISTORY:  H/O resection of liver mets to liver; s/p partial liver resection Mar 2022 @ [facility]    History of partial pancreatectomy s/p distal pancreatectomy Aug 2021

## 2022-11-14 LAB
A1C WITH ESTIMATED AVERAGE GLUCOSE RESULT: 7.3 % — HIGH (ref 4–5.6)
ALBUMIN SERPL ELPH-MCNC: 4 G/DL — SIGNIFICANT CHANGE UP (ref 3.5–5.2)
ALP SERPL-CCNC: 86 U/L — SIGNIFICANT CHANGE UP (ref 30–115)
ALT FLD-CCNC: 21 U/L — SIGNIFICANT CHANGE UP (ref 0–41)
ANION GAP SERPL CALC-SCNC: 13 MMOL/L — SIGNIFICANT CHANGE UP (ref 7–14)
AST SERPL-CCNC: 19 U/L — SIGNIFICANT CHANGE UP (ref 0–41)
BASOPHILS # BLD AUTO: 0.08 K/UL — SIGNIFICANT CHANGE UP (ref 0–0.2)
BASOPHILS NFR BLD AUTO: 0.8 % — SIGNIFICANT CHANGE UP (ref 0–1)
BILIRUB SERPL-MCNC: 0.8 MG/DL — SIGNIFICANT CHANGE UP (ref 0.2–1.2)
BUN SERPL-MCNC: 28 MG/DL — HIGH (ref 10–20)
CALCIUM SERPL-MCNC: 9.2 MG/DL — SIGNIFICANT CHANGE UP (ref 8.4–10.5)
CHLORIDE SERPL-SCNC: 103 MMOL/L — SIGNIFICANT CHANGE UP (ref 98–110)
CO2 SERPL-SCNC: 24 MMOL/L — SIGNIFICANT CHANGE UP (ref 17–32)
CREAT SERPL-MCNC: 0.9 MG/DL — SIGNIFICANT CHANGE UP (ref 0.7–1.5)
CULTURE RESULTS: SIGNIFICANT CHANGE UP
EGFR: 62 ML/MIN/1.73M2 — SIGNIFICANT CHANGE UP
EOSINOPHIL # BLD AUTO: 0.26 K/UL — SIGNIFICANT CHANGE UP (ref 0–0.7)
EOSINOPHIL NFR BLD AUTO: 2.6 % — SIGNIFICANT CHANGE UP (ref 0–8)
ESTIMATED AVERAGE GLUCOSE: 163 MG/DL — HIGH (ref 68–114)
GLUCOSE BLDC GLUCOMTR-MCNC: 115 MG/DL — HIGH (ref 70–99)
GLUCOSE BLDC GLUCOMTR-MCNC: 121 MG/DL — HIGH (ref 70–99)
GLUCOSE BLDC GLUCOMTR-MCNC: 79 MG/DL — SIGNIFICANT CHANGE UP (ref 70–99)
GLUCOSE BLDC GLUCOMTR-MCNC: 98 MG/DL — SIGNIFICANT CHANGE UP (ref 70–99)
GLUCOSE SERPL-MCNC: 82 MG/DL — SIGNIFICANT CHANGE UP (ref 70–99)
HCT VFR BLD CALC: 43.3 % — SIGNIFICANT CHANGE UP (ref 37–47)
HGB BLD-MCNC: 14.4 G/DL — SIGNIFICANT CHANGE UP (ref 12–16)
IMM GRANULOCYTES NFR BLD AUTO: 0.4 % — HIGH (ref 0.1–0.3)
LYMPHOCYTES # BLD AUTO: 1.74 K/UL — SIGNIFICANT CHANGE UP (ref 1.2–3.4)
LYMPHOCYTES # BLD AUTO: 17.1 % — LOW (ref 20.5–51.1)
MCHC RBC-ENTMCNC: 31.7 PG — HIGH (ref 27–31)
MCHC RBC-ENTMCNC: 33.3 G/DL — SIGNIFICANT CHANGE UP (ref 32–37)
MCV RBC AUTO: 95.4 FL — SIGNIFICANT CHANGE UP (ref 81–99)
MONOCYTES # BLD AUTO: 1.28 K/UL — HIGH (ref 0.1–0.6)
MONOCYTES NFR BLD AUTO: 12.6 % — HIGH (ref 1.7–9.3)
NEUTROPHILS # BLD AUTO: 6.76 K/UL — HIGH (ref 1.4–6.5)
NEUTROPHILS NFR BLD AUTO: 66.5 % — SIGNIFICANT CHANGE UP (ref 42.2–75.2)
NRBC # BLD: 0 /100 WBCS — SIGNIFICANT CHANGE UP (ref 0–0)
PLATELET # BLD AUTO: 193 K/UL — SIGNIFICANT CHANGE UP (ref 130–400)
POTASSIUM SERPL-MCNC: 4.4 MMOL/L — SIGNIFICANT CHANGE UP (ref 3.5–5)
POTASSIUM SERPL-SCNC: 4.4 MMOL/L — SIGNIFICANT CHANGE UP (ref 3.5–5)
PROT SERPL-MCNC: 6.3 G/DL — SIGNIFICANT CHANGE UP (ref 6–8)
RBC # BLD: 4.54 M/UL — SIGNIFICANT CHANGE UP (ref 4.2–5.4)
RBC # FLD: 16.1 % — HIGH (ref 11.5–14.5)
SODIUM SERPL-SCNC: 140 MMOL/L — SIGNIFICANT CHANGE UP (ref 135–146)
SPECIMEN SOURCE: SIGNIFICANT CHANGE UP
T3 SERPL-MCNC: 62 NG/DL — LOW (ref 80–200)
T3FREE SERPL-MCNC: 1.77 PG/ML — LOW (ref 2–4.4)
T4 AB SER-ACNC: 6.5 UG/DL — SIGNIFICANT CHANGE UP (ref 4.6–12)
T4 FREE SERPL-MCNC: 1.2 NG/DL — SIGNIFICANT CHANGE UP (ref 0.9–1.8)
TSH SERPL-MCNC: 2.12 UIU/ML — SIGNIFICANT CHANGE UP (ref 0.27–4.2)
WBC # BLD: 10.16 K/UL — SIGNIFICANT CHANGE UP (ref 4.8–10.8)
WBC # FLD AUTO: 10.16 K/UL — SIGNIFICANT CHANGE UP (ref 4.8–10.8)

## 2022-11-14 PROCEDURE — 99233 SBSQ HOSP IP/OBS HIGH 50: CPT

## 2022-11-14 RX ORDER — APIXABAN 2.5 MG/1
2.5 TABLET, FILM COATED ORAL
Refills: 0 | Status: DISCONTINUED | OUTPATIENT
Start: 2022-11-14 | End: 2022-11-19

## 2022-11-14 RX ORDER — ACETAMINOPHEN 500 MG
650 TABLET ORAL EVERY 6 HOURS
Refills: 0 | Status: DISCONTINUED | OUTPATIENT
Start: 2022-11-14 | End: 2022-11-21

## 2022-11-14 RX ORDER — CEFTRIAXONE 500 MG/1
1000 INJECTION, POWDER, FOR SOLUTION INTRAMUSCULAR; INTRAVENOUS EVERY 24 HOURS
Refills: 0 | Status: COMPLETED | OUTPATIENT
Start: 2022-11-14 | End: 2022-11-17

## 2022-11-14 RX ORDER — METOPROLOL TARTRATE 50 MG
50 TABLET ORAL EVERY 8 HOURS
Refills: 0 | Status: DISCONTINUED | OUTPATIENT
Start: 2022-11-14 | End: 2022-11-15

## 2022-11-14 RX ORDER — METOPROLOL TARTRATE 50 MG
5 TABLET ORAL ONCE
Refills: 0 | Status: COMPLETED | OUTPATIENT
Start: 2022-11-14 | End: 2022-11-14

## 2022-11-14 RX ADMIN — Medication 25 MICROGRAM(S): at 06:46

## 2022-11-14 RX ADMIN — Medication 50 MILLIGRAM(S): at 06:46

## 2022-11-14 RX ADMIN — Medication 5 MILLIGRAM(S): at 13:12

## 2022-11-14 RX ADMIN — Medication 81 MILLIGRAM(S): at 13:09

## 2022-11-14 RX ADMIN — CLOPIDOGREL BISULFATE 75 MILLIGRAM(S): 75 TABLET, FILM COATED ORAL at 13:09

## 2022-11-14 RX ADMIN — Medication 50 MILLIGRAM(S): at 21:08

## 2022-11-14 RX ADMIN — INSULIN GLARGINE 22 UNIT(S): 100 INJECTION, SOLUTION SUBCUTANEOUS at 21:08

## 2022-11-14 RX ADMIN — Medication 50 MILLIGRAM(S): at 18:30

## 2022-11-14 NOTE — PROGRESS NOTE ADULT - TIME BILLING
87F PMHx pAF not on ac, CAD s/p stent x4, DM2, hypothyroidism, pancreatic ca s/p resection 8/2021, cholangioca metastatic to liver s/p liver resection 3/2022 here with palpitations, found to have SVT.    #Palpitations, due to SVT  suspect ATach, in background of AFib  lopressor 50 bid  no ac per pt preference  tsh  tte  to review tele with cards  #UTI  ua positive  ucx contaminated  ceftriaxone  repeat ucx  #CAD  asa, plavix  #DM2  lantus 22  ssi  #Hypothyroidism  synthroid 25  #Pancreatic ca  outpt f/u  #Cholangioca  outpt f/u  #DVT ppx  lovenox    #Progress Note Handoff:  Pending (specify):  Consults_________, Tests________, Test Results_______, Other____tte_____  Family discussion: d/w pt at bedside re: treatment plan, primary dx  Disposition: Home___/SNF___/Other________/Unknown at this time___x_____.

## 2022-11-14 NOTE — PROGRESS NOTE ADULT - SUBJECTIVE AND OBJECTIVE BOX
INTERVAL HPI/OVERNIGHT EVENTS:    SUBJECTIVE: Patient seen and examined at bedside.     cc: palpitations  no cp, sob, abd pain, fever  no cp, +palpitations, no hitchcock, orthopnea    OBJECTIVE:    VITAL SIGNS:  Vital Signs Last 24 Hrs  T(C): 37.2 (2022 07:48), Max: 37.2 (2022 07:48)  T(F): 98.9 (2022 07:48), Max: 98.9 (2022 07:48)  HR: 116 (2022 07:48) (104 - 135)  BP: 149/65 (2022 07:48) (113/76 - 149/65)  BP(mean): --  RR: 18 (2022 07:48) (18 - 18)  SpO2: 95% (2022 07:48) (94% - 98%)    Parameters below as of 2022 07:48  Patient On (Oxygen Delivery Method): room air          PHYSICAL EXAM:    General: NAD  HEENT: NC/AT; PERRL, clear conjunctiva  Neck: supple  Respiratory: CTA b/l  Cardiovascular: +S1/S2; RRR  Abdomen: soft, NT/ND; +BS x4  Extremities: WWP, 2+ peripheral pulses b/l; no LE edema  Skin: normal color and turgor; no rash  Neurological:    MEDICATIONS:  MEDICATIONS  (STANDING):  aspirin  chewable 81 milliGRAM(s) Oral daily  cefTRIAXone   IVPB 1000 milliGRAM(s) IV Intermittent every 24 hours  clopidogrel Tablet 75 milliGRAM(s) Oral daily  enoxaparin Injectable 40 milliGRAM(s) SubCutaneous every 24 hours  insulin glargine Injectable (LANTUS) 22 Unit(s) SubCutaneous at bedtime  insulin lispro (ADMELOG) corrective regimen sliding scale   SubCutaneous three times a day before meals  levothyroxine 25 MICROGram(s) Oral daily  metoprolol tartrate 50 milliGRAM(s) Oral two times a day  metoprolol tartrate Injectable 5 milliGRAM(s) IV Push once    MEDICATIONS  (PRN):  acetaminophen     Tablet .. 650 milliGRAM(s) Oral every 6 hours PRN Mild Pain (1 - 3), Moderate Pain (4 - 6)      ALLERGIES:  Allergies    No Known Allergies    Intolerances        LABS:                        14.4   10.16 )-----------( 193      ( 2022 07:27 )             43.3     Hemoglobin: 14.4 g/dL ( @ 07:27)  Hemoglobin: 14.5 g/dL ( @ 19:35)    CBC Full  -  ( 2022 07:27 )  WBC Count : 10.16 K/uL  RBC Count : 4.54 M/uL  Hemoglobin : 14.4 g/dL  Hematocrit : 43.3 %  Platelet Count - Automated : 193 K/uL  Mean Cell Volume : 95.4 fL  Mean Cell Hemoglobin : 31.7 pg  Mean Cell Hemoglobin Concentration : 33.3 g/dL  Auto Neutrophil # : 6.76 K/uL  Auto Lymphocyte # : 1.74 K/uL  Auto Monocyte # : 1.28 K/uL  Auto Eosinophil # : 0.26 K/uL  Auto Basophil # : 0.08 K/uL  Auto Neutrophil % : 66.5 %  Auto Lymphocyte % : 17.1 %  Auto Monocyte % : 12.6 %  Auto Eosinophil % : 2.6 %  Auto Basophil % : 0.8 %        140  |  103  |  28<H>  ----------------------------<  82  4.4   |  24  |  0.9    Ca    9.2      2022 07:27  Mg     2.2         TPro  6.3  /  Alb  4.0  /  TBili  0.8  /  DBili  x   /  AST  19  /  ALT  21  /  AlkPhos  86  -14    Creatinine Trend: 0.9<--, 0.8<--, 0.9<--, 0.9<--, 0.9<--  LIVER FUNCTIONS - ( 2022 07:27 )  Alb: 4.0 g/dL / Pro: 6.3 g/dL / ALK PHOS: 86 U/L / ALT: 21 U/L / AST: 19 U/L / GGT: x               hs Troponin:            Urinalysis Basic - ( 2022 22:37 )    Color: Yellow / Appearance: Clear / S.031 / pH: x  Gluc: x / Ketone: Negative  / Bili: Negative / Urobili: <2 mg/dL   Blood: x / Protein: 30 mg/dL / Nitrite: Negative   Leuk Esterase: Large / RBC: 5 /HPF / WBC 13 /HPF   Sq Epi: x / Non Sq Epi: 2 /HPF / Bacteria: Negative      CSF:                      EKG:   MICROBIOLOGY:    Culture - Urine (collected 2022 22:37)  Source: Clean Catch Clean Catch (Midstream)  Final Report (2022 10:10):    >=3 organisms. Probable collection contamination.      IMAGING:      Labs, imaging, EKG personally reviewed    RADIOLOGY & ADDITIONAL TESTS: Reviewed.

## 2022-11-14 NOTE — PATIENT PROFILE ADULT - PATIENT'S SEXUAL ORIENTATION
[facility] [place] [facility] Services  DISCHARGE SUMMARY       Date of Admission: 9/10/2021  Date of Discharge:  9/13/2021  Primary Care Physician: Yaron Wiggins APRN    Presenting Problem/History of Present Illness:  Altered mental status, unspecified altered mental status type [R41.82]     Final Discharge Diagnoses:  Active Hospital Problems    Diagnosis    • **Altered mental status    • Troponin level elevated      No trend  No ischemic symptoms  Elevated due to ESRD     • Uncontrolled diabetes mellitus (CMS/[facility])    • End stage kidney disease (CMS/[facility])    • HTN (hypertension)        Consults: None.    Procedures Performed: None.    Pertinent Test Results:      Imaging Results (Last 72 Hours)     Procedure Component Value Units Date/Time    CT Head Without Contrast [[id number]] Collected: 09/11/21 0712     Updated: 09/11/21 0717    Narrative:      EXAMINATION:   CT HEAD WO CONTRAST-  9/11/2021 7:12 AM CDT     HISTORY: CT BRAIN without contrast dated 9/11/2021 2:41 AM CDT     HISTORY: Headache     COMPARISON: June 19, 2021      DOSE LENGTH PRODUCT: 607 mGy cm     TECHNIQUE: Serial axial tomographic images of the brain were obtained  without the use of intravenous contrast.      FINDINGS:   The midline structures are nondisplaced. The ventricles and basilar  cisterns are normal in size and configuration. There is no evidence of  intracranial hemorrhage or mass-effect. The gray-white matter  differentiation is maintained. The sulci have a normal configuration,  and there are no abnormal extra-axial fluid collections. The structures  of the posterior fossa are unremarkable.      The included orbits and their contents are unremarkable. The visualized  paranasal sinuses, mastoid air cells and middle ear cavities are clear.  The visualized osseous structures and overlying soft tissues of the  skull and face are unremarkable.        Impression:      1. No acute intracranial process.         This report was finalized on 09/11/2021 07:14 by Dr. Lazaro Martinez MD.    XR Chest 1 View [[id number]] Collected: 09/11/21 0712     Updated: 09/11/21 0715    Narrative:      EXAM: XR CHEST 1 - 9/11/2021 2:12 AM CDT     HISTORY: central line placement       COMPARISON: September 11, 2021 at 12:40 AM.     TECHNIQUE: Frontal radiograph of the chest     FINDINGS:   The lungs are clear. Cardiac silhouettes mildly enlarged. Right internal  jugular catheters satisfactorily position in superior vena cava..      The osseous structures and surrounding soft tissues demonstrate no acute  abnormality.          Impression:      1. Mild cardiomegaly with no evidence pulmonary vascular congestion.        This report was finalized on 09/11/2021 07:12 by Dr. Lazaro Martinez MD.    XR Chest 1 View [[id number]] Collected: 09/11/21 0654     Updated: 09/11/21 0658    Narrative:      EXAM: XR CHEST 1 - 9/11/2021 12:54 AM CDT     HISTORY: Chest pain       COMPARISON: August 17, 2021.     TECHNIQUE: Frontal radiograph of the chest     FINDINGS:   Vague groundglass infiltrates noted in the right lung base.. Cardiac  silhouettes moderately enlarged..      The osseous structures and surrounding soft tissues demonstrate no acute  abnormality.          Impression:      1. Vague groundglass infiltrate right lung base. This is suspicious for  an early inflammatory process.        This report was finalized on 09/11/2021 06:55 by Dr. Lazaro Martinez MD.        Lab Results (last 72 hours)     Procedure Component Value Units Date/Time    POC Glucose Once [[id number]]  (Abnormal) Collected: 09/13/21 1114    Specimen: Blood Updated: 09/13/21 1130     Glucose 271 mg/dL      Comment: : LULA Staples GinaMeter ID: [id number]       POC Glucose Once [[id number]]  (Abnormal) Collected: 09/13/21 0739    Specimen: Blood Updated: 09/13/21 0753     Glucose 342 mg/dL      Comment: : LULA Staples GinaMeter ID: [id number]       POC Glucose Once  [[id number]]  (Abnormal) Collected: 09/13/21 0430    Specimen: Blood Updated: 09/13/21 0447     Glucose 414 mg/dL      Comment: : [id number] Hiram LisaMeter ID: [id number]       POC Glucose Once [[id number]]  (Abnormal) Collected: 09/13/21 0358    Specimen: Blood Updated: 09/13/21 0421     Glucose 401 mg/dL      Comment: : [id number] Hiram LisaMeter ID: [id number]       POC Glucose Once [[id number]]  (Abnormal) Collected: 09/13/21 0017    Specimen: Blood Updated: 09/13/21 0029     Glucose 344 mg/dL      Comment: : [id number] Hiram LisaMeter ID: [id number]       POC Glucose Once [[id number]]  (Abnormal) Collected: 09/12/21 1941    Specimen: Blood Updated: 09/12/21 1957     Glucose 333 mg/dL      Comment: : [id number] Erinn VanceWow! StuffMeter ID: [id number]       POC Glucose Once [[id number]]  (Abnormal) Collected: 09/12/21 1549    Specimen: Blood Updated: 09/12/21 1600     Glucose 301 mg/dL      Comment: : GEMMAIVANA Gunnison QuarticsellMeter ID: [id number]       POC Glucose Once [[id number]]  (Abnormal) Collected: 09/12/21 1119    Specimen: Blood Updated: 09/12/21 1130     Glucose 257 mg/dL      Comment: : JUAN Angeles MachellMeter ID: [id number]       Basic Metabolic Panel [[id number]]  (Abnormal) Collected: 09/12/21 0904    Specimen: Blood Updated: 09/12/21 1044     Glucose 175 mg/dL      BUN 22 mg/dL      Creatinine 2.83 mg/dL      Sodium 136 mmol/L      Potassium 4.5 mmol/L      Chloride 97 mmol/L      CO2 28.0 mmol/L      Calcium 8.6 mg/dL      eGFR Non African Amer 18 mL/min/1.73      BUN/Creatinine Ratio 7.8     Anion Gap 11.0 mmol/L     Narrative:      GFR Normal >60  Chronic Kidney Disease <60  Kidney Failure <15      POC Glucose Once [[id number]]  (Normal) Collected: 09/12/21 0803    Specimen: Blood Updated: 09/12/21 0813     Glucose 127 mg/dL      Comment: : JUAN BryantellMeter ID: [id number]       Hemoglobin A1c [[id number]]  (Abnormal) Collected: 09/12/21 0656     Specimen: Blood Updated: 09/12/21 0750     Hemoglobin A1C 11.40 %     Narrative:      Hemoglobin A1C Ranges:    Increased Risk for Diabetes  5.7% to 6.4%  Diabetes                     >= 6.5%  Diabetic Goal                < 7.0%    CBC (No Diff) [[id number]]  (Abnormal) Collected: 09/12/21 0656    Specimen: Blood Updated: 09/12/21 0735     WBC 4.95 10*3/mm3      RBC 2.45 10*6/mm3      Hemoglobin 7.2 g/dL      Hematocrit 23.0 %      MCV 93.9 fL      MCH 29.4 pg      MCHC 31.3 g/dL      RDW 16.0 %      RDW-SD 54.6 fl      MPV 9.5 fL      Platelets 157 10*3/mm3     POC Glucose Once [[id number]]  (Abnormal) Collected: 09/12/21 0357    Specimen: Blood Updated: 09/12/21 0408     Glucose 141 mg/dL      Comment: : [id number] Bryan AbigailMeter ID: [id number]       POC Glucose Once [[id number]]  (Normal) Collected: 09/12/21 0036    Specimen: Blood Updated: 09/12/21 0046     Glucose 105 mg/dL      Comment: : [id number] Adams AbigailMeter ID: [id number]       POC Glucose Once [[id number]]  (Normal) Collected: 09/11/21 2229    Specimen: Blood Updated: 09/11/21 2240     Glucose 98 mg/dL      Comment: : [id number] Adams AbigailMeter ID: [id number]       POC Glucose Once [[id number]]  (Abnormal) Collected: 09/11/21 1555    Specimen: Blood Updated: 09/11/21 1616     Glucose 189 mg/dL      Comment: : JUAN Angeles MachellMeter ID: [id number]       POC Glucose Once [[id number]]  (Abnormal) Collected: 09/11/21 1415    Specimen: Blood Updated: 09/11/21 1442     Glucose 219 mg/dL      Comment: : [id number] Nancie MaciasileyMeter ID: [id number]       Magnesium [[id number]]  (Normal) Collected: 09/11/21 0655    Specimen: Blood Updated: 09/11/21 1348     Magnesium 2.4 mg/dL     POC Glucose Once [[id number]]  (Abnormal) Collected: 09/11/21 1247    Specimen: Blood Updated: 09/11/21 1314     Glucose 216 mg/dL      Comment: : [id number] Nancie Hernadez ID: [id number]       POC Glucose Once [[id number]]   (Abnormal) Collected: 09/11/21 1111    Specimen: Blood Updated: 09/11/21 1139     Glucose 242 mg/dL      Comment: : [id number] Nancie GalindoMeter ID: [id number]       POC Glucose Once [[id number]]  (Abnormal) Collected: 09/11/21 0808    Specimen: Blood Updated: 09/11/21 0835     Glucose 149 mg/dL      Comment: : [id number] Nancie GalindoMeter ID: [id number]       Troponin [[id number]]  (Abnormal) Collected: 09/11/21 0655    Specimen: Blood Updated: 09/11/21 0732     Troponin T 0.147 ng/mL     Narrative:      Troponin T Reference Range:  <= 0.03 ng/mL-   Negative for AMI  >0.03 ng/mL-     Abnormal for myocardial necrosis.  Clinicians would have to utilize clinical acumen, EKG, Troponin and serial changes to determine if it is an Acute Myocardial Infarction or myocardial injury due to an underlying chronic condition.       Results may be falsely decreased if patient taking Biotin.      Comprehensive Metabolic Panel [[id number]]  (Abnormal) Collected: 09/11/21 0511    Specimen: Blood Updated: 09/11/21 0556     Glucose 122 mg/dL      BUN 37 mg/dL      Creatinine 3.91 mg/dL      Sodium 138 mmol/L      Potassium 2.9 mmol/L      Chloride 96 mmol/L      CO2 27.0 mmol/L      Calcium 8.5 mg/dL      Total Protein 6.1 g/dL      Albumin 3.10 g/dL      ALT (SGPT) 7 U/L      AST (SGOT) 14 U/L      Alkaline Phosphatase 108 U/L      Total Bilirubin 0.2 mg/dL      eGFR Non African Amer 12 mL/min/1.73      Comment: <15 Indicative of kidney failure.        eGFR   Amer --     Comment: <15 Indicative of kidney failure.        Globulin 3.0 gm/dL      A/G Ratio 1.0 g/dL      BUN/Creatinine Ratio 9.5     Anion Gap 15.0 mmol/L     Narrative:      GFR Normal >60  Chronic Kidney Disease <60  Kidney Failure <15      Troponin [[id number]]  (Abnormal) Collected: 09/11/21 0511    Specimen: Blood Updated: 09/11/21 0545     Troponin T 0.146 ng/mL     Narrative:      Troponin T Reference Range:  <= 0.03 ng/mL-   Negative for AMI  >0.03  ng/mL-     Abnormal for myocardial necrosis.  Clinicians would have to utilize clinical acumen, EKG, Troponin and serial changes to determine if it is an Acute Myocardial Infarction or myocardial injury due to an underlying chronic condition.       Results may be falsely decreased if patient taking Biotin.      Phosphorus [[id number]]  (Abnormal) Collected: 09/11/21 0511    Specimen: Blood Updated: 09/11/21 0544     Phosphorus 7.2 mg/dL     CBC Auto Differential [[id number]]  (Abnormal) Collected: 09/11/21 0511    Specimen: Blood Updated: 09/11/21 0519     WBC 8.95 10*3/mm3      RBC 2.44 10*6/mm3      Hemoglobin 7.3 g/dL      Hematocrit 22.7 %      MCV 93.0 fL      MCH 29.9 pg      MCHC 32.2 g/dL      RDW 16.0 %      RDW-SD 54.1 fl      MPV 9.6 fL      Platelets 170 10*3/mm3      Neutrophil % 78.0 %      Lymphocyte % 11.2 %      Monocyte % 8.3 %      Eosinophil % 1.6 %      Basophil % 0.7 %      Immature Grans % 0.2 %      Neutrophils, Absolute 6.99 10*3/mm3      Lymphocytes, Absolute 1.00 10*3/mm3      Monocytes, Absolute 0.74 10*3/mm3      Eosinophils, Absolute 0.14 10*3/mm3      Basophils, Absolute 0.06 10*3/mm3      Immature Grans, Absolute 0.02 10*3/mm3      nRBC 0.0 /100 WBC     POC Glucose Once [[id number]]  (Abnormal) Collected: 09/11/21 0343    Specimen: Blood Updated: 09/11/21 0354     Glucose 140 mg/dL      Comment: : [id number] [facility] KalinMeter ID: [id number]       Troponin [[id number]]  (Abnormal) Collected: 09/11/21 0218    Specimen: Blood Updated: 09/11/21 0309     Troponin T 0.149 ng/mL     Narrative:      Troponin T Reference Range:  <= 0.03 ng/mL-   Negative for AMI  >0.03 ng/mL-     Abnormal for myocardial necrosis.  Clinicians would have to utilize clinical acumen, EKG, Troponin and serial changes to determine if it is an Acute Myocardial Infarction or myocardial injury due to an underlying chronic condition.       Results may be falsely decreased if patient taking Biotin.      POC Glucose  Once [[id number]]  (Abnormal) Collected: 09/11/21 0234    Specimen: Blood Updated: 09/11/21 0245     Glucose 176 mg/dL      Comment: : [id number] Jesse Carrillo ID: [id number]       Jenner Draw [[id number]] Collected: 09/11/21 0033    Specimen: Blood Updated: 09/11/21 0145    Narrative:      The following orders were created for panel order Jenner Draw.  Procedure                               Abnormality         Status                     ---------                               -----------         ------                     Green Top (Gel)[[id number]]                                  Final result               Lavender Top[[id number]]                                     Final result               Red Top[[id number]]                                          Final result               Light Blue Top[[id number]]                                   Final result                 Please view results for these tests on the individual orders.    Green Top (Gel) [[id number]] Collected: 09/11/21 0033    Specimen: Blood Updated: 09/11/21 0145     Extra Tube Hold for add-ons.     Comment: Auto resulted.       Red Top [[id number]] Collected: 09/11/21 0033    Specimen: Blood Updated: 09/11/21 0145     Extra Tube Hold for add-ons.     Comment: Auto resulted.       Lavender Top [[id number]] Collected: 09/11/21 0033    Specimen: Blood Updated: 09/11/21 0145     Extra Tube hold for add-on     Comment: Auto resulted       Light Blue Top [[id number]] Collected: 09/11/21 0036    Specimen: Blood Updated: 09/11/21 0145     Extra Tube hold for add-on     Comment: Auto resulted       Urine Drug Screen - Urine, Catheter [[id number]]  (Normal) Collected: 09/11/21 0108    Specimen: Urine, Catheter Updated: 09/11/21 0138     THC, Screen, Urine Negative     Phencyclidine (PCP), Urine Negative     Cocaine Screen, Urine Negative     Methamphetamine, Ur Negative     Opiate Screen Negative     Amphetamine Screen, Urine Negative     Benzodiazepine  Screen, Urine Negative     Tricyclic Antidepressants Screen Negative     Methadone Screen, Urine Negative     Barbiturates Screen, Urine Negative     Oxycodone Screen, Urine Negative     Propoxyphene Screen Negative     Buprenorphine, Screen, Urine Negative    Narrative:      Cutoff For Drugs Screened:    Amphetamines               500 ng/ml  Barbiturates               200 ng/ml  Benzodiazepines            150 ng/ml  Cocaine                    150 ng/ml  Methadone                  200 ng/ml  Opiates                    100 ng/ml  Phencyclidine               25 ng/ml  THC                            50 ng/ml  Methamphetamine            500 ng/ml  Tricyclic Antidepressants  300 ng/ml  Oxycodone                  100 ng/ml  Propoxyphene               300 ng/ml  Buprenorphine               10 ng/ml    The normal value for all drugs tested is negative. This report includes unconfirmed screening results, with the cutoff values listed, to be used for medical treatment purposes only.  Unconfirmed results must not be used for non-medical purposes such as employment or legal testing.  Clinical consideration should be applied to any drug of abuse test, particularly when unconfirmed results are used.      Troponin [[id number]]  (Abnormal) Collected: 09/11/21 0033    Specimen: Blood Updated: 09/11/21 0134     Troponin T 0.169 ng/mL     Narrative:      Troponin T Reference Range:  <= 0.03 ng/mL-   Negative for AMI  >0.03 ng/mL-     Abnormal for myocardial necrosis.  Clinicians would have to utilize clinical acumen, EKG, Troponin and serial changes to determine if it is an Acute Myocardial Infarction or myocardial injury due to an underlying chronic condition.       Results may be falsely decreased if patient taking Biotin.      Comprehensive Metabolic Panel [[id number]]  (Abnormal) Collected: 09/11/21 0033    Specimen: Blood Updated: 09/11/21 0133     Glucose 40 mg/dL      BUN 34 mg/dL      Creatinine 3.98 mg/dL      Sodium 141 mmol/L       Potassium 2.3 mmol/L      Chloride 96 mmol/L      CO2 27.0 mmol/L      Calcium 9.2 mg/dL      Total Protein 7.1 g/dL      Albumin 3.80 g/dL      ALT (SGPT) 9 U/L      AST (SGOT) 15 U/L      Alkaline Phosphatase 125 U/L      Total Bilirubin 0.3 mg/dL      eGFR Non African Amer 12 mL/min/1.73      Comment: <15 Indicative of kidney failure.        eGFR   Amer --     Comment: <15 Indicative of kidney failure.        Globulin 3.3 gm/dL      A/G Ratio 1.2 g/dL      BUN/Creatinine Ratio 8.5     Anion Gap 18.0 mmol/L     Narrative:      GFR Normal >60  Chronic Kidney Disease <60  Kidney Failure <15      Urinalysis, Microscopic Only - Urine, Catheter In/Out [[id number]]  (Abnormal) Collected: 09/11/21 0108    Specimen: Urine, Catheter In/Out Updated: 09/11/21 0131     RBC, UA 3-5 /HPF      WBC, UA 0-2 /HPF      Bacteria, UA None Seen /HPF      Squamous Epithelial Cells, UA 0-2 /HPF      Hyaline Casts, UA 0-2 /LPF      Methodology Automated Microscopy    Urinalysis With Culture If Indicated - Urine, Catheter In/Out [[id number]]  (Abnormal) Collected: 09/11/21 0108    Specimen: Urine, Catheter In/Out Updated: 09/11/21 0131     Color, UA Yellow     Appearance, UA Clear     pH, UA 7.0     Specific Gravity, UA 1.017     Glucose, UA >=1000 mg/dL (3+)     Ketones, UA Negative     Bilirubin, UA Negative     Blood, UA Negative     Protein,  mg/dL (2+)     Leuk Esterase, UA Negative     Nitrite, UA Negative     Urobilinogen, UA 0.2 E.U./dL    COVID-19,[facility] IN-HOUSE,Nasal Swab No Transport Media 3-4 HR TAT - Swab, Nasal Cavity [[id number]]  (Normal) Collected: 09/11/21 0040    Specimen: Swab from Nasal Cavity Updated: 09/11/21 0129     COVID19 Not Detected    Narrative:      Fact sheet for providers: https://www.fda.gov/media/027778/download     Fact sheet for patients: https://www.fda.gov/media/582109/download    Test performed by PCR.    Consider negative results in combination with clinical observations, patient  history, and epidemiological information.    POC Glucose Once [[id number]]  (Abnormal) Collected: 09/11/21 0116    Specimen: Blood Updated: 09/11/21 0127     Glucose 150 mg/dL      Comment: : [id number] Trupti AlmodovarMeter ID: [id number]       D-dimer, Quantitative [[id number]]  (Abnormal) Collected: 09/11/21 0036    Specimen: Blood Updated: 09/11/21 0120     D-Dimer, Quantitative 1.22 mg/L (FEU)     Narrative:      Reference Range is 0-0.50 mg/L FEU. However, results <0.50 mg/L FEU tends to rule out DVT or PE. Results >0.50 mg/L FEU are not useful in predicting absence or presence of DVT or PE.      aPTT [[id number]]  (Normal) Collected: 09/11/21 0036    Specimen: Blood Updated: 09/11/21 0120     PTT 30.9 seconds     Protime-INR [[id number]]  (Normal) Collected: 09/11/21 0036    Specimen: Blood Updated: 09/11/21 0120     Protime 13.2 Seconds      INR 1.04    Blood Gas, Venous - [[id number]]  (Abnormal) Collected: 09/11/21 0108    Specimen: Venous Blood Updated: 09/11/21 0117     Site OTHER     pH, Venous 7.398 pH Units      pCO2, Venous 50.3 mm Hg      pO2, Venous 49.0 mm Hg      HCO3, Venous 31.0 mmol/L      Comment: 83 Value above reference range        Base Excess, Venous 5.4 mmol/L      Comment: 83 Value above reference range        O2 Saturation, Venous 83.3 %      Comment: 83 Value above reference range        Temperature 37.0 C      Barometric Pressure for Blood Gas 752 mmHg      Modality Room Air     Ventilator Mode NA     Collected by [id number]     Comment: Meter: [id number]     :  [id number]       Ammonia [[id number]]  (Normal) Collected: 09/11/21 0033    Specimen: Blood Updated: 09/11/21 0116     Ammonia 13 umol/L     Ethanol [[id number]] Collected: 09/11/21 0033    Specimen: Blood Updated: 09/11/21 0115     Ethanol % <0.010 %     Narrative:      Not for legal purposes. Chain of Custody not followed.     Magnesium [[id number]]  (Normal) Collected: 09/11/21 0033    Specimen: Blood Updated: 09/11/21  0114     Magnesium 2.4 mg/dL     POC Glucose Once [[id number]]  (Abnormal) Collected: 09/11/21 0056    Specimen: Blood Updated: 09/11/21 0107     Glucose 41 mg/dL      Comment: : [id number] Ray Grimes ID: [id number]       CBC & Differential [[id number]]  (Abnormal) Collected: 09/11/21 0033    Specimen: Blood Updated: 09/11/21 0049    Narrative:      The following orders were created for panel order CBC & Differential.  Procedure                               Abnormality         Status                     ---------                               -----------         ------                     CBC Auto Differential[[id number]]        Abnormal            Final result                 Please view results for these tests on the individual orders.    CBC Auto Differential [[id number]]  (Abnormal) Collected: 09/11/21 0033    Specimen: Blood Updated: 09/11/21 0049     WBC 8.75 10*3/mm3      RBC 2.79 10*6/mm3      Hemoglobin 8.2 g/dL      Hematocrit 25.8 %      MCV 92.5 fL      MCH 29.4 pg      MCHC 31.8 g/dL      RDW 16.1 %      RDW-SD 54.0 fl      MPV 9.7 fL      Platelets 217 10*3/mm3      Neutrophil % 68.8 %      Lymphocyte % 15.3 %      Monocyte % 11.4 %      Eosinophil % 3.2 %      Basophil % 1.1 %      Immature Grans % 0.2 %      Neutrophils, Absolute 6.01 10*3/mm3      Lymphocytes, Absolute 1.34 10*3/mm3      Monocytes, Absolute 1.00 10*3/mm3      Eosinophils, Absolute 0.28 10*3/mm3      Basophils, Absolute 0.10 10*3/mm3      Immature Grans, Absolute 0.02 10*3/mm3      nRBC 0.0 /100 WBC     POC Glucose Once [[id number]]  (Normal) Collected: 09/11/21 0003    Specimen: Blood Updated: 09/11/21 0014     Glucose 81 mg/dL      Comment: : [id number] Yo Fang ID: [id number]           Chief Complaint on Day of Discharge: Overall, patient reports feeling well.  She denies shortness of breath, chest pain or pressure.  She is tolerating a diet.  She is eager for discharge home.     Hospital Course:  The  "patient is a 52 y.o. female who presented to [facility] with altered mental status.  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  She follows with [facility] for her primary care.  Reportedly, family member found her at home with decreased responsiveness.  EMS was called and glucose reading would not register a glucometer.  She was treated with D50 and later started on a dextrose infusion in the emergency department.  She was also given Narcan.  She then became alert and oriented.  She denied shortness of breath, chest pain or pressure.  She states she ate dessert and then took insulin but states \" I do not know if I gave myself too much.\"  She is compliant with hemodialysis 3 times per week, Tuesday, Thursday, Saturday.  She did complete her most recent dialysis prior to admission on Thursday 9/9.  She was found to be hypokalemic with potassium 2.3.  Troponins were checked and were flat trending-0.169 at the highest.  Chest x-ray and CT of the head unremarkable.  She was admitted to the hospital service for further evaluation and management.    Hemoglobin A1c 11.40.  Her blood glucose has much improved with last reading 271.  She is eating and drinking well.  No further hypoglycemic episodes.  She takes NovoLog 8 to 10 units 3 times per day with meals and Basaglar 40 units nightly.  She is and oriented x3.  She feels back to baseline.  No sign of infection identified.  Blood pressure has improved and she was hypertensive this morning.  Antihypertensive occasions resumed most recent blood pressure 154/67.  She was seen in consultation by cardiology. EKGs reveal sinus rhythm, prolonged QTC-up to 583, T wave inversions in the lateral leads as well as V4, with ST depression in 2, aVF, V4, V5, V6--improved on repeat.  June " "2021 EKGs also revealed lateral T wave inversions.  She underwent cardiac catheterization in March 2020 at [facility].  Cath revealed nonobstructive coronary artery disease and normal left ventricular ejection fraction.  Per cardiology, chronically elevated troponin in the setting of end-stage renal disease.  She has no ischemic symptoms and no evidence of acute coronary syndrome.  She has noncardiac chest pain that was tender to palpation.  No further cardiac testing or treatment recommended.  She was seen in consultation by nephrology, she tolerated dialysis on Tuesday 9/12.  She has anemia of chronic disease with hemoglobin 7.2.  No signs of active bleeding.  Dr. Ann is okay with patient going home today and will see her in clinic tomorrow to discuss anemia/Retacrit.  She will have hemodialysis tomorrow per routine schedule Tuesday, Thursday, Saturday.  Overall, patient reports feeling well.  She denies shortness of breath, chest pain or pressure.  She is tolerating a diet.  She is eager for discharge home.  She has worked with physical and Occupational Therapy.  States she has all needed DME at home.  Therapy recommends home with assistance.  Patient states she does live home alone but she has her boyfriend and ends check on her.  Feel as though she is stable and appropriate for discharge home today.  She is to follow-up with GAEL Rene within 1 week.    Condition on Discharge:  Medically stable.    Physical Exam on Discharge:  /62 (BP Location: Right arm, Patient Position: Lying)   Pulse 68   Temp 97.5 °F (36.4 °C) (Oral)   Resp 18   Ht 165.1 cm (65\")   Wt 76 kg (167 lb 8.8 oz)   LMP  (LMP Unknown)   SpO2 97%   BMI 27.88 kg/m²   Physical Exam  Constitutional:       Appearance: She is well-developed.      Comments: Sitting on the edge of the bed.  No acute distress.  Tolerating room air.  Discussed with her nurse, Favian ALEXANDERT:      Head: Normocephalic and atraumatic.      Mouth/Throat:      " Dentition: Abnormal dentition. Dental caries present.   Eyes:      General: No scleral icterus.     Conjunctiva/sclera: Conjunctivae normal.      Pupils: Pupils are equal, round, and reactive to light.   Neck:      Vascular: No JVD.   Cardiovascular:      Rate and Rhythm: Normal rate and regular rhythm.      Heart sounds: Normal heart sounds. No murmur heard.     Pulmonary:      Effort: Pulmonary effort is normal.      Breath sounds: Normal breath sounds. No wheezing or rales.   Abdominal:      General: Bowel sounds are normal. There is no distension.      Palpations: Abdomen is soft.      Tenderness: There is no abdominal tenderness.   Musculoskeletal:         General: Normal range of motion.      Cervical back: Neck supple.   Lymphadenopathy:      Cervical: No cervical adenopathy.   Skin:     General: Skin is warm and dry.   Neurological:      Mental Status: She is alert and oriented to person, place, and time.      Cranial Nerves: No cranial nerve deficit.   Psychiatric:         Behavior: Behavior normal.       Discharge Disposition:  Home or Self Care    Discharge Medications:     Discharge Medications      Changes to Medications      Instructions Start Date   cloNIDine 0.2 MG tablet  Commonly known as: CATAPRES  What changed: how much to take   0.2 mg, Oral, Every 8 Hours Scheduled         Continue These Medications      Instructions Start Date   albuterol sulfate  (90 Base) MCG/ACT inhaler  Commonly known as: PROVENTIL HFA;VENTOLIN HFA;PROAIR HFA   2 puffs, Inhalation, Every 4 Hours PRN      BASAGLAR KWIKPEN 100 UNIT/ML injection pen   40 Units, Subcutaneous, Nightly      carvedilol 12.5 MG tablet  Commonly known as: COREG   12.5 mg, Oral, 2 Times Daily With Meals      DULoxetine 60 MG capsule  Commonly known as: CYMBALTA   60 mg, Oral, Daily      famotidine 20 MG tablet  Commonly known as: PEPCID   20 mg, Oral, Daily      hydrALAZINE 50 MG tablet  Commonly known as: APRESOLINE   50 mg, Oral, 3 Times  Daily      lactulose 10 GM/15ML solution  Commonly known as: CHRONULAC   20 g, Oral, 3 Times Daily      levothyroxine 25 MCG tablet  Commonly known as: SYNTHROID, LEVOTHROID   25 mcg, Oral, Daily      lisinopril 20 MG tablet  Commonly known as: PRINIVIL,ZESTRIL   20 mg, Oral, 2 times daily      nicotine 14 MG/24HR patch  Commonly known as: NICODERM CQ   1 patch, Transdermal, Every 24 Hours Scheduled, Take off it pt smoke.      NIFEdipine XL 60 MG 24 hr tablet  Commonly known as: PROCARDIA XL   60 mg, Oral, 2 times daily      NovoLOG FlexPen 100 UNIT/ML solution pen-injector sc pen  Generic drug: insulin aspart   10 Units, Subcutaneous, 3 Times Daily With Meals      ondansetron 4 MG tablet  Commonly known as: ZOFRAN   4 mg, Oral, Every 8 Hours PRN      pregabalin 75 MG capsule  Commonly known as: LYRICA   75 mg, Oral, 2 Times Daily      rOPINIRole 4 MG tablet  Commonly known as: REQUIP   4 mg, Oral, Nightly      senna 8.6 MG tablet  Commonly known as: SENOKOT   1 tablet, Oral, Nightly      simvastatin 20 MG tablet  Commonly known as: ZOCOR   40 mg, Oral, Daily             Discharge Diet:   Diet Instructions     Diet: Regular, Renal, Consistent Carbohydrate, Cardiac; Thin      Discharge Diet:  Regular  Renal  Consistent Carbohydrate  Cardiac       Fluid Consistency: Thin          Activity at Discharge:   Activity Instructions     Activity as Tolerated            Discharge Care Plan/Instructions:   1.  Follow-up with GAEL Rene within 1 week.  2.  Follow-up with Dr. Ann in clinic tomorrow 9/14 discuss anemia/possible need for Retacrit.  3.  Continue hemodialysis Tuesday, Thursday, Saturday.  4.  Seek evaluation for worsening symptoms.    Test Results Pending at Discharge: none.    Electronically signed by GAEL Islas, 09/13/21, 16:01 CDT.    Time: 35 minutes.         Heterosexual

## 2022-11-15 LAB
ALBUMIN SERPL ELPH-MCNC: 3.8 G/DL — SIGNIFICANT CHANGE UP (ref 3.5–5.2)
ALP SERPL-CCNC: 79 U/L — SIGNIFICANT CHANGE UP (ref 30–115)
ALT FLD-CCNC: 20 U/L — SIGNIFICANT CHANGE UP (ref 0–41)
ANION GAP SERPL CALC-SCNC: 10 MMOL/L — SIGNIFICANT CHANGE UP (ref 7–14)
AST SERPL-CCNC: 19 U/L — SIGNIFICANT CHANGE UP (ref 0–41)
BILIRUB SERPL-MCNC: 0.6 MG/DL — SIGNIFICANT CHANGE UP (ref 0.2–1.2)
BUN SERPL-MCNC: 26 MG/DL — HIGH (ref 10–20)
CALCIUM SERPL-MCNC: 8.9 MG/DL — SIGNIFICANT CHANGE UP (ref 8.4–10.5)
CHLORIDE SERPL-SCNC: 102 MMOL/L — SIGNIFICANT CHANGE UP (ref 98–110)
CO2 SERPL-SCNC: 23 MMOL/L — SIGNIFICANT CHANGE UP (ref 17–32)
CREAT SERPL-MCNC: 0.8 MG/DL — SIGNIFICANT CHANGE UP (ref 0.7–1.5)
EGFR: 71 ML/MIN/1.73M2 — SIGNIFICANT CHANGE UP
GLUCOSE BLDC GLUCOMTR-MCNC: 132 MG/DL — HIGH (ref 70–99)
GLUCOSE BLDC GLUCOMTR-MCNC: 155 MG/DL — HIGH (ref 70–99)
GLUCOSE BLDC GLUCOMTR-MCNC: 156 MG/DL — HIGH (ref 70–99)
GLUCOSE BLDC GLUCOMTR-MCNC: 168 MG/DL — HIGH (ref 70–99)
GLUCOSE BLDC GLUCOMTR-MCNC: 56 MG/DL — LOW (ref 70–99)
GLUCOSE BLDC GLUCOMTR-MCNC: 89 MG/DL — SIGNIFICANT CHANGE UP (ref 70–99)
GLUCOSE SERPL-MCNC: 171 MG/DL — HIGH (ref 70–99)
HCT VFR BLD CALC: 40 % — SIGNIFICANT CHANGE UP (ref 37–47)
HGB BLD-MCNC: 13.6 G/DL — SIGNIFICANT CHANGE UP (ref 12–16)
MAGNESIUM SERPL-MCNC: 1.9 MG/DL — SIGNIFICANT CHANGE UP (ref 1.8–2.4)
MCHC RBC-ENTMCNC: 32.2 PG — HIGH (ref 27–31)
MCHC RBC-ENTMCNC: 34 G/DL — SIGNIFICANT CHANGE UP (ref 32–37)
MCV RBC AUTO: 94.8 FL — SIGNIFICANT CHANGE UP (ref 81–99)
NRBC # BLD: 0 /100 WBCS — SIGNIFICANT CHANGE UP (ref 0–0)
PLATELET # BLD AUTO: 188 K/UL — SIGNIFICANT CHANGE UP (ref 130–400)
POTASSIUM SERPL-MCNC: 3.9 MMOL/L — SIGNIFICANT CHANGE UP (ref 3.5–5)
POTASSIUM SERPL-SCNC: 3.9 MMOL/L — SIGNIFICANT CHANGE UP (ref 3.5–5)
PROT SERPL-MCNC: 5.8 G/DL — LOW (ref 6–8)
RBC # BLD: 4.22 M/UL — SIGNIFICANT CHANGE UP (ref 4.2–5.4)
RBC # FLD: 15.7 % — HIGH (ref 11.5–14.5)
SODIUM SERPL-SCNC: 135 MMOL/L — SIGNIFICANT CHANGE UP (ref 135–146)
WBC # BLD: 9.09 K/UL — SIGNIFICANT CHANGE UP (ref 4.8–10.8)
WBC # FLD AUTO: 9.09 K/UL — SIGNIFICANT CHANGE UP (ref 4.8–10.8)

## 2022-11-15 PROCEDURE — 99233 SBSQ HOSP IP/OBS HIGH 50: CPT

## 2022-11-15 RX ORDER — METOPROLOL TARTRATE 50 MG
50 TABLET ORAL EVERY 6 HOURS
Refills: 0 | Status: DISCONTINUED | OUTPATIENT
Start: 2022-11-15 | End: 2022-11-19

## 2022-11-15 RX ORDER — CLOPIDOGREL BISULFATE 75 MG/1
75 TABLET, FILM COATED ORAL DAILY
Refills: 0 | Status: DISCONTINUED | OUTPATIENT
Start: 2022-11-16 | End: 2022-11-21

## 2022-11-15 RX ORDER — SALICYLIC ACID 0.5 %
1 CLEANSER (GRAM) TOPICAL THREE TIMES A DAY
Refills: 0 | Status: DISCONTINUED | OUTPATIENT
Start: 2022-11-15 | End: 2022-11-21

## 2022-11-15 RX ADMIN — APIXABAN 2.5 MILLIGRAM(S): 2.5 TABLET, FILM COATED ORAL at 05:27

## 2022-11-15 RX ADMIN — CEFTRIAXONE 100 MILLIGRAM(S): 500 INJECTION, POWDER, FOR SOLUTION INTRAMUSCULAR; INTRAVENOUS at 05:27

## 2022-11-15 RX ADMIN — Medication 1: at 16:20

## 2022-11-15 RX ADMIN — Medication 50 MILLIGRAM(S): at 05:27

## 2022-11-15 RX ADMIN — INSULIN GLARGINE 22 UNIT(S): 100 INJECTION, SOLUTION SUBCUTANEOUS at 21:51

## 2022-11-15 RX ADMIN — Medication 50 MILLIGRAM(S): at 15:55

## 2022-11-15 RX ADMIN — Medication 25 MICROGRAM(S): at 05:27

## 2022-11-15 RX ADMIN — Medication 1: at 12:18

## 2022-11-15 RX ADMIN — APIXABAN 2.5 MILLIGRAM(S): 2.5 TABLET, FILM COATED ORAL at 17:30

## 2022-11-15 RX ADMIN — Medication 50 MILLIGRAM(S): at 23:57

## 2022-11-15 RX ADMIN — Medication 1 APPLICATION(S): at 22:00

## 2022-11-15 NOTE — PROGRESS NOTE ADULT - SUBJECTIVE AND OBJECTIVE BOX
ANNE VILLATORO 87y Female  MRN#: [id number]   Hospital Day: 2d    SUBJECTIVE  Patient is a 87y old Female who presents with a chief complaint of Palpitations (14 Nov 2022 10:36)  Currently admitted to medicine with the primary diagnosis of Palpitations      INTERVAL HPI AND OVERNIGHT EVENTS:  Patient was examined and seen at bedside. This morning she is resting comfortably in bed and reports no issues or overnight events. Patient was admitted for heart palpitations and tachycardia, found to be in rapid Afib in the ED HR > 130s, was given metoprolol IV 5mg x2. Patient's UA also demonstrated leuk esterase + blood, c/f UTI and was started on Rocephin. Admitted to med-tele for rapid Afib likely in setting of UTI.     Patient feels well this AM, reports no palpitations at present, no chest pain, shortness of breath, nausea, vomiting, diarrhea, dizziness, or headache. Tele review revealed persistent Afib HR  overnight, no other acute tele events were noted. The patient reports she was previously on aspirin and Plavix and no other AC for chronic Afib given concern for bleeding, however states she was recently started on Eliquis this admission, confirmed on review of chart; Lovenox and Plavix now d/c'd. No other acute events.     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    History of partial pancreatectomy  s/p distal pancreatectomy Aug 2021    H/O resection of liver  mets to liver; s/p partial liver resection Mar 2022 @ [facility]      ALLERGIES:  No Known Allergies    MEDICATIONS:  STANDING MEDICATIONS  apixaban 2.5 milliGRAM(s) Oral two times a day  aspirin  chewable 81 milliGRAM(s) Oral daily  cefTRIAXone   IVPB 1000 milliGRAM(s) IV Intermittent every 24 hours  insulin glargine Injectable (LANTUS) 22 Unit(s) SubCutaneous at bedtime  insulin lispro (ADMELOG) corrective regimen sliding scale   SubCutaneous three times a day before meals  levothyroxine 25 MICROGram(s) Oral daily  metoprolol tartrate 50 milliGRAM(s) Oral every 8 hours    PRN MEDICATIONS  acetaminophen     Tablet .. 650 milliGRAM(s) Oral every 6 hours PRN      VITAL SIGNS: Last 24 Hours  T(C): 36.1 (15 Nov 2022 04:35), Max: 36.6 (14 Nov 2022 15:27)  T(F): 97 (15 Nov 2022 04:35), Max: 97.8 (14 Nov 2022 15:27)  HR: 127 (15 Nov 2022 04:35) (105 - 130)  BP: 106/71 (15 Nov 2022 04:35) (106/71 - 144/77)  BP(mean): 96 (14 Nov 2022 15:27) (96 - 96)  RR: 18 (15 Nov 2022 04:35) (18 - 18)  SpO2: 95% (15 Nov 2022 07:52) (95% - 98%)    LABS:                        13.6   9.09  )-----------( 188      ( 15 Nov 2022 06:45 )             40.0     11-15    135  |  102  |  26<H>  ----------------------------<  171<H>  3.9   |  23  |  0.8    Ca    8.9      15 Nov 2022 06:45  Mg     1.9     11-15    TPro  5.8<L>  /  Alb  3.8  /  TBili  0.6  /  DBili  x   /  AST  19  /  ALT  20  /  AlkPhos  79  11-15        Culture - Urine (collected 12 Nov 2022 22:37)  Source: Clean Catch Clean Catch (Midstream)  Final Report (14 Nov 2022 10:10):    >=3 organisms. Probable collection contamination.          RADIOLOGY:      PHYSICAL EXAM:  CONSTITUTIONAL: No acute distress, well-developed, well-groomed, AAOx3  HEAD: Atraumatic, normocephalic  EYES: EOM intact, conjunctiva and sclera clear  ENT: Supple, no JVD; moist mucous membranes  PULMONARY: Clear to auscultation bilaterally; no wheezes, rales, or rhonchi  CARDIOVASCULAR: Tachycardia, irregularly irregular rhythm, no murmurs, rubs, or gallops  GASTROINTESTINAL: Soft, non-tender, non-distended; bowel sounds present  MUSCULOSKELETAL: 2+ peripheral pulses; no clubbing, no cyanosis, no edema  NEUROLOGY: non-focal, moving all extremities, no facial droop, answering all questions appropriately  SKIN: No rashes or lesions; warm and dry

## 2022-11-15 NOTE — PROGRESS NOTE ADULT - ATTENDING COMMENTS
87F w/ h/o pAF (not on AC), CAD (s/p 4 stents), IDDM, and pancreatic cancer (s/p distal pancreatectomy Aug 2021), cholangio carcinoma (mets to liver; s/p partial liver resection Mar 2022 @ [facility]) p/w intermittent palpitations, found to be in rapid afib HR > 130s with improved control s/p IV metoprolol.    #Chronic Afib/Aflutter  - CHADS-VASC score >6. Initially p/w intermittent palpitations. Found to be in aflutter on admission EKG. UTI suspected cause of pt's current afib relapse. Had pAF in 2021, but did not want to be on anticoagulation for bleeding issues at the time and thus was placed on aspirin and Plavix per cardiology.   - Patient now agreeable to Eliquis   - Spoke w/cardio 11/15, okay to c/w eliquis for now and dc ASA, c/w plavix  - Metoprolol switched to 50 mg TID for now, home dose is Toprol XL 50mg PO QD- monitor rate control   - Target HR <110    #UTI  - C/w Rocephin 1g QD for 1 more day to end tomorrow, 11/16, for total course of 3 days     #Coronary Artery Disease  #Hypertension  #Dyslipidemia  Follows w/ Dr. Peters. S/P PCI x4 (mLAD, dLCX, mRCA, and most recently OM1). Has consistently and continuously refused statin per cardiology.  c/w eliquis and plavix and metoprolol     #Hypothyroidism  - Managed via [employer] Thyroid 15qd  - dc synthroid, TSH and T4 WNL     #IDDM  - Likely secondary to distal pancreatectomy. A1C 7.3. Managed via Tresiba 22u qhs.  - Monitor FS TIDAC and QHS  - c/w Lantus 22U SQ QHS  - c/w low-dose ISS    DVT PPX: Lovenox 40mg SQ QD     Dispo: dc pending improved rate control     Total time spent to complete patient's bedside assessment, review medical chart, discuss medical plan of care with covering medical team was more than 35 minutes  with >50% of time spent face to face with patient, discussion with patient/family and/or coordination of care      Katy Zuniga DO 87F w/ h/o pAF (not on AC), CAD (s/p 4 stents), IDDM, and pancreatic cancer (s/p distal pancreatectomy Aug 2021), cholangio carcinoma (mets to liver; s/p partial liver resection Mar 2022 @ [facility]) p/w intermittent palpitations, found to be in rapid afib HR > 130s with improved control s/p IV metoprolol.    #Chronic Afib/Aflutter  - CHADS-VASC score >6. Initially p/w intermittent palpitations. Found to be in aflutter on admission EKG. UTI suspected cause of pt's current afib relapse. Had pAF in 2021, but did not want to be on anticoagulation for bleeding issues at the time and thus was placed on aspirin and Plavix per cardiology.   - Patient now agreeable to Eliquis   - Spoke w/cardio 11/15, okay to c/w eliquis for now and dc ASA, c/w plavix  - Metoprolol switched to 50 mg TID for now, home dose is Toprol XL 50mg PO QD- monitor rate control   - Target HR <110    #UTI  - C/w Rocephin 1g QD for 1 more day to end tomorrow, 11/16, for total course of 3 days     #Coronary Artery Disease  #Hypertension  #Dyslipidemia  Follows w/ Dr. Peters. S/P PCI x4 (mLAD, dLCX, mRCA, and most recently OM1). Has consistently and continuously refused statin per cardiology.  c/w eliquis and plavix and metoprolol     #Hypothyroidism  - Managed via [place] [facility]roid 15qd  - dc synthroid, TSH and T4 WNL     #IDDM  - Likely secondary to distal pancreatectomy. A1C 7.3. Managed via Tresiba 22u qhs.  - Monitor FS TIDAC and QHS  - c/w Lantus 22U SQ QHS  - c/w low-dose ISS    DVT PPX: Lovenox 40mg SQ QD     Dispo: dc pending improved rate control . 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    #Chronic Afib/Aflutter  - CHADS-VASC score >6. Initially p/w intermittent palpitations. Found to be in aflutter on admission EKG. UTI suspected cause of pt's current afib relapse. Had pAF in 2021, but did not want to be on anticoagulation for bleeding issues at the time and thus was placed on aspirin and Plavix per cardiology.   - Patient now agreeable to Eliquis   - Spoke w/cardio 11/15, okay to c/w eliquis for now and dc ASA, c/w plavix  - Metoprolol increased to 50 q6 given persistently poor rate control   - Target HR <110, if still suboptimal tomorrow appreciate cardio reccs for further BB titration or second agent     #UTI  - C/w Rocephin 1g QD for 1 more day to end tomorrow, 11/16, for total course of 3 days     #Coronary Artery Disease  #Hypertension  #Dyslipidemia  Follows w/ Dr. Peters. S/P PCI x4 (mLAD, dLCX, mRCA, and most recently OM1). Has consistently and continuously refused statin per cardiology.  c/w eliquis and plavix and metoprolol     #Hypothyroidism  - Managed via [place] Thyroid 15qd  - dc synthroid, TSH and T4 WNL     #IDDM  - Likely secondary to distal pancreatectomy. A1C 7.3. Managed via Tresiba 22u qhs.  - Monitor FS TIDAC and QHS  - c/w Lantus 22U SQ QHS  - c/w low-dose ISS    DVT PPX: Lovenox 40mg SQ QD     Dispo: dc pending improved rate control . Follow up with Cardiology in AM if HR continues to be poorly controlled regarding initiation of CCB vs further uptitration of BB     Total time spent to complete patient's bedside assessment, review medical chart, discuss medical plan of care with covering medical team was more than 35 minutes  with >50% of time spent face to face with patient, discussion with patient/family and/or coordination of care      Katy Zuniga, DO

## 2022-11-15 NOTE — PROGRESS NOTE ADULT - ASSESSMENT
Assessment:   87F w/ h/o pAF (not on AC), CAD (s/p 4 stents), IDDM, and pancreatic cancer (s/p distal pancreatectomy Aug 2021), cholangio carcinoma (mets to liver; s/p partial liver resection Mar 2022 @ [facility]) p/w intermittent palpitations, found to be in rapid afib HR > 130s with improved control s/p IV metoprolol likely in the setting of acute UTI given positive UA.    Plan:  #Chronic Afib/Aflutter  - CHADS-VASC score >6. Initially p/w intermittent palpitations. Found to be in aflutter on admission EKG. UTI suspected cause of pt's current afib relapse. Had pAF in 2021, but did not want to be on anticoagulation for bleeding issues at the time and thus was placed on aspirin and Plavix per cardiology.   - At time of admission: As pt does not want to be on AC, cannot cardiovert as this would require uninterrupted AC for at least one month post-cardioversion  - Patient was started on Eliquis yesterday  - S/p 2x 5mg IV Metoprolol in the ED with improved HR  - Metoprolol switched to 50 mg TID for now, home dose is Toprol XL 50mg PO QD  - obtain thyroid panel (takes [employer] Thyroid)  - Target HR <110  - Patient hemodynamically stable, currently still in afib HR  at this morning    #UTI  - Suspected exacerbating factor for patient's afib relapse, UA was positive for +LE and blood  - S/p 1g IV Rocephin in the ED  - C/w Rocephin 1g QD for 1 more day to end tomorrow, 11/16, for total course of 3 days   - WBC normal 9 today    #Coronary Artery Disease  #Hypertension  #Dyslipidemia  Follows w/ Dr. Peters. S/P PCI x4 (mLAD, dLCX, mRCA, and most recently OM1). Has consistently and continuously refused statin per cardiology.  - c/w ASA 81mg PO QD  - Currently on AC  - Currently on metoprolol 50mg TID, home is 50 QD    #Hypothyroidism  - Managed via [employer] Thyroid 15qd  - start levothyroxine 25mcg PO QD  - TSH 2.12    #IDDM  - Likely secondary to distal pancreatectomy. Unknown A1c. Managed via Tresiba 22u qhs.  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    Plan:  #Chronic Afib/Aflutter  - CHADS-VASC score >6. Initially p/w intermittent palpitations. Found to be in aflutter on admission EKG. UTI suspected cause of pt's current afib relapse. Had pAF in 2021, but did not want to be on anticoagulation for bleeding issues at the time and thus was placed on aspirin and Plavix per cardiology.   - At time of admission: As pt does not want to be on AC, cannot cardiovert as this would require uninterrupted AC for at least one month post-cardioversion  - Patient was started on Eliquis yesterday  - Spoke w/cardio 11/15, okay to c/w eliquis for now and dc ASA, c/w plavix  - S/p 2x 5mg IV Metoprolol in the ED with improved HR  - Metoprolol switched to 50 mg TID for now, home dose is Toprol XL 50mg PO QD  - Will likely need to dc on 150mg metoprolol  - obtain thyroid panel (takes [employer] Thyroid)  - Target HR <110  - Patient hemodynamically stable, currently still in afib HR  at this morning  - Cardio f/u    #UTI  - Suspected exacerbating factor for patient's afib relapse, UA was positive for +LE and blood  - S/p 1g IV Rocephin in the ED  - C/w Rocephin 1g QD for 1 more day to end tomorrow, 11/16, for total course of 3 days   - WBC normal 9 today    #Coronary Artery Disease  #Hypertension  #Dyslipidemia  Follows w/ Dr. Peters. S/P PCI x4 (mLAD, dLCX, mRCA, and most recently OM1). Has consistently and continuously refused statin per cardiology.  - c/w ASA 81mg PO QD  - Currently on AC  - Currently on metoprolol 50mg TID, home is 50 QD    #Hypothyroidism  - Managed via [place] Thyroid 15qd  - start levothyroxine 25mcg PO QD  - TSH 2.12    #IDDM  - Likely secondary to distal pancreatectomy. Unknown A1c. Managed via Tresiba 22u qhs.  - Monitor FS TIDAC and QHS  - c/w Lantus 22U SQ QHS  - c/w low-dose ISS  - A1c 7.3    #Misc  DVT PPX: Lovenox 40mg SQ QD   GI PPX: none indicated  DIET: DASH/TLC + CC  ACTIVITY: IAT  CODE STATUS: Full Code  DISPOSITION: From Home; likely d/c home in 1-2 days

## 2022-11-16 LAB
ALBUMIN SERPL ELPH-MCNC: 3.9 G/DL — SIGNIFICANT CHANGE UP (ref 3.5–5.2)
ALP SERPL-CCNC: 94 U/L — SIGNIFICANT CHANGE UP (ref 30–115)
ALT FLD-CCNC: 28 U/L — SIGNIFICANT CHANGE UP (ref 0–41)
ANION GAP SERPL CALC-SCNC: 11 MMOL/L — SIGNIFICANT CHANGE UP (ref 7–14)
AST SERPL-CCNC: 25 U/L — SIGNIFICANT CHANGE UP (ref 0–41)
BILIRUB SERPL-MCNC: 0.5 MG/DL — SIGNIFICANT CHANGE UP (ref 0.2–1.2)
BUN SERPL-MCNC: 24 MG/DL — HIGH (ref 10–20)
CALCIUM SERPL-MCNC: 9.2 MG/DL — SIGNIFICANT CHANGE UP (ref 8.4–10.5)
CHLORIDE SERPL-SCNC: 103 MMOL/L — SIGNIFICANT CHANGE UP (ref 98–110)
CO2 SERPL-SCNC: 25 MMOL/L — SIGNIFICANT CHANGE UP (ref 17–32)
CREAT SERPL-MCNC: 1 MG/DL — SIGNIFICANT CHANGE UP (ref 0.7–1.5)
EGFR: 55 ML/MIN/1.73M2 — LOW
GLUCOSE BLDC GLUCOMTR-MCNC: 142 MG/DL — HIGH (ref 70–99)
GLUCOSE BLDC GLUCOMTR-MCNC: 170 MG/DL — HIGH (ref 70–99)
GLUCOSE BLDC GLUCOMTR-MCNC: 172 MG/DL — HIGH (ref 70–99)
GLUCOSE BLDC GLUCOMTR-MCNC: 193 MG/DL — HIGH (ref 70–99)
GLUCOSE SERPL-MCNC: 198 MG/DL — HIGH (ref 70–99)
HCT VFR BLD CALC: 41.2 % — SIGNIFICANT CHANGE UP (ref 37–47)
HGB BLD-MCNC: 13.6 G/DL — SIGNIFICANT CHANGE UP (ref 12–16)
MAGNESIUM SERPL-MCNC: 1.9 MG/DL — SIGNIFICANT CHANGE UP (ref 1.8–2.4)
MCHC RBC-ENTMCNC: 31.8 PG — HIGH (ref 27–31)
MCHC RBC-ENTMCNC: 33 G/DL — SIGNIFICANT CHANGE UP (ref 32–37)
MCV RBC AUTO: 96.3 FL — SIGNIFICANT CHANGE UP (ref 81–99)
NRBC # BLD: 0 /100 WBCS — SIGNIFICANT CHANGE UP (ref 0–0)
PLATELET # BLD AUTO: 167 K/UL — SIGNIFICANT CHANGE UP (ref 130–400)
POTASSIUM SERPL-MCNC: 4.4 MMOL/L — SIGNIFICANT CHANGE UP (ref 3.5–5)
POTASSIUM SERPL-SCNC: 4.4 MMOL/L — SIGNIFICANT CHANGE UP (ref 3.5–5)
PROT SERPL-MCNC: 6.4 G/DL — SIGNIFICANT CHANGE UP (ref 6–8)
RBC # BLD: 4.28 M/UL — SIGNIFICANT CHANGE UP (ref 4.2–5.4)
RBC # FLD: 15.6 % — HIGH (ref 11.5–14.5)
SODIUM SERPL-SCNC: 139 MMOL/L — SIGNIFICANT CHANGE UP (ref 135–146)
WBC # BLD: 8.64 K/UL — SIGNIFICANT CHANGE UP (ref 4.8–10.8)
WBC # FLD AUTO: 8.64 K/UL — SIGNIFICANT CHANGE UP (ref 4.8–10.8)

## 2022-11-16 PROCEDURE — 99232 SBSQ HOSP IP/OBS MODERATE 35: CPT

## 2022-11-16 RX ADMIN — INSULIN GLARGINE 22 UNIT(S): 100 INJECTION, SOLUTION SUBCUTANEOUS at 21:27

## 2022-11-16 RX ADMIN — Medication 50 MILLIGRAM(S): at 23:42

## 2022-11-16 RX ADMIN — CEFTRIAXONE 100 MILLIGRAM(S): 500 INJECTION, POWDER, FOR SOLUTION INTRAMUSCULAR; INTRAVENOUS at 05:33

## 2022-11-16 RX ADMIN — Medication 1 APPLICATION(S): at 13:01

## 2022-11-16 RX ADMIN — Medication 50 MILLIGRAM(S): at 11:25

## 2022-11-16 RX ADMIN — Medication 1: at 16:55

## 2022-11-16 RX ADMIN — Medication 1 APPLICATION(S): at 21:26

## 2022-11-16 RX ADMIN — APIXABAN 2.5 MILLIGRAM(S): 2.5 TABLET, FILM COATED ORAL at 05:39

## 2022-11-16 RX ADMIN — Medication 50 MILLIGRAM(S): at 17:04

## 2022-11-16 RX ADMIN — CLOPIDOGREL BISULFATE 75 MILLIGRAM(S): 75 TABLET, FILM COATED ORAL at 11:25

## 2022-11-16 RX ADMIN — Medication 50 MILLIGRAM(S): at 05:33

## 2022-11-16 RX ADMIN — Medication 1: at 12:12

## 2022-11-16 RX ADMIN — APIXABAN 2.5 MILLIGRAM(S): 2.5 TABLET, FILM COATED ORAL at 17:04

## 2022-11-16 RX ADMIN — Medication 1 APPLICATION(S): at 05:06

## 2022-11-16 NOTE — PROGRESS NOTE ADULT - SUBJECTIVE AND OBJECTIVE BOX
Subjective: pt is tired but well otherwise.  pt denies cp, sob nor palpitation      MEDICATIONS  (STANDING):  apixaban 2.5 milliGRAM(s) Oral two times a day  cefTRIAXone   IVPB 1000 milliGRAM(s) IV Intermittent every 24 hours  clopidogrel Tablet 75 milliGRAM(s) Oral daily  insulin glargine Injectable (LANTUS) 22 Unit(s) SubCutaneous at bedtime  insulin lispro (ADMELOG) corrective regimen sliding scale   SubCutaneous three times a day before meals  metoprolol tartrate 50 milliGRAM(s) Oral every 6 hours  vitamin A &amp; D Ointment 1 Application(s) Topical three times a day    MEDICATIONS  (PRN):  acetaminophen     Tablet .. 650 milliGRAM(s) Oral every 6 hours PRN Mild Pain (1 - 3), Moderate Pain (4 - 6)            Vital Signs Last 24 Hrs  T(C): 36.7 (16 Nov 2022 21:00), Max: 36.7 (16 Nov 2022 21:00)  T(F): 98.1 (16 Nov 2022 21:00), Max: 98.1 (16 Nov 2022 21:00)  HR: 98 (16 Nov 2022 21:00) (98 - 115)  BP: 126/74 (16 Nov 2022 21:00) (120/59 - 134/62)  BP(mean): --  RR: 18 (16 Nov 2022 21:00) (18 - 18)  SpO2: 95% (16 Nov 2022 07:36) (95% - 95%)    Parameters below as of 16 Nov 2022 07:36  Patient On (Oxygen Delivery Method): room air                 REVIEW OF SYSTEMS:  CONSTITUTIONAL: no fever, no chills, no diaphoresis  CARDIOLOGY: no chest pain, no SOB, no palpitation, no diaphoresis, no faint   RESPIRATORY: no dyspnea, no wheeze, no orthopnea, no PND   NEUROLOGICAL: no dizziness, headache, focal deficits to report.  GI: no abdominal pain, no dyspepsia, no nausea, no vomiting, no diarrhea.    HEENT: no congestion, no nasal bleeding  SKIN: no ecchymosis,              PHYSICAL EXAM:  · CONSTITUTIONAL: Looks stable, in no distress  . NECK: Supple, no JVD, no bruit on either carotid side   · RESPIRATORY: Normal air entry to lung base, no wheeze, no crackle, no wet rales  · CARDIOVASCULAR: Normal S1, A2, P2, no murmur, no click, irregular rate,  no rub,  · EXTREMITIES: No cyanosis, no clubbing, no edema  · VASCULAR: Pulses are regular, equal, bilateral in upper and lower extremities  	  TELEMETRY:afib with improving v response    ECG:    TTE:    LABS:                        13.6   8.64  )-----------( 167      ( 16 Nov 2022 06:37 )             41.2     11-16    139  |  103  |  24<H>  ----------------------------<  198<H>  4.4   |  25  |  1.0    Ca    9.2      16 Nov 2022 06:37  Mg     1.9     11-16    TPro  6.4  /  Alb  3.9  /  TBili  0.5  /  DBili  x   /  AST  25  /  ALT  28  /  AlkPhos  94  11-16            I&O's Summary    15 Nov 2022 07:01 - 16 Nov 2022 07:00  --------------------------------------------------------  IN: 450 mL / OUT: 0 mL / NET: 450 mL    16 Nov 2022 07:01  -  16 Nov 2022 20:53  --------------------------------------------------------  IN: 450 mL / OUT: 0 mL / NET: 450 mL      BNP  RADIOLOGY & ADDITIONAL STUDIES:    IMPRESSION AND PLAN:

## 2022-11-16 NOTE — PROGRESS NOTE ADULT - ASSESSMENT
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) p/w intermittent palpitations, found to be in rapid afib HR > 130s with improved control s/p IV metoprolol likely in the setting of acute UTI given positive UA.    # Chronic Afib/Aflutter with RVR  - c/w eliquis   - on metoprolol 50 mg po q6h     # UTI  - on IV rocephin    # Coronary Artery Disease  # Hypertension  # Dyslipidemia  - follows w/ Dr. Peters. S/P PCI x4 (mLAD, dLCX, mRCA, and most recently OM1). Has consistently and continuously refused statin per cardiology.  - c/w ASA 81mg PO QD  - Currently on AC  - Currently on metoprolol 50mg TID, home is 50 QD    # Hypothyroidism  - Managed via [place] Thyroid 15qd  - start levothyroxine 25mcg PO QD  - TSH 2.12    # IDDM  - Likely secondary to distal pancreatectomy. Unknown A1c. Managed via Tresiba 22u qhs.  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    # Chronic Afib/Aflutter with RVR  - c/w eliquis   - on metoprolol 50 mg po q6h   - echo pending    # UTI  - on IV rocephin, follow up blood culture    # Coronary Artery Disease  # Hypertension  # Dyslipidemia  - follows w/ Dr. Peters. S/P PCI x4 (mLAD, dLCX, mRCA, and most recently OM1). Has consistently and continuously refused statin per cardiology.  - c/w ASA 81mg PO QD  - Currently on AC  - Currently on metoprolol 50mg TID, home is 50 QD    # Hypothyroidism  - Managed via [place] Thyroid 15qd  - start levothyroxine 25mcg PO QD  - TSH 2.12    # IDDM  - Likely secondary to distal pancreatectomy. Unknown A1c. Managed via Tresiba 22u qhs.  - Monitor FS TIDAC and QHS  - c/w Lantus 22U SQ QHS  - c/w low-dose ISS  - A1c 7.3    # Pancreatic cancer (s/p distal pancreatectomy Aug 2021)  # Cholangio carcinoma (mets to liver; s/p [facility]22 [facility])  - outpatient follow up    # DVT prophylaxis - on eliquis  # Code status - Full Code

## 2022-11-16 NOTE — PROGRESS NOTE ADULT - SUBJECTIVE AND OBJECTIVE BOX
ANNE VILLATORO 87y Female  MRN#: [id number]   Hospital Day: 3d    SUBJECTIVE  Patient is a 87y old Female who presents with a chief complaint of Palpitations (16 Nov 2022 10:40)  Currently admitted to medicine with the primary diagnosis of Palpitations      INTERVAL HPI AND OVERNIGHT EVENTS:  Patient was examined and seen at bedside. This morning she is resting comfortably in bed and reports no issues or overnight events. Patient still in afib/flutter HR > 110 overnight, hemodynamically stable. Otherwise patient has no major complaints, No acute events overnight.     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; s/p partial liver resection Mar 2022 @ [facility]    History of partial pancreatectomy  s/p distal pancreatectomy Aug 2021    H/O resection of liver  mets to liver; s/p partial liver resection Mar 2022 @ [facility]      ALLERGIES:  No Known Allergies    MEDICATIONS:  STANDING MEDICATIONS  apixaban 2.5 milliGRAM(s) Oral two times a day  cefTRIAXone   IVPB 1000 milliGRAM(s) IV Intermittent every 24 hours  clopidogrel Tablet 75 milliGRAM(s) Oral daily  insulin glargine Injectable (LANTUS) 22 Unit(s) SubCutaneous at bedtime  insulin lispro (ADMELOG) corrective regimen sliding scale   SubCutaneous three times a day before meals  metoprolol tartrate 50 milliGRAM(s) Oral every 6 hours  vitamin A &amp; D Ointment 1 Application(s) Topical three times a day    PRN MEDICATIONS  acetaminophen     Tablet .. 650 milliGRAM(s) Oral every 6 hours PRN      VITAL SIGNS: Last 24 Hours  T(C): 36.1 (16 Nov 2022 12:33), Max: 36.9 (15 Nov 2022 20:33)  T(F): 97 (16 Nov 2022 12:33), Max: 98.4 (15 Nov 2022 20:33)  HR: 103 (16 Nov 2022 12:33) (103 - 123)  BP: 134/62 (16 Nov 2022 12:33) (120/59 - 135/74)  BP(mean): --  RR: 18 (16 Nov 2022 12:33) (18 - 18)  SpO2: 95% (16 Nov 2022 07:36) (95% - 96%)    LABS:                        13.6   8.64  )-----------( 167      ( 16 Nov 2022 06:37 )             41.2     11-16    139  |  103  |  24<H>  ----------------------------<  198<H>  4.4   |  25  |  1.0    Ca    9.2      16 Nov 2022 06:37  Mg     1.9     11-16    TPro  6.4  /  Alb  3.9  /  TBili  0.5  /  DBili  x   /  AST  25  /  ALT  28  /  AlkPhos  94  11-16        RADIOLOGY:      PHYSICAL EXAM:  CONSTITUTIONAL: No acute distress, well-developed, well-groomed, AAOx3  HEAD: Atraumatic, normocephalic  EYES: EOM intact, PERRLA, conjunctiva and sclera clear  ENT: Supple, no masses, no thyromegaly, no bruits, no JVD; moist mucous membranes  PULMONARY: Clear to auscultation bilaterally; no wheezes, rales, or rhonchi  CARDIOVASCULAR: Regular rate and rhythm; no murmurs, rubs, or gallops  GASTROINTESTINAL: Soft, non-tender, non-distended; bowel sounds present  MUSCULOSKELETAL: 2+ peripheral pulses; no clubbing, no cyanosis, no edema  NEUROLOGY: non-focal  SKIN: No rashes or lesions; warm and dry

## 2022-11-16 NOTE — PROGRESS NOTE ADULT - ASSESSMENT
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    Plan:  #Chronic Afib/Aflutter  - CHADS-VASC score >6. Initially p/w intermittent palpitations. Found to be in aflutter on admission EKG. UTI suspected cause of pt's current afib relapse. Had pAF in 2021, but did not want to be on anticoagulation for bleeding issues at the time and thus was placed on aspirin and Plavix per cardiology.   - At time of admission: As pt does not want to be on AC, cannot cardiovert as this would require uninterrupted AC for at least one month post-cardioversion  - Patient was started on Eliquis yesterday  - Spoke w/cardio 11/15, okay to c/w eliquis for now and dc ASA, c/w plavix  - S/p 2x 5mg IV Metoprolol in the ED with improved HR  - Metoprolol switched to 50 mg TID for now, home dose is Toprol XL 50mg PO QD  - Will likely need to dc on 150mg metoprolol  - obtain thyroid panel (takes [place] Thyroid)  - Target HR <110  - Patient hemodynamically stable, currently still in afib HR> 100 at this morning  - Cardio f/u, will need improved rate control    #UTI  - Suspected exacerbating factor for patient's afib relapse, UA was positive for +LE and blood  - S/p 1g IV Rocephin in the ED  - C/w Rocephin 1g QD for 1 more day to end tomorrow, 11/16, for total course of 3 days   - WBC normal 9 today    #Coronary Artery Disease  #Hypertension  #Dyslipidemia  Follows w/ Dr. Peters. S/P PCI x4 (mLAD, dLCX, mRCA, and most recently OM1). Has consistently and continuously refused statin per cardiology.  - c/w ASA 81mg PO QD  - Currently on AC  - Currently on metoprolol 50mg TID, home is 50 QD    #Hypothyroidism  - Managed via [place] Thyroid 15qd  - start levothyroxine 25mcg PO QD  - TSH 2.12    #IDDM  - Likely secondary to distal pancreatectomy. Unknown A1c. Managed via Tresiba 22u qhs.  - Monitor FS TIDAC and QHS  - c/w Lantus 22U SQ QHS  - c/w low-dose ISS  - A1c 7.3    #Misc  DVT PPX: Lovenox 40mg SQ QD   GI PPX: none indicated  DIET: DASH/TLC + CC  ACTIVITY: IAT  CODE STATUS: Full Code  DISPOSITION: From Home; likely d/c home in 1-2 days

## 2022-11-16 NOTE — PROGRESS NOTE ADULT - SUBJECTIVE AND OBJECTIVE BOX
ANNE VILLATORO  87y Female    Patient is a 87y old  Female who presents with a chief complaint of palpitations    INTERVAL HPI/OVERNIGHT EVENTS:    ROS: Pt states palpitations improved but still feels weak. Pt denies fever, chills, SOB, chest pain, nausea, vomiting, abdominal pain, dysuria, diarrhea, constipation, rash. Pt admits to chronic back and leg pain on ambulation but nothing new from baseline.    Overnight events: No acute events overnight.    Vital Signs Last 24 Hrs  T(C): 36.7 (16 Nov 2022 21:00), Max: 36.7 (16 Nov 2022 21:00)  T(F): 98.1 (16 Nov 2022 21:00), Max: 98.1 (16 Nov 2022 21:00)  HR: 98 (16 Nov 2022 21:00) (98 - 115)  BP: 126/74 (16 Nov 2022 21:00) (120/59 - 134/62)  BP(mean): --  RR: 18 (16 Nov 2022 21:00) (18 - 18)  SpO2: 95% (16 Nov 2022 07:36) (95% - 95%)    Parameters below as of 16 Nov 2022 07:36  Patient On (Oxygen Delivery Method): room air        No Known Allergies      MEDICATIONS  (STANDING):  apixaban 2.5 milliGRAM(s) Oral two times a day  cefTRIAXone   IVPB 1000 milliGRAM(s) IV Intermittent every 24 hours  clopidogrel Tablet 75 milliGRAM(s) Oral daily  insulin glargine Injectable (LANTUS) 22 Unit(s) SubCutaneous at bedtime  insulin lispro (ADMELOG) corrective regimen sliding scale   SubCutaneous three times a day before meals  metoprolol tartrate 50 milliGRAM(s) Oral every 6 hours  vitamin A &amp; D Ointment 1 Application(s) Topical three times a day    MEDICATIONS  (PRN):  acetaminophen     Tablet .. 650 milliGRAM(s) Oral every 6 hours PRN Mild Pain (1 - 3), Moderate Pain (4 - 6)      PHYSICAL EXAM:  General Appearance: NAD, normal for age and gender. 	  Neck: Normal JVP, no bruit.   Eyes: Conjunctiva clear, Extra Ocular muscles intact. No scleral icterus.  ENMT: Moist oral mucosa. No oral lesion.  Cardiovascular: Irregularly irregular, tachycardic, S1 S2, No JVD, No murmurs.  Respiratory: Lungs clear to auscultation. No wheezes, rales or rhonchi.  Psychiatry: Alert and oriented x 3, Mood & affect appropriate.  Gastrointestinal:  Soft, Non-tender, Non-distended.  Neurologic: Non-focal deficits.  Musculoskeletal/ extremities: Normal range of motion, No clubbing, cyanosis or edema.  Vascular: Peripheral pulses palpable bilaterally.  Skin/Integumen: No rashes, No ecchymoses, No cyanosis.    LABS:                        13.6   8.64  )-----------( 167      ( 16 Nov 2022 06:37 )             41.2     11-16    139  |  103  |  24<H>  ----------------------------<  198<H>  4.4   |  25  |  1.0    Ca    9.2      16 Nov 2022 06:37  Mg     1.9     11-16    TPro  6.4  /  Alb  3.9  /  TBili  0.5  /  DBili  x   /  AST  25  /  ALT  28  /  AlkPhos  94  11-16          RADIOLOGY & ADDITIONAL TESTS:

## 2022-11-16 NOTE — PROGRESS NOTE ADULT - ASSESSMENT
pt with hx of pafib, cad, s/p stents last 8/2021, nl lv systolic fxn, admitted with uti and found to be in afib with rapid v response.  pt's ventricular rate improved on metoprolol 50 q6.  pt tolerating eliquis and plavix with no signs of bleeding  ekg in am  echo eval ef

## 2022-11-17 LAB
ALBUMIN SERPL ELPH-MCNC: 3.8 G/DL — SIGNIFICANT CHANGE UP (ref 3.5–5.2)
ALP SERPL-CCNC: 85 U/L — SIGNIFICANT CHANGE UP (ref 30–115)
ALT FLD-CCNC: 27 U/L — SIGNIFICANT CHANGE UP (ref 0–41)
ANION GAP SERPL CALC-SCNC: 10 MMOL/L — SIGNIFICANT CHANGE UP (ref 7–14)
AST SERPL-CCNC: 23 U/L — SIGNIFICANT CHANGE UP (ref 0–41)
BILIRUB SERPL-MCNC: 0.4 MG/DL — SIGNIFICANT CHANGE UP (ref 0.2–1.2)
BUN SERPL-MCNC: 23 MG/DL — HIGH (ref 10–20)
CALCIUM SERPL-MCNC: 9 MG/DL — SIGNIFICANT CHANGE UP (ref 8.4–10.5)
CHLORIDE SERPL-SCNC: 103 MMOL/L — SIGNIFICANT CHANGE UP (ref 98–110)
CO2 SERPL-SCNC: 24 MMOL/L — SIGNIFICANT CHANGE UP (ref 17–32)
CREAT SERPL-MCNC: 0.8 MG/DL — SIGNIFICANT CHANGE UP (ref 0.7–1.5)
EGFR: 71 ML/MIN/1.73M2 — SIGNIFICANT CHANGE UP
GLUCOSE BLDC GLUCOMTR-MCNC: 136 MG/DL — HIGH (ref 70–99)
GLUCOSE BLDC GLUCOMTR-MCNC: 151 MG/DL — HIGH (ref 70–99)
GLUCOSE BLDC GLUCOMTR-MCNC: 161 MG/DL — HIGH (ref 70–99)
GLUCOSE BLDC GLUCOMTR-MCNC: 220 MG/DL — HIGH (ref 70–99)
GLUCOSE SERPL-MCNC: 189 MG/DL — HIGH (ref 70–99)
HCT VFR BLD CALC: 41.1 % — SIGNIFICANT CHANGE UP (ref 37–47)
HGB BLD-MCNC: 13.4 G/DL — SIGNIFICANT CHANGE UP (ref 12–16)
MAGNESIUM SERPL-MCNC: 1.8 MG/DL — SIGNIFICANT CHANGE UP (ref 1.8–2.4)
MCHC RBC-ENTMCNC: 31.5 PG — HIGH (ref 27–31)
MCHC RBC-ENTMCNC: 32.6 G/DL — SIGNIFICANT CHANGE UP (ref 32–37)
MCV RBC AUTO: 96.7 FL — SIGNIFICANT CHANGE UP (ref 81–99)
NRBC # BLD: 0 /100 WBCS — SIGNIFICANT CHANGE UP (ref 0–0)
PLATELET # BLD AUTO: 200 K/UL — SIGNIFICANT CHANGE UP (ref 130–400)
POTASSIUM SERPL-MCNC: 4.2 MMOL/L — SIGNIFICANT CHANGE UP (ref 3.5–5)
POTASSIUM SERPL-SCNC: 4.2 MMOL/L — SIGNIFICANT CHANGE UP (ref 3.5–5)
PROT SERPL-MCNC: 6 G/DL — SIGNIFICANT CHANGE UP (ref 6–8)
RBC # BLD: 4.25 M/UL — SIGNIFICANT CHANGE UP (ref 4.2–5.4)
RBC # FLD: 15.6 % — HIGH (ref 11.5–14.5)
SODIUM SERPL-SCNC: 137 MMOL/L — SIGNIFICANT CHANGE UP (ref 135–146)
WBC # BLD: 8.56 K/UL — SIGNIFICANT CHANGE UP (ref 4.8–10.8)
WBC # FLD AUTO: 8.56 K/UL — SIGNIFICANT CHANGE UP (ref 4.8–10.8)

## 2022-11-17 PROCEDURE — 99232 SBSQ HOSP IP/OBS MODERATE 35: CPT

## 2022-11-17 PROCEDURE — 93010 ELECTROCARDIOGRAM REPORT: CPT

## 2022-11-17 RX ORDER — DILTIAZEM HCL 120 MG
30 CAPSULE, EXT RELEASE 24 HR ORAL EVERY 8 HOURS
Refills: 0 | Status: DISCONTINUED | OUTPATIENT
Start: 2022-11-17 | End: 2022-11-18

## 2022-11-17 RX ORDER — SENNA PLUS 8.6 MG/1
2 TABLET ORAL AT BEDTIME
Refills: 0 | Status: DISCONTINUED | OUTPATIENT
Start: 2022-11-17 | End: 2022-11-18

## 2022-11-17 RX ORDER — PANTOPRAZOLE SODIUM 20 MG/1
40 TABLET, DELAYED RELEASE ORAL
Refills: 0 | Status: DISCONTINUED | OUTPATIENT
Start: 2022-11-17 | End: 2022-11-21

## 2022-11-17 RX ORDER — POLYETHYLENE GLYCOL 3350 17 G/17G
17 POWDER, FOR SOLUTION ORAL DAILY
Refills: 0 | Status: DISCONTINUED | OUTPATIENT
Start: 2022-11-17 | End: 2022-11-18

## 2022-11-17 RX ADMIN — Medication 1 APPLICATION(S): at 15:33

## 2022-11-17 RX ADMIN — Medication 50 MILLIGRAM(S): at 12:22

## 2022-11-17 RX ADMIN — INSULIN GLARGINE 22 UNIT(S): 100 INJECTION, SOLUTION SUBCUTANEOUS at 21:45

## 2022-11-17 RX ADMIN — APIXABAN 2.5 MILLIGRAM(S): 2.5 TABLET, FILM COATED ORAL at 17:18

## 2022-11-17 RX ADMIN — Medication 1: at 08:10

## 2022-11-17 RX ADMIN — APIXABAN 2.5 MILLIGRAM(S): 2.5 TABLET, FILM COATED ORAL at 05:22

## 2022-11-17 RX ADMIN — Medication 50 MILLIGRAM(S): at 05:23

## 2022-11-17 RX ADMIN — Medication 50 MILLIGRAM(S): at 23:52

## 2022-11-17 RX ADMIN — SENNA PLUS 2 TABLET(S): 8.6 TABLET ORAL at 12:22

## 2022-11-17 RX ADMIN — Medication 50 MILLIGRAM(S): at 17:18

## 2022-11-17 RX ADMIN — Medication 1 APPLICATION(S): at 21:48

## 2022-11-17 RX ADMIN — CLOPIDOGREL BISULFATE 75 MILLIGRAM(S): 75 TABLET, FILM COATED ORAL at 12:22

## 2022-11-17 RX ADMIN — CEFTRIAXONE 100 MILLIGRAM(S): 500 INJECTION, POWDER, FOR SOLUTION INTRAMUSCULAR; INTRAVENOUS at 05:22

## 2022-11-17 RX ADMIN — Medication 1 APPLICATION(S): at 05:18

## 2022-11-17 RX ADMIN — Medication 30 MILLIGRAM(S): at 21:46

## 2022-11-17 RX ADMIN — PANTOPRAZOLE SODIUM 40 MILLIGRAM(S): 20 TABLET, DELAYED RELEASE ORAL at 12:22

## 2022-11-17 NOTE — PROGRESS NOTE ADULT - SUBJECTIVE AND OBJECTIVE BOX
ANNE VILLATORO 87y Female  MRN#: [id number]   Hospital Day: 4d    SUBJECTIVE  Patient is a 87y old Female who presents with a chief complaint of Palpitations (16 Nov 2022 20:53)  Currently admitted to medicine with the primary diagnosis of Palpitations      INTERVAL HPI AND OVERNIGHT EVENTS:  Patient was examined and seen at bedside. This morning she is resting comfortably in bed and reports no issues or overnight events. Patient reports subjective feeling of indigestion and L-sided chest discomfort, associated with burping. Patient states it does not feel like chest pain in the past when she had MI, and has reproducible chest wall tenderness on my exam this AM. No changes on tele-monitor were noted, patient HR is still > 110 and afib. Otherwise patient feels generally well, no other acute events.     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; s/p partial liver resection Mar 2022 @ [facility]    History of partial pancreatectomy  s/p distal pancreatectomy Aug 2021    H/O resection of liver  mets to liver; s/p partial liver resection Mar 2022 @ [facility]      ALLERGIES:  No Known Allergies    MEDICATIONS:  STANDING MEDICATIONS  apixaban 2.5 milliGRAM(s) Oral two times a day  clopidogrel Tablet 75 milliGRAM(s) Oral daily  insulin glargine Injectable (LANTUS) 22 Unit(s) SubCutaneous at bedtime  insulin lispro (ADMELOG) corrective regimen sliding scale   SubCutaneous three times a day before meals  metoprolol tartrate 50 milliGRAM(s) Oral every 6 hours  vitamin A &amp; D Ointment 1 Application(s) Topical three times a day    PRN MEDICATIONS  acetaminophen     Tablet .. 650 milliGRAM(s) Oral every 6 hours PRN      VITAL SIGNS: Last 24 Hours  T(C): 35.8 (17 Nov 2022 05:11), Max: 36.7 (16 Nov 2022 21:00)  T(F): 96.5 (17 Nov 2022 05:11), Max: 98.1 (16 Nov 2022 21:00)  HR: 87 (17 Nov 2022 05:11) (87 - 110)  BP: 108/56 (17 Nov 2022 05:11) (108/56 - 134/62)  BP(mean): --  RR: 18 (17 Nov 2022 05:11) (18 - 18)  SpO2: 97% (17 Nov 2022 05:11) (97% - 97%)    LABS:                        13.4   8.56  )-----------( 200      ( 17 Nov 2022 07:06 )             41.1     11-17    137  |  103  |  23<H>  ----------------------------<  189<H>  4.2   |  24  |  0.8    Ca    9.0      17 Nov 2022 07:06  Mg     1.8     11-17    TPro  6.0  /  Alb  3.8  /  TBili  0.4  /  DBili  x   /  AST  23  /  ALT  27  /  AlkPhos  85  11-17        RADIOLOGY:      PHYSICAL EXAM:  CONSTITUTIONAL: No acute distress, well-developed, well-groomed, AAOx3  HEAD: Atraumatic, normocephalic  EYES: EOM intact, conjunctiva and sclera clear  ENT: Supple, no JVD; moist mucous membranes  PULMONARY: Clear to auscultation bilaterally; no wheezes, rales, or rhonchi  CARDIOVASCULAR: Regular rate and rhythm; no murmurs, rubs, or gallops  GASTROINTESTINAL: Soft, non-tender, non-distended; bowel sounds present  MUSCULOSKELETAL: 2+ peripheral pulses; no clubbing, no cyanosis, no edema, + reproducible L-sided chest wall tenderness  NEUROLOGY: non-focal, moving all extremities, no facial droop  SKIN: No rashes or lesions; warm and dry

## 2022-11-17 NOTE — PROGRESS NOTE ADULT - ASSESSMENT
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    # Chronic Afib/Aflutter with RVR  - c/w eliquis   - on metoprolol 50 mg po q6h   - echo pending    # UTI  - completed rocephin course, follow up blood culture    # Coronary Artery Disease  # Hypertension  # Dyslipidemia  - follows w/ Dr. Peters. S/P PCI x4 (mLAD, dLCX, mRCA, and most recently OM1). Has consistently and continuously refused statin per cardiology.  - c/w ASA 81mg PO QD  - Currently on AC  - Currently on metoprolol 50mg TID, home is 50 QD    # Hypothyroidism  - Managed via [place] Thyroid 15qd  - start levothyroxine 25mcg PO QD  - TSH 2.12    # IDDM  - Likely secondary to distal pancreatectomy. Unknown A1c. Managed via Tresiba 22u qhs.  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

## 2022-11-17 NOTE — PROGRESS NOTE ADULT - SUBJECTIVE AND OBJECTIVE BOX
ANNE VILLATORO  87y Female    Patient is a 87y old  Female who presents with a chief complaint of palpitations    INTERVAL HPI/OVERNIGHT EVENTS:    ROS: Pt states that palpitations has improved but feels weaker. Pt admits to abdominal fullness, bloating and burping as well as constipation but denies abdominal pain. Pt denies fever, SOB, chest pain, dysuria. Pt is ambulating.    Overnight events: No acute events overnight.    Vital Signs Last 24 Hrs  T(C): 35.9 (17 Nov 2022 12:30), Max: 36.7 (16 Nov 2022 21:00)  T(F): 96.7 (17 Nov 2022 12:30), Max: 98.1 (16 Nov 2022 21:00)  HR: 100 (17 Nov 2022 12:30) (87 - 100)  BP: 124/88 (17 Nov 2022 12:30) (108/56 - 126/74)  BP(mean): --  ABP: --  ABP(mean): --  RR: 18 (17 Nov 2022 12:30) (18 - 18)  SpO2: 97% (17 Nov 2022 05:11) (97% - 97%)    O2 Parameters below as of 17 Nov 2022 05:11  Patient On (Oxygen Delivery Method): room air    No Known Allergies    MEDICATIONS  (STANDING):  apixaban 2.5 milliGRAM(s) Oral two times a day  clopidogrel Tablet 75 milliGRAM(s) Oral daily  insulin glargine Injectable (LANTUS) 22 Unit(s) SubCutaneous at bedtime  insulin lispro (ADMELOG) corrective regimen sliding scale   SubCutaneous three times a day before meals  metoprolol tartrate 50 milliGRAM(s) Oral every 6 hours  pantoprazole    Tablet 40 milliGRAM(s) Oral before breakfast  polyethylene glycol 3350 17 Gram(s) Oral daily  senna 2 Tablet(s) Oral at bedtime  vitamin A &amp; D Ointment 1 Application(s) Topical three times a day    MEDICATIONS  (PRN):  acetaminophen     Tablet .. 650 milliGRAM(s) Oral every 6 hours PRN Mild Pain (1 - 3), Moderate Pain (4 - 6)  aluminum hydroxide/magnesium hydroxide/simethicone Suspension 30 milliLiter(s) Oral every 6 hours PRN Dyspepsia      PHYSICAL EXAM:  General Appearance: NAD, normal for age and gender. 	  Neck: Normal JVP, no bruit.   Eyes: Conjunctiva clear, Extra Ocular muscles intact. No scleral icterus.  ENMT: Moist oral mucosa. No oral lesion.  Cardiovascular: Irregularly irregular, S1 S2, No JVD, No murmurs.  Respiratory: Lungs clear to auscultation. No wheezes, rales or rhonchi.  Psychiatry: Alert and oriented x 3, Mood & affect appropriate.  Gastrointestinal:  Soft, Non-tender, Mildly distended.  Neurologic: Non-focal deficits.  Musculoskeletal/ extremities: Normal range of motion, No clubbing, cyanosis or edema.  Vascular: Peripheral pulses palpable bilaterally.  Skin/Integumen: No rashes, No ecchymoses, No cyanosis.    LABS:                        13.6   8.64  )-----------( 167      ( 16 Nov 2022 06:37 )             41.2     11-16    139  |  103  |  24<H>  ----------------------------<  198<H>  4.4   |  25  |  1.0    Ca    9.2      16 Nov 2022 06:37  Mg     1.9     11-16    TPro  6.4  /  Alb  3.9  /  TBili  0.5  /  DBili  x   /  AST  25  /  ALT  28  /  AlkPhos  94  11-16    RADIOLOGY & ADDITIONAL TESTS:

## 2022-11-17 NOTE — PROGRESS NOTE ADULT - ASSESSMENT
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    Plan:  #Chronic Afib/Aflutter  - CHADS-VASC score >6. Initially p/w intermittent palpitations. Found to be in aflutter on admission EKG. UTI suspected cause of pt's current afib relapse. Had pAF in 2021, but did not want to be on anticoagulation for bleeding issues at the time and thus was placed on aspirin and Plavix per cardiology.   - At time of admission: As pt does not want to be on AC, cannot cardiovert as this would require uninterrupted AC for at least one month post-cardioversion  - Patient was started on Eliquis yesterday  - Spoke w/cardio 11/15, okay to c/w eliquis for now and dc ASA, c/w plavix  - S/p 2x 5mg IV Metoprolol in the ED with improved HR  - Metoprolol switched to 50 mgq6h for now, home dose is Toprol XL 50mg PO QD  - Will likely need to dc on 150mg metoprolol  - obtain thyroid panel (takes [place] Thyroid)  - Target HR <110  - Patient hemodynamically stable, currently still in afib HR> 100 at this morning  - Cardio f/u, will need improved rate control    #UTI  - Suspected exacerbating factor for patient's afib relapse, UA was positive for +LE and blood  - S/p 1g IV Rocephin in the ED  - Completed course of rocephin  - WBC normal 8.5 today    #Coronary Artery Disease  #Hypertension  #Dyslipidemia  Follows w/ Dr. Peters. S/P PCI x4 (mLAD, dLCX, mRCA, and most recently OM1). Has consistently and continuously refused statin per cardiology.  - c/w ASA 81mg PO QD  - Currently on AC    #Hypothyroidism  - Managed via [place] Thyroid 15qd  - start levothyroxine 25mcg PO QD  - TSH 2.12    #IDDM  - Likely secondary to distal pancreatectomy. Unknown A1c. Managed via Tresiba 22u qhs.  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    Plan:  #Chronic Afib/Aflutter  - CHADS-VASC score >6. Initially p/w intermittent palpitations. Found to be in aflutter on admission EKG. UTI suspected cause of pt's current afib relapse. Had pAF in 2021, but did not want to be on anticoagulation for bleeding issues at the time and thus was placed on aspirin and Plavix per cardiology.   - At time of admission: As pt does not want to be on AC, cannot cardiovert as this would require uninterrupted AC for at least one month post-cardioversion  - Patient was started on Eliquis yesterday  - Spoke w/cardio 11/15, okay to c/w eliquis for now and dc ASA, c/w plavix  - S/p 2x 5mg IV Metoprolol in the ED with improved HR  - Metoprolol switched to 50 mgq6h for now, home dose is Toprol XL 50mg PO QD  - Will likely need to dc on 150mg metoprolol  - obtain thyroid panel (takes [employer] Thyroid)  - Target HR <110  - Patient hemodynamically stable, currently still in afib HR> 100 at this morning  - Cardio f/u, will need improved rate control -> will add Cardizem 30mg q8h per Cardio    #UTI  - Suspected exacerbating factor for patient's afib relapse, UA was positive for +LE and blood  - S/p 1g IV Rocephin in the ED  - Completed course of rocephin  - WBC normal 8.5 today    #Coronary Artery Disease  #Hypertension  #Dyslipidemia  Follows w/ Dr. Peters. S/P PCI x4 (mLAD, dLCX, mRCA, and most recently OM1). Has consistently and continuously refused statin per cardiology.  - c/w ASA 81mg PO QD  - Currently on AC    #Hypothyroidism  - Managed via [employer] Thyroid 15qd  - start levothyroxine 25mcg PO QD  - TSH 2.12    #IDDM  - Likely secondary to distal pancreatectomy. Unknown A1c. Managed via Tresiba 22u qhs.  - Monitor FS TIDAC and QHS  - c/w Lantus 22U SQ QHS  - c/w low-dose ISS  - A1c 7.3    #Misc  DVT PPX: Lovenox 40mg SQ QD   GI PPX: none indicated  DIET: DASH/TLC + CC  ACTIVITY: IAT  CODE STATUS: Full Code  DISPOSITION: From Home; likely d/c home in 1-2 days

## 2022-11-18 LAB
ALBUMIN SERPL ELPH-MCNC: 3.6 G/DL — SIGNIFICANT CHANGE UP (ref 3.5–5.2)
ALP SERPL-CCNC: 50 U/L — SIGNIFICANT CHANGE UP (ref 30–115)
ALT FLD-CCNC: 31 U/L — SIGNIFICANT CHANGE UP (ref 0–41)
ANION GAP SERPL CALC-SCNC: 9 MMOL/L — SIGNIFICANT CHANGE UP (ref 7–14)
AST SERPL-CCNC: 24 U/L — SIGNIFICANT CHANGE UP (ref 0–41)
BILIRUB SERPL-MCNC: 0.6 MG/DL — SIGNIFICANT CHANGE UP (ref 0.2–1.2)
BUN SERPL-MCNC: 17 MG/DL — SIGNIFICANT CHANGE UP (ref 10–20)
CALCIUM SERPL-MCNC: 9.2 MG/DL — SIGNIFICANT CHANGE UP (ref 8.4–10.5)
CHLORIDE SERPL-SCNC: 103 MMOL/L — SIGNIFICANT CHANGE UP (ref 98–110)
CO2 SERPL-SCNC: 27 MMOL/L — SIGNIFICANT CHANGE UP (ref 17–32)
CREAT SERPL-MCNC: 0.9 MG/DL — SIGNIFICANT CHANGE UP (ref 0.7–1.5)
EGFR: 62 ML/MIN/1.73M2 — SIGNIFICANT CHANGE UP
GLUCOSE BLDC GLUCOMTR-MCNC: 115 MG/DL — HIGH (ref 70–99)
GLUCOSE BLDC GLUCOMTR-MCNC: 133 MG/DL — HIGH (ref 70–99)
GLUCOSE BLDC GLUCOMTR-MCNC: 149 MG/DL — HIGH (ref 70–99)
GLUCOSE BLDC GLUCOMTR-MCNC: 176 MG/DL — HIGH (ref 70–99)
GLUCOSE BLDC GLUCOMTR-MCNC: 215 MG/DL — HIGH (ref 70–99)
GLUCOSE SERPL-MCNC: 126 MG/DL — HIGH (ref 70–99)
HCT VFR BLD CALC: 40.6 % — SIGNIFICANT CHANGE UP (ref 37–47)
HGB BLD-MCNC: 13.3 G/DL — SIGNIFICANT CHANGE UP (ref 12–16)
MAGNESIUM SERPL-MCNC: 1.9 MG/DL — SIGNIFICANT CHANGE UP (ref 1.8–2.4)
MCHC RBC-ENTMCNC: 31.6 PG — HIGH (ref 27–31)
MCHC RBC-ENTMCNC: 32.8 G/DL — SIGNIFICANT CHANGE UP (ref 32–37)
MCV RBC AUTO: 96.4 FL — SIGNIFICANT CHANGE UP (ref 81–99)
NRBC # BLD: 0 /100 WBCS — SIGNIFICANT CHANGE UP (ref 0–0)
PLATELET # BLD AUTO: 212 K/UL — SIGNIFICANT CHANGE UP (ref 130–400)
POTASSIUM SERPL-MCNC: 4.3 MMOL/L — SIGNIFICANT CHANGE UP (ref 3.5–5)
POTASSIUM SERPL-SCNC: 4.3 MMOL/L — SIGNIFICANT CHANGE UP (ref 3.5–5)
PROT SERPL-MCNC: 6 G/DL — SIGNIFICANT CHANGE UP (ref 6–8)
RBC # BLD: 4.21 M/UL — SIGNIFICANT CHANGE UP (ref 4.2–5.4)
RBC # FLD: 15.6 % — HIGH (ref 11.5–14.5)
SODIUM SERPL-SCNC: 139 MMOL/L — SIGNIFICANT CHANGE UP (ref 135–146)
WBC # BLD: 8.6 K/UL — SIGNIFICANT CHANGE UP (ref 4.8–10.8)
WBC # FLD AUTO: 8.6 K/UL — SIGNIFICANT CHANGE UP (ref 4.8–10.8)

## 2022-11-18 PROCEDURE — 93010 ELECTROCARDIOGRAM REPORT: CPT

## 2022-11-18 PROCEDURE — 93306 TTE W/DOPPLER COMPLETE: CPT | Mod: 26

## 2022-11-18 PROCEDURE — 99232 SBSQ HOSP IP/OBS MODERATE 35: CPT

## 2022-11-18 RX ORDER — LANOLIN ALCOHOL/MO/W.PET/CERES
3 CREAM (GRAM) TOPICAL AT BEDTIME
Refills: 0 | Status: DISCONTINUED | OUTPATIENT
Start: 2022-11-18 | End: 2022-11-21

## 2022-11-18 RX ORDER — DILTIAZEM HCL 120 MG
60 CAPSULE, EXT RELEASE 24 HR ORAL EVERY 8 HOURS
Refills: 0 | Status: DISCONTINUED | OUTPATIENT
Start: 2022-11-18 | End: 2022-11-19

## 2022-11-18 RX ORDER — SENNA PLUS 8.6 MG/1
2 TABLET ORAL AT BEDTIME
Refills: 0 | Status: DISCONTINUED | OUTPATIENT
Start: 2022-11-18 | End: 2022-11-21

## 2022-11-18 RX ORDER — POLYETHYLENE GLYCOL 3350 17 G/17G
17 POWDER, FOR SOLUTION ORAL DAILY
Refills: 0 | Status: DISCONTINUED | OUTPATIENT
Start: 2022-11-18 | End: 2022-11-21

## 2022-11-18 RX ADMIN — Medication 1: at 12:10

## 2022-11-18 RX ADMIN — Medication 50 MILLIGRAM(S): at 05:22

## 2022-11-18 RX ADMIN — Medication 50 MILLIGRAM(S): at 23:01

## 2022-11-18 RX ADMIN — APIXABAN 2.5 MILLIGRAM(S): 2.5 TABLET, FILM COATED ORAL at 17:05

## 2022-11-18 RX ADMIN — Medication 30 MILLIGRAM(S): at 13:19

## 2022-11-18 RX ADMIN — Medication 3 MILLIGRAM(S): at 22:50

## 2022-11-18 RX ADMIN — Medication 60 MILLIGRAM(S): at 22:50

## 2022-11-18 RX ADMIN — Medication 50 MILLIGRAM(S): at 12:12

## 2022-11-18 RX ADMIN — INSULIN GLARGINE 22 UNIT(S): 100 INJECTION, SOLUTION SUBCUTANEOUS at 22:49

## 2022-11-18 RX ADMIN — Medication 1 APPLICATION(S): at 23:01

## 2022-11-18 RX ADMIN — PANTOPRAZOLE SODIUM 40 MILLIGRAM(S): 20 TABLET, DELAYED RELEASE ORAL at 05:23

## 2022-11-18 RX ADMIN — Medication 1 APPLICATION(S): at 13:20

## 2022-11-18 RX ADMIN — Medication 30 MILLIGRAM(S): at 05:22

## 2022-11-18 RX ADMIN — CLOPIDOGREL BISULFATE 75 MILLIGRAM(S): 75 TABLET, FILM COATED ORAL at 12:13

## 2022-11-18 RX ADMIN — APIXABAN 2.5 MILLIGRAM(S): 2.5 TABLET, FILM COATED ORAL at 05:23

## 2022-11-18 RX ADMIN — Medication 50 MILLIGRAM(S): at 17:05

## 2022-11-18 NOTE — PHYSICAL THERAPY INITIAL EVALUATION ADULT - [id number]
Pt. encouraged to be OOB daily and ambulate with assist to ensure safety.   Pt. instructed bedside therapeutic exercises to increase mm strength and improve circulation.

## 2022-11-18 NOTE — PHYSICAL THERAPY INITIAL EVALUATION ADULT - SPECIFY REASON(S)
Upon assessment pt is independent with transfers and ambulation with no assistive device, will not require skilled PT at this time.

## 2022-11-18 NOTE — PROGRESS NOTE ADULT - SUBJECTIVE AND OBJECTIVE BOX
ANNE VILLATORO 87y Female  MRN#: [id number]   Hospital Day: 5d    SUBJECTIVE  Patient is a 87y old Female who presents with a chief complaint of Palpitations (18 Nov 2022 16:19)  Currently admitted to medicine with the primary diagnosis of Palpitations      INTERVAL HPI AND OVERNIGHT EVENTS:  Patient was examined and seen at bedside. This morning she is resting comfortably in bed and reports no issues or overnight events. 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; s/p partial liver resection Mar 2022 @ [facility]    History of partial pancreatectomy  s/p distal pancreatectomy Aug 2021    H/O resection of liver  mets to liver; s/p partial liver resection Mar 2022 @ [facility]      ALLERGIES:  No Known Allergies    MEDICATIONS:  STANDING MEDICATIONS  apixaban 2.5 milliGRAM(s) Oral two times a day  clopidogrel Tablet 75 milliGRAM(s) Oral daily  diltiazem    Tablet 60 milliGRAM(s) Oral every 8 hours  insulin glargine Injectable (LANTUS) 22 Unit(s) SubCutaneous at bedtime  insulin lispro (ADMELOG) corrective regimen sliding scale   SubCutaneous three times a day before meals  melatonin 3 milliGRAM(s) Oral at bedtime  metoprolol tartrate 50 milliGRAM(s) Oral every 6 hours  pantoprazole    Tablet 40 milliGRAM(s) Oral before breakfast  vitamin A &amp; D Ointment 1 Application(s) Topical three times a day    PRN MEDICATIONS  acetaminophen     Tablet .. 650 milliGRAM(s) Oral every 6 hours PRN  aluminum hydroxide/magnesium hydroxide/simethicone Suspension 30 milliLiter(s) Oral every 6 hours PRN  polyethylene glycol 3350 17 Gram(s) Oral daily PRN  senna 2 Tablet(s) Oral at bedtime PRN      VITAL SIGNS: Last 24 Hours  T(C): 36.4 (18 Nov 2022 13:57), Max: 36.4 (17 Nov 2022 20:40)  T(F): 97.6 (18 Nov 2022 13:57), Max: 97.6 (18 Nov 2022 13:57)  HR: 101 (18 Nov 2022 13:57) (101 - 131)  BP: 127/84 (18 Nov 2022 13:57) (127/84 - 135/91)  BP(mean): --  RR: 18 (18 Nov 2022 13:57) (18 - 18)  SpO2: 97% (17 Nov 2022 20:40) (97% - 97%)    LABS:                        13.3   8.60  )-----------( 212      ( 18 Nov 2022 06:20 )             40.6     11-18    139  |  103  |  17  ----------------------------<  126<H>  4.3   |  27  |  0.9    Ca    9.2      18 Nov 2022 06:20  Mg     1.9     11-18    TPro  6.0  /  Alb  3.6  /  TBili  0.6  /  DBili  x   /  AST  24  /  ALT  31  /  AlkPhos  50  11-18      RADIOLOGY:      PHYSICAL EXAM:  CONSTITUTIONAL: No acute distress, well-developed, well-groomed, AAOx3  HEAD: Atraumatic, normocephalic  EYES: EOM intact, PERRLA, conjunctiva and sclera clear  ENT: Supple, no JVD; moist mucous membranes  PULMONARY: Clear to auscultation bilaterally; no wheezes, rales, or rhonchi  CARDIOVASCULAR: Regular rate and rhythm; no murmurs, rubs, or gallops  GASTROINTESTINAL: Soft, non-tender, non-distended; bowel sounds present, obese habitus  MUSCULOSKELETAL: 2+ peripheral pulses; no clubbing, no cyanosis, no edema  NEUROLOGY: non-focal, moving all extremities  SKIN: No rashes or lesions; warm and dry   ANNE VILLATORO 87y Female  MRN#: [id number]   Hospital Day: 5d    SUBJECTIVE  Patient is a 87y old Female who presents with a chief complaint of Palpitations (18 Nov 2022 16:19)  Currently admitted to medicine with the primary diagnosis of Palpitations      INTERVAL HPI AND OVERNIGHT EVENTS:  Patient was examined and seen at bedside. This morning she is resting comfortably in bed and reports no issues or overnight events. Patient reports difficulty sleeping last night. Still has elevated HR on cardizem 30, HR  this AM. Otherwise feeling well, no acute events.     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    History of partial pancreatectomy  s/p distal pancreatectomy Aug 2021    H/O resection of liver  mets to liver; s/p partial liver resection Mar 2022 @ [facility]      ALLERGIES:  No Known Allergies    MEDICATIONS:  STANDING MEDICATIONS  apixaban 2.5 milliGRAM(s) Oral two times a day  clopidogrel Tablet 75 milliGRAM(s) Oral daily  diltiazem    Tablet 60 milliGRAM(s) Oral every 8 hours  insulin glargine Injectable (LANTUS) 22 Unit(s) SubCutaneous at bedtime  insulin lispro (ADMELOG) corrective regimen sliding scale   SubCutaneous three times a day before meals  melatonin 3 milliGRAM(s) Oral at bedtime  metoprolol tartrate 50 milliGRAM(s) Oral every 6 hours  pantoprazole    Tablet 40 milliGRAM(s) Oral before breakfast  vitamin A &amp; D Ointment 1 Application(s) Topical three times a day    PRN MEDICATIONS  acetaminophen     Tablet .. 650 milliGRAM(s) Oral every 6 hours PRN  aluminum hydroxide/magnesium hydroxide/simethicone Suspension 30 milliLiter(s) Oral every 6 hours PRN  polyethylene glycol 3350 17 Gram(s) Oral daily PRN  senna 2 Tablet(s) Oral at bedtime PRN      VITAL SIGNS: Last 24 Hours  T(C): 36.4 (18 Nov 2022 13:57), Max: 36.4 (17 Nov 2022 20:40)  T(F): 97.6 (18 Nov 2022 13:57), Max: 97.6 (18 Nov 2022 13:57)  HR: 101 (18 Nov 2022 13:57) (101 - 131)  BP: 127/84 (18 Nov 2022 13:57) (127/84 - 135/91)  BP(mean): --  RR: 18 (18 Nov 2022 13:57) (18 - 18)  SpO2: 97% (17 Nov 2022 20:40) (97% - 97%)    LABS:                        13.3   8.60  )-----------( 212      ( 18 Nov 2022 06:20 )             40.6     11-18    139  |  103  |  17  ----------------------------<  126<H>  4.3   |  27  |  0.9    Ca    9.2      18 Nov 2022 06:20  Mg     1.9     11-18    TPro  6.0  /  Alb  3.6  /  TBili  0.6  /  DBili  x   /  AST  24  /  ALT  31  /  AlkPhos  50  11-18      RADIOLOGY:      PHYSICAL EXAM:  CONSTITUTIONAL: No acute distress, well-developed, well-groomed, AAOx3  HEAD: Atraumatic, normocephalic  EYES: EOM intact, PERRLA, conjunctiva and sclera clear  ENT: Supple, no JVD; moist mucous membranes  PULMONARY: Clear to auscultation bilaterally; no wheezes, rales, or rhonchi  CARDIOVASCULAR: Regular rate and rhythm; no murmurs, rubs, or gallops  GASTROINTESTINAL: Soft, non-tender, non-distended; bowel sounds present, obese habitus  MUSCULOSKELETAL: 2+ peripheral pulses; no clubbing, no cyanosis, no edema  NEUROLOGY: non-focal, moving all extremities  SKIN: No rashes or lesions; warm and dry

## 2022-11-18 NOTE — PROGRESS NOTE ADULT - SUBJECTIVE AND OBJECTIVE BOX
ANNE VILLATORO  87y Female    Patient is a 87y old  Female who presents with a chief complaint of palpitations    INTERVAL HPI/OVERNIGHT EVENTS:    ROS: Pt states that palpitations is stable but once she starts to walk, feels it more and feels like passing out after a short walk. Pt had a large bowel movement yesterday and would like to stop stool softeners. Pt denies fever, chills, SOB, chest pain, nausea, vomiting, abdominal pain, dysuria, rash, muscle or joint pain.    Overnight events: No acute events overnight.    Vital Signs Last 24 Hrs  T(C): 36.4 (18 Nov 2022 13:57), Max: 36.4 (17 Nov 2022 20:40)  T(F): 97.6 (18 Nov 2022 13:57), Max: 97.6 (18 Nov 2022 13:57)  HR: 101 (18 Nov 2022 13:57) (101 - 131)  BP: 127/84 (18 Nov 2022 13:57) (127/84 - 135/91)  BP(mean): --  ABP: --  ABP(mean): --  RR: 18 (18 Nov 2022 13:57) (18 - 18)  SpO2: 97% (17 Nov 2022 20:40) (97% - 97%)    O2 Parameters below as of 17 Nov 2022 20:40  Patient On (Oxygen Delivery Method): room air      I&O's Summary    17 Nov 2022 07:01  -  18 Nov 2022 07:00  --------------------------------------------------------  IN: 900 mL / OUT: 0 mL / NET: 900 mL    18 Nov 2022 07:01  -  18 Nov 2022 16:26  --------------------------------------------------------  IN: 1151 mL / OUT: 975 mL / NET: 176 mL    No Known Allergies    MEDICATIONS  (STANDING):  apixaban 2.5 milliGRAM(s) Oral two times a day  clopidogrel Tablet 75 milliGRAM(s) Oral daily  diltiazem    Tablet 60 milliGRAM(s) Oral every 8 hours  insulin glargine Injectable (LANTUS) 22 Unit(s) SubCutaneous at bedtime  insulin lispro (ADMELOG) corrective regimen sliding scale   SubCutaneous three times a day before meals  melatonin 3 milliGRAM(s) Oral at bedtime  metoprolol tartrate 50 milliGRAM(s) Oral every 6 hours  pantoprazole    Tablet 40 milliGRAM(s) Oral before breakfast  vitamin A &amp; D Ointment 1 Application(s) Topical three times a day    MEDICATIONS  (PRN):  acetaminophen     Tablet .. 650 milliGRAM(s) Oral every 6 hours PRN Mild Pain (1 - 3), Moderate Pain (4 - 6)  aluminum hydroxide/magnesium hydroxide/simethicone Suspension 30 milliLiter(s) Oral every 6 hours PRN Dyspepsia  polyethylene glycol 3350 17 Gram(s) Oral daily PRN Constipation  senna 2 Tablet(s) Oral at bedtime PRN Constipation        PHYSICAL EXAM:  General Appearance: NAD, normal for age and gender. 	  Neck: Normal JVP, no bruit.   Eyes: Conjunctiva clear, Extra Ocular muscles intact. No scleral icterus.  ENMT: Moist oral mucosa. No oral lesion.  Cardiovascular: Irregularly irregular, S1 S2, No JVD, No murmurs.  Respiratory: Lungs clear to auscultation. No wheezes, rales or rhonchi.  Psychiatry: Alert and oriented x 3, Mood & affect appropriate.  Gastrointestinal:  Soft, Non-tender, Mildly distended.  Neurologic: Non-focal deficits.  Musculoskeletal/ extremities: Normal range of motion, No clubbing, cyanosis or edema.  Vascular: Peripheral pulses palpable bilaterally.  Skin/Integumen: No rashes, No ecchymoses, No cyanosis.    LABS:                        13.3   8.60  )-----------( 212      ( 18 Nov 2022 06:20 )             40.6     11-18    139  |  103  |  17  ----------------------------<  126<H>  4.3   |  27  |  0.9    Ca    9.2      18 Nov 2022 06:20  Mg     1.9     11-18    TPro  6.0  /  Alb  3.6  /  TBili  0.6  /  DBili  x   /  AST  24  /  ALT  31  /  AlkPhos  50  11-18      RADIOLOGY & ADDITIONAL TESTS:    < from: TTE Echo Complete w/o Contrast w/ Doppler (11.18.22 @ 09:23) >  Summary:   1. Normal global left ventricular systolic function, internal cavity   size, and wall thickness.   2. LV Ejection Fraction by Pacheco's Method with a biplane EF of 67 %.   3. Left ventricular diastolic function could not be assessed in this   study due to atrial arrhythmia.   4. Normal right ventricular size with low-normal function.   5. Severely enlarged left atrium.   6. Severe mitral annular calcification with mild mitral regurgitation   and no evidence of stenosis.   7. Sclerotic aortic valve with normal opening.   8. No pericardial effusion.   9. Patient was in atrial fibrillation during this exam.  10. No prior TTE is available for comparison.

## 2022-11-18 NOTE — PROGRESS NOTE ADULT - ASSESSMENT
Assessment:   87F w/ h/o pAF (not on AC), CAD (s/p 4 stents), IDDM, and pancreatic cancer (s/p distal pancreatectomy Aug 2021), cholangio carcinoma (mets to liver; s/p partial liver resection Mar 2022 @ [facility]) p/w intermittent palpitations, found to be in rapid afib HR > 130s with improved control s/p IV metoprolol likely in the setting of acute UTI given positive UA.    Plan:  #Chronic Afib/Aflutter  - CHADS-VASC score >6. Initially p/w intermittent palpitations. Found to be in aflutter on admission EKG. UTI suspected cause of pt's current afib relapse. Had pAF in 2021, but did not want to be on anticoagulation for bleeding issues at the time and thus was placed on aspirin and Plavix per cardiology.   - At time of admission: As pt does not want to be on AC, cannot cardiovert as this would require uninterrupted AC for at least one month post-cardioversion  - Patient was started on Eliquis yesterday  - Spoke w/cardio 11/15, okay to c/w eliquis for now and dc ASA, c/w plavix  - S/p 2x 5mg IV Metoprolol in the ED with improved HR  - Metoprolol switched to 50 mgq6h for now, home dose is Toprol XL 50mg PO QD  - Will likely need to dc on 150mg metoprolol  - obtain thyroid panel (takes [facility])  - Target HR <110  - Patient hemodynamically stable, currently still in afib HR> 100 at this morning  - Cardio f/u, will need improved rate control -> will add Cardizem 30mg q8h per Cardio-> changed to 60mg today after d/w attending, f/u HR  - Pending dispo plans, 1- days    #UTI  - Suspected exacerbating factor for patient's afib relapse, UA was positive for +LE and blood  - S/p 1g IV Rocephin in the ED  - Completed course of rocephin  - WBC normal 8.5 today    #Coronary Artery Disease  #Hypertension  #Dyslipidemia  Follows w/ Dr. Peters. S/P PCI x4 (mLAD, dLCX, mRCA, and most recently OM1). Has consistently and continuously refused statin per cardiology.  - c/w ASA 81mg PO QD  - Currently on AC    #Hypothyroidism  - Managed via [facility] 15qd  - start levothyroxine 25mcg PO QD  - TSH 2.12    #IDDM  - Likely secondary to distal pancreatectomy. Unknown A1c. Managed via Tresiba 22u qhs.  - Monitor FS TIDAC and QHS  - c/w Lantus 22U SQ QHS  - c/w low-dose ISS  - A1c 7.3    #Misc  DVT PPX: Lovenox 40mg SQ QD   GI PPX: none indicated  DIET: DASH/TLC + CC  ACTIVITY: IAT  CODE STATUS: Full Code  DISPOSITION: From Home; likely d/c home in 1-2 days

## 2022-11-18 NOTE — PHYSICAL THERAPY INITIAL EVALUATION ADULT - GENERAL OBSERVATIONS, REHAB EVAL
840-900 am Chart reviewed. Pt. seen OOB in chair , in no apparent distress, alert/ oriented X 4,+ telemetry  + IV lock, denies pain, agreeable to therapy.

## 2022-11-18 NOTE — PHYSICAL THERAPY INITIAL EVALUATION ADULT - PRECAUTIONS/LIMITATIONS, REHAB EVAL
normal appearance,  without tenderness upon palpation,  no deformities,  no masses,  no thyroid nodules,  Thyroid normal size cardiac precautions/fall precautions

## 2022-11-18 NOTE — PHYSICAL THERAPY INITIAL EVALUATION ADULT - PERTINENT HX OF CURRENT PROBLEM, REHAB EVAL
87F w/ h/o pAF (not on AC), CAD (s/p 4 stents), HTN, DLD, IDDM, and pancreatic cancer (s/p distal pancreatectomy Aug 2021), cholangio carcinoma (mets to liver; s/p partial liver resection Mar 2022 @ [facility]) p/w intermittent palpitations.  Pt. referred to PT for eval and tx.

## 2022-11-18 NOTE — PROGRESS NOTE ADULT - ASSESSMENT
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    # Chronic Afib/Aflutter with RVR  - c/w eliquis   - on metoprolol 50 mg po q6h, started on cardizem 30 mg po 8h, will titrate up as need for better rate control   - echo done with normal EF    # Coronary Artery Disease  # Hypertension  # Dyslipidemia  - follows w/ Dr. Peters. S/P PCI x4 (mLAD, dLCX, mRCA, and most recently OM1). Has consistently and continuously refused statin per cardiology.  - c/w ASA 81mg PO QD  - Currently on AC  - Currently on metoprolol 50mg TID, home is 50 QD    # Hypothyroidism  - Managed via [facility] 15qd  - c/w levothyroxine 25mcg PO QD  - TSH 2.12    # IDDM  - Likely secondary to distal pancreatectomy. Unknown A1c. Managed via Tresiba 22u qhs.  - Monitor FS TIDAC and QHS  - c/w Lantus 22U SQ QHS  - c/w low-dose ISS  - A1c 7.3    # UTI - completed rocephin course  # Pancreatic cancer (s/p distal pancreatectomy Aug 2021)  # Cholangio carcinoma (mets to liver; s/p partial liver resection Mar 2022 @ [facility])  - outpatient follow up    # Constipation, resolved  # GERD - c/w protonix and mylanta prn  # DVT prophylaxis - on eliquis  # Code status - Full Code    Dispo: pending rate control of Afib, possible discharge tomorrow if there is further improvement in symptoms and better rate control

## 2022-11-19 ENCOUNTER — TRANSCRIPTION ENCOUNTER (OUTPATIENT)
Age: 87
End: 2022-11-19

## 2022-11-19 LAB
ALBUMIN SERPL ELPH-MCNC: 3.7 G/DL — SIGNIFICANT CHANGE UP (ref 3.5–5.2)
ALP SERPL-CCNC: 81 U/L — SIGNIFICANT CHANGE UP (ref 30–115)
ALT FLD-CCNC: 24 U/L — SIGNIFICANT CHANGE UP (ref 0–41)
ANION GAP SERPL CALC-SCNC: 9 MMOL/L — SIGNIFICANT CHANGE UP (ref 7–14)
AST SERPL-CCNC: 23 U/L — SIGNIFICANT CHANGE UP (ref 0–41)
BILIRUB SERPL-MCNC: 0.6 MG/DL — SIGNIFICANT CHANGE UP (ref 0.2–1.2)
BUN SERPL-MCNC: 18 MG/DL — SIGNIFICANT CHANGE UP (ref 10–20)
CALCIUM SERPL-MCNC: 8.7 MG/DL — SIGNIFICANT CHANGE UP (ref 8.4–10.5)
CHLORIDE SERPL-SCNC: 102 MMOL/L — SIGNIFICANT CHANGE UP (ref 98–110)
CO2 SERPL-SCNC: 23 MMOL/L — SIGNIFICANT CHANGE UP (ref 17–32)
CREAT SERPL-MCNC: 0.7 MG/DL — SIGNIFICANT CHANGE UP (ref 0.7–1.5)
EGFR: 84 ML/MIN/1.73M2 — SIGNIFICANT CHANGE UP
GLUCOSE BLDC GLUCOMTR-MCNC: 166 MG/DL — HIGH (ref 70–99)
GLUCOSE BLDC GLUCOMTR-MCNC: 167 MG/DL — HIGH (ref 70–99)
GLUCOSE BLDC GLUCOMTR-MCNC: 170 MG/DL — HIGH (ref 70–99)
GLUCOSE BLDC GLUCOMTR-MCNC: 202 MG/DL — HIGH (ref 70–99)
GLUCOSE SERPL-MCNC: 211 MG/DL — HIGH (ref 70–99)
HCT VFR BLD CALC: 38.8 % — SIGNIFICANT CHANGE UP (ref 37–47)
HGB BLD-MCNC: 12.9 G/DL — SIGNIFICANT CHANGE UP (ref 12–16)
MAGNESIUM SERPL-MCNC: 1.8 MG/DL — SIGNIFICANT CHANGE UP (ref 1.8–2.4)
MCHC RBC-ENTMCNC: 31.9 PG — HIGH (ref 27–31)
MCHC RBC-ENTMCNC: 33.2 G/DL — SIGNIFICANT CHANGE UP (ref 32–37)
MCV RBC AUTO: 96 FL — SIGNIFICANT CHANGE UP (ref 81–99)
NRBC # BLD: 0 /100 WBCS — SIGNIFICANT CHANGE UP (ref 0–0)
PLATELET # BLD AUTO: 205 K/UL — SIGNIFICANT CHANGE UP (ref 130–400)
POTASSIUM SERPL-MCNC: 4.5 MMOL/L — SIGNIFICANT CHANGE UP (ref 3.5–5)
POTASSIUM SERPL-SCNC: 4.5 MMOL/L — SIGNIFICANT CHANGE UP (ref 3.5–5)
PROT SERPL-MCNC: 5.9 G/DL — LOW (ref 6–8)
RBC # BLD: 4.04 M/UL — LOW (ref 4.2–5.4)
RBC # FLD: 15.4 % — HIGH (ref 11.5–14.5)
SODIUM SERPL-SCNC: 134 MMOL/L — LOW (ref 135–146)
WBC # BLD: 8.93 K/UL — SIGNIFICANT CHANGE UP (ref 4.8–10.8)
WBC # FLD AUTO: 8.93 K/UL — SIGNIFICANT CHANGE UP (ref 4.8–10.8)

## 2022-11-19 PROCEDURE — 71045 X-RAY EXAM CHEST 1 VIEW: CPT | Mod: 26

## 2022-11-19 PROCEDURE — 99232 SBSQ HOSP IP/OBS MODERATE 35: CPT

## 2022-11-19 RX ORDER — METOPROLOL TARTRATE 50 MG
100 TABLET ORAL
Refills: 0 | Status: DISCONTINUED | OUTPATIENT
Start: 2022-11-19 | End: 2022-11-21

## 2022-11-19 RX ORDER — APIXABAN 2.5 MG/1
1 TABLET, FILM COATED ORAL
Qty: 60 | Refills: 0
Start: 2022-11-19 | End: 2022-12-18

## 2022-11-19 RX ORDER — NIFEDIPINE 30 MG
30 TABLET, EXTENDED RELEASE 24 HR ORAL ONCE
Refills: 0 | Status: DISCONTINUED | OUTPATIENT
Start: 2022-11-19 | End: 2022-11-20

## 2022-11-19 RX ORDER — METOPROLOL TARTRATE 50 MG
1 TABLET ORAL
Qty: 60 | Refills: 0
Start: 2022-11-19 | End: 2022-12-18

## 2022-11-19 RX ORDER — DILTIAZEM HCL 120 MG
1 CAPSULE, EXT RELEASE 24 HR ORAL
Qty: 30 | Refills: 0
Start: 2022-11-19 | End: 2022-12-18

## 2022-11-19 RX ORDER — DILTIAZEM HCL 120 MG
180 CAPSULE, EXT RELEASE 24 HR ORAL DAILY
Refills: 0 | Status: DISCONTINUED | OUTPATIENT
Start: 2022-11-19 | End: 2022-11-21

## 2022-11-19 RX ORDER — FUROSEMIDE 40 MG
40 TABLET ORAL ONCE
Refills: 0 | Status: COMPLETED | OUTPATIENT
Start: 2022-11-19 | End: 2022-11-19

## 2022-11-19 RX ORDER — METOPROLOL TARTRATE 50 MG
50 TABLET ORAL ONCE
Refills: 0 | Status: COMPLETED | OUTPATIENT
Start: 2022-11-19 | End: 2022-11-19

## 2022-11-19 RX ORDER — APIXABAN 2.5 MG/1
5 TABLET, FILM COATED ORAL EVERY 12 HOURS
Refills: 0 | Status: DISCONTINUED | OUTPATIENT
Start: 2022-11-19 | End: 2022-11-20

## 2022-11-19 RX ADMIN — Medication 60 MILLIGRAM(S): at 05:21

## 2022-11-19 RX ADMIN — Medication 50 MILLIGRAM(S): at 05:22

## 2022-11-19 RX ADMIN — APIXABAN 2.5 MILLIGRAM(S): 2.5 TABLET, FILM COATED ORAL at 05:21

## 2022-11-19 RX ADMIN — Medication 60 MILLIGRAM(S): at 13:27

## 2022-11-19 RX ADMIN — Medication 100 MILLIGRAM(S): at 22:06

## 2022-11-19 RX ADMIN — CLOPIDOGREL BISULFATE 75 MILLIGRAM(S): 75 TABLET, FILM COATED ORAL at 11:46

## 2022-11-19 RX ADMIN — Medication 1: at 11:46

## 2022-11-19 RX ADMIN — Medication 1 APPLICATION(S): at 22:08

## 2022-11-19 RX ADMIN — APIXABAN 5 MILLIGRAM(S): 2.5 TABLET, FILM COATED ORAL at 17:20

## 2022-11-19 RX ADMIN — Medication 50 MILLIGRAM(S): at 17:20

## 2022-11-19 RX ADMIN — INSULIN GLARGINE 22 UNIT(S): 100 INJECTION, SOLUTION SUBCUTANEOUS at 22:05

## 2022-11-19 RX ADMIN — Medication 50 MILLIGRAM(S): at 11:46

## 2022-11-19 RX ADMIN — Medication 40 MILLIGRAM(S): at 11:45

## 2022-11-19 RX ADMIN — PANTOPRAZOLE SODIUM 40 MILLIGRAM(S): 20 TABLET, DELAYED RELEASE ORAL at 07:32

## 2022-11-19 RX ADMIN — Medication 180 MILLIGRAM(S): at 22:06

## 2022-11-19 RX ADMIN — Medication 3 MILLIGRAM(S): at 22:05

## 2022-11-19 RX ADMIN — Medication 1: at 08:04

## 2022-11-19 RX ADMIN — Medication 1: at 17:21

## 2022-11-19 NOTE — DISCHARGE NOTE PROVIDER - PROVIDER TOKENS
PROVIDER:[TOKEN:[[id number]],FOLLOWUP:[2 weeks]] PROVIDER:[TOKEN:[[id number]],FOLLOWUP:[2 weeks]],PROVIDER:[TOKEN:[[id number]],FOLLOWUP:[2 weeks]],PROVIDER:[TOKEN:[[id number]],FOLLOWUP:[1 month]]

## 2022-11-19 NOTE — DISCHARGE NOTE PROVIDER - NSDCFUADDINST_GEN_ALL_CORE_FT
Discharge Instructions:  - May resume blood thinner (Eliquis) tomorrow evening  - No heavy lifting > 5 lbs. for 4-6 weeks  - Wound care per CT surgery  - No submerging in water for 1 month  - Leave steri-strips in place, they will fall off on their own  - No driving for 1 week

## 2022-11-19 NOTE — DISCHARGE NOTE PROVIDER - CARE PROVIDER_API CALL
Emmett Galloway  CARDIOVASCULAR DISEASE  [address]  [place] [address]  Phone: [phone]  Fax: [phone]  Follow Up Time: 2 weeks   Emmett Galloway  CARDIOVASCULAR DISEASE  [address]  [place] [address]  Phone: [phone]  Fax: [phone]  Follow Up Time: 2 weeks    Reyes Olivier)  Surgery; Thoracic and Cardiac Surgery  [address] [place] [address], [address]  [place] [address]  Phone: [phone]  Fax: [phone]  Follow Up Time: 2 weeks    Leonardo Neri)  Cardiac Electrophysiology; Cardiovascular Disease; [facility]  [address] [place] [address]  Phone: [phone]  Fax: [phone]  Follow Up Time: 1 month

## 2022-11-19 NOTE — PROGRESS NOTE ADULT - ASSESSMENT
pt with pafib and remains in afib and v rate improved.  pt tolerate eliquis 2.5 and plavix  f/u hr on higher cardizem dose and if better controlled then consider possible d/c home later today on  metoprolol tartrate 100 mgs po q12  cardizem cd 180 mgs po q24  increase eliquis to therapeutic dose 5 mgs po q12  cont plavix 75 mgs po q24

## 2022-11-19 NOTE — PROGRESS NOTE ADULT - ASSESSMENT
87F w/ h/o pAF (not on AC), CAD (s/p 4 stents), IDDM, and pancreatic cancer (s/p distal pancreatectomy Aug 2021), cholangio carcinoma (mets to liver; s/p partial liver resection Mar 2022 @ [facility]) p/w intermittent palpitations, found to be in rapid afib HR > 130s with improved control s/p IV metoprolol likely in the setting of acute UTI given positive UA.    # Chronic Afib/Aflutter with RVR, improved HR 70s in AM, 90s in the afternoon   - c/w eliquis 5 mg BID  - will switch to lopressor 100 mg BID and cardizem  starting tonight  - echo done with normal EF    # Mild HFpEF exacerbation  - CXR done today shows worsening congestion  - will give one dose of IV lasix and reassess after    # Coronary Artery Disease  # Hypertension  # Dyslipidemia  - follows w/ Dr. Peters. S/P PCI x4 (mLAD, dLCX, mRCA, and most recently OM1). Has consistently and continuously refused statin per cardiology.  - Currently on AC  - Currently on metoprolol and cardizem    # Hypothyroidism  - Managed via [facility] 15qd  - c/w levothyroxine 25mcg PO QD  - TSH 2.12    # IDDM  - Likely secondary to distal pancreatectomy. Unknown A1c. Managed via Tresiba 22u qhs.  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; s/p partial liver resection Mar 2022 @ [facility])  - outpatient follow up    # Constipation, resolved  # GERD - c/w protonix and mylanta prn  # DVT prophylaxis - on eliquis  # Code status - Full Code    Dispo: pending rate control of Afib, possible discharge tomorrow if there is further improvement in symptoms and better rate control

## 2022-11-19 NOTE — DISCHARGE NOTE PROVIDER - NSDCMRMEDTOKEN_GEN_ALL_CORE_FT
apixaban 5 mg oral tablet: 1 tab(s) orally every 12 hours  [place] Thyroid 15 mg oral tablet: 1 tab(s) orally once a day  dilTIAZem 180 mg/24 hours oral capsule, extended release: 1 cap(s) orally once a day  metoprolol tartrate 100 mg oral tablet: 1 tab(s) orally 2 times a day  Plavix 75 mg oral tablet: 1 tab(s) orally once a day  Tresiba 100 units/mL subcutaneous solution: 22 unit(s) subcutaneous once a day (at bedtime)   apixaban 5 mg oral tablet: 1 tab(s) orally every 12 hours  Start on 11/23, first dose in the evening  [place] Thyroid 15 mg oral tablet: 1 tab(s) orally once a day  dilTIAZem 180 mg/24 hours oral capsule, extended release: 1 cap(s) orally once a day  furosemide 20 mg oral tablet: 1 tab(s) orally once a day  metoprolol tartrate 100 mg oral tablet: 1 tab(s) orally 2 times a day  pantoprazole 40 mg oral delayed release tablet: 1 tab(s) orally once a day (before a meal)  Plavix 75 mg oral tablet: 1 tab(s) orally once a day  Tresiba 100 units/mL subcutaneous solution: 22 unit(s) subcutaneous once a day (at bedtime)   apixaban 5 mg oral tablet: 1 tab(s) orally every 12 hours  Start on 11/23, first dose in the evening  [place] Thyroid 15 mg oral tablet: 1 tab(s) orally once a day  dilTIAZem 240 mg/24 hours oral capsule, extended release: 1 cap(s) orally once a day  furosemide 20 mg oral tablet: 1 tab(s) orally once a day  metoprolol tartrate 100 mg oral tablet: 1 tab(s) orally 2 times a day  pantoprazole 40 mg oral delayed release tablet: 1 tab(s) orally once a day (before a meal)  Plavix 75 mg oral tablet: 1 tab(s) orally once a day  Tresiba 100 units/mL subcutaneous solution: 22 unit(s) subcutaneous once a day (at bedtime)

## 2022-11-19 NOTE — DISCHARGE NOTE PROVIDER - NSDCCPCAREPLAN_GEN_ALL_CORE_FT
PRINCIPAL DISCHARGE DIAGNOSIS  Diagnosis: Chronic atrial fibrillation  Assessment and Plan of Treatment: Continue anticoagulation and rate controlled medications as prescribed. Follow up with Cardiology for further management.      SECONDARY DISCHARGE DIAGNOSES  Diagnosis: Weakness generalized  Assessment and Plan of Treatment:     Diagnosis: Palpitations  Assessment and Plan of Treatment:     Diagnosis: Acute UTI  Assessment and Plan of Treatment:     Diagnosis: Congestive heart failure with right ventricular systolic dysfunction  Assessment and Plan of Treatment:      PRINCIPAL DISCHARGE DIAGNOSIS  Diagnosis: Chronic atrial fibrillation  Assessment and Plan of Treatment: Continue anticoagulation and rate controlled medications as prescribed. Follow up with Cardiology for further management. You had pacemaker placed by your cardiology team on this admission to control your heart rate. Your medications were also adjusted; please begin taking Eliquis 5mg once daily tonight. You should start taking Cardizem CD 240mg tomorrow (11/24) morning. Please continue all of your other medications as directed by your medical team, and follow up with your cardiologist in 2 weeks. If you experience any new-onset chest pain, shortness of breath, or worsening heart palpitations and dizziness, please return to the ED for treatment and call your doctor.      SECONDARY DISCHARGE DIAGNOSES  Diagnosis: Weakness generalized  Assessment and Plan of Treatment:     Diagnosis: Acute UTI  Assessment and Plan of Treatment:     Diagnosis: Congestive heart failure with right ventricular systolic dysfunction  Assessment and Plan of Treatment:     Diagnosis: Palpitations  Assessment and Plan of Treatment:

## 2022-11-19 NOTE — DISCHARGE NOTE PROVIDER - HOSPITAL COURSE
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    < from: TTE Echo Complete w/o Contrast w/ Doppler (11.18.22 @ 09:23) >    Summary:   1. Normal global left ventricular systolic function, internal cavity   size, and wall thickness.   2. LV Ejection Fraction by Pacheco's Method with a biplane EF of 67 %.   3. Left ventricular diastolic function could not be assessed in this   study due to atrial arrhythmia.   4. Normal right ventricular size with low-normal function.   5. Severely enlarged left atrium.   6. Severe mitral annular calcification with mild mitral regurgitation   and no evidence of stenosis.   7. Sclerotic aortic valve with normal opening.   8. No pericardial effusion.   9. Patient was in atrial fibrillation during this exam.  10. No prior TTE is available for comparison.    < end of copied text >      # Chronic Afib/Aflutter with RVR  - c/w eliquis increased to 5 mg twice daily, c/w plavix, discontinue aspirin  - discharge home on lopressor 100 mg BID and cardizem cd 180 mg  - echo done with normal EF    # Coronary Artery Disease  # Hypertension  # Dyslipidemia  - follows w/ Dr. Peters. S/P PCI x4 (mLAD, dLCX, mRCA, and most recently OM1). Has consistently and continuously refused statin per cardiology.  - Currently on AC  - Currently on metoprolol 50mg TID, home is 50 QD    # Hypothyroidism  - Managed via [employer] Thyroid 15qd  - c/w levothyroxine 25mcg PO QD  - TSH 2.12    # IDDM  - Likely secondary to distal pancreatectomy. Unknown A1c. Managed via Tresiba 22u qhs.  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) p/w intermittent palpitations, found to be in rapid afib HR > 130s with improved control s/p IV metoprolol likely in the setting of acute UTI given positive UA.    < from: TTE Echo Complete w/o Contrast w/ Doppler (11.18.22 @ 09:23) >    Summary:   1. Normal global left ventricular systolic function, internal cavity   size, and wall thickness.   2. LV Ejection Fraction by Pacheco's Method with a biplane EF of 67 %.   3. Left ventricular diastolic function could not be assessed in this   study due to atrial arrhythmia.   4. Normal right ventricular size with low-normal function.   5. Severely enlarged left atrium.   6. Severe mitral annular calcification with mild mitral regurgitation   and no evidence of stenosis.   7. Sclerotic aortic valve with normal opening.   8. No pericardial effusion.   9. Patient was in atrial fibrillation during this exam.  10. No prior TTE is available for comparison.    < end of copied text >      # Chronic Afib/Aflutter with RVR  - c/w eliquis increased to 5 mg twice daily, c/w plavix, discontinue aspirin  - discharge home on lopressor 100 mg BID and cardizem cd 180 mg, improved rate control achieved at this dose during admission  - echo done with normal EF  - Had pacemaker placed by CT Surgery on 11/21, device was interrogated by EP on 11/22  - Patient can be restarted on Eliquis and Plavix 48h post-op following PPM placement     # Coronary Artery Disease  # Hypertension  # Dyslipidemia  - follows w/ Dr. Peters. S/P PCI x4 (mLAD, dLCX, mRCA, and most recently OM1). Has consistently and continuously refused statin per cardiology.  - Currently on AC  - Currently on metoprolol 50mg TID, home is 50 QD  - Started on Lasix PO 20mg daily per Dr. De La Rosa on 11/22, will be sent on dc    # Hypothyroidism  - Managed via [employer] Thyroid 15qd  - c/w levothyroxine 25mcg PO QD  - TSH 2.12    # IDDM  - Likely secondary to distal pancreatectomy. Unknown A1c. Managed via Tresiba 22u qhs.  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    < from: TTE Echo Complete w/o Contrast w/ Doppler (11.18.22 @ 09:23) >    Summary:   1. Normal global left ventricular systolic function, internal cavity   size, and wall thickness.   2. LV Ejection Fraction by Pacheco's Method with a biplane EF of 67 %.   3. Left ventricular diastolic function could not be assessed in this   study due to atrial arrhythmia.   4. Normal right ventricular size with low-normal function.   5. Severely enlarged left atrium.   6. Severe mitral annular calcification with mild mitral regurgitation   and no evidence of stenosis.   7. Sclerotic aortic valve with normal opening.   8. No pericardial effusion.   9. Patient was in atrial fibrillation during this exam.  10. No prior TTE is available for comparison.    < end of copied text >    Patient had PPM placed by EP on 11/21 without complications. Device was interrogated on 11/22:   - Interrogation complete: DDD  bpm, in AF. Normal functioning device.   - CXR noted on 11/21  - EKG  - HOLD any heparin, lovenox or DOACs for 48 hr following procedure to minimize risk of hematoma  - May resume Eliquis tomorrow evening  - May resume PO diet  - May resume plavix  - Increase cardizem for better rate control  - Cont metoprolol 100 mg PO Q 12  - Tylenol for pain  - Ambulate  Patient was seen by her cardiologist 11/23, Cardizem CD increased to 240mg, Plavix resumed 75mg, and plan for Eliquis to resume this evening after discharge. Patient was otherwise medically stable and cleared for discharge home 11/23 with outpatient follow-up.      # Chronic Afib/Aflutter with RVR  - c/w eliquis increased to 5 mg twice daily, c/w plavix, discontinue aspirin  - discharge home on lopressor 100 mg BID and cardizem cd 180 mg, improved rate control achieved at this dose during admission  - echo done with normal EF  - Had pacemaker placed by CT Surgery on 11/21, device was interrogated by EP on 11/22  - Patient can be restarted on Eliquis and Plavix 48h post-op following PPM placement     # Coronary Artery Disease  # Hypertension  # Dyslipidemia  - follows w/ Dr. Peters. S/P PCI x4 (mLAD, dLCX, mRCA, and most recently OM1). Has consistently and continuously refused statin per cardiology.  - Currently on AC  - Currently on metoprolol 50mg TID, home is 50 QD  - Started on Lasix PO 20mg daily per Dr. De La Rosa on 11/22, will be sent on dc    # Hypothyroidism  - Managed via [employer] Thyroid 15qd  - c/w levothyroxine 25mcg PO QD  - TSH 2.12    # IDDM  - Likely secondary to distal pancreatectomy. Unknown A1c. Managed via Tresiba 22u qhs.  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

## 2022-11-19 NOTE — PROGRESS NOTE ADULT - SUBJECTIVE AND OBJECTIVE BOX
Subjective:  pt is tired from not sleeping. pt with no cp nor sob.       MEDICATIONS  (STANDING):  apixaban 2.5 milliGRAM(s) Oral two times a day  clopidogrel Tablet 75 milliGRAM(s) Oral daily  diltiazem    Tablet 60 milliGRAM(s) Oral every 8 hours  insulin glargine Injectable (LANTUS) 22 Unit(s) SubCutaneous at bedtime  insulin lispro (ADMELOG) corrective regimen sliding scale   SubCutaneous three times a day before meals  melatonin 3 milliGRAM(s) Oral at bedtime  metoprolol tartrate 50 milliGRAM(s) Oral every 6 hours  pantoprazole    Tablet 40 milliGRAM(s) Oral before breakfast  vitamin A &amp; D Ointment 1 Application(s) Topical three times a day    MEDICATIONS  (PRN):  acetaminophen     Tablet .. 650 milliGRAM(s) Oral every 6 hours PRN Mild Pain (1 - 3), Moderate Pain (4 - 6)  aluminum hydroxide/magnesium hydroxide/simethicone Suspension 30 milliLiter(s) Oral every 6 hours PRN Dyspepsia  polyethylene glycol 3350 17 Gram(s) Oral daily PRN Constipation  senna 2 Tablet(s) Oral at bedtime PRN Constipation            Vital Signs Last 24 Hrs  T(C): 36.3 (19 Nov 2022 06:59), Max: 36.4 (18 Nov 2022 13:57)  T(F): 97.4 (19 Nov 2022 06:59), Max: 97.6 (18 Nov 2022 13:57)  HR: 115 (19 Nov 2022 06:59) (101 - 115)  BP: 124/81 (19 Nov 2022 06:59) (124/81 - 131/61)  BP(mean): --  RR: 18 (19 Nov 2022 06:59) (18 - 18)  SpO2: 94% (19 Nov 2022 09:03) (94% - 94%)    Parameters below as of 19 Nov 2022 09:03  Patient On (Oxygen Delivery Method): room air                 REVIEW OF SYSTEMS:  CONSTITUTIONAL: no fever, no chills, no diaphoresis  CARDIOLOGY: no chest pain, no SOB, no palpitation, no diaphoresis, no faint   RESPIRATORY: no dyspnea, no wheeze, no orthopnea, no PND   NEUROLOGICAL: no dizziness, headache, focal deficits to report.  GI: no abdominal pain, no dyspepsia, no nausea, no vomiting, no diarrhea.   HEENT: no congestion, no nasal bleeding  SKIN: no ecchymosis, no petechia             PHYSICAL EXAM:  · CONSTITUTIONAL: Looks stable, in no dstiress  . NECK: Supple, no JVD, no bruit on either carotid side   · RESPIRATORY: Normal air entry to lung base, no wheeze, no crackle, no wet rales  · CARDIOVASCULAR: Normal S1, A2, P2, no murmur, no click, irregular rate,  no rub,  · EXTREMITIES: No cyanosis, no clubbing, no edema  · VASCULAR: Pulses are regular, equal, bilateral in upper and lower extremities  	  TELEMETRY:  afib with mod to rapid v response  ECG:    TTE:  Summary:   1. Normal global left ventricular systolic function, internal cavity   size, and wall thickness.   2. LV Ejection Fraction by Pacheco's Method with a biplane EF of 67 %.   3. Left ventricular diastolic function could not be assessed in this   study due to atrial arrhythmia.   4. Normal right ventricular size with low-normal function.   5. Severely enlarged left atrium.   6. Severe mitral annular calcification with mild mitral regurgitation   and no evidence of stenosis.   7. Sclerotic aortic valve with normal opening.   8. No pericardial effusion.   9. Patient was in atrial fibrillation during this exam.  10. No prior TTE is available for comparison.    LABS:                        12.9   8.93  )-----------( 205      ( 19 Nov 2022 06:50 )             38.8     11-19    134<L>  |  102  |  18  ----------------------------<  211<H>  4.5   |  23  |  0.7    Ca    8.7      19 Nov 2022 06:50  Mg     1.8     11-19    TPro  5.9<L>  /  Alb  3.7  /  TBili  0.6  /  DBili  x   /  AST  23  /  ALT  24  /  AlkPhos  81  11-19            I&O's Summary    18 Nov 2022 07:01  -  19 Nov 2022 07:00  --------------------------------------------------------  IN: 1151 mL / OUT: 975 mL / NET: 176 mL      BNP  RADIOLOGY & ADDITIONAL STUDIES:    IMPRESSION AND PLAN:

## 2022-11-19 NOTE — DISCHARGE NOTE PROVIDER - ATTENDING DISCHARGE PHYSICAL EXAMINATION:
PHYSICAL EXAM:  General Appearance: NAD, normal for age and gender. 	  Neck: Normal JVP, no bruit.   Eyes: Conjunctiva clear, Extra Ocular muscles intact. No scleral icterus.  ENMT: Moist oral mucosa. No oral lesion.  Cardiovascular: Irregularly irregular, S1 S2, No JVD, No murmurs.  Respiratory: Lungs clear to auscultation. No wheezes, rales or rhonchi.  Psychiatry: Alert and oriented x 3, Mood & affect appropriate.  Gastrointestinal:  Soft, Non-tender, Non-distended.  Neurologic: Non-focal deficits.  Musculoskeletal/ extremities: Normal range of motion, No clubbing, cyanosis or edema.  Vascular: Peripheral pulses palpable bilaterally.  Skin/Integumen: No rashes, No ecchymoses, No cyanosis.   Patient seen at bedside. Cardiology cleared for discharge. patient was advised to follow up with PCP and cardiologist. family notified. Patient will be discharged today.

## 2022-11-19 NOTE — PROGRESS NOTE ADULT - SUBJECTIVE AND OBJECTIVE BOX
ANNE VILLATORO  87y Female    Patient is a 87y old  Female who presents with a chief complaint of palpitations    INTERVAL HPI/OVERNIGHT EVENTS:    ROS: Pt states that she stills has palpitations and dyspneic on exertion and while lying in bed. Pt denies fever, chills, SOB, chest pain, nausea, vomiting, abdominal pain, dysuria, rash, muscle or joint pain.    Overnight events: No acute events overnight. Pt had an episode of SOB while lying in bed found hypoxic compared to baseline, given nasal cannula oxygen with improvement in symptoms today.    Vital Signs Last 24 Hrs  T(C): 36.7 (19 Nov 2022 12:53), Max: 36.7 (19 Nov 2022 12:53)  T(F): 98 (19 Nov 2022 12:53), Max: 98 (19 Nov 2022 12:53)  HR: 98 (19 Nov 2022 12:53) (93 - 115)  BP: 118/81 (19 Nov 2022 12:53) (117/77 - 131/61)  BP(mean): --  ABP: --  ABP(mean): --  RR: 16 (19 Nov 2022 12:53) (16 - 18)  SpO2: 94% (19 Nov 2022 09:03) (94% - 94%)    O2 Parameters below as of 19 Nov 2022 09:03  Patient On (Oxygen Delivery Method): room air    I&O's Summary    18 Nov 2022 07:01  -  19 Nov 2022 07:00  --------------------------------------------------------  IN: 1151 mL / OUT: 975 mL / NET: 176 mL    19 Nov 2022 07:01  -  19 Nov 2022 16:28  --------------------------------------------------------  IN: 1050 mL / OUT: 1000 mL / NET: 50 mL    No Known Allergies    MEDICATIONS  (STANDING):  apixaban 5 milliGRAM(s) Oral every 12 hours  clopidogrel Tablet 75 milliGRAM(s) Oral daily  diltiazem    milliGRAM(s) Oral daily  insulin glargine Injectable (LANTUS) 22 Unit(s) SubCutaneous at bedtime  insulin lispro (ADMELOG) corrective regimen sliding scale   SubCutaneous three times a day before meals  melatonin 3 milliGRAM(s) Oral at bedtime  metoprolol tartrate 50 milliGRAM(s) Oral once  metoprolol tartrate 100 milliGRAM(s) Oral two times a day  pantoprazole    Tablet 40 milliGRAM(s) Oral before breakfast  vitamin A &amp; D Ointment 1 Application(s) Topical three times a day    MEDICATIONS  (PRN):  acetaminophen     Tablet .. 650 milliGRAM(s) Oral every 6 hours PRN Mild Pain (1 - 3), Moderate Pain (4 - 6)  aluminum hydroxide/magnesium hydroxide/simethicone Suspension 30 milliLiter(s) Oral every 6 hours PRN Dyspepsia  polyethylene glycol 3350 17 Gram(s) Oral daily PRN Constipation  senna 2 Tablet(s) Oral at bedtime PRN Constipation      PHYSICAL EXAM:  General Appearance: NAD, normal for age and gender. 	  Neck: Normal JVP, no bruit.   Eyes: Conjunctiva clear, Extra Ocular muscles intact. No scleral icterus.  ENMT: Moist oral mucosa. No oral lesion.  Cardiovascular: Irregularly irregular, S1 S2, No JVD, No murmurs.  Respiratory: Lungs clear to auscultation. No wheezes, rales or rhonchi.  Psychiatry: Alert and oriented x 3, Mood & affect appropriate.  Gastrointestinal:  Soft, Non-tender, Mildly distended.  Neurologic: Non-focal deficits.  Musculoskeletal/ extremities: Normal range of motion, No clubbing, cyanosis or edema.  Vascular: Peripheral pulses palpable bilaterally.  Skin/Integumen: No rashes, No ecchymoses, No cyanosis.    LABS:                        12.9   8.93  )-----------( 205      ( 19 Nov 2022 06:50 )             38.8     11-19    134<L>  |  102  |  18  ----------------------------<  211<H>  4.5   |  23  |  0.7    Ca    8.7      19 Nov 2022 06:50  Mg     1.8     11-19    TPro  5.9<L>  /  Alb  3.7  /  TBili  0.6  /  DBili  x   /  AST  23  /  ALT  24  /  AlkPhos  81  11-19    RADIOLOGY & ADDITIONAL TESTS:    < from: TTE Echo Complete w/o Contrast w/ Doppler (11.18.22 @ 09:23) >  Summary:   1. Normal global left ventricular systolic function, internal cavity   size, and wall thickness.   2. LV Ejection Fraction by Pacheco's Method with a biplane EF of 67 %.   3. Left ventricular diastolic function could not be assessed in this   study due to atrial arrhythmia.   4. Normal right ventricular size with low-normal function.   5. Severely enlarged left atrium.   6. Severe mitral annular calcification with mild mitral regurgitation   and no evidence of stenosis.   7. Sclerotic aortic valve with normal opening.   8. No pericardial effusion.   9. Patient was in atrial fibrillation during this exam.  10. No prior TTE is available for comparison.

## 2022-11-19 NOTE — DISCHARGE NOTE PROVIDER - CARE PROVIDERS DIRECT ADDRESSES
,DirectAddress_Unknown ,DirectAddress_Unknown,[email or web address]

## 2022-11-20 LAB
ALBUMIN SERPL ELPH-MCNC: 4.1 G/DL — SIGNIFICANT CHANGE UP (ref 3.5–5.2)
ALP SERPL-CCNC: 88 U/L — SIGNIFICANT CHANGE UP (ref 30–115)
ALT FLD-CCNC: 27 U/L — SIGNIFICANT CHANGE UP (ref 0–41)
ANION GAP SERPL CALC-SCNC: 14 MMOL/L — SIGNIFICANT CHANGE UP (ref 7–14)
AST SERPL-CCNC: 29 U/L — SIGNIFICANT CHANGE UP (ref 0–41)
BASOPHILS # BLD AUTO: 0.08 K/UL — SIGNIFICANT CHANGE UP (ref 0–0.2)
BASOPHILS NFR BLD AUTO: 1 % — SIGNIFICANT CHANGE UP (ref 0–1)
BILIRUB SERPL-MCNC: 0.7 MG/DL — SIGNIFICANT CHANGE UP (ref 0.2–1.2)
BUN SERPL-MCNC: 21 MG/DL — HIGH (ref 10–20)
CALCIUM SERPL-MCNC: 9.1 MG/DL — SIGNIFICANT CHANGE UP (ref 8.4–10.5)
CHLORIDE SERPL-SCNC: 97 MMOL/L — LOW (ref 98–110)
CO2 SERPL-SCNC: 25 MMOL/L — SIGNIFICANT CHANGE UP (ref 17–32)
CREAT SERPL-MCNC: 1 MG/DL — SIGNIFICANT CHANGE UP (ref 0.7–1.5)
EGFR: 54 ML/MIN/1.73M2 — LOW
EOSINOPHIL # BLD AUTO: 0.25 K/UL — SIGNIFICANT CHANGE UP (ref 0–0.7)
EOSINOPHIL NFR BLD AUTO: 3 % — SIGNIFICANT CHANGE UP (ref 0–8)
GLUCOSE BLDC GLUCOMTR-MCNC: 122 MG/DL — HIGH (ref 70–99)
GLUCOSE BLDC GLUCOMTR-MCNC: 138 MG/DL — HIGH (ref 70–99)
GLUCOSE BLDC GLUCOMTR-MCNC: 157 MG/DL — HIGH (ref 70–99)
GLUCOSE BLDC GLUCOMTR-MCNC: 226 MG/DL — HIGH (ref 70–99)
GLUCOSE SERPL-MCNC: 148 MG/DL — HIGH (ref 70–99)
HCT VFR BLD CALC: 41.6 % — SIGNIFICANT CHANGE UP (ref 37–47)
HGB BLD-MCNC: 13.4 G/DL — SIGNIFICANT CHANGE UP (ref 12–16)
IMM GRANULOCYTES NFR BLD AUTO: 0.5 % — HIGH (ref 0.1–0.3)
LYMPHOCYTES # BLD AUTO: 1.56 K/UL — SIGNIFICANT CHANGE UP (ref 1.2–3.4)
LYMPHOCYTES # BLD AUTO: 18.6 % — LOW (ref 20.5–51.1)
MAGNESIUM SERPL-MCNC: 1.7 MG/DL — LOW (ref 1.8–2.4)
MCHC RBC-ENTMCNC: 31.5 PG — HIGH (ref 27–31)
MCHC RBC-ENTMCNC: 32.2 G/DL — SIGNIFICANT CHANGE UP (ref 32–37)
MCV RBC AUTO: 97.9 FL — SIGNIFICANT CHANGE UP (ref 81–99)
MONOCYTES # BLD AUTO: 0.91 K/UL — HIGH (ref 0.1–0.6)
MONOCYTES NFR BLD AUTO: 10.8 % — HIGH (ref 1.7–9.3)
NEUTROPHILS # BLD AUTO: 5.56 K/UL — SIGNIFICANT CHANGE UP (ref 1.4–6.5)
NEUTROPHILS NFR BLD AUTO: 66.1 % — SIGNIFICANT CHANGE UP (ref 42.2–75.2)
NRBC # BLD: 0 /100 WBCS — SIGNIFICANT CHANGE UP (ref 0–0)
PLATELET # BLD AUTO: 216 K/UL — SIGNIFICANT CHANGE UP (ref 130–400)
POTASSIUM SERPL-MCNC: 5.8 MMOL/L — HIGH (ref 3.5–5)
POTASSIUM SERPL-SCNC: 5.8 MMOL/L — HIGH (ref 3.5–5)
PROT SERPL-MCNC: 6.6 G/DL — SIGNIFICANT CHANGE UP (ref 6–8)
RBC # BLD: 4.25 M/UL — SIGNIFICANT CHANGE UP (ref 4.2–5.4)
RBC # FLD: 15.7 % — HIGH (ref 11.5–14.5)
SODIUM SERPL-SCNC: 136 MMOL/L — SIGNIFICANT CHANGE UP (ref 135–146)
WBC # BLD: 8.4 K/UL — SIGNIFICANT CHANGE UP (ref 4.8–10.8)
WBC # FLD AUTO: 8.4 K/UL — SIGNIFICANT CHANGE UP (ref 4.8–10.8)

## 2022-11-20 PROCEDURE — 99223 1ST HOSP IP/OBS HIGH 75: CPT

## 2022-11-20 PROCEDURE — 99233 SBSQ HOSP IP/OBS HIGH 50: CPT

## 2022-11-20 RX ORDER — MAGNESIUM SULFATE 500 MG/ML
2 VIAL (ML) INJECTION ONCE
Refills: 0 | Status: COMPLETED | OUTPATIENT
Start: 2022-11-20 | End: 2022-11-20

## 2022-11-20 RX ORDER — INSULIN GLARGINE 100 [IU]/ML
22 INJECTION, SOLUTION SUBCUTANEOUS AT BEDTIME
Refills: 0 | Status: DISCONTINUED | OUTPATIENT
Start: 2022-11-21 | End: 2022-11-21

## 2022-11-20 RX ORDER — SODIUM ZIRCONIUM CYCLOSILICATE 10 G/10G
10 POWDER, FOR SUSPENSION ORAL ONCE
Refills: 0 | Status: DISCONTINUED | OUTPATIENT
Start: 2022-11-20 | End: 2022-11-23

## 2022-11-20 RX ORDER — THYROID 120 MG
15 TABLET ORAL
Refills: 0 | Status: DISCONTINUED | OUTPATIENT
Start: 2022-11-20 | End: 2022-11-21

## 2022-11-20 RX ORDER — INSULIN GLARGINE 100 [IU]/ML
12 INJECTION, SOLUTION SUBCUTANEOUS AT BEDTIME
Refills: 0 | Status: COMPLETED | OUTPATIENT
Start: 2022-11-20 | End: 2022-11-20

## 2022-11-20 RX ADMIN — INSULIN GLARGINE 12 UNIT(S): 100 INJECTION, SOLUTION SUBCUTANEOUS at 23:02

## 2022-11-20 RX ADMIN — Medication 3 MILLIGRAM(S): at 23:01

## 2022-11-20 RX ADMIN — Medication 100 MILLIGRAM(S): at 18:17

## 2022-11-20 RX ADMIN — Medication 100 MILLIGRAM(S): at 05:26

## 2022-11-20 RX ADMIN — Medication 180 MILLIGRAM(S): at 05:25

## 2022-11-20 RX ADMIN — Medication 25 GRAM(S): at 18:16

## 2022-11-20 RX ADMIN — APIXABAN 5 MILLIGRAM(S): 2.5 TABLET, FILM COATED ORAL at 05:25

## 2022-11-20 RX ADMIN — Medication 1: at 12:00

## 2022-11-20 RX ADMIN — CLOPIDOGREL BISULFATE 75 MILLIGRAM(S): 75 TABLET, FILM COATED ORAL at 11:21

## 2022-11-20 RX ADMIN — PANTOPRAZOLE SODIUM 40 MILLIGRAM(S): 20 TABLET, DELAYED RELEASE ORAL at 06:24

## 2022-11-20 NOTE — CONSULT NOTE ADULT - SUBJECTIVE AND OBJECTIVE BOX
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 Symptoms started yesterday AM when pt felt weak. Noticed HR elevated to 130's at the time. Took increase dose of home metoprolol, without symptom resolution, thus prompting ED visit. Reports some urinary frequency (symptom similar to prior UTI's). No reported dysuria or hematuria. No reported fevers, chills, CP, SOB, abdominal pain, n/v/d, or LE swelling.     In ED, pt tachycardic to 130's, but otherwise HD stable on vitals. EKG demonstrated 2:1 aflutter. UA concerning for UTI, so given ceftriaxone 1g IV x1. CTA Chest ruled out PE, but demonstrated reflux of contrast into IVC and hepatic veins, indicating possible component of right heart dysfunction.     NOTE: Patient has two charts. 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 During admission developed AF RVR, hard to control, meds were adjusted, currently Metoprolol 100mg bid, Cardizem 180mg daily, rate controlled. However had several pauses >5sec. Patient now recommended for pacemaker placement  Denies syncope, dyspnea, dizziness.     REVIEW OF SYSTEMS    [x ] A ten-point review of systems was otherwise negative except as noted.  [ ] Due to altered mental status/intubation, subjective information were not able to be obtained from the patient. History was obtained, to the extent possible, from review of the chart and collateral sources of information.      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; s/p partial liver resection Mar 2022 @ [facility]      History of partial pancreatectomy  s/p distal pancreatectomy Aug 2021      H/O resection of liver  mets to liver; s/p partial liver resection Mar 2022 @ [facility]          Home Medications:  [place] Thyroid 15 mg oral tablet: 1 tab(s) orally once a day (2022 10:04)  Plavix 75 mg oral tablet: 1 tab(s) orally once a day (2022 10:04)  Tresiba 100 units/mL subcutaneous solution: 22 unit(s) subcutaneous once a day (at bedtime) (2022 10:04)      Allergies:  No Known Allergies      FAMILY HISTORY:  No pertinent family history in first degree relatives        SOCIAL HISTORY: denies tobacco / ETOH / illicit drug use     CIGARETTES:  ALCOHOL:  MARIJUANA:  ILLICIT DRUGS:        PREVIOUS DIAGNOSTIC TESTING:      ECHO  FINDINGS:  < from: TTE Echo Complete w/o Contrast w/ Doppler (22 @ 09:23) >   1. Normal global left ventricular systolic function, internal cavity   size, and wall thickness.   2. LV Ejection Fraction by Pacheco's Method with a biplane EF of 67 %.   3. Left ventricular diastolic function could not be assessed in this   study due to atrial arrhythmia.   4. Normal right ventricular size with low-normal function.   5. Severely enlarged left atrium.   6. Severe mitral annular calcification with mild mitral regurgitation   and no evidence of stenosis.   7. Sclerotic aortic valve with normal opening.   8. No pericardial effusion.   9. Patient was in atrial fibrillation during this exam.  10. No prior TTE is available for comparison.    < end of copied text >    STRESS  FINDINGS:    CATHETERIZATION  FINDINGS:    ELECTROPHYSIOLOGY STUDY  FINDINGS:    CAROTID ULTRASOUND:  FINDINGS    VENOUS DUPLEX SCAN:  FINDINGS:    CHEST CT PULMONARY ANGIO with IV Contrast:  FINDINGS:  < from: CT Angio Chest PE Protocol w/ IV Cont (22 @ 23:55) >  INTERPRETATION:    PULMONARY ARTERIES: There areno pulmonary emboli.    AIRWAYS/LUNGS/PLEURA: Patent central tracheobronchial tree. No focal   consolidation, pleural effusion, or pneumothorax. Mosaic attenuation of   the lungs. Dependent subsegmental atelectasis. Scattered areas of   pleural-parenchymal scarring.    MEDIASTINUM: No lymphadenopathy by size criteria.    HEART AND VESSELS: No pericardial effusion. Coronary artery   calcifications/stents. Normal caliber thoracic aorta. Contrast is noted   within the coronary sinus of uncertain relevance. There is reflux of   contrast into the IVC and hepatic veins indicating possible component of   right heart dysfunction.    UPPER ABDOMEN: No focal abnormality.    BONES AND SOFT TISSUES: Degenerative changes of the spine. No acute   osseous abnormality. Right-sided central venous Mediport.    IMPRESSION:  1.  No evidence of acute pulmonary embolism or acute thoracic pathology.  2.  Reflux of contrast into the IVC and hepatic veins indicating possible   component of right heart dysfunction.    < end of copied text >      MEDICATIONS  (STANDING):  clopidogrel Tablet 75 milliGRAM(s) Oral daily  diltiazem    milliGRAM(s) Oral daily  insulin glargine Injectable (LANTUS) 12 Unit(s) SubCutaneous at bedtime  insulin lispro (ADMELOG) corrective regimen sliding scale   SubCutaneous three times a day before meals  melatonin 3 milliGRAM(s) Oral at bedtime  metoprolol tartrate 100 milliGRAM(s) Oral two times a day  pantoprazole    Tablet 40 milliGRAM(s) Oral before breakfast  thyroid 15 milliGRAM(s) Oral <User Schedule>  vitamin A &amp; D Ointment 1 Application(s) Topical three times a day    MEDICATIONS  (PRN):  acetaminophen     Tablet .. 650 milliGRAM(s) Oral every 6 hours PRN Mild Pain (1 - 3), Moderate Pain (4 - 6)  aluminum hydroxide/magnesium hydroxide/simethicone Suspension 30 milliLiter(s) Oral every 6 hours PRN Dyspepsia  polyethylene glycol 3350 17 Gram(s) Oral daily PRN Constipation  senna 2 Tablet(s) Oral at bedtime PRN Constipation      Vital Signs Last 24 Hrs  T(C): 35.6 (2022 14:14), Max: 36.1 (2022 20:24)  T(F): 96 (2022 14:14), Max: 97 (2022 20:24)  HR: 103 (2022 14:14) (68 - 112)  BP: 139/83 (2022 14:14) (100/63 - 139/83)  BP(mean): --  RR: 18 (2022 14:14) (18 - 18)  SpO2: 95% (2022 20:37) (95% - 95%)    Parameters below as of 2022 20:37  Patient On (Oxygen Delivery Method): room air        PHYSICAL EXAM:    GENERAL: In no apparent distress, well nourished, and hydrated.  HEAD:  Atraumatic, Normocephalic  EYES: EOMI, PERRLA, conjunctiva and sclera clear  NECK: Supple and normal thyroid.  No JVD or carotid bruit.  Carotid pulse is 2+ bilaterally.  HEART: Regular rate and rhythm; No murmurs, rubs, or gallops.  PULMONARY: Clear to auscultation and perfusion.  No rales, wheezing, or rhonchi bilaterally.  ABDOMEN: Soft, Nontender, Nondistended; Bowel sounds present  EXTREMITIES:  2+ Peripheral Pulses, No clubbing, cyanosis, or edema  NEUROLOGICAL: Grossly nonfocal    I&O's Detail    2022 07:01  -  2022 07:00  --------------------------------------------------------  IN:    Oral Fluid: 975 mL  Total IN: 975 mL    OUT:    Voided (mL): 2700 mL  Total OUT: 2700 mL    Total NET: -1725 mL      2022 07:01  -  2022 16:00  --------------------------------------------------------  IN:  Total IN: 0 mL    OUT:    Voided (mL): 500 mL  Total OUT: 500 mL    Total NET: -500 mL        Daily     Daily Weight in k (2022 04:43)    INTERPRETATION OF TELEMETRY:    EC Lead ECG:   Ventricular Rate 116 BPM    Atrial Rate 87 BPM    QRS Duration 90 ms    Q-T Interval 358 ms    QTC Calculation(Bazett) 497 ms    R Axis -14 degrees    T Axis -20 degrees    Diagnosis Line Atrial fibrillation with rapid ventricular response  Low voltage QRS  Poor R wave progression  Abnormal ECG    Confirmed by Dax Boyer ([id number]) on 2022 10:53:13 AM (11-18-22 @ 07:57)        LABS:                        13.4   8.40  )-----------( 216      ( 2022 14:07 )             41.6         134<L>  |  102  |  18  ----------------------------<  211<H>  4.5   |  23  |  0.7    Ca    8.7      2022 06:50  Mg     1.8         TPro  5.9<L>  /  Alb  3.7  /  TBili  0.6  /  DBili  x   /  AST  23  /  ALT  24  /  AlkPhos  81              BNP            RADIOLOGY & ADDITIONAL STUDIES:       Patient is a 88y old  Female who presents with a chief complaint of Palpitations (2022 08:28)        HPI:  87F w/ h/o pAF (not on AC), CAD (s/p 4 stents), IDDM, and pancreatic cancer (s/p distal pancreatectomy Aug 2021), 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 Symptoms started yesterday AM when pt felt weak. Noticed HR elevated to 130's at the time. Took increase dose of home metoprolol, without symptom resolution, thus prompting ED visit. Reports some urinary frequency (symptom similar to prior UTI's). No reported dysuria or hematuria. No reported fevers, chills, CP, SOB, abdominal pain, n/v/d, or LE swelling.     In ED, pt tachycardic to 130's, but otherwise HD stable on vitals. EKG demonstrated 2:1 aflutter. UA concerning for UTI, so given ceftriaxone 1g IV x1. CTA Chest ruled out PE, but demonstrated reflux of contrast into IVC and hepatic veins, indicating possible component of right heart dysfunction.     NOTE: Patient has two charts. 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 During admission developed AF RVR, hard to control, meds were adjusted, currently Metoprolol 100mg bid, Cardizem 180mg daily, rate controlled. However had several pauses >5sec. Patient now recommended for pacemaker placement  Denies syncope, dyspnea, dizziness.     REVIEW OF SYSTEMS    [x ] A ten-point review of systems was otherwise negative except as noted.  [ ] Due to altered mental status/intubation, subjective information were not able to be obtained from the patient. History was obtained, to the extent possible, from review of the chart and collateral sources of information.      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; s/p partial liver resection Mar 2022 @ [facility]      History of partial pancreatectomy  s/p distal pancreatectomy Aug 2021      H/O resection of liver  mets to liver; s/p partial liver resection Mar 2022 @ [facility]          Home Medications:  [place] Thyroid 15 mg oral tablet: 1 tab(s) orally once a day (2022 10:04)  Plavix 75 mg oral tablet: 1 tab(s) orally once a day (2022 10:04)  Tresiba 100 units/mL subcutaneous solution: 22 unit(s) subcutaneous once a day (at bedtime) (2022 10:04)      Allergies:  No Known Allergies      FAMILY HISTORY:  No pertinent family history in first degree relatives        SOCIAL HISTORY: denies tobacco / ETOH / illicit drug use     CIGARETTES:  ALCOHOL:  MARIJUANA:  ILLICIT DRUGS:        PREVIOUS DIAGNOSTIC TESTING:      ECHO  FINDINGS:  < from: TTE Echo Complete w/o Contrast w/ Doppler (22 @ 09:23) >   1. Normal global left ventricular systolic function, internal cavity   size, and wall thickness.   2. LV Ejection Fraction by Pacheco's Method with a biplane EF of 67 %.   3. Left ventricular diastolic function could not be assessed in this   study due to atrial arrhythmia.   4. Normal right ventricular size with low-normal function.   5. Severely enlarged left atrium.   6. Severe mitral annular calcification with mild mitral regurgitation   and no evidence of stenosis.   7. Sclerotic aortic valve with normal opening.   8. No pericardial effusion.   9. Patient was in atrial fibrillation during this exam.  10. No prior TTE is available for comparison.    < end of copied text >    STRESS  FINDINGS:    CATHETERIZATION  FINDINGS:  21  FINDINGS                         Hemodynamics: Hemodynamic assessment demonstrates mildly to moderately elevated LVEDP.   Ventricles: EF calculated by contrast ventriculography was 60 %.   Coronary circulation: The coronary circulation is right dominant. There was significant 1-vessel coronary artery disease. Left main: Angiography showed no evidence of disease. LAD: Angiography showed mild diffuse atherosclerosis. Patent prior stent in mid LAD. 1st diagonal: Angiography showed no evidence of disease. 2nd diagonal: Angiography showed mild atherosclerosis with no flow limiting lesions. Proximal circumflex: There was a discrete 50 % stenosis. Distal circumflex: Angiography showed mild atherosclerosis with no flow limiting lesions. Patent prior stent. 1st obtuse marginal: The vessel was medium sized. There was a discrete 90 % stenosis in the proximal third of the vessel segment. The lesion was hazy. There was KEELY grade 3 flow through the vessel (brisk flow). This is a likely culprit for the patient's clinical presentation. An intervention was performed. RCA: The vessel was large sized (dominant). Proximal RCA: Angiography showed mild atherosclerosis with no flow limiting lesions. Mid RCA: Angiography showed mild atherosclerosis with no flow limiting lesions. Patent prior stent. Distal RCA: There was a discrete 50 % stenosis. Right PDA: There was a discrete 50 % stenosis in the middle third of the vessel segment. Right posterolateral segment: Angiography showed mild atherosclerosis with no flow limiting lesions.   PROCEDURE SUMMARY  The coronary anatomy is normal. There is significant single vessel coronary artery disease.  Successful PCI of OM1 with MEAGAN x 1 (AUC score 8).     7/10/19  FINDINGS  Left Heart Catheterization:  LVEF%: 60%  LVEDP: WNL  [x] Non-obstructive CAD  LEFT HEART CATHETERIZATION:       Hemodynamics: Hemodynamic assessment demonstrates normal LVEDP.   Valves: Aortic valve: No significant aortic gradient.   Coronary circulation: The coronary circulation is right dominant. Left main: Normal. The vessel was normal sized. Angiography showed minor luminal irregularities with no flow limiting lesions. LAD: The vessel was normal sized. Angiography showed minor luminal irregularities with no flow limiting lesions. Patent stent. Circumflex: The vessel was medium sized. Angiography showed minor luminal irregularities with no flow limiting lesions. Patent stent. RCA: The vessel was large sized (dominant). There was a discrete 50 % stenosis at a site with no prior intervention, in the distal third of the vessel segment. There was KEELY grade 3 flow through the vessel (brisk flow).                                  ELECTROPHYSIOLOGY STUDY  FINDINGS:    CAROTID ULTRASOUND:  FINDINGS    VENOUS DUPLEX SCAN:  FINDINGS:    CHEST CT PULMONARY ANGIO with IV Contrast:  FINDINGS:  < from: CT Angio Chest PE Protocol w/ IV Cont (22 @ 23:55) >  INTERPRETATION:    PULMONARY ARTERIES: There areno pulmonary emboli.    AIRWAYS/LUNGS/PLEURA: Patent central tracheobronchial tree. No focal   consolidation, pleural effusion, or pneumothorax. Mosaic attenuation of   the lungs. Dependent subsegmental atelectasis. Scattered areas of   pleural-parenchymal scarring.    MEDIASTINUM: No lymphadenopathy by size criteria.    HEART AND VESSELS: No pericardial effusion. Coronary artery   calcifications/stents. Normal caliber thoracic aorta. Contrast is noted   within the coronary sinus of uncertain relevance. There is reflux of   contrast into the IVC and hepatic veins indicating possible component of   right heart dysfunction.    UPPER ABDOMEN: No focal abnormality.    BONES AND SOFT TISSUES: Degenerative changes of the spine. No acute   osseous abnormality. Right-sided central venous Mediport.    IMPRESSION:  1.  No evidence of acute pulmonary embolism or acute thoracic pathology.  2.  Reflux of contrast into the IVC and hepatic veins indicating possible   component of right heart dysfunction.    < end of copied text >      MEDICATIONS  (STANDING):  clopidogrel Tablet 75 milliGRAM(s) Oral daily  diltiazem    milliGRAM(s) Oral daily  insulin glargine Injectable (LANTUS) 12 Unit(s) SubCutaneous at bedtime  insulin lispro (ADMELOG) corrective regimen sliding scale   SubCutaneous three times a day before meals  melatonin 3 milliGRAM(s) Oral at bedtime  metoprolol tartrate 100 milliGRAM(s) Oral two times a day  pantoprazole    Tablet 40 milliGRAM(s) Oral before breakfast  thyroid 15 milliGRAM(s) Oral <User Schedule>  vitamin A &amp; D Ointment 1 Application(s) Topical three times a day    MEDICATIONS  (PRN):  acetaminophen     Tablet .. 650 milliGRAM(s) Oral every 6 hours PRN Mild Pain (1 - 3), Moderate Pain (4 - 6)  aluminum hydroxide/magnesium hydroxide/simethicone Suspension 30 milliLiter(s) Oral every 6 hours PRN Dyspepsia  polyethylene glycol 3350 17 Gram(s) Oral daily PRN Constipation  senna 2 Tablet(s) Oral at bedtime PRN Constipation      Vital Signs Last 24 Hrs  T(C): 35.6 (2022 14:14), Max: 36.1 (2022 20:24)  T(F): 96 (2022 14:14), Max: 97 (2022 20:24)  HR: 103 (2022 14:14) (68 - 112)  BP: 139/83 (2022 14:14) (100/63 - 139/83)  BP(mean): --  RR: 18 (2022 14:14) (18 - 18)  SpO2: 95% (2022 20:37) (95% - 95%)    Parameters below as of 2022 20:37  Patient On (Oxygen Delivery Method): room air        PHYSICAL EXAM:    GENERAL: In no apparent distress, well nourished, and hydrated.  HEAD:  Atraumatic, Normocephalic  EYES: EOMI, PERRLA, conjunctiva and sclera clear  NECK: Supple and normal thyroid.  No JVD or carotid bruit.  Carotid pulse is 2+ bilaterally.  HEART: Regular rate and rhythm; No murmurs, rubs, or gallops.  PULMONARY: Clear to auscultation and perfusion.  No rales, wheezing, or rhonchi bilaterally.  ABDOMEN: Soft, Nontender, Nondistended; Bowel sounds present  EXTREMITIES:  2+ Peripheral Pulses, No clubbing, cyanosis, or edema  NEUROLOGICAL: Grossly nonfocal    I&O's Detail    2022 07:01  -  2022 07:00  --------------------------------------------------------  IN:    Oral Fluid: 975 mL  Total IN: 975 mL    OUT:    Voided (mL): 2700 mL  Total OUT: 2700 mL    Total NET: -1725 mL      2022 07:01  -  2022 16:00  --------------------------------------------------------  IN:  Total IN: 0 mL    OUT:    Voided (mL): 500 mL  Total OUT: 500 mL    Total NET: -500 mL        Daily     Daily Weight in k (2022 04:43)    INTERPRETATION OF TELEMETRY:    EC Lead ECG:   Ventricular Rate 116 BPM    Atrial Rate 87 BPM    QRS Duration 90 ms    Q-T Interval 358 ms    QTC Calculation(Bazett) 497 ms    R Axis -14 degrees    T Axis -20 degrees    Diagnosis Line Atrial fibrillation with rapid ventricular response  Low voltage QRS  Poor R wave progression  Abnormal ECG    Confirmed by Dax Boyer ([id number]) on 2022 10:53:13 AM (11-18-22 @ 07:57)        LABS:                        13.4   8.40  )-----------( 216      ( 2022 14:07 )             41.6         134<L>  |  102  |  18  ----------------------------<  211<H>  4.5   |  23  |  0.7    Ca    8.7      2022 06:50  Mg     1.8         TPro  5.9<L>  /  Alb  3.7  /  TBili  0.6  /  DBili  x   /  AST  23  /  ALT  24  /  AlkPhos  81              BNP            RADIOLOGY & ADDITIONAL STUDIES:

## 2022-11-20 NOTE — CONSULT NOTE ADULT - SUBJECTIVE AND OBJECTIVE BOX
Surgeon: Dr. 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 During admission developed AF RVR, hard to control, meds were adjusted, currently Metoprolol 100mg bid, Cardizem 180mg daily, rate controlled. However had several pauses >5sec. Patient now recommended for pacemaker placement. Denies syncope, dyspnea, dizziness. CTS consulted for PPM      Home Medications:  [place] Thyroid 15 mg oral tablet: 1 tab(s) orally once a day (13 Nov 2022 10:04)  Plavix 75 mg oral tablet: 1 tab(s) orally once a day (13 Nov 2022 10:04)  Tresiba 100 units/mL subcutaneous solution: 22 unit(s) subcutaneous once a day (at bedtime) (13 Nov 2022 10:04)        NYHA functional class    [ ] Class I (no limitation) [ ] Class II (slight limitation) [ ] Class III (marked limitation) [ ] Class IV (symptoms at rest)    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; s/p partial liver resection Mar 2022 @ [facility]      History of partial pancreatectomy  s/p distal pancreatectomy Aug 2021      H/O resection of liver  mets to liver; s/p partial liver resection Mar 2022 @ [facility]      MEDICATIONS  (STANDING):  clopidogrel Tablet 75 milliGRAM(s) Oral daily  diltiazem    milliGRAM(s) Oral daily  insulin glargine Injectable (LANTUS) 12 Unit(s) SubCutaneous at bedtime  insulin lispro (ADMELOG) corrective regimen sliding scale   SubCutaneous three times a day before meals  magnesium sulfate  IVPB 2 Gram(s) IV Intermittent once  melatonin 3 milliGRAM(s) Oral at bedtime  metoprolol tartrate 100 milliGRAM(s) Oral two times a day  pantoprazole    Tablet 40 milliGRAM(s) Oral before breakfast  thyroid 15 milliGRAM(s) Oral <User Schedule>  vitamin A &amp; D Ointment 1 Application(s) Topical three times a day    MEDICATIONS  (PRN):  acetaminophen     Tablet .. 650 milliGRAM(s) Oral every 6 hours PRN Mild Pain (1 - 3), Moderate Pain (4 - 6)  aluminum hydroxide/magnesium hydroxide/simethicone Suspension 30 milliLiter(s) Oral every 6 hours PRN Dyspepsia  polyethylene glycol 3350 17 Gram(s) Oral daily PRN Constipation  senna 2 Tablet(s) Oral at bedtime PRN Constipation    Antiplatelet therapy:                           Last dose/amt:    Allergies    No Known Allergies    Intolerances        SOCIAL HISTORY:  no recent drug, alcohol or tobacco abuse  Occupation: retired  Lives with:  Assisted device use:    FAMILY HISTORY:  No pertinent family history in first degree relatives        Review of Systems  CONSTITUTIONAL: no fever, chills or night sweats]   NEURO:  denies seizures, paralysis or paresthesias                                                                                EYES: wears glasses, no double vision, no blurry vision                                                                          ENMT: no difficulty hearing, vertigo, dysphagia, epistaxis recent dental work [ ]                                      CV:  denies chest pain, PEDRAZA or palpatations at current time                                                                                            RESPIRATORY:  no cough or hemoptysis                                                                 GI:  no nausea, vomiting, constipation or diarrhea   : denies dysuria, hematuria, incontinence or retention                                                                                           MUSKULOSKELETAL:  denies joint swelling or muscle weakness  PSYCH:  denies dementia, depression, anxiety   ENDOCRINE:  cold intolerance[ ] heat intolerance[ ] polydipsia[ ]                                                                                                                                                                                                PHYSICAL EXAM  Vital Signs Last 24 Hrs  T(C): 35.6 (20 Nov 2022 14:14), Max: 36.1 (19 Nov 2022 20:24)  T(F): 96 (20 Nov 2022 14:14), Max: 97 (19 Nov 2022 20:24)  HR: 103 (20 Nov 2022 14:14) (68 - 112)  BP: 139/83 (20 Nov 2022 14:14) (100/63 - 139/83)  RR: 18 (20 Nov 2022 14:14) (18 - 18)  SpO2: 95% (19 Nov 2022 20:37) (95% - 95%)    Parameters below as of 19 Nov 2022 20:37  Patient On (Oxygen Delivery Method): room air      Right arm bp: Left arm bp;    CONSTITUTIONAL:    well nourished, well developed, overweight in NAD                                                                       Neuro: oriented to person/place & time with no focal motor or sensory  deficits     Eyes: PERRLA, EOMI, no nystagmus, sclera anicteric  ENT:  nasal/oral mucosa with absence of cyanosis, fair dentition  Neck: no jugular vein distention, trachea midline, no goiter   Chest: bilateral breath sounds with good air movement absence of wheezes, rales, or rhonchi                                                                           CV:  RSR, S1S2, no significant murmur appreciated  Carotids: No Bruits  GI:  soft, non-tender non-distended, + bowel sounds                                                                                                          Extremities:  no evidence of cyanosis or deformity, no pedal edema   Extremity Pulses: right / left:  femorals 2+/ 2+; DP's 2+/2+; radials 2+/2+    negative Allens  SKIN : no rashes                                                          LABS:                        13.4   8.40  )-----------( 216      ( 20 Nov 2022 14:07 )             41.6     11-20    136  |  97<L>  |  21<H>  ----------------------------<  148<H>  5.8<H>   |  25  |  1.0    Ca    9.1      20 Nov 2022 14:07  Mg     1.7     11-20    TPro  6.6  /  Alb  4.1  /  TBili  0.7  /  DBili  x   /  AST  29  /  ALT  27  /  AlkPhos  88  11-20    < from: Xray Chest 1 View-PORTABLE IMMEDIATE (Xray Chest 1 View-PORTABLE IMMEDIATE .) (11.19.22 @ 10:41) >  Impression:  1.  Lung volumes decreased bilaterally, new.  2.  Bibasilar atelectatic changes.    < end of copied text >  < from: CT Angio Chest PE Protocol w/ IV Cont (11.12.22 @ 23:55) >  IMPRESSION:  1.  No evidence of acute pulmonary embolism or acute thoracic pathology.  2.  Reflux of contrast into the IVC and hepatic veins indicating possible   component of right heart dysfunction.    < end of copied text >  < from: 12 Lead ECG (11.18.22 @ 07:57) >    Ventricular Rate 116 BPM    Atrial Rate 87 BPM    QRS Duration 90 ms    Q-T Interval 358 ms    QTC Calculation(Bazett) 497 ms    R Axis -14 degrees    T Axis -20 degrees    Diagnosis Line Atrial fibrillation with rapid ventricular response  Low voltage QRS  Poor R wave progression  Abnormal ECG    < end of copied text >  < from: TTE Echo Complete w/o Contrast w/ Doppler (11.18.22 @ 09:23) >  Summary:   1. Normal global left ventricular systolic function, internal cavity   size, and wall thickness.   2. LV Ejection Fraction by Pacheco's Method with a biplane EF of 67 %.   3. Left ventricular diastolic function could not be assessed in this   study due to atrial arrhythmia.   4. Normal right ventricular size with low-normal function.   5. Severely enlarged left atrium.   6. Severe mitral annular calcification with mild mitral regurgitation   and no evidence of stenosis.   7. Sclerotic aortic valve with normal opening.   8. No pericardial effusion.   9. Patient was in atrial fibrillation during this exam.  10. No prior TTE is available for comparison.    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 Developed AF RVR, medication adjusted, now rate controlled but with pauses. 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    NPO after midnight for PPM - most likely around 16:00.  Hold Plavix  PT / PTT and Type and screen  Correct hyperkalemia prior to surgery  Attending note to follow                   Surgeon: Dr. 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 During admission developed AF RVR, hard to control, meds were adjusted, currently Metoprolol 100mg bid, Cardizem 180mg daily, rate controlled. However had several pauses >5sec. Patient now recommended for pacemaker placement. Denies syncope, dyspnea, dizziness. CTS consulted for PPM      Home Medications:  [place] Thyroid 15 mg oral tablet: 1 tab(s) orally once a day (13 Nov 2022 10:04)  Plavix 75 mg oral tablet: 1 tab(s) orally once a day (13 Nov 2022 10:04)  Tresiba 100 units/mL subcutaneous solution: 22 unit(s) subcutaneous once a day (at bedtime) (13 Nov 2022 10:04)        NYHA functional class    [ ] Class I (no limitation) [ ] Class II (slight limitation) [ ] Class III (marked limitation) [ ] Class IV (symptoms at rest)    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; s/p partial liver resection Mar 2022 @ [facility]      History of partial pancreatectomy  s/p distal pancreatectomy Aug 2021      H/O resection of liver  mets to liver; s/p partial liver resection Mar 2022 @ [facility]      MEDICATIONS  (STANDING):  clopidogrel Tablet 75 milliGRAM(s) Oral daily  diltiazem    milliGRAM(s) Oral daily  insulin glargine Injectable (LANTUS) 12 Unit(s) SubCutaneous at bedtime  insulin lispro (ADMELOG) corrective regimen sliding scale   SubCutaneous three times a day before meals  magnesium sulfate  IVPB 2 Gram(s) IV Intermittent once  melatonin 3 milliGRAM(s) Oral at bedtime  metoprolol tartrate 100 milliGRAM(s) Oral two times a day  pantoprazole    Tablet 40 milliGRAM(s) Oral before breakfast  thyroid 15 milliGRAM(s) Oral <User Schedule>  vitamin A &amp; D Ointment 1 Application(s) Topical three times a day    MEDICATIONS  (PRN):  acetaminophen     Tablet .. 650 milliGRAM(s) Oral every 6 hours PRN Mild Pain (1 - 3), Moderate Pain (4 - 6)  aluminum hydroxide/magnesium hydroxide/simethicone Suspension 30 milliLiter(s) Oral every 6 hours PRN Dyspepsia  polyethylene glycol 3350 17 Gram(s) Oral daily PRN Constipation  senna 2 Tablet(s) Oral at bedtime PRN Constipation    Antiplatelet therapy:       Plavix                    Last dose/amt:  today    Allergies    No Known Allergies    Intolerances    SOCIAL HISTORY:  no recent drug, alcohol or tobacco abuse  Occupation: retired  Lives with: family    FAMILY HISTORY:  No pertinent family history in first degree relatives      Review of Systems  CONSTITUTIONAL: no fever, chills or night sweats]   NEURO:  denies seizures, paralysis or paresthesias                                                                                EYES: wears glasses, no double vision, no blurry vision                                                                          ENMT: no difficulty hearing, vertigo, dysphagia, epistaxis recent dental work [ ]                                      CV:  denies chest pain, + PEDRAZA no palpitations at current time                                                                                            RESPIRATORY:  no cough or hemoptysis                                                                 GI:  no nausea, vomiting, constipation or diarrhea   : denies dysuria, hematuria, incontinence or retention                                                                                           MUSKULOSKELETAL:  denies joint swelling or muscle weakness  PSYCH:  denies dementia, depression, anxiety   ENDOCRINE:  cold intolerance[ ] heat intolerance[ ] polydipsia[ ]                                                                                                                                                                                                PHYSICAL EXAM  Vital Signs Last 24 Hrs  T(C): 35.6 (20 Nov 2022 14:14), Max: 36.1 (19 Nov 2022 20:24)  T(F): 96 (20 Nov 2022 14:14), Max: 97 (19 Nov 2022 20:24)  HR: 103 (20 Nov 2022 14:14) (68 - 112)  BP: 139/83 (20 Nov 2022 14:14) (100/63 - 139/83)  RR: 18 (20 Nov 2022 14:14) (18 - 18)  SpO2: 95% (19 Nov 2022 20:37) (95% - 95%)    Parameters below as of 19 Nov 2022 20:37  Patient On (Oxygen Delivery Method): room air      Right arm bp: Left arm bp;    CONSTITUTIONAL:    well nourished, well developed, overweight in NAD                                                                       Neuro: oriented to person/place & time with no focal motor or sensory  deficits     Eyes: PERRLA, EOMI, no nystagmus, sclera anicteric  ENT:  nasal/oral mucosa with absence of cyanosis, fair dentition  Neck: no jugular vein distention, trachea midline, no goiter   Chest: bilateral breath sounds with good air movement absence of wheezes, rales, or rhonchi                                                                           CV:  RSR, S1S2, no significant murmur appreciated  Carotids: No Bruits  GI:  soft, non-tender non-distended, + bowel sounds, protuberant                                                                                                       Extremities:  no evidence of cyanosis or deformity, no pedal edema   Extremity Pulses: right / left:  ; DP's 2+/2+; radials 2+/2+      SKIN : no rashes                                                          LABS:                        13.4   8.40  )-----------( 216      ( 20 Nov 2022 14:07 )             41.6     11-20    136  |  97<L>  |  21<H>  ----------------------------<  148<H>  5.8<H>   |  25  |  1.0    Ca    9.1      20 Nov 2022 14:07  Mg     1.7     11-20    TPro  6.6  /  Alb  4.1  /  TBili  0.7  /  DBili  x   /  AST  29  /  ALT  27  /  AlkPhos  88  11-20    < from: Xray Chest 1 View-PORTABLE IMMEDIATE (Xray Chest 1 View-PORTABLE IMMEDIATE .) (11.19.22 @ 10:41) >  Impression:  1.  Lung volumes decreased bilaterally, new.  2.  Bibasilar atelectatic changes.    < end of copied text >  < from: CT Angio Chest PE Protocol w/ IV Cont (11.12.22 @ 23:55) >  IMPRESSION:  1.  No evidence of acute pulmonary embolism or acute thoracic pathology.  2.  Reflux of contrast into the IVC and hepatic veins indicating possible   component of right heart dysfunction.    < end of copied text >  < from: 12 Lead ECG (11.18.22 @ 07:57) >    Ventricular Rate 116 BPM    Atrial Rate 87 BPM    QRS Duration 90 ms    Q-T Interval 358 ms    QTC Calculation(Bazett) 497 ms    R Axis -14 degrees    T Axis -20 degrees    Diagnosis Line Atrial fibrillation with rapid ventricular response  Low voltage QRS  Poor R wave progression  Abnormal ECG    < end of copied text >  < from: TTE Echo Complete w/o Contrast w/ Doppler (11.18.22 @ 09:23) >  Summary:   1. Normal global left ventricular systolic function, internal cavity   size, and wall thickness.   2. LV Ejection Fraction by Pacheco's Method with a biplane EF of 67 %.   3. Left ventricular diastolic function could not be assessed in this   study due to atrial arrhythmia.   4. Normal right ventricular size with low-normal function.   5. Severely enlarged left atrium.   6. Severe mitral annular calcification with mild mitral regurgitation   and no evidence of stenosis.   7. Sclerotic aortic valve with normal opening.   8. No pericardial effusion.   9. Patient was in atrial fibrillation during this exam.  10. No prior TTE is available for comparison.    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 Developed AF RVR, medication adjusted, now rate controlled but with pauses. 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    NPO after midnight for PPM - most likely around 16:00.  Hold Plavix  PT / PTT and Type and screen  Correct hyperkalemia prior to surgery  Attending note to follow                   Surgeon: Dr. 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 During admission developed AF RVR, hard to control, meds were adjusted, currently Metoprolol 100mg bid, Cardizem 180mg daily, rate controlled. However had several pauses >5sec. Patient now recommended for pacemaker placement. Denies syncope, dyspnea, dizziness. CTS consulted for PPM      Home Medications:  [place] Thyroid 15 mg oral tablet: 1 tab(s) orally once a day (13 Nov 2022 10:04)  Plavix 75 mg oral tablet: 1 tab(s) orally once a day (13 Nov 2022 10:04)  Tresiba 100 units/mL subcutaneous solution: 22 unit(s) subcutaneous once a day (at bedtime) (13 Nov 2022 10:04)        NYHA functional class    [ ] Class I (no limitation) [ ] Class II (slight limitation) [ ] Class III (marked limitation) [ ] Class IV (symptoms at rest)    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; s/p partial liver resection Mar 2022 @ [facility]      History of partial pancreatectomy  s/p distal pancreatectomy Aug 2021      H/O resection of liver  mets to liver; s/p partial liver resection Mar 2022 @ [facility]      MEDICATIONS  (STANDING):  clopidogrel Tablet 75 milliGRAM(s) Oral daily  diltiazem    milliGRAM(s) Oral daily  insulin glargine Injectable (LANTUS) 12 Unit(s) SubCutaneous at bedtime  insulin lispro (ADMELOG) corrective regimen sliding scale   SubCutaneous three times a day before meals  magnesium sulfate  IVPB 2 Gram(s) IV Intermittent once  melatonin 3 milliGRAM(s) Oral at bedtime  metoprolol tartrate 100 milliGRAM(s) Oral two times a day  pantoprazole    Tablet 40 milliGRAM(s) Oral before breakfast  thyroid 15 milliGRAM(s) Oral <User Schedule>  vitamin A &amp; D Ointment 1 Application(s) Topical three times a day    MEDICATIONS  (PRN):  acetaminophen     Tablet .. 650 milliGRAM(s) Oral every 6 hours PRN Mild Pain (1 - 3), Moderate Pain (4 - 6)  aluminum hydroxide/magnesium hydroxide/simethicone Suspension 30 milliLiter(s) Oral every 6 hours PRN Dyspepsia  polyethylene glycol 3350 17 Gram(s) Oral daily PRN Constipation  senna 2 Tablet(s) Oral at bedtime PRN Constipation    Antiplatelet therapy:       Plavix                    Last dose/amt:  today    Allergies    No Known Allergies    Intolerances    SOCIAL HISTORY:  no recent drug, alcohol or tobacco abuse  Occupation: retired  Lives with: family    FAMILY HISTORY:  No pertinent family history in first degree relatives      Review of Systems  CONSTITUTIONAL: no fever, chills or night sweats]   NEURO:  denies seizures, paralysis or paresthesias                                                                                EYES: wears glasses, no double vision, no blurry vision                                                                          ENMT: no difficulty hearing, vertigo, dysphagia, epistaxis recent dental work [ ]                                      CV:  denies chest pain, + PEDRAZA no palpitations at current time                                                                                            RESPIRATORY:  no cough or hemoptysis                                                                 GI:  no nausea, vomiting, constipation or diarrhea   : denies dysuria, hematuria, incontinence or retention                                                                                           MUSKULOSKELETAL:  denies joint swelling or muscle weakness  PSYCH:  denies dementia, depression, anxiety   ENDOCRINE:  cold intolerance[ ] heat intolerance[ ] polydipsia[ ]                                                                                                                                                                                                PHYSICAL EXAM  Vital Signs Last 24 Hrs  T(C): 35.6 (20 Nov 2022 14:14), Max: 36.1 (19 Nov 2022 20:24)  T(F): 96 (20 Nov 2022 14:14), Max: 97 (19 Nov 2022 20:24)  HR: 103 (20 Nov 2022 14:14) (68 - 112)  BP: 139/83 (20 Nov 2022 14:14) (100/63 - 139/83)  RR: 18 (20 Nov 2022 14:14) (18 - 18)  SpO2: 95% (19 Nov 2022 20:37) (95% - 95%)    Parameters below as of 19 Nov 2022 20:37  Patient On (Oxygen Delivery Method): room air      Right arm bp: Left arm bp;    CONSTITUTIONAL:    well nourished, well developed, overweight in NAD                                                                       Neuro: oriented to person/place & time with no focal motor or sensory  deficits     Eyes: PERRLA, EOMI, no nystagmus, sclera anicteric  ENT:  nasal/oral mucosa with absence of cyanosis, fair dentition  Neck: no jugular vein distention, trachea midline, no goiter   Chest: bilateral breath sounds with good air movement absence of wheezes, rales, or rhonchi                                                                           CV:  RSR, S1S2, no significant murmur appreciated  Carotids: No Bruits  GI:  soft, non-tender non-distended, + bowel sounds, protuberant                                                                                                       Extremities:  no evidence of cyanosis or deformity, no pedal edema   Extremity Pulses: right / left:  ; DP's 2+/2+; radials 2+/2+      SKIN : no rashes                                                          LABS:                        13.4   8.40  )-----------( 216      ( 20 Nov 2022 14:07 )             41.6     11-20    136  |  97<L>  |  21<H>  ----------------------------<  148<H>  5.8<H>   |  25  |  1.0    Ca    9.1      20 Nov 2022 14:07  Mg     1.7     11-20    TPro  6.6  /  Alb  4.1  /  TBili  0.7  /  DBili  x   /  AST  29  /  ALT  27  /  AlkPhos  88  11-20    < from: Xray Chest 1 View-PORTABLE IMMEDIATE (Xray Chest 1 View-PORTABLE IMMEDIATE .) (11.19.22 @ 10:41) >  Impression:  1.  Lung volumes decreased bilaterally, new.  2.  Bibasilar atelectatic changes.    < end of copied text >  < from: CT Angio Chest PE Protocol w/ IV Cont (11.12.22 @ 23:55) >  IMPRESSION:  1.  No evidence of acute pulmonary embolism or acute thoracic pathology.  2.  Reflux of contrast into the IVC and hepatic veins indicating possible   component of right heart dysfunction.    < end of copied text >  < from: 12 Lead ECG (11.18.22 @ 07:57) >    Ventricular Rate 116 BPM    Atrial Rate 87 BPM    QRS Duration 90 ms    Q-T Interval 358 ms    QTC Calculation(Bazett) 497 ms    R Axis -14 degrees    T Axis -20 degrees    Diagnosis Line Atrial fibrillation with rapid ventricular response  Low voltage QRS  Poor R wave progression  Abnormal ECG    < end of copied text >  < from: TTE Echo Complete w/o Contrast w/ Doppler (11.18.22 @ 09:23) >  Summary:   1. Normal global left ventricular systolic function, internal cavity   size, and wall thickness.   2. LV Ejection Fraction by Pacheco's Method with a biplane EF of 67 %.   3. Left ventricular diastolic function could not be assessed in this   study due to atrial arrhythmia.   4. Normal right ventricular size with low-normal function.   5. Severely enlarged left atrium.   6. Severe mitral annular calcification with mild mitral regurgitation   and no evidence of stenosis.   7. Sclerotic aortic valve with normal opening.   8. No pericardial effusion.   9. Patient was in atrial fibrillation during this exam.  10. No prior TTE is available for comparison.    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 Developed AF RVR, medication adjusted, now rate controlled but with pauses. 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    NPO after midnight for PPM - most likely around 16:00.  Hold Plavix  PT / PTT and Type and screen  Correct hyperkalemia prior to surgery  Attending note to follow    CTS ATTENDING:  --------------------  Pt interviewed and examined  case discussed with Dr. Neri  pt referred for DDD PPM with Afib, pauses, SichSinus Syndrome  procedur explained in detail, including risks, benefits and alternatives and she agreed to proceed  40-min preop/consult/coord of care/discussionwith EP  -FMR

## 2022-11-20 NOTE — PROGRESS NOTE ADULT - SUBJECTIVE AND OBJECTIVE BOX
Subjective: pt feels tired but ok otherwise and denies cp nor sob.        MEDICATIONS  (STANDING):  clopidogrel Tablet 75 milliGRAM(s) Oral daily  diltiazem    milliGRAM(s) Oral daily  insulin glargine Injectable (LANTUS) 12 Unit(s) SubCutaneous at bedtime  insulin lispro (ADMELOG) corrective regimen sliding scale   SubCutaneous three times a day before meals  melatonin 3 milliGRAM(s) Oral at bedtime  metoprolol tartrate 100 milliGRAM(s) Oral two times a day  pantoprazole    Tablet 40 milliGRAM(s) Oral before breakfast  sodium zirconium cyclosilicate 10 Gram(s) Oral once  thyroid 15 milliGRAM(s) Oral <User Schedule>  vitamin A &amp; D Ointment 1 Application(s) Topical three times a day    MEDICATIONS  (PRN):  acetaminophen     Tablet .. 650 milliGRAM(s) Oral every 6 hours PRN Mild Pain (1 - 3), Moderate Pain (4 - 6)  aluminum hydroxide/magnesium hydroxide/simethicone Suspension 30 milliLiter(s) Oral every 6 hours PRN Dyspepsia  polyethylene glycol 3350 17 Gram(s) Oral daily PRN Constipation  senna 2 Tablet(s) Oral at bedtime PRN Constipation            Vital Signs Last 24 Hrs  T(C): 35.6 (20 Nov 2022 14:14), Max: 36.1 (19 Nov 2022 20:24)  T(F): 96 (20 Nov 2022 14:14), Max: 97 (19 Nov 2022 20:24)  HR: 112 (20 Nov 2022 17:12) (68 - 112)  BP: 121/72 (20 Nov 2022 17:12) (100/63 - 139/83)  BP(mean): --  RR: 18 (20 Nov 2022 14:14) (18 - 18)  SpO2: 93% (20 Nov 2022 17:12) (93% - 95%)    Parameters below as of 20 Nov 2022 17:12  Patient On (Oxygen Delivery Method): room air                 REVIEW OF SYSTEMS:  CONSTITUTIONAL: no fever, no chills, no diaphoresis  CARDIOLOGY: no chest pain, no SOB, no palpitation, no diaphoresis,(+) faint   RESPIRATORY: no dyspnea, no wheeze, no orthopnea, no PND   NEUROLOGICAL: no dizziness, headache, focal deficits to report.  GI: no abdominal pain, no dyspepsia, no nausea, no vomiting, no diarrhea.    HEENT: no congestion, no nasal bleeding  SKIN: no ecchymosis, no petechia             PHYSICAL EXAM:  · CONSTITUTIONAL: Looks stable, in no distress  . NECK: Supple, no JVD, no bruit on either carotid side   · RESPIRATORY: Normal air entry to lung base, no wheeze, no crackle, no wet rales  · CARDIOVASCULAR: Normal S1, A2, P2, no murmur, no click, irregular rate,  no rub,  · EXTREMITIES: No cyanosis, no clubbing, trace bilateral lower ext edema  · VASCULAR: Pulses are irregular, equal, bilateral in upper and lower extremities  	  TELEMETRY: aflutter mod v response with multiple pauses > 5 secs    ECG:    TTE:    LABS:                        13.4   8.40  )-----------( 216      ( 20 Nov 2022 14:07 )             41.6     11-20    136  |  97<L>  |  21<H>  ----------------------------<  148<H>  5.8<H>   |  25  |  1.0    Ca    9.1      20 Nov 2022 14:07  Mg     1.7     11-20    TPro  6.6  /  Alb  4.1  /  TBili  0.7  /  DBili  x   /  AST  29  /  ALT  27  /  AlkPhos  88  11-20            I&O's Summary    19 Nov 2022 07:01  -  20 Nov 2022 07:00  --------------------------------------------------------  IN: 975 mL / OUT: 2700 mL / NET: -1725 mL    20 Nov 2022 07:01  -  20 Nov 2022 19:42  --------------------------------------------------------  IN: 670 mL / OUT: 500 mL / NET: 170 mL      BNP  RADIOLOGY & ADDITIONAL STUDIES:    IMPRESSION AND PLAN:

## 2022-11-20 NOTE — PROGRESS NOTE ADULT - ASSESSMENT
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    # Chronic Afib/Aflutter with RVR, improved HR 70s in AM, 90s in the afternoon   - c/w eliquis 5 mg BID  - c/w lopressor 100 mg BID and cardizem CD 180mg  - echo done with normal EF    # Mild HFpEF exacerbation  - CXR done today shows worsening congestion  - will give one dose of IV lasix and reassess after    # Coronary Artery Disease  # Hypertension  # Dyslipidemia  - follows w/ Dr. Peters. S/P PCI x4 (mLAD, dLCX, mRCA, and most recently OM1). Has consistently and continuously refused statin per cardiology.  - Currently on AC  - Currently on metoprolol and cardizem    # Hypothyroidism  - Managed via [email or web address] Thyroid 15qd  - c/w levothyroxine 25mcg PO QD  - TSH 2.12    # IDDM  - Likely secondary to distal pancreatectomy. Unknown A1c. Managed via [employer] 22u qhs.  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) p/w intermittent palpitations, found to be in rapid afib HR > 130s with improved control s/p IV metoprolol likely in the setting of acute UTI given positive UA.    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 Peters. S/P PCI x4 (mLAD, dLCX, mRCA, and most recently OM1). Has consistently and continuously refused statin per cardiology.  - Currently on AC  - Currently on metoprolol and cardizem    # Hypothyroidism  - Managed via [employer] Thyroid 15qd, non formulary form filled today  - TSH 2.12    # IDDM  - Likely secondary to distal pancreatectomy. Unknown A1c. Managed via [employer] 22u qhs.  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; s/p partial liver resection Mar 2022 @ 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 Peters. S/P PCI x4 (mLAD, dLCX, mRCA, and most recently OM1). Has consistently and continuously refused statin per cardiology.  - Currently on AC  - Currently on metoprolol and cardizem    # Hypothyroidism  - Managed via [employer] Thyroid 15qd, non formulary form filled today  - TSH 2.12    # IDDM  - Likely secondary to distal pancreatectomy. Unknown A1c. Managed via [employer] 22u qhs.  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 Peters. S/P PCI x4 (mLAD, dLCX, mRCA, and most recently OM1). Has consistently and continuously refused statin per cardiology.  - Currently on AC  - Currently on metoprolol and cardizem    # Hypothyroidism  - Managed via [employer] Thyroid 15qd, non formulary form filled today  - TSH 2.12    # IDDM  - Likely secondary to distal pancreatectomy. Unknown A1c. Managed via [employer] 22u qhs.  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; s/p partial liver resection Mar 2022 @ [facility])  - outpatient follow up    # Constipation, resolved  # GERD - c/w protonix and mylanta prn  # DVT prophylaxis - on eliquis  # Code status - Full code    Dispo: pending EP evaluation for PPM 87F w/ h/o pAF (not on AC), CAD (s/p 4 stents), IDDM, and pancreatic cancer (s/p distal pancreatectomy Aug 2021), 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 Peters. S/P PCI x4 (mLAD, dLCX, mRCA, and most recently OM1). Has consistently and continuously refused statin per cardiology.  - Currently on AC  - Currently on metoprolol and cardizem    # Hypothyroidism  - Managed via [employer] Thyroid 15qd, non formulary form filled today  - TSH 2.12    # IDDM  - Likely secondary to distal pancreatectomy. Unknown A1c. Managed via [employer] 22u qhs.  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

## 2022-11-20 NOTE — PROGRESS NOTE ADULT - ASSESSMENT
89 yo female with hx of pancreatic and cholangio carcinoma resect, multiple cor stents last 8/2021, tachybrady syndrome requiring a dual pacemaker.  pt for ppm tomorrow.  eliquis held  if plavix must be held then can pt go on asa 81 mgs po q24 for prior cardiac stents  resume eliquis and plavix when cleared by ct surgery  consider d/c cv in future after ppm  cont other meds  pt with new hyperkalemia with stable renal fxn and nl K+ all week.  likely lab error vs hemolysis and not likely true hyperkalemia.  consider repeat K+ priior to treating for hyperkalemia.

## 2022-11-20 NOTE — CONSULT NOTE ADULT - ASSESSMENT
paroxysmal afib  CAD  HTN  DLD  DM    - had PAFIB in 2021 and had not wanted to be on anticoagulation for bleeding issues and thus was placed on aspirin and plavix  - had PCI to OM in 3/2022  - Will continue aspirin and plavix as that is her wish. she understands the risks but is afraid of bleeding  - has consistently and continuously refused a statin  - Will give 5 mg IV metoprolol x2 doses as needed now  - increase home metoprolol to 50 mg XL   - check thryoid panel (patient takes armour thyroid at home)  - likely cause of afib relapse is her UTI that she has  - will treat this and adjust her meds appropriately. target HR is <110  - because patient does not want to be on AC, cannot cardiovert her as she would need to be on uninterrupted anticoagulation for at least one month post cardioversion
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 Developed AF RVR, medication adjusted, now rate controlled but with pauses    tachy-david syndrome  AF  UTI, treated  CAD, s/p PCI    con't tele  for OR tomorrow with Dr Olivier for DC PPM  NPO after MN  COVID PCR negative 11/15, repeat pending  hold Eliquis from [facility] until 48hours postop  Maintain electrolytes K>4.0 Mg >2.0

## 2022-11-20 NOTE — CONSULT NOTE ADULT - TIME BILLING
CTS ATTENDING:  --------------------  Pt interviewed and examined  case discussed with Dr. Neri  pt referred for DDD PPM with Afib, pauses, SichSinus Syndrome  procedur explained in detail, including risks, benefits and alternatives and she agreed to proceed  40-min preop/consult/coord of care/discussionwith EP  -FMR
Tachy-Chung syndrome

## 2022-11-20 NOTE — CONSULT NOTE ADULT - NS ATTEND AMEND GEN_ALL_CORE FT
complex patient  Tachy-Chung syndrome  Treatment of AF with RVR with BB and CCB limited by pauses  recommend dual chamber PPM  We discussed the risks/benefits/alternatives, nature of procedure, and follow up care after device is implanted. We also discussed remote monitoring in details. Patient is in agreement with proceeding with the device implant. We discussed the risks of bleeding, hematoma, injury to vessels and heart, perforation, tamponade, pneumothorax, infection, lead dislodgment, device malfunction, and rare risks of stroke/heart attack/death. Patient expressed understanding of the discussion. I answered all the questions in details.  Plan for restoration of sinus rhythm in 4-6 weeks

## 2022-11-20 NOTE — PROGRESS NOTE ADULT - SUBJECTIVE AND OBJECTIVE BOX
ANNE VILLATORO  87y Female    Patient is a 87y old  Female who presents with a chief complaint of palpitations    INTERVAL HPI/OVERNIGHT EVENTS:    ROS: Pt denies fever, chills, SOB, chest pain, nausea, vomiting, abdominal pain, dysuria, rash, muscle or joint pain.    Overnight events: No acute events overnight.     Vital Signs Last 24 Hrs  T(C): 35.4 (20 Nov 2022 04:43), Max: 36.7 (19 Nov 2022 12:53)  T(F): 95.8 (20 Nov 2022 04:43), Max: 98 (19 Nov 2022 12:53)  HR: 103 (20 Nov 2022 05:20) (68 - 112)  BP: 100/63 (20 Nov 2022 05:20) (100/63 - 119/75)  BP(mean): --  ABP: --  ABP(mean): --  RR: 18 (20 Nov 2022 04:43) (16 - 18)  SpO2: 95% (19 Nov 2022 20:37) (94% - 95%)    O2 Parameters below as of 19 Nov 2022 20:37  Patient On (Oxygen Delivery Method): room air      I&O's Summary    19 Nov 2022 07:01  -  20 Nov 2022 07:00  --------------------------------------------------------  IN: 975 mL / OUT: 2700 mL / NET: -1725 mL      No Known Allergies    MEDICATIONS  (STANDING):  apixaban 5 milliGRAM(s) Oral every 12 hours  clopidogrel Tablet 75 milliGRAM(s) Oral daily  diltiazem    milliGRAM(s) Oral daily  insulin glargine Injectable (LANTUS) 22 Unit(s) SubCutaneous at bedtime  insulin lispro (ADMELOG) corrective regimen sliding scale   SubCutaneous three times a day before meals  melatonin 3 milliGRAM(s) Oral at bedtime  metoprolol tartrate 100 milliGRAM(s) Oral two times a day  NIFEdipine XL 30 milliGRAM(s) Oral once  pantoprazole    Tablet 40 milliGRAM(s) Oral before breakfast  vitamin A &amp; D Ointment 1 Application(s) Topical three times a day    MEDICATIONS  (PRN):  acetaminophen     Tablet .. 650 milliGRAM(s) Oral every 6 hours PRN Mild Pain (1 - 3), Moderate Pain (4 - 6)  aluminum hydroxide/magnesium hydroxide/simethicone Suspension 30 milliLiter(s) Oral every 6 hours PRN Dyspepsia  polyethylene glycol 3350 17 Gram(s) Oral daily PRN Constipation  senna 2 Tablet(s) Oral at bedtime PRN Constipation      PHYSICAL EXAM:  General Appearance: NAD, normal for age and gender. 	  Neck: Normal JVP, no bruit.   Eyes: Conjunctiva clear, Extra Ocular muscles intact. No scleral icterus.  ENMT: Moist oral mucosa. No oral lesion.  Cardiovascular: Irregularly irregular, S1 S2, No JVD, No murmurs.  Respiratory: Lungs clear to auscultation. No wheezes, rales or rhonchi.  Psychiatry: Alert and oriented x 3, Mood & affect appropriate.  Gastrointestinal:  Soft, Non-tender, Mildly distended.  Neurologic: Non-focal deficits.  Musculoskeletal/ extremities: Normal range of motion, No clubbing, cyanosis or edema.  Vascular: Peripheral pulses palpable bilaterally.  Skin/Integumen: No rashes, No ecchymoses, No cyanosis.    LABS:                        12.9   8.93  )-----------( 205      ( 19 Nov 2022 06:50 )             38.8     11-19    134<L>  |  102  |  18  ----------------------------<  211<H>  4.5   |  23  |  0.7    Ca    8.7      19 Nov 2022 06:50  Mg     1.8     11-19    TPro  5.9<L>  /  Alb  3.7  /  TBili  0.6  /  DBili  x   /  AST  23  /  ALT  24  /  AlkPhos  81  11-19    RADIOLOGY & ADDITIONAL TESTS:    < from: TTE Echo Complete w/o Contrast w/ Doppler (11.18.22 @ 09:23) >  Summary:   1. Normal global left ventricular systolic function, internal cavity   size, and wall thickness.   2. LV Ejection Fraction by Pacheco's Method with a biplane EF of 67 %.   3. Left ventricular diastolic function could not be assessed in this   study due to atrial arrhythmia.   4. Normal right ventricular size with low-normal function.   5. Severely enlarged left atrium.   6. Severe mitral annular calcification with mild mitral regurgitation   and no evidence of stenosis.   7. Sclerotic aortic valve with normal opening.   8. No pericardial effusion.   9. Patient was in atrial fibrillation during this exam.  10. No prior TTE is available for comparison.                   ANNE VILLATORO  87y Female    Patient is a 87y old  Female who presents with a chief complaint of palpitations    INTERVAL HPI/OVERNIGHT EVENTS:    ROS: SOB and palpitations improved. Pt denies fever, chills, chest pain, nausea, vomiting, abdominal pain, dysuria, rash, muscle or joint pain.    Overnight events: No acute events overnight. Heart rate better controlled noted multiple pauses from 3s upto 5s on telemetry. (5s episode, pt was symptomatic at the time)    Vital Signs Last 24 Hrs  T(C): 35.4 (20 Nov 2022 04:43), Max: 36.7 (19 Nov 2022 12:53)  T(F): 95.8 (20 Nov 2022 04:43), Max: 98 (19 Nov 2022 12:53)  HR: 103 (20 Nov 2022 05:20) (68 - 112)  BP: 100/63 (20 Nov 2022 05:20) (100/63 - 119/75)  BP(mean): --  ABP: --  ABP(mean): --  RR: 18 (20 Nov 2022 04:43) (16 - 18)  SpO2: 95% (19 Nov 2022 20:37) (94% - 95%)    O2 Parameters below as of 19 Nov 2022 20:37  Patient On (Oxygen Delivery Method): room air      I&O's Summary    19 Nov 2022 07:01  -  20 Nov 2022 07:00  --------------------------------------------------------  IN: 975 mL / OUT: 2700 mL / NET: -1725 mL      No Known Allergies    MEDICATIONS  (STANDING):  apixaban 5 milliGRAM(s) Oral every 12 hours  clopidogrel Tablet 75 milliGRAM(s) Oral daily  diltiazem    milliGRAM(s) Oral daily  insulin glargine Injectable (LANTUS) 22 Unit(s) SubCutaneous at bedtime  insulin lispro (ADMELOG) corrective regimen sliding scale   SubCutaneous three times a day before meals  melatonin 3 milliGRAM(s) Oral at bedtime  metoprolol tartrate 100 milliGRAM(s) Oral two times a day  NIFEdipine XL 30 milliGRAM(s) Oral once  pantoprazole    Tablet 40 milliGRAM(s) Oral before breakfast  vitamin A &amp; D Ointment 1 Application(s) Topical three times a day    MEDICATIONS  (PRN):  acetaminophen     Tablet .. 650 milliGRAM(s) Oral every 6 hours PRN Mild Pain (1 - 3), Moderate Pain (4 - 6)  aluminum hydroxide/magnesium hydroxide/simethicone Suspension 30 milliLiter(s) Oral every 6 hours PRN Dyspepsia  polyethylene glycol 3350 17 Gram(s) Oral daily PRN Constipation  senna 2 Tablet(s) Oral at bedtime PRN Constipation      PHYSICAL EXAM:  General Appearance: NAD, normal for age and gender. 	  Neck: Normal JVP, no bruit.   Eyes: Conjunctiva clear, Extra Ocular muscles intact. No scleral icterus.  ENMT: Moist oral mucosa. No oral lesion.  Cardiovascular: Irregularly irregular, S1 S2, No JVD, No murmurs.  Respiratory: Lungs clear to auscultation. No wheezes, rales or rhonchi.  Psychiatry: Alert and oriented x 3, Mood & affect appropriate.  Gastrointestinal:  Soft, Non-tender, Mildly distended.  Neurologic: Non-focal deficits.  Musculoskeletal/ extremities: Normal range of motion, No clubbing, cyanosis or edema.  Vascular: Peripheral pulses palpable bilaterally.  Skin/Integumen: No rashes, No ecchymoses, No cyanosis.      LABS:                        13.4   8.40  )-----------( 216      ( 20 Nov 2022 14:07 )             41.6     11-19    134<L>  |  102  |  18  ----------------------------<  211<H>  4.5   |  23  |  0.7    Ca    8.7      19 Nov 2022 06:50  Mg     1.8     11-19    TPro  5.9<L>  /  Alb  3.7  /  TBili  0.6  /  DBili  x   /  AST  23  /  ALT  24  /  AlkPhos  81  11-19    RADIOLOGY & ADDITIONAL TESTS:    < from: TTE Echo Complete w/o Contrast w/ Doppler (11.18.22 @ 09:23) >  Summary:   1. Normal global left ventricular systolic function, internal cavity   size, and wall thickness.   2. LV Ejection Fraction by Pacheco's Method with a biplane EF of 67 %.   3. Left ventricular diastolic function could not be assessed in this   study due to atrial arrhythmia.   4. Normal right ventricular size with low-normal function.   5. Severely enlarged left atrium.   6. Severe mitral annular calcification with mild mitral regurgitation   and no evidence of stenosis.   7. Sclerotic aortic valve with normal opening.   8. No pericardial effusion.   9. Patient was in atrial fibrillation during this exam.  10. No prior TTE is available for comparison.                   ANNE VILLATORO  87y Female    Patient is a 87y old  Female who presents with a chief complaint of palpitations    INTERVAL HPI/OVERNIGHT EVENTS:    ROS: SOB and palpitations improved. Pt denies fever, chills, chest pain, nausea, vomiting, abdominal pain, dysuria, rash, muscle or joint pain.    Overnight events: No acute events overnight. Heart rate better controlled noted multiple pauses from 3s upto 5s on telemetry. (5s episode, pt was symptomatic at the time)    Vital Signs Last 24 Hrs  T(C): 35.4 (20 Nov 2022 04:43), Max: 36.7 (19 Nov 2022 12:53)  T(F): 95.8 (20 Nov 2022 04:43), Max: 98 (19 Nov 2022 12:53)  HR: 103 (20 Nov 2022 05:20) (68 - 112)  BP: 100/63 (20 Nov 2022 05:20) (100/63 - 119/75)  BP(mean): --  ABP: --  ABP(mean): --  RR: 18 (20 Nov 2022 04:43) (16 - 18)  SpO2: 95% (19 Nov 2022 20:37) (94% - 95%)    O2 Parameters below as of 19 Nov 2022 20:37  Patient On (Oxygen Delivery Method): room air      I&O's Summary    19 Nov 2022 07:01  -  20 Nov 2022 07:00  --------------------------------------------------------  IN: 975 mL / OUT: 2700 mL / NET: -1725 mL      No Known Allergies    MEDICATIONS  (STANDING):  apixaban 5 milliGRAM(s) Oral every 12 hours  clopidogrel Tablet 75 milliGRAM(s) Oral daily  diltiazem    milliGRAM(s) Oral daily  insulin glargine Injectable (LANTUS) 22 Unit(s) SubCutaneous at bedtime  insulin lispro (ADMELOG) corrective regimen sliding scale   SubCutaneous three times a day before meals  melatonin 3 milliGRAM(s) Oral at bedtime  metoprolol tartrate 100 milliGRAM(s) Oral two times a day  NIFEdipine XL 30 milliGRAM(s) Oral once  pantoprazole    Tablet 40 milliGRAM(s) Oral before breakfast  vitamin A &amp; D Ointment 1 Application(s) Topical three times a day    MEDICATIONS  (PRN):  acetaminophen     Tablet .. 650 milliGRAM(s) Oral every 6 hours PRN Mild Pain (1 - 3), Moderate Pain (4 - 6)  aluminum hydroxide/magnesium hydroxide/simethicone Suspension 30 milliLiter(s) Oral every 6 hours PRN Dyspepsia  polyethylene glycol 3350 17 Gram(s) Oral daily PRN Constipation  senna 2 Tablet(s) Oral at bedtime PRN Constipation      PHYSICAL EXAM:  General Appearance: NAD, normal for age and gender. 	  Neck: Normal JVP, no bruit.   Eyes: Conjunctiva clear, Extra Ocular muscles intact. No scleral icterus.  ENMT: Moist oral mucosa. No oral lesion.  Cardiovascular: Irregularly irregular, S1 S2, No JVD, No murmurs.  Respiratory: Lungs clear to auscultation. No wheezes, rales or rhonchi.  Psychiatry: Alert and oriented x 3, Mood & affect appropriate.  Gastrointestinal:  Soft, Non-tender, Mildly distended.  Neurologic: Non-focal deficits.  Musculoskeletal/ extremities: Normal range of motion, No clubbing, cyanosis or edema.  Vascular: Peripheral pulses palpable bilaterally.  Skin/Integumen: No rashes, No ecchymoses, No cyanosis.      LABS:                        13.4   8.40  )-----------( 216      ( 20 Nov 2022 14:07 )             41.6     11-20    136  |  97<L>  |  21<H>  ----------------------------<  148<H>  5.8<H>   |  25  |  1.0    Ca    9.1      20 Nov 2022 14:07  Mg     1.7     11-20    TPro  6.6  /  Alb  4.1  /  TBili  0.7  /  DBili  x   /  AST  29  /  ALT  27  /  AlkPhos  88  11-20      RADIOLOGY & ADDITIONAL TESTS:    < from: TTE Echo Complete w/o Contrast w/ Doppler (11.18.22 @ 09:23) >  Summary:   1. Normal global left ventricular systolic function, internal cavity   size, and wall thickness.   2. LV Ejection Fraction by Pacheco's Method with a biplane EF of 67 %.   3. Left ventricular diastolic function could not be assessed in this   study due to atrial arrhythmia.   4. Normal right ventricular size with low-normal function.   5. Severely enlarged left atrium.   6. Severe mitral annular calcification with mild mitral regurgitation   and no evidence of stenosis.   7. Sclerotic aortic valve with normal opening.   8. No pericardial effusion.   9. Patient was in atrial fibrillation during this exam.  10. No prior TTE is available for comparison.

## 2022-11-21 ENCOUNTER — NON-APPOINTMENT (OUTPATIENT)
Age: 87
End: 2022-11-21

## 2022-11-21 LAB
ALBUMIN SERPL ELPH-MCNC: 3.6 G/DL — SIGNIFICANT CHANGE UP (ref 3.5–5.2)
ALP SERPL-CCNC: 91 U/L — SIGNIFICANT CHANGE UP (ref 30–115)
ALT FLD-CCNC: 23 U/L — SIGNIFICANT CHANGE UP (ref 0–41)
ANION GAP SERPL CALC-SCNC: 11 MMOL/L — SIGNIFICANT CHANGE UP (ref 7–14)
APTT BLD: 42.8 SEC — HIGH (ref 27–39.2)
AST SERPL-CCNC: 16 U/L — SIGNIFICANT CHANGE UP (ref 0–41)
BASOPHILS # BLD AUTO: 0.1 K/UL — SIGNIFICANT CHANGE UP (ref 0–0.2)
BASOPHILS NFR BLD AUTO: 1.2 % — HIGH (ref 0–1)
BILIRUB SERPL-MCNC: 0.4 MG/DL — SIGNIFICANT CHANGE UP (ref 0.2–1.2)
BUN SERPL-MCNC: 27 MG/DL — HIGH (ref 10–20)
CALCIUM SERPL-MCNC: 9.3 MG/DL — SIGNIFICANT CHANGE UP (ref 8.4–10.5)
CHLORIDE SERPL-SCNC: 104 MMOL/L — SIGNIFICANT CHANGE UP (ref 98–110)
CO2 SERPL-SCNC: 25 MMOL/L — SIGNIFICANT CHANGE UP (ref 17–32)
CREAT SERPL-MCNC: 1 MG/DL — SIGNIFICANT CHANGE UP (ref 0.7–1.5)
EGFR: 54 ML/MIN/1.73M2 — LOW
EOSINOPHIL # BLD AUTO: 0.26 K/UL — SIGNIFICANT CHANGE UP (ref 0–0.7)
EOSINOPHIL NFR BLD AUTO: 3 % — SIGNIFICANT CHANGE UP (ref 0–8)
GLUCOSE BLDC GLUCOMTR-MCNC: 107 MG/DL — HIGH (ref 70–99)
GLUCOSE BLDC GLUCOMTR-MCNC: 117 MG/DL — HIGH (ref 70–99)
GLUCOSE BLDC GLUCOMTR-MCNC: 178 MG/DL — HIGH (ref 70–99)
GLUCOSE BLDC GLUCOMTR-MCNC: 250 MG/DL — HIGH (ref 70–99)
GLUCOSE SERPL-MCNC: 250 MG/DL — HIGH (ref 70–99)
HCT VFR BLD CALC: 39.4 % — SIGNIFICANT CHANGE UP (ref 37–47)
HGB BLD-MCNC: 13.3 G/DL — SIGNIFICANT CHANGE UP (ref 12–16)
IMM GRANULOCYTES NFR BLD AUTO: 0.7 % — HIGH (ref 0.1–0.3)
INR BLD: 0.97 RATIO — SIGNIFICANT CHANGE UP (ref 0.65–1.3)
LYMPHOCYTES # BLD AUTO: 2.05 K/UL — SIGNIFICANT CHANGE UP (ref 1.2–3.4)
LYMPHOCYTES # BLD AUTO: 23.7 % — SIGNIFICANT CHANGE UP (ref 20.5–51.1)
MAGNESIUM SERPL-MCNC: 2 MG/DL — SIGNIFICANT CHANGE UP (ref 1.8–2.4)
MCHC RBC-ENTMCNC: 31.7 PG — HIGH (ref 27–31)
MCHC RBC-ENTMCNC: 33.8 G/DL — SIGNIFICANT CHANGE UP (ref 32–37)
MCV RBC AUTO: 94 FL — SIGNIFICANT CHANGE UP (ref 81–99)
MONOCYTES # BLD AUTO: 0.95 K/UL — HIGH (ref 0.1–0.6)
MONOCYTES NFR BLD AUTO: 11 % — HIGH (ref 1.7–9.3)
NEUTROPHILS # BLD AUTO: 5.24 K/UL — SIGNIFICANT CHANGE UP (ref 1.4–6.5)
NEUTROPHILS NFR BLD AUTO: 60.4 % — SIGNIFICANT CHANGE UP (ref 42.2–75.2)
NRBC # BLD: 0 /100 WBCS — SIGNIFICANT CHANGE UP (ref 0–0)
PLATELET # BLD AUTO: 245 K/UL — SIGNIFICANT CHANGE UP (ref 130–400)
POTASSIUM SERPL-MCNC: 4.6 MMOL/L — SIGNIFICANT CHANGE UP (ref 3.5–5)
POTASSIUM SERPL-SCNC: 4.6 MMOL/L — SIGNIFICANT CHANGE UP (ref 3.5–5)
PROT SERPL-MCNC: 6.1 G/DL — SIGNIFICANT CHANGE UP (ref 6–8)
PROTHROM AB SERPL-ACNC: 11 SEC — SIGNIFICANT CHANGE UP (ref 9.95–12.87)
RBC # BLD: 4.19 M/UL — LOW (ref 4.2–5.4)
RBC # FLD: 15.2 % — HIGH (ref 11.5–14.5)
SARS-COV-2 RNA SPEC QL NAA+PROBE: SIGNIFICANT CHANGE UP
SODIUM SERPL-SCNC: 140 MMOL/L — SIGNIFICANT CHANGE UP (ref 135–146)
T4 FREE SERPL-MCNC: 1.2 NG/DL — SIGNIFICANT CHANGE UP (ref 0.9–1.8)
TSH SERPL-MCNC: 2.67 UIU/ML — SIGNIFICANT CHANGE UP (ref 0.27–4.2)
WBC # BLD: 8.66 K/UL — SIGNIFICANT CHANGE UP (ref 4.8–10.8)
WBC # FLD AUTO: 8.66 K/UL — SIGNIFICANT CHANGE UP (ref 4.8–10.8)

## 2022-11-21 PROCEDURE — 99221 1ST HOSP IP/OBS SF/LOW 40: CPT | Mod: 57

## 2022-11-21 PROCEDURE — 71045 X-RAY EXAM CHEST 1 VIEW: CPT | Mod: 26

## 2022-11-21 PROCEDURE — 99233 SBSQ HOSP IP/OBS HIGH 50: CPT

## 2022-11-21 PROCEDURE — 33208 INSRT HEART PM ATRIAL & VENT: CPT | Mod: KX

## 2022-11-21 RX ORDER — INSULIN LISPRO 100/ML
VIAL (ML) SUBCUTANEOUS
Refills: 0 | Status: DISCONTINUED | OUTPATIENT
Start: 2022-11-21 | End: 2022-11-23

## 2022-11-21 RX ORDER — HYDROMORPHONE HYDROCHLORIDE 2 MG/ML
0.5 INJECTION INTRAMUSCULAR; INTRAVENOUS; SUBCUTANEOUS
Refills: 0 | Status: DISCONTINUED | OUTPATIENT
Start: 2022-11-21 | End: 2022-11-22

## 2022-11-21 RX ORDER — PROCHLORPERAZINE MALEATE 5 MG
10 TABLET ORAL ONCE
Refills: 0 | Status: COMPLETED | OUTPATIENT
Start: 2022-11-21 | End: 2022-11-21

## 2022-11-21 RX ORDER — METOPROLOL TARTRATE 50 MG
100 TABLET ORAL
Refills: 0 | Status: DISCONTINUED | OUTPATIENT
Start: 2022-11-21 | End: 2022-11-23

## 2022-11-21 RX ORDER — POLYETHYLENE GLYCOL 3350 17 G/17G
17 POWDER, FOR SOLUTION ORAL DAILY
Refills: 0 | Status: DISCONTINUED | OUTPATIENT
Start: 2022-11-21 | End: 2022-11-23

## 2022-11-21 RX ORDER — SENNA PLUS 8.6 MG/1
2 TABLET ORAL AT BEDTIME
Refills: 0 | Status: DISCONTINUED | OUTPATIENT
Start: 2022-11-21 | End: 2022-11-23

## 2022-11-21 RX ORDER — INSULIN GLARGINE 100 [IU]/ML
22 INJECTION, SOLUTION SUBCUTANEOUS AT BEDTIME
Refills: 0 | Status: DISCONTINUED | OUTPATIENT
Start: 2022-11-21 | End: 2022-11-23

## 2022-11-21 RX ORDER — ONDANSETRON 8 MG/1
4 TABLET, FILM COATED ORAL ONCE
Refills: 0 | Status: COMPLETED | OUTPATIENT
Start: 2022-11-21 | End: 2022-11-21

## 2022-11-21 RX ORDER — SALICYLIC ACID 0.5 %
1 CLEANSER (GRAM) TOPICAL THREE TIMES A DAY
Refills: 0 | Status: DISCONTINUED | OUTPATIENT
Start: 2022-11-21 | End: 2022-11-23

## 2022-11-21 RX ORDER — PANTOPRAZOLE SODIUM 20 MG/1
40 TABLET, DELAYED RELEASE ORAL
Refills: 0 | Status: DISCONTINUED | OUTPATIENT
Start: 2022-11-21 | End: 2022-11-23

## 2022-11-21 RX ORDER — CEFAZOLIN SODIUM 1 G
1000 VIAL (EA) INJECTION EVERY 8 HOURS
Refills: 0 | Status: COMPLETED | OUTPATIENT
Start: 2022-11-21 | End: 2022-11-22

## 2022-11-21 RX ORDER — DILTIAZEM HCL 120 MG
180 CAPSULE, EXT RELEASE 24 HR ORAL DAILY
Refills: 0 | Status: DISCONTINUED | OUTPATIENT
Start: 2022-11-21 | End: 2022-11-23

## 2022-11-21 RX ORDER — ACETAMINOPHEN 500 MG
650 TABLET ORAL EVERY 6 HOURS
Refills: 0 | Status: DISCONTINUED | OUTPATIENT
Start: 2022-11-21 | End: 2022-11-23

## 2022-11-21 RX ORDER — SODIUM CHLORIDE 9 MG/ML
1000 INJECTION, SOLUTION INTRAVENOUS
Refills: 0 | Status: DISCONTINUED | OUTPATIENT
Start: 2022-11-21 | End: 2022-11-22

## 2022-11-21 RX ORDER — HYDROMORPHONE HYDROCHLORIDE 2 MG/ML
1 INJECTION INTRAMUSCULAR; INTRAVENOUS; SUBCUTANEOUS
Refills: 0 | Status: DISCONTINUED | OUTPATIENT
Start: 2022-11-21 | End: 2022-11-22

## 2022-11-21 RX ORDER — LANOLIN ALCOHOL/MO/W.PET/CERES
3 CREAM (GRAM) TOPICAL AT BEDTIME
Refills: 0 | Status: DISCONTINUED | OUTPATIENT
Start: 2022-11-21 | End: 2022-11-23

## 2022-11-21 RX ADMIN — SODIUM CHLORIDE 75 MILLILITER(S): 9 INJECTION, SOLUTION INTRAVENOUS at 19:10

## 2022-11-21 RX ADMIN — PANTOPRAZOLE SODIUM 40 MILLIGRAM(S): 20 TABLET, DELAYED RELEASE ORAL at 07:43

## 2022-11-21 RX ADMIN — HYDROMORPHONE HYDROCHLORIDE 1 MILLIGRAM(S): 2 INJECTION INTRAMUSCULAR; INTRAVENOUS; SUBCUTANEOUS at 19:55

## 2022-11-21 RX ADMIN — Medication 100 MILLIGRAM(S): at 05:56

## 2022-11-21 RX ADMIN — Medication 180 MILLIGRAM(S): at 05:56

## 2022-11-21 RX ADMIN — Medication 10 MILLIGRAM(S): at 21:13

## 2022-11-21 RX ADMIN — ONDANSETRON 4 MILLIGRAM(S): 8 TABLET, FILM COATED ORAL at 20:36

## 2022-11-21 RX ADMIN — Medication 1 APPLICATION(S): at 14:08

## 2022-11-21 RX ADMIN — HYDROMORPHONE HYDROCHLORIDE 1 MILLIGRAM(S): 2 INJECTION INTRAMUSCULAR; INTRAVENOUS; SUBCUTANEOUS at 19:40

## 2022-11-21 RX ADMIN — Medication 2: at 08:17

## 2022-11-21 RX ADMIN — Medication 100 MILLIGRAM(S): at 22:37

## 2022-11-21 NOTE — PROGRESS NOTE ADULT - SUBJECTIVE AND OBJECTIVE BOX
ANNE VILLATORO 88y Female  MRN#: [id number]   Hospital Day: 8d    SUBJECTIVE  Patient is a 88y old Female who presents with a chief complaint of Palpitations (20 Nov 2022 19:41)  Currently admitted to medicine with the primary diagnosis of Chronic atrial fibrillation      INTERVAL HPI AND OVERNIGHT EVENTS:  Patient was examined and seen at bedside. This morning she is resting comfortably in bed and reports no issues or overnight events. Reports sleeping better with melatonin. 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 Was switched to Cardizem for better rate control. No sob, chest pain, normal respiratory effort, no other major complaints at this time. No acute events overnight.     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; s/p partial liver resection Mar 2022 @ [facility]    History of partial pancreatectomy  s/p distal pancreatectomy Aug 2021    H/O resection of liver  mets to liver; s/p partial liver resection Mar 2022 @ [facility]      ALLERGIES:  No Known Allergies    MEDICATIONS:  STANDING MEDICATIONS  clopidogrel Tablet 75 milliGRAM(s) Oral daily  diltiazem    milliGRAM(s) Oral daily  insulin glargine Injectable (LANTUS) 22 Unit(s) SubCutaneous at bedtime  insulin lispro (ADMELOG) corrective regimen sliding scale   SubCutaneous three times a day before meals  melatonin 3 milliGRAM(s) Oral at bedtime  metoprolol tartrate 100 milliGRAM(s) Oral two times a day  pantoprazole    Tablet 40 milliGRAM(s) Oral before breakfast  sodium zirconium cyclosilicate 10 Gram(s) Oral once  thyroid 15 milliGRAM(s) Oral <User Schedule>  vitamin A &amp; D Ointment 1 Application(s) Topical three times a day    PRN MEDICATIONS  acetaminophen     Tablet .. 650 milliGRAM(s) Oral every 6 hours PRN  aluminum hydroxide/magnesium hydroxide/simethicone Suspension 30 milliLiter(s) Oral every 6 hours PRN  polyethylene glycol 3350 17 Gram(s) Oral daily PRN  senna 2 Tablet(s) Oral at bedtime PRN      VITAL SIGNS: Last 24 Hours  T(C): 35.4 (21 Nov 2022 04:45), Max: 36.6 (20 Nov 2022 20:40)  T(F): 95.7 (21 Nov 2022 04:45), Max: 97.9 (20 Nov 2022 20:40)  HR: 103 (21 Nov 2022 04:45) (81 - 112)  BP: 110/64 (21 Nov 2022 04:45) (110/64 - 139/83)  BP(mean): --  RR: 18 (21 Nov 2022 04:45) (18 - 18)  SpO2: 93% (20 Nov 2022 20:10) (93% - 93%)    LABS:                        13.3   8.66  )-----------( 245      ( 21 Nov 2022 07:30 )             39.4     11-20    136  |  97<L>  |  21<H>  ----------------------------<  148<H>  5.8<H>   |  25  |  1.0    Ca    9.1      20 Nov 2022 14:07  Mg     1.7     11-20    TPro  6.6  /  Alb  4.1  /  TBili  0.7  /  DBili  x   /  AST  29  /  ALT  27  /  AlkPhos  88  11-20    PT/INR - ( 21 Nov 2022 07:30 )   PT: 11.00 sec;   INR: 0.97 ratio         PTT - ( 21 Nov 2022 07:30 )  PTT:42.8 sec        RADIOLOGY:      PHYSICAL EXAM:  CONSTITUTIONAL: No acute distress, well-developed, well-groomed, AAOx3  HEAD: Atraumatic, normocephalic  EYES: EOM intact, conjunctiva and sclera clear  ENT: Supple, no JVD; moist mucous membranes  PULMONARY: Clear to auscultation bilaterally; no wheezes, rales, or rhonchi  CARDIOVASCULAR: Regular rate and rhythm; no murmurs, rubs, or gallops  GASTROINTESTINAL: Soft, non-tender, non-distended, obese habitus; bowel sounds present  MUSCULOSKELETAL: no clubbing, no cyanosis, no edema  NEUROLOGY: non-focal, moving all extremities, no facial droop  SKIN: No rashes or lesions; warm and dry   ANNE VILLATORO 88y Female  MRN#: [id number]   Hospital Day: 8d    SUBJECTIVE  Patient is a 88y old Female who presents with a chief complaint of Palpitations (20 Nov 2022 19:41)  Currently admitted to medicine with the primary diagnosis of Chronic atrial fibrillation    ROS no cp, no sob     INTERVAL HPI AND OVERNIGHT EVENTS:  Patient was examined and seen at bedside. This morning she is resting comfortably in bed and reports no issues or overnight events. Reports sleeping better with melatonin. 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 Was switched to Cardizem for better rate control. No sob, chest pain, normal respiratory effort, no other major complaints at this time. No acute events overnight.     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; s/p partial liver resection Mar 2022 @ [facility]    History of partial pancreatectomy  s/p distal pancreatectomy Aug 2021    H/O resection of liver  mets to liver; s/p partial liver resection Mar 2022 @ [facility]      ALLERGIES:  No Known Allergies    MEDICATIONS:  STANDING MEDICATIONS  clopidogrel Tablet 75 milliGRAM(s) Oral daily  diltiazem    milliGRAM(s) Oral daily  insulin glargine Injectable (LANTUS) 22 Unit(s) SubCutaneous at bedtime  insulin lispro (ADMELOG) corrective regimen sliding scale   SubCutaneous three times a day before meals  melatonin 3 milliGRAM(s) Oral at bedtime  metoprolol tartrate 100 milliGRAM(s) Oral two times a day  pantoprazole    Tablet 40 milliGRAM(s) Oral before breakfast  sodium zirconium cyclosilicate 10 Gram(s) Oral once  thyroid 15 milliGRAM(s) Oral <User Schedule>  vitamin A &amp; D Ointment 1 Application(s) Topical three times a day    PRN MEDICATIONS  acetaminophen     Tablet .. 650 milliGRAM(s) Oral every 6 hours PRN  aluminum hydroxide/magnesium hydroxide/simethicone Suspension 30 milliLiter(s) Oral every 6 hours PRN  polyethylene glycol 3350 17 Gram(s) Oral daily PRN  senna 2 Tablet(s) Oral at bedtime PRN      VITAL SIGNS: Last 24 Hours  T(C): 35.4 (21 Nov 2022 04:45), Max: 36.6 (20 Nov 2022 20:40)  T(F): 95.7 (21 Nov 2022 04:45), Max: 97.9 (20 Nov 2022 20:40)  HR: 103 (21 Nov 2022 04:45) (81 - 112)  BP: 110/64 (21 Nov 2022 04:45) (110/64 - 139/83)  BP(mean): --  RR: 18 (21 Nov 2022 04:45) (18 - 18)  SpO2: 93% (20 Nov 2022 20:10) (93% - 93%)    LABS:                        13.3   8.66  )-----------( 245      ( 21 Nov 2022 07:30 )             39.4     11-20    136  |  97<L>  |  21<H>  ----------------------------<  148<H>  5.8<H>   |  25  |  1.0    Ca    9.1      20 Nov 2022 14:07  Mg     1.7     11-20    TPro  6.6  /  Alb  4.1  /  TBili  0.7  /  DBili  x   /  AST  29  /  ALT  27  /  AlkPhos  88  11-20    PT/INR - ( 21 Nov 2022 07:30 )   PT: 11.00 sec;   INR: 0.97 ratio         PTT - ( 21 Nov 2022 07:30 )  PTT:42.8 sec        RADIOLOGY:      PHYSICAL EXAM:  CONSTITUTIONAL: No acute distress, well-developed, well-groomed, AAOx3  HEAD: Atraumatic, normocephalic  EYES: EOM intact, conjunctiva and sclera clear  ENT: Supple, no JVD; moist mucous membranes  PULMONARY: Clear to auscultation bilaterally; no wheezes, rales, or rhonchi  CARDIOVASCULAR: Regular rate and rhythm; no murmurs, rubs, or gallops  GASTROINTESTINAL: Soft, non-tender, non-distended, obese habitus; bowel sounds present  MUSCULOSKELETAL: no clubbing, no cyanosis, no edema  NEUROLOGY: non-focal, moving all extremities, no facial droop  SKIN: No rashes or lesions; warm and dry

## 2022-11-21 NOTE — PROGRESS NOTE ADULT - ASSESSMENT
Assessment:   87F w/ h/o pAF (not on AC), CAD (s/p 4 stents), IDDM, and pancreatic cancer (s/p distal pancreatectomy Aug 2021), cholangio carcinoma (mets to liver; s/p partial liver resection Mar 2022 @ [facility]) p/w intermittent palpitations, found to be in rapid afib HR > 130s with improved control s/p IV metoprolol likely in the setting of acute UTI given positive UA.    Plan:  #Chronic Afib/Aflutter  - CHADS-VASC score >6. Initially p/w intermittent palpitations. Found to be in aflutter on admission EKG. UTI suspected cause of pt's current afib relapse. Had pAF in 2021, but did not want to be on anticoagulation for bleeding issues at the time and thus was placed on aspirin and Plavix per cardiology.   - At time of admission: As pt does not want to be on AC, cannot cardiovert as this would require uninterrupted AC for at least one month post-cardioversion  - Patient was started on Eliquis yesterday  - Spoke w/cardio 11/15, okay to c/w eliquis for now and dc ASA, c/w plavix  - S/p 2x 5mg IV Metoprolol in the ED with improved HR  - Metoprolol switched to 50 mgq6h for now, home dose is Toprol XL 50mg PO QD  - Will likely need to dc on 150mg metoprolol  - obtain thyroid panel (takes [employer] Thyroid)  - Target HR <110  - Patient hemodynamically stable, currently still in afib HR> 100 at this morning  - Cardizem 180 CD was added  - 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 Peters. S/P PCI x4 (mLAD, dLCX, mRCA, and most recently OM1). Has consistently and continuously refused statin per cardiology.  - c/w ASA 81mg PO QD  - Currently on AC    #Hypothyroidism  - Managed via [employer] Thyroid 15qd - patient's home medication brought here  - TSH 2.12    #IDDM  - Likely secondary to distal pancreatectomy. Unknown A1c. Managed via Tresiba 22u qhs.  - Monitor FS TIDAC and QHS  - c/w Lantus 22U SQ QHS  - c/w low-dose ISS  - A1c 7.3    #Misc  DVT PPX: Lovenox 40mg SQ QD   GI PPX: none indicated  DIET: DASH/TLC + CC  ACTIVITY: IAT  CODE STATUS: Full Code  DISPOSITION: From Home; likely d/c home in 1-2 days now after PPM placed

## 2022-11-21 NOTE — PROGRESS NOTE ADULT - ATTENDING COMMENTS
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 Peters. S/P PCI x4 (mLAD, dLCX, mRCA, and most recently OM1). Has consistently and continuously refused statin per cardiology.  - Currently on AC  - Currently on metoprolol and cardizem    # Hypothyroidism  - Managed via [employer] Thyroid 15qd, non formulary form filled today  - TSH 2.12    # DM uncontrolled   insulin per protocol   - A1c 7.3  CAPILLARY BLOOD GLUCOSE    POCT Blood Glucose.: 117 mg/dL (21 Nov 2022 11:36)  POCT Blood Glucose.: 178 mg/dL (21 Nov 2022 10:09)  POCT Blood Glucose.: 250 mg/dL (21 Nov 2022 07:35)  POCT Blood Glucose.: 226 mg/dL (20 Nov 2022 22:17)  POCT Blood Glucose.: 138 mg/dL (20 Nov 2022 16:31)    # UTI - completed Rocephin course  # Pancreatic cancer (s/p distal pancreatectomy Aug 2021) and hx of Cholangiocarcinoma (mets to liver; s/p partial liver resection Mar 2022 @ [facility])  - outpatient follow up    # Constipation, resolved  # GERD - c/w protonix and mylanta prn    #Progress Note Handoff  Pending (specify): PPM today   Family discussion: dw pt   Disposition: Home when clear for dc

## 2022-11-21 NOTE — PRE-ANESTHESIA EVALUATION ADULT - NSRADCARDRESULTSFT_GEN_ALL_CORE
TTE 11/18/22    Summary:   1. Normal global left ventricular systolic function, internal cavity   size, and wall thickness.   2. LV Ejection Fraction by Pacheco's Method with a biplane EF of 67 %.   3. Left ventricular diastolic function could not be assessed in this   study due to atrial arrhythmia.   4. Normal right ventricular size with low-normal function.   5. Severely enlarged left atrium.   6. Severe mitral annular calcification with mild mitral regurgitation   and no evidence of stenosis.   7. Sclerotic aortic valve with normal opening.   8. No pericardial effusion.   9. Patient was in atrial fibrillation during this exam.  10. No prior TTE is available for comparison.

## 2022-11-22 LAB
ALBUMIN SERPL ELPH-MCNC: 3.5 G/DL — SIGNIFICANT CHANGE UP (ref 3.5–5.2)
ALP SERPL-CCNC: 87 U/L — SIGNIFICANT CHANGE UP (ref 30–115)
ALT FLD-CCNC: 23 U/L — SIGNIFICANT CHANGE UP (ref 0–41)
ANION GAP SERPL CALC-SCNC: 13 MMOL/L — SIGNIFICANT CHANGE UP (ref 7–14)
AST SERPL-CCNC: 21 U/L — SIGNIFICANT CHANGE UP (ref 0–41)
BILIRUB SERPL-MCNC: 0.7 MG/DL — SIGNIFICANT CHANGE UP (ref 0.2–1.2)
BUN SERPL-MCNC: 26 MG/DL — HIGH (ref 10–20)
CALCIUM SERPL-MCNC: 9 MG/DL — SIGNIFICANT CHANGE UP (ref 8.4–10.4)
CHLORIDE SERPL-SCNC: 100 MMOL/L — SIGNIFICANT CHANGE UP (ref 98–110)
CO2 SERPL-SCNC: 25 MMOL/L — SIGNIFICANT CHANGE UP (ref 17–32)
CREAT SERPL-MCNC: 0.8 MG/DL — SIGNIFICANT CHANGE UP (ref 0.7–1.5)
EGFR: 71 ML/MIN/1.73M2 — SIGNIFICANT CHANGE UP
GLUCOSE BLDC GLUCOMTR-MCNC: 106 MG/DL — HIGH (ref 70–99)
GLUCOSE BLDC GLUCOMTR-MCNC: 188 MG/DL — HIGH (ref 70–99)
GLUCOSE BLDC GLUCOMTR-MCNC: 205 MG/DL — HIGH (ref 70–99)
GLUCOSE BLDC GLUCOMTR-MCNC: 239 MG/DL — HIGH (ref 70–99)
GLUCOSE BLDC GLUCOMTR-MCNC: 299 MG/DL — HIGH (ref 70–99)
GLUCOSE BLDC GLUCOMTR-MCNC: 84 MG/DL — SIGNIFICANT CHANGE UP (ref 70–99)
GLUCOSE SERPL-MCNC: 222 MG/DL — HIGH (ref 70–99)
HCT VFR BLD CALC: 42.4 % — SIGNIFICANT CHANGE UP (ref 37–47)
HGB BLD-MCNC: 13.7 G/DL — SIGNIFICANT CHANGE UP (ref 12–16)
MAGNESIUM SERPL-MCNC: 1.8 MG/DL — SIGNIFICANT CHANGE UP (ref 1.8–2.4)
MCHC RBC-ENTMCNC: 31.3 PG — HIGH (ref 27–31)
MCHC RBC-ENTMCNC: 32.3 G/DL — SIGNIFICANT CHANGE UP (ref 32–37)
MCV RBC AUTO: 96.8 FL — SIGNIFICANT CHANGE UP (ref 81–99)
NRBC # BLD: 0 /100 WBCS — SIGNIFICANT CHANGE UP (ref 0–0)
PLATELET # BLD AUTO: 242 K/UL — SIGNIFICANT CHANGE UP (ref 130–400)
POTASSIUM SERPL-MCNC: 4.3 MMOL/L — SIGNIFICANT CHANGE UP (ref 3.5–5)
POTASSIUM SERPL-SCNC: 4.3 MMOL/L — SIGNIFICANT CHANGE UP (ref 3.5–5)
PROT SERPL-MCNC: 5.9 G/DL — LOW (ref 6–8)
RBC # BLD: 4.38 M/UL — SIGNIFICANT CHANGE UP (ref 4.2–5.4)
RBC # FLD: 15.1 % — HIGH (ref 11.5–14.5)
SODIUM SERPL-SCNC: 138 MMOL/L — SIGNIFICANT CHANGE UP (ref 135–146)
WBC # BLD: 12.8 K/UL — HIGH (ref 4.8–10.8)
WBC # FLD AUTO: 12.8 K/UL — HIGH (ref 4.8–10.8)

## 2022-11-22 PROCEDURE — 99239 HOSP IP/OBS DSCHRG MGMT >30: CPT

## 2022-11-22 PROCEDURE — 99233 SBSQ HOSP IP/OBS HIGH 50: CPT

## 2022-11-22 PROCEDURE — 93280 PM DEVICE PROGR EVAL DUAL: CPT | Mod: 26

## 2022-11-22 PROCEDURE — 71045 X-RAY EXAM CHEST 1 VIEW: CPT | Mod: 26

## 2022-11-22 RX ORDER — FUROSEMIDE 40 MG
20 TABLET ORAL DAILY
Refills: 0 | Status: DISCONTINUED | OUTPATIENT
Start: 2022-11-22 | End: 2022-11-23

## 2022-11-22 RX ORDER — APIXABAN 2.5 MG/1
1 TABLET, FILM COATED ORAL
Qty: 60 | Refills: 0
Start: 2022-11-22 | End: 2022-12-21

## 2022-11-22 RX ORDER — PANTOPRAZOLE SODIUM 20 MG/1
1 TABLET, DELAYED RELEASE ORAL
Qty: 0 | Refills: 0 | DISCHARGE
Start: 2022-11-22

## 2022-11-22 RX ORDER — FUROSEMIDE 40 MG
1 TABLET ORAL
Qty: 0 | Refills: 0 | DISCHARGE
Start: 2022-11-22

## 2022-11-22 RX ORDER — METOPROLOL TARTRATE 50 MG
5 TABLET ORAL ONCE
Refills: 0 | Status: DISCONTINUED | OUTPATIENT
Start: 2022-11-22 | End: 2022-11-22

## 2022-11-22 RX ADMIN — Medication 180 MILLIGRAM(S): at 05:19

## 2022-11-22 RX ADMIN — Medication 100 MILLIGRAM(S): at 17:51

## 2022-11-22 RX ADMIN — Medication 1 APPLICATION(S): at 22:19

## 2022-11-22 RX ADMIN — Medication 1: at 09:14

## 2022-11-22 RX ADMIN — Medication 100 MILLIGRAM(S): at 05:19

## 2022-11-22 RX ADMIN — Medication 650 MILLIGRAM(S): at 22:19

## 2022-11-22 RX ADMIN — Medication 650 MILLIGRAM(S): at 10:38

## 2022-11-22 RX ADMIN — Medication 1 APPLICATION(S): at 13:35

## 2022-11-22 RX ADMIN — INSULIN GLARGINE 22 UNIT(S): 100 INJECTION, SOLUTION SUBCUTANEOUS at 22:20

## 2022-11-22 RX ADMIN — Medication 3 MILLIGRAM(S): at 22:19

## 2022-11-22 RX ADMIN — Medication 100 MILLIGRAM(S): at 05:20

## 2022-11-22 RX ADMIN — Medication 2: at 17:51

## 2022-11-22 RX ADMIN — Medication 2: at 12:55

## 2022-11-22 RX ADMIN — Medication 100 MILLIGRAM(S): at 13:35

## 2022-11-22 RX ADMIN — Medication 20 MILLIGRAM(S): at 10:39

## 2022-11-22 RX ADMIN — Medication 1 APPLICATION(S): at 05:19

## 2022-11-22 NOTE — PROGRESS NOTE ADULT - ASSESSMENT
pt with cad, stents, pafib remains in afib with rapid v response, SSS, s/p ppm dual chamber  increase cardizem cd to 240 po q24  cont metoprolol  resume plavix  resume eliquis tomorrow  cont lasix 20 po q24.

## 2022-11-22 NOTE — PROGRESS NOTE ADULT - SUBJECTIVE AND OBJECTIVE BOX
Subjective: pt denies cp nor sob,  pacer site mildly sore.       MEDICATIONS  (STANDING):  diltiazem    milliGRAM(s) Oral daily  furosemide    Tablet 20 milliGRAM(s) Oral daily  insulin glargine Injectable (LANTUS) 22 Unit(s) SubCutaneous at bedtime  insulin lispro (ADMELOG) corrective regimen sliding scale   SubCutaneous three times a day before meals  melatonin 3 milliGRAM(s) Oral at bedtime  metoprolol tartrate 100 milliGRAM(s) Oral two times a day  pantoprazole    Tablet 40 milliGRAM(s) Oral before breakfast  sodium zirconium cyclosilicate 10 Gram(s) Oral once  vitamin A &amp; D Ointment 1 Application(s) Topical three times a day    MEDICATIONS  (PRN):  acetaminophen     Tablet .. 650 milliGRAM(s) Oral every 6 hours PRN Mild Pain (1 - 3), Moderate Pain (4 - 6)  aluminum hydroxide/magnesium hydroxide/simethicone Suspension 30 milliLiter(s) Oral every 6 hours PRN Dyspepsia  polyethylene glycol 3350 17 Gram(s) Oral daily PRN Constipation  senna 2 Tablet(s) Oral at bedtime PRN Constipation            Vital Signs Last 24 Hrs  T(C): 36.4 (22 Nov 2022 19:52), Max: 36.4 (21 Nov 2022 21:10)  T(F): 97.6 (22 Nov 2022 19:52), Max: 97.6 (22 Nov 2022 05:20)  HR: 120 (22 Nov 2022 19:52) (102 - 122)  BP: 132/62 (22 Nov 2022 19:52) (109/66 - 148/86)  BP(mean): --  RR: 18 (22 Nov 2022 19:52) (17 - 19)  SpO2: 96% (22 Nov 2022 08:30) (94% - 97%)    Parameters below as of 22 Nov 2022 08:30  Patient On (Oxygen Delivery Method): room air                 REVIEW OF SYSTEMS:  CONSTITUTIONAL: no fever, no chills, no diaphoresis  CARDIOLOGY: no chest pain, no SOB, no palpitation, no diaphoresis, no faint   RESPIRATORY: no dyspnea, no wheeze, no orthopnea, no PND   NEUROLOGICAL: no dizziness, headache, focal deficits to report.  GI: no abdominal pain, no dyspepsia, no nausea, no vomiting, no diarrhea.    HEENT: no congestion, no nasal bleeding  SKIN: no ecchymosis, no ptechia             PHYSICAL EXAM:  · CONSTITUTIONAL: Looks stable, in no diress  . NECK: Supple, no JVD, no bruit on either carotid side   · RESPIRATORY: Normal air entry to lung base, no wheeze, no crackle, no wet rales  · CARDIOVASCULAR: Normal S1, A2, P2, no murmur, no click, irregular rate,  no rub,  · EXTREMITIES: No cyanosis, no clubbing, no edema  · VASCULAR: Pulses are regular, equal, bilateral in upper and lower extremities  	  TELEMETRY: afib stil with rapid v response    ECG:  Diagnosis Line Atrial fibrillation with rapid ventricular response  Low voltage QRS  Poor R wave progression  Abnormal ECG    TTE:    LABS:                        13.7   12.80 )-----------( 242      ( 22 Nov 2022 06:27 )             42.4     11-22    138  |  100  |  26<H>  ----------------------------<  222<H>  4.3   |  25  |  0.8    Ca    9.0      22 Nov 2022 06:27  Mg     1.8     11-22    TPro  5.9<L>  /  Alb  3.5  /  TBili  0.7  /  DBili  x   /  AST  21  /  ALT  23  /  AlkPhos  87  11-22        PT/INR - ( 21 Nov 2022 07:30 )   PT: 11.00 sec;   INR: 0.97 ratio         PTT - ( 21 Nov 2022 07:30 )  PTT:42.8 sec    I&O's Summary    21 Nov 2022 07:01  -  22 Nov 2022 07:00  --------------------------------------------------------  IN: 880 mL / OUT: 600 mL / NET: 280 mL    22 Nov 2022 07:01  -  22 Nov 2022 20:14  --------------------------------------------------------  IN: 480 mL / OUT: 1300 mL / NET: -820 mL      BNP  RADIOLOGY & ADDITIONAL STUDIES:    IMPRESSION AND PLAN:

## 2022-11-22 NOTE — PROGRESS NOTE ADULT - SUBJECTIVE AND OBJECTIVE BOX
INTERVAL HPI/OVERNIGHT EVENTS:  Pt is POD #1 s/p PPm by Dr. Olivier. No acute events over night. Currently in AF ~ 106 bpm.   Patient denies fever, chills, dizziness, syncope, chest pain, palpitations, SOB, cough, abd pain, n/v/d/c, dysuria, hematuria or unusual rash.     MEDICATIONS  (STANDING):  ceFAZolin   IVPB 1000 milliGRAM(s) IV Intermittent every 8 hours  diltiazem    milliGRAM(s) Oral daily  furosemide    Tablet 20 milliGRAM(s) Oral daily  insulin glargine Injectable (LANTUS) 22 Unit(s) SubCutaneous at bedtime  insulin lispro (ADMELOG) corrective regimen sliding scale   SubCutaneous three times a day before meals  melatonin 3 milliGRAM(s) Oral at bedtime  metoprolol tartrate 100 milliGRAM(s) Oral two times a day  pantoprazole    Tablet 40 milliGRAM(s) Oral before breakfast  sodium zirconium cyclosilicate 10 Gram(s) Oral once  vitamin A &amp; D Ointment 1 Application(s) Topical three times a day    MEDICATIONS  (PRN):  acetaminophen     Tablet .. 650 milliGRAM(s) Oral every 6 hours PRN Mild Pain (1 - 3), Moderate Pain (4 - 6)  aluminum hydroxide/magnesium hydroxide/simethicone Suspension 30 milliLiter(s) Oral every 6 hours PRN Dyspepsia  polyethylene glycol 3350 17 Gram(s) Oral daily PRN Constipation  senna 2 Tablet(s) Oral at bedtime PRN Constipation      Allergies    No Known Allergies    Intolerances        REVIEW OF SYSTEMS    [x] A ten-point review of systems was otherwise negative except as noted.  [ ] Due to altered mental status/intubation, subjective information were not able to be obtained from the patient. History was obtained, to the extent possible, from review of the chart and collateral sources of information.      Vital Signs Last 24 Hrs  T(C): 36.4 (22 Nov 2022 05:20), Max: 36.6 (21 Nov 2022 19:10)  T(F): 97.6 (22 Nov 2022 05:20), Max: 97.9 (21 Nov 2022 19:10)  HR: 118 (22 Nov 2022 05:20) (98 - 122)  BP: 109/66 (22 Nov 2022 05:20) (109/66 - 148/88)  BP(mean): 96 (21 Nov 2022 17:38) (96 - 96)  RR: 18 (22 Nov 2022 05:20) (16 - 19)  SpO2: 95% (22 Nov 2022 05:20) (93% - 98%)    Parameters below as of 21 Nov 2022 23:41  Patient On (Oxygen Delivery Method): nasal cannula  O2 Flow (L/min): 3      11-21-22 @ 07:01  -  11-22-22 @ 07:00  --------------------------------------------------------  IN: 880 mL / OUT: 600 mL / NET: 280 mL        Physical Exam  GENERAL: Obese female, In no apparent distress, well nourished, and hydrated.  HEART: Irregular rate and rhythm; No murmurs, rubs, or gallops.  PULMONARY: Diminished  ABDOMEN:  Rounded, Soft, Nontender, Nondistended; Bowel sounds present  EXTREMITIES:  2+ Peripheral Pulses, No clubbing, cyanosis, or edema  NEUROLOGICAL: AO x4 ,SHAH, speech clear    LABS:                        13.7   12.80 )-----------( 242      ( 22 Nov 2022 06:27 )             42.4     11-22    138  |  100  |  26<H>  ----------------------------<  222<H>  4.3   |  25  |  0.8    Ca    9.0      22 Nov 2022 06:27  Mg     1.8     11-22    TPro  5.9<L>  /  Alb  3.5  /  TBili  0.7  /  DBili  x   /  AST  21  /  ALT  23  /  AlkPhos  87  11-22    PT/INR - ( 21 Nov 2022 07:30 )   PT: 11.00 sec;   INR: 0.97 ratio         PTT - ( 21 Nov 2022 07:30 )  PTT:42.8 sec      11-21-22 @ 07:01  -  11-22-22 @ 07:00  --------------------------------------------------------  IN: 880 mL / OUT: 600 mL / NET: 280 mL        11-21-22 @ 07:01  -  11-22-22 @ 07:00  --------------------------------------------------------  IN: 880 mL / OUT: 600 mL / NET: 280 mL        RADIOLOGY:  < from: TTE Echo Complete w/o Contrast w/ Doppler (11.18.22 @ 09:23) >  Summary:   1. Normal global left ventricular systolic function, internal cavity   size, and wall thickness.   2. LV Ejection Fraction by Pacheco's Method with a biplane EF of 67 %.   3. Left ventricular diastolic function could not be assessed in this   study due to atrial arrhythmia.   4. Normal right ventricular size with low-normal function.   5. Severely enlarged left atrium.   6. Severe mitral annular calcification with mild mitral regurgitation   and no evidence of stenosis.   7. Sclerotic aortic valve with normal opening.   8. No pericardial effusion.   9. Patient was in atrial fibrillation during this exam.  10. No prior TTE is available for comparison.    PHYSICIAN INTERPRETATION:  Left Ventricle: The left ventricular internal cavity size is normal.   There is no left ventricular hypertrophy. Global LV systolic function was   normal. The left ventricular diastolic function could not be assessed in   this study.  Right Ventricle: The right ventricular size is normal. RV systolic   function is normal.  LeftAtrium: Severely enlarged left atrium. LA volume Index is 62.0 ml/m²   ml/m2.  Right Atrium: Normal right atrial size. RA Area is 14.08 cm² cm2.  Pericardium: There is no evidence of pericardial effusion.  Mitral Valve: There is severe mitral annularcalcification. No evidence   of mitral valve stenosis. Mild mitral valve regurgitation is seen.  Tricuspid Valve: Structurally normal tricuspid valve, with normal leaflet   excursion. Trivial tricuspid regurgitation is visualized.  Aortic Valve: The aortic valve is trileaflet. Sclerotic aortic valve with   normal opening. No evidence of aortic valve regurgitation is seen.  Pulmonic Valve: The pulmonic valve was not well visualized. Trace   pulmonic valve regurgitation.  Aorta: The aortic root andascending aorta are structurally normal, with   no evidence of dilitation.  Pulmonary Artery: The pulmonary artery is not well seen.  Venous: The inferior vena cava was normal sized, with respiratory size   variation less than 50%.    < end of copied text >

## 2022-11-22 NOTE — PROGRESS NOTE ADULT - ATTENDING COMMENTS
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 Peters. S/P PCI x4 (mLAD, dLCX, mRCA, and most recently OM1). Has consistently and continuously refused statin per cardiology.  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

## 2022-11-22 NOTE — PROGRESS NOTE ADULT - REASON FOR ADMISSION
Palpitations

## 2022-11-22 NOTE — PROGRESS NOTE ADULT - ASSESSMENT
Assessment:   87F w/ h/o pAF (not on AC), CAD (s/p 4 stents), IDDM, and pancreatic cancer (s/p distal pancreatectomy Aug 2021), cholangio carcinoma (mets to liver; s/p partial liver resection Mar 2022 @ [facility]) p/w intermittent palpitations, found to be in rapid afib HR > 130s with improved control s/p IV metoprolol likely in the setting of acute UTI given positive UA.    Plan:  #Chronic Afib/Aflutter  - CHADS-VASC score >6. Initially p/w intermittent palpitations. Found to be in aflutter on admission EKG. UTI suspected cause of pt's current afib relapse. Had pAF in 2021, but did not want to be on anticoagulation for bleeding issues at the time and thus was placed on aspirin and Plavix per cardiology.   - At time of admission: As pt does not want to be on AC, cannot cardiovert as this would require uninterrupted AC for at least one month post-cardioversion  - Patient was started on Eliquis yesterday  - Spoke w/cardio 11/15, okay to c/w eliquis for now and dc ASA, c/w plavix  - S/p 2x 5mg IV Metoprolol in the ED with improved HR  - Metoprolol switched to 50 mgq6h for now, home dose is Toprol XL 50mg PO QD  - Will likely need to dc on 150mg metoprolol  - obtain thyroid panel (takes [place] Thyroid)  - Target HR <110  - Patient hemodynamically stable, currently still in afib HR> 100 at this morning  - Cardizem 180 CD was added  - More likely in setting of tachy-david syndrome, EP following, CTS placed PPM on 11/21  - F/u CXR  - EP to interrogate device this AM  - Spoke w/CTS - Plavix and Eliquis to be restarted 48h post-op, on evening of 11/23 (6pm)   - Patient safe for dc, dispo pending likely home today  - Patient to complete 3 doses total Ancef, finishing today  - OP F/u w/ CTS, cardio    #UTI - resolved  - Suspected exacerbating factor for patient's afib relapse, UA was positive for +LE and blood  - Completed course of Rocephin now improved    #Coronary Artery Disease  #Hypertension  #Dyslipidemia  Follows w/ Dr. Peters. S/P PCI x4 (mLAD, dLCX, mRCA, and most recently OM1). Has consistently and continuously refused statin per cardiology.  - c/w ASA 81mg PO QD  - Currently on AC  - Dose of 20 PO lasix given this AM per cardio recs, will start on daily 20 mg po lasix on dc    #Hypothyroidism  - Managed via [employer] Thyroid 15qd - patient's home medication brought here  - TSH 2.12    #IDDM  - Likely secondary to distal pancreatectomy. Unknown A1c. Managed via Tres[employer] 22u qhs.  - Monitor FS TIDAC and QHS  - c/w Lantus 22U SQ QHS  - c/w low-dose ISS  - A1c 7.3    #Misc  DVT PPX: Lovenox 40mg SQ QD   GI PPX: none indicated  DIET: DASH/TLC + CC  ACTIVITY: IAT  CODE STATUS: Full Code  DISPOSITION: From Home; likely d/c home in 1-2 days now after PPM placed

## 2022-11-22 NOTE — PROGRESS NOTE ADULT - PROVIDER SPECIALTY LIST ADULT
CT Surgery
Cardiology
Cardiology
Electrophysiology
Hospitalist
Hospitalist
Internal Medicine
Internal Medicine
Cardiology
Hospitalist
Hospitalist
Internal Medicine
Cardiology
Electrophysiology
Hospitalist
Internal Medicine

## 2022-11-22 NOTE — PROGRESS NOTE ADULT - SUBJECTIVE AND OBJECTIVE BOX
ANNE VILLATORO 88y Female  MRN#: [id number]   Hospital Day: 9d    SUBJECTIVE  Patient is a 88y old Female who presents with a chief complaint of Palpitations (21 Nov 2022 08:42)  Currently admitted to medicine with the primary diagnosis of Chronic atrial fibrillation      INTERVAL HPI AND OVERNIGHT EVENTS:  Patient was examined and seen at bedside. This morning she is resting comfortably in bed and reports no issues or overnight events. POD 1 s/p PPM placement w/CT Surgery, procedure was uncomplicated with minimal blood loss. EP to interrogate device today. Patient is feeling well today, slept well, wants to eat (diet was resumed in the AM), and discussed dispo planning to home. No major complaints, hemodynamically stable, pending discharge likely today.     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; s/p partial liver resection Mar 2022 @ [facility]    History of partial pancreatectomy  s/p distal pancreatectomy Aug 2021    H/O resection of liver  mets to liver; s/p partial liver resection Mar 2022 @ [facility]      ALLERGIES:  No Known Allergies    MEDICATIONS:  STANDING MEDICATIONS  ceFAZolin   IVPB 1000 milliGRAM(s) IV Intermittent every 8 hours  diltiazem    milliGRAM(s) Oral daily  furosemide    Tablet 20 milliGRAM(s) Oral daily  insulin glargine Injectable (LANTUS) 22 Unit(s) SubCutaneous at bedtime  insulin lispro (ADMELOG) corrective regimen sliding scale   SubCutaneous three times a day before meals  melatonin 3 milliGRAM(s) Oral at bedtime  metoprolol tartrate 100 milliGRAM(s) Oral two times a day  pantoprazole    Tablet 40 milliGRAM(s) Oral before breakfast  sodium zirconium cyclosilicate 10 Gram(s) Oral once  vitamin A &amp; D Ointment 1 Application(s) Topical three times a day    PRN MEDICATIONS  acetaminophen     Tablet .. 650 milliGRAM(s) Oral every 6 hours PRN  aluminum hydroxide/magnesium hydroxide/simethicone Suspension 30 milliLiter(s) Oral every 6 hours PRN  polyethylene glycol 3350 17 Gram(s) Oral daily PRN  senna 2 Tablet(s) Oral at bedtime PRN      VITAL SIGNS: Last 24 Hours  T(C): 36.4 (22 Nov 2022 05:20), Max: 36.6 (21 Nov 2022 19:10)  T(F): 97.6 (22 Nov 2022 05:20), Max: 97.9 (21 Nov 2022 19:10)  HR: 118 (22 Nov 2022 05:20) (98 - 122)  BP: 109/66 (22 Nov 2022 05:20) (109/66 - 148/88)  BP(mean): 96 (21 Nov 2022 17:38) (96 - 96)  RR: 18 (22 Nov 2022 05:20) (16 - 19)  SpO2: 95% (22 Nov 2022 05:20) (93% - 98%)    LABS:                        13.7   12.80 )-----------( 242      ( 22 Nov 2022 06:27 )             42.4     11-22    138  |  100  |  26<H>  ----------------------------<  222<H>  4.3   |  25  |  0.8    Ca    9.0      22 Nov 2022 06:27  Mg     1.8     11-22    TPro  5.9<L>  /  Alb  3.5  /  TBili  0.7  /  DBili  x   /  AST  21  /  ALT  23  /  AlkPhos  87  11-22    PT/INR - ( 21 Nov 2022 07:30 )   PT: 11.00 sec;   INR: 0.97 ratio         PTT - ( 21 Nov 2022 07:30 )  PTT:42.8 sec      RADIOLOGY:      PHYSICAL EXAM:  CONSTITUTIONAL: No acute distress, well-developed, well-groomed, AAOx3  HEAD: Atraumatic, normocephalic  EYES: EOM intact, conjunctiva and sclera clear  ENT: Supple, no JVD; moist mucous membranes  PULMONARY: Clear to auscultation bilaterally; no wheezes, rales, or rhonchi  CARDIOVASCULAR: Regular rate and rhythm; no murmurs, rubs, or gallops, surgical dressing in place over L chest wall, clean dry and intact  GASTROINTESTINAL: Soft, non-tender, non-distended, obese habitus; bowel sounds present  MUSCULOSKELETAL: no clubbing, no cyanosis, no edema  NEUROLOGY: non-focal, moving all extremities, no facial droop  SKIN: No rashes or lesions; warm and dry

## 2022-11-22 NOTE — PROGRESS NOTE ADULT - ASSESSMENT
Pacemaker Interrogation  Excellent device sensing and pacing, Programming see below for the following programming changes  Device: permanent pacemaker   Implanting Physician: Leonardo Neri  Implant Date: 11/21/22  Vendor: [id number]  A: Impedance: 460  A: sensitivity: 0.4  A: sensing threshold: 3.1  A: threshold Afib  A: out put 2.5@0.4ms  V: Impedance: 550  V: sensitivity: 2.0mV  V: sensing threshold: 12.0mV  V: threshold 0.5V@0.4ms  V: out put 2.5V@0.4ms  Settings: DDD  Base rate 60bpm  Max track rate 120bpm  Paced AV delay 225ms  Sensed AV delay 225ms  Arrythmia chronic atrial fibrillation   Mode switch: 100%    PVAB was decreased to 120 to improve appropriate mode switch

## 2022-11-22 NOTE — PROGRESS NOTE ADULT - SUBJECTIVE AND OBJECTIVE BOX
OPERATIVE PROCEDURE(s):  Insertion of DDD for SSS              POD # 1                      SURGEON(s): JENNIFER Olivier  EP: Dr. 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 During admission developed AF RVR, hard to control, meds were adjusted, currently Metoprolol 100mg bid, Cardizem 180mg daily, rate controlled. However had several pauses >5sec. Patient now recommended for pacemaker placement. Denies syncope, dyspnea, dizziness. CTS consulted for PPM    SUBJECTIVE ASSESSMENT: 88y Female patient seen and examined at bedside.    Vital Signs Last 24 Hrs  T(F): 97.6 (22 Nov 2022 05:20), Max: 97.9 (21 Nov 2022 19:10)  HR: 118 (22 Nov 2022 05:20) (98 - 122)  BP: 109/66 (22 Nov 2022 05:20) (109/66 - 148/88)  BP(mean): 96 (21 Nov 2022 17:38) (96 - 96)  RR: 18 (22 Nov 2022 05:20) (16 - 19)  SpO2: 95% (22 Nov 2022 05:20) (93% - 98%)    I&O's Detail    21 Nov 2022 07:01  -  22 Nov 2022 07:00  --------------------------------------------------------  IN:    IV PiggyBack: 575 mL    Lactated Ringers: 155 mL    Oral Fluid: 150 mL  Total IN: 880 mL    OUT:    Voided (mL): 600 mL  Total OUT: 600 mL    Net: I&O's Detail    20 Nov 2022 07:01  -  21 Nov 2022 07:00  --------------------------------------------------------  Total NET: 170 mL    21 Nov 2022 07:01  -  22 Nov 2022 07:00  --------------------------------------------------------  Total NET: 280 mL      CAPILLARY BLOOD GLUCOSE  106 (21 Nov 2022 21:48)  84 (21 Nov 2022 19:53)  POCT Blood Glucose.: 205 mg/dL (22 Nov 2022 10:59)  POCT Blood Glucose.: 188 mg/dL (22 Nov 2022 08:27)  POCT Blood Glucose.: 106 mg/dL (21 Nov 2022 21:48)  POCT Blood Glucose.: 84 mg/dL (21 Nov 2022 19:53)  POCT Blood Glucose.: 107 mg/dL (21 Nov 2022 16:32)    A1C with Estimated Average Glucose Result: 7.3 % (11-14-22 @ 07:27)      Physical Exam:  General: NAD; A&Ox3  Cardiac: S1/S2, RRR, no murmur, no rubs  Lungs: unlabored respirations, CTA b/l, no wheeze, no rales, no crackles  Abdomen: Soft/NT/ND; positive bowel sounds x 4  Incisions: Incisions clean/dry/intact  Extremities: No edema b/l lower extremities; good capillary refill; no cyanosis; palpable 1+ pedal pulses b/l        LABS:                        13.7   12.80<H> )-----------( 242      ( 22 Nov 2022 06:27 )             42.4                         13.3   8.66  )-----------( 245      ( 21 Nov 2022 07:30 )             39.4     11-22    138  |  100  |  26<H>  ----------------------------<  222<H>  4.3   |  25  |  0.8  11-21    140  |  104  |  27<H>  ----------------------------<  250<H>  4.6   |  25  |  1.0    Ca    9.0      22 Nov 2022 06:27  Mg     1.8     11-22    TPro  5.9<L> [6.0 - 8.0]  /  Alb  3.5 [3.5 - 5.2]  /  TBili  0.7 [0.2 - 1.2]  /  DBili  x   /  AST  21 [0 - 41]  /  ALT  23 [0 - 41]  /  AlkPhos  87 [30 - 115]  11-22    PT/INR - ( 21 Nov 2022 07:30 )   PT: ;   INR: 0.97 ratio       PTT - ( 21 Nov 2022 07:30 )  PTT:42.8 sec    RADIOLOGY & ADDITIONAL TESTS:  CXR: PENDING    Allergies  No Known Allergies  Intolerances      MEDICATIONS  (STANDING):  ceFAZolin   IVPB 1000 milliGRAM(s) IV Intermittent every 8 hours  diltiazem    milliGRAM(s) Oral daily  furosemide    Tablet 20 milliGRAM(s) Oral daily  insulin glargine Injectable (LANTUS) 22 Unit(s) SubCutaneous at bedtime  insulin lispro (ADMELOG) corrective regimen sliding scale   SubCutaneous three times a day before meals  melatonin 3 milliGRAM(s) Oral at bedtime  metoprolol tartrate 100 milliGRAM(s) Oral two times a day  pantoprazole    Tablet 40 milliGRAM(s) Oral before breakfast  sodium zirconium cyclosilicate 10 Gram(s) Oral once  vitamin A &amp; D Ointment 1 Application(s) Topical three times a day    MEDICATIONS  (PRN):  acetaminophen     Tablet .. 650 milliGRAM(s) Oral every 6 hours PRN Mild Pain (1 - 3), Moderate Pain (4 - 6)  aluminum hydroxide/magnesium hydroxide/simethicone Suspension 30 milliLiter(s) Oral every 6 hours PRN Dyspepsia  polyethylene glycol 3350 17 Gram(s) Oral daily PRN Constipation  senna 2 Tablet(s) Oral at bedtime PRN Constipation    Home Medications:  [place] Thyroid 15 mg oral tablet: 1 tab(s) orally once a day (13 Nov 2022 10:04)  Plavix 75 mg oral tablet: 1 tab(s) orally once a day (13 Nov 2022 10:04)  Tresiba 100 units/mL subcutaneous solution: 22 unit(s) subcutaneous once a day (at bedtime) (13 Nov 2022 10:04)      Assessment/Plan:  88y Female w/PMH: CAD, PCI x4, DM2, pancreatic cancer (s/p distal pancreatectomy Aug 2021), 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 During admission developed AF RVR, hard to control, meds were adjusted, currently Metoprolol 100mg bid, Cardizem 180mg daily, rate controlled. However had several pauses >5sec. Patient now recommended for pacemaker placement. Denies syncope, dyspnea, dizziness. CTS consulted for PPM    - Case and plan discussed with CTU Intensivist and CT Surgeon - Dr. Matt/Soy/Parish/Licha  - Continue CTU supportive care and ongoing plan of care as per continuing CTU rounds.   - Continue DVT/GI prophylaxis  - Incentive Spirometry 10 times an hour  - Continue to advance physical activity as tolerated and continue PT/OT as directed  1. CAD s/p CABG: Continue ASA, statin, BB  2. HTN:   3. Post-op A. Fib ppx:   4. COPD/Hypoxia:   5. DM/Glucose Control:     Social Service Disposition:     OPERATIVE PROCEDURE(s):  Insertion of DDD for SSS              POD # 1                      SURGEON(s): JENNIFER Olivier  EP: Dr. 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 During admission developed AF RVR, hard to control, meds were adjusted, currently Metoprolol 100mg bid, Cardizem 180mg daily, rate controlled. However had several pauses >5sec. Patient now recommended for pacemaker placement. Denies syncope, dyspnea, dizziness. CTS consulted for PPM    SUBJECTIVE ASSESSMENT: 88y Female patient seen and examined at bedside.    Vital Signs Last 24 Hrs  T(F): 97.6 (22 Nov 2022 05:20), Max: 97.9 (21 Nov 2022 19:10)  HR: 118 (22 Nov 2022 05:20) (98 - 122)  BP: 109/66 (22 Nov 2022 05:20) (109/66 - 148/88)  BP(mean): 96 (21 Nov 2022 17:38) (96 - 96)  RR: 18 (22 Nov 2022 05:20) (16 - 19)  SpO2: 95% (22 Nov 2022 05:20) (93% - 98%)    I&O's Detail    21 Nov 2022 07:01  -  22 Nov 2022 07:00  --------------------------------------------------------  IN:    IV PiggyBack: 575 mL    Lactated Ringers: 155 mL    Oral Fluid: 150 mL  Total IN: 880 mL    OUT:    Voided (mL): 600 mL  Total OUT: 600 mL    Net: I&O's Detail    20 Nov 2022 07:01  -  21 Nov 2022 07:00  --------------------------------------------------------  Total NET: 170 mL    21 Nov 2022 07:01  -  22 Nov 2022 07:00  --------------------------------------------------------  Total NET: 280 mL      CAPILLARY BLOOD GLUCOSE  106 (21 Nov 2022 21:48)  84 (21 Nov 2022 19:53)  POCT Blood Glucose.: 205 mg/dL (22 Nov 2022 10:59)  POCT Blood Glucose.: 188 mg/dL (22 Nov 2022 08:27)  POCT Blood Glucose.: 106 mg/dL (21 Nov 2022 21:48)  POCT Blood Glucose.: 84 mg/dL (21 Nov 2022 19:53)  POCT Blood Glucose.: 107 mg/dL (21 Nov 2022 16:32)    A1C with Estimated Average Glucose Result: 7.3 % (11-14-22 @ 07:27)      Physical Exam:  General: NAD; A&Ox3  Cardiac: S1/S2, RRR, no murmur, no rubs  Lungs: unlabored respirations, CTA b/l, no wheeze, no rales, no crackles  Abdomen: Soft/NT/ND; positive bowel sounds x 4  Incisions: Incisions clean/dry/intact  Extremities: No edema b/l lower extremities; good capillary refill; no cyanosis; palpable 1+ pedal pulses b/l        LABS:                        13.7   12.80<H> )-----------( 242      ( 22 Nov 2022 06:27 )             42.4                         13.3   8.66  )-----------( 245      ( 21 Nov 2022 07:30 )             39.4     11-22    138  |  100  |  26<H>  ----------------------------<  222<H>  4.3   |  25  |  0.8  11-21    140  |  104  |  27<H>  ----------------------------<  250<H>  4.6   |  25  |  1.0    Ca    9.0      22 Nov 2022 06:27  Mg     1.8     11-22    TPro  5.9<L> [6.0 - 8.0]  /  Alb  3.5 [3.5 - 5.2]  /  TBili  0.7 [0.2 - 1.2]  /  DBili  x   /  AST  21 [0 - 41]  /  ALT  23 [0 - 41]  /  AlkPhos  87 [30 - 115]  11-22    PT/INR - ( 21 Nov 2022 07:30 )   PT: ;   INR: 0.97 ratio       PTT - ( 21 Nov 2022 07:30 )  PTT:42.8 sec    RADIOLOGY & ADDITIONAL TESTS:  CXR: PENDING    Allergies  No Known Allergies  Intolerances      MEDICATIONS  (STANDING):  ceFAZolin   IVPB 1000 milliGRAM(s) IV Intermittent every 8 hours  diltiazem    milliGRAM(s) Oral daily  furosemide    Tablet 20 milliGRAM(s) Oral daily  insulin glargine Injectable (LANTUS) 22 Unit(s) SubCutaneous at bedtime  insulin lispro (ADMELOG) corrective regimen sliding scale   SubCutaneous three times a day before meals  melatonin 3 milliGRAM(s) Oral at bedtime  metoprolol tartrate 100 milliGRAM(s) Oral two times a day  pantoprazole    Tablet 40 milliGRAM(s) Oral before breakfast  sodium zirconium cyclosilicate 10 Gram(s) Oral once  vitamin A &amp; D Ointment 1 Application(s) Topical three times a day    MEDICATIONS  (PRN):  acetaminophen     Tablet .. 650 milliGRAM(s) Oral every 6 hours PRN Mild Pain (1 - 3), Moderate Pain (4 - 6)  aluminum hydroxide/magnesium hydroxide/simethicone Suspension 30 milliLiter(s) Oral every 6 hours PRN Dyspepsia  polyethylene glycol 3350 17 Gram(s) Oral daily PRN Constipation  senna 2 Tablet(s) Oral at bedtime PRN Constipation    Home Medications:  [place] Thyroid 15 mg oral tablet: 1 tab(s) orally once a day (13 Nov 2022 10:04)  Plavix 75 mg oral tablet: 1 tab(s) orally once a day (13 Nov 2022 10:04)  Tresiba 100 units/mL subcutaneous solution: 22 unit(s) subcutaneous once a day (at bedtime) (13 Nov 2022 10:04)      Assessment/Plan:  88y Female w/PMH: CAD, PCI x4, DM2, pancreatic cancer (s/p distal pancreatectomy Aug 2021), 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 During admission developed AF RVR complicated by several sinus pauses >5sec on Metoprolol 100mg bid and Cardizem 180mg daily for rate controlled. Patient now s/p DDD PPM.  - Case and plan discussed with CTU Intensivist and CT Surgeon - Dr. Olivier  - Continue supportive care.   - Continue DVT/GI prophylaxis  - Incentive Spirometry 10 times an hour  - Continue to advance physical activity as tolerated and continue PT/OT as directed  1. CAD s/p PCI: may resume and continue plavix.   2. A. Fib: paroxysmal; rate controlled on lopressor and cardizem. May resume eliquis 48 hrs post-op.   3. EP: Interrogation complete; device functioning well as per EP. Please f/u with EP in 1 month.  4. CTS: Post-op [facility] DDD PPM for tachy-david syndrome: No Heavy lifting >10 lbs and do not raise your arm above shoulder level for 4-6 weeks. Keep occlusive dressing intact, ok to shower with occlusive dressing, No submerging in water for 1 month. Occlusive dressing will be removed in cardiac surgery clinic by Dr. Olivier. Patient may be d/c'd if medically cleared by primary team.  - Please follow up with Dr. Olivier at [address] [place] [address], 2nd floor [phone] on Wednesday 11/30/22 at 11:30 am.

## 2022-11-22 NOTE — PROGRESS NOTE ADULT - ASSESSMENT
Cards Dr Galloway  EP: Dr. 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 Developed AF RVR, medication adjusted, now rate controlled but with pauses. 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 No immediate postop complications noted.     Impression:  tachy-david syndrome S/p DC PPM ([facility])  PAF  UTI, treated  CAD, s/p PCI    Plan:  - Interrogation complete: DDD  bpm, in AF. Normal functioning device.   - CXR noted on 11/21  - EKG  - HOLD any heparin, lovenox or DOACs for 48 hr following procedure to minimize risk of hematoma  - May resume Eliquis tomorrow evening  - May resume PO diet  - Increase cardizem for better rate control  - Cont metoprolol 100 mg PO Q 12  - Tylenol for pain  - Ambulate    Dispo: Home today     Discharge Instructions:  - May resume blood thinner on tomorrow evening  - No heavy lifting > 5 lbs. for 4-6 weeks  - Wound care per CT surgery  - No submerging in water for 1 month  - Leave steri-strips in place, they will fall off on their own  - No driving for 1 week   Cards Dr Galloway  EP: Dr. 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 Developed AF RVR, medication adjusted, now rate controlled but with pauses. 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 No immediate postop complications noted.     Impression:  tachy-david syndrome S/p DC PPM ([facility])  PAF  UTI, treated  CAD, s/p PCI    Plan:  - Interrogation complete: DDD  bpm, in AF. Normal functioning device.   - CXR noted on 11/21  - EKG  - HOLD any heparin, lovenox or DOACs for 48 hr following procedure to minimize risk of hematoma  - May resume Eliquis tomorrow evening  - May resume PO diet  - May resume plavix  - Increase cardizem for better rate control  - Cont metoprolol 100 mg PO Q 12  - Tylenol for pain  - Ambulate    Dispo: Home today     Discharge Instructions:  - May resume blood thinner on tomorrow evening  - No heavy lifting > 5 lbs. for 4-6 weeks  - Wound care per CT surgery  - No submerging in water for 1 month  - Leave steri-strips in place, they will fall off on their own  - No driving for 1 week

## 2022-11-22 NOTE — PROGRESS NOTE ADULT - NS ATTEND AMEND GEN_ALL_CORE FT
Device re-programming
Rate control with metoprolol. Increase as tolerated  OAC when ok with surgery  OP f-up

## 2022-11-23 ENCOUNTER — TRANSCRIPTION ENCOUNTER (OUTPATIENT)
Age: 87
End: 2022-11-23

## 2022-11-23 VITALS
TEMPERATURE: 98 F | DIASTOLIC BLOOD PRESSURE: 63 MMHG | SYSTOLIC BLOOD PRESSURE: 118 MMHG | RESPIRATION RATE: 18 BRPM | HEART RATE: 70 BPM

## 2022-11-23 LAB
ALBUMIN SERPL ELPH-MCNC: 3.7 G/DL — SIGNIFICANT CHANGE UP (ref 3.5–5.2)
ALP SERPL-CCNC: 78 U/L — SIGNIFICANT CHANGE UP (ref 30–115)
ALT FLD-CCNC: 16 U/L — SIGNIFICANT CHANGE UP (ref 0–41)
ANION GAP SERPL CALC-SCNC: 10 MMOL/L — SIGNIFICANT CHANGE UP (ref 7–14)
AST SERPL-CCNC: 15 U/L — SIGNIFICANT CHANGE UP (ref 0–41)
BILIRUB SERPL-MCNC: 0.7 MG/DL — SIGNIFICANT CHANGE UP (ref 0.2–1.2)
BUN SERPL-MCNC: 25 MG/DL — HIGH (ref 10–20)
CALCIUM SERPL-MCNC: 9.1 MG/DL — SIGNIFICANT CHANGE UP (ref 8.4–10.4)
CHLORIDE SERPL-SCNC: 100 MMOL/L — SIGNIFICANT CHANGE UP (ref 98–110)
CO2 SERPL-SCNC: 27 MMOL/L — SIGNIFICANT CHANGE UP (ref 17–32)
CREAT SERPL-MCNC: 1 MG/DL — SIGNIFICANT CHANGE UP (ref 0.7–1.5)
EGFR: 54 ML/MIN/1.73M2 — LOW
GLUCOSE BLDC GLUCOMTR-MCNC: 168 MG/DL — HIGH (ref 70–99)
GLUCOSE BLDC GLUCOMTR-MCNC: 178 MG/DL — HIGH (ref 70–99)
GLUCOSE SERPL-MCNC: 195 MG/DL — HIGH (ref 70–99)
HCT VFR BLD CALC: 38.4 % — SIGNIFICANT CHANGE UP (ref 37–47)
HGB BLD-MCNC: 12.8 G/DL — SIGNIFICANT CHANGE UP (ref 12–16)
MAGNESIUM SERPL-MCNC: 1.7 MG/DL — LOW (ref 1.8–2.4)
MCHC RBC-ENTMCNC: 32.1 PG — HIGH (ref 27–31)
MCHC RBC-ENTMCNC: 33.3 G/DL — SIGNIFICANT CHANGE UP (ref 32–37)
MCV RBC AUTO: 96.2 FL — SIGNIFICANT CHANGE UP (ref 81–99)
NRBC # BLD: 0 /100 WBCS — SIGNIFICANT CHANGE UP (ref 0–0)
PLATELET # BLD AUTO: 227 K/UL — SIGNIFICANT CHANGE UP (ref 130–400)
POTASSIUM SERPL-MCNC: 3.9 MMOL/L — SIGNIFICANT CHANGE UP (ref 3.5–5)
POTASSIUM SERPL-SCNC: 3.9 MMOL/L — SIGNIFICANT CHANGE UP (ref 3.5–5)
PROT SERPL-MCNC: 5.7 G/DL — LOW (ref 6–8)
RBC # BLD: 3.99 M/UL — LOW (ref 4.2–5.4)
RBC # FLD: 15.1 % — HIGH (ref 11.5–14.5)
SODIUM SERPL-SCNC: 137 MMOL/L — SIGNIFICANT CHANGE UP (ref 135–146)
WBC # BLD: 8.8 K/UL — SIGNIFICANT CHANGE UP (ref 4.8–10.8)
WBC # FLD AUTO: 8.8 K/UL — SIGNIFICANT CHANGE UP (ref 4.8–10.8)

## 2022-11-23 PROCEDURE — 99239 HOSP IP/OBS DSCHRG MGMT >30: CPT

## 2022-11-23 RX ORDER — DILTIAZEM HCL 120 MG
240 CAPSULE, EXT RELEASE 24 HR ORAL DAILY
Refills: 0 | Status: DISCONTINUED | OUTPATIENT
Start: 2022-11-24 | End: 2022-11-23

## 2022-11-23 RX ORDER — CLOPIDOGREL BISULFATE 75 MG/1
75 TABLET, FILM COATED ORAL DAILY
Refills: 0 | Status: DISCONTINUED | OUTPATIENT
Start: 2022-11-23 | End: 2022-11-23

## 2022-11-23 RX ORDER — CLOPIDOGREL BISULFATE 75 MG/1
1 TABLET, FILM COATED ORAL
Qty: 30 | Refills: 0
Start: 2022-11-23 | End: 2022-12-22

## 2022-11-23 RX ORDER — APIXABAN 2.5 MG/1
1 TABLET, FILM COATED ORAL
Qty: 60 | Refills: 0
Start: 2022-11-23 | End: 2022-12-22

## 2022-11-23 RX ORDER — DILTIAZEM HCL 120 MG
1 CAPSULE, EXT RELEASE 24 HR ORAL
Qty: 30 | Refills: 0
Start: 2022-11-23 | End: 2022-12-22

## 2022-11-23 RX ADMIN — Medication 1: at 12:10

## 2022-11-23 RX ADMIN — Medication 650 MILLIGRAM(S): at 07:10

## 2022-11-23 RX ADMIN — CLOPIDOGREL BISULFATE 75 MILLIGRAM(S): 75 TABLET, FILM COATED ORAL at 12:11

## 2022-11-23 RX ADMIN — Medication 20 MILLIGRAM(S): at 05:33

## 2022-11-23 RX ADMIN — Medication 100 MILLIGRAM(S): at 05:33

## 2022-11-23 RX ADMIN — PANTOPRAZOLE SODIUM 40 MILLIGRAM(S): 20 TABLET, DELAYED RELEASE ORAL at 05:34

## 2022-11-23 RX ADMIN — Medication 1: at 08:33

## 2022-11-23 RX ADMIN — Medication 180 MILLIGRAM(S): at 05:33

## 2022-11-23 NOTE — DISCHARGE NOTE NURSING/CASE MANAGEMENT/SOCIAL WORK - PATIENT PORTAL LINK FT
You can access the FollowMyHealth Patient Portal offered by [facility] by registering at the following website: [email or web address]. By joining [facility]’s FollowMyHealth portal, you will also be able to view your health information using other applications (apps) compatible with our system.

## 2022-11-23 NOTE — DISCHARGE NOTE NURSING/CASE MANAGEMENT/SOCIAL WORK - NSDCPEFALRISK_GEN_ALL_CORE
For information on Fall & Injury Prevention, visit: [email or web address] OR  [email or web address] OR  https://www.cdc.gov/steadi/patient.html

## 2022-11-23 NOTE — CHART NOTE - NSCHARTNOTEFT_GEN_A_CORE
<<<RESIDENT DISCHARGE NOTE>>>     ANNE VILLATORO  MRN-[id number]    VITAL SIGNS:  T(F): 97.6 (11-23-22 @ 13:02), Max: 97.6 (11-22-22 @ 19:52)  HR: 70 (11-23-22 @ 13:02)  BP: 118/63 (11-23-22 @ 13:02)  SpO2: 96% (11-22-22 @ 20:52)      PHYSICAL EXAMINATION:  General: NAD, AOx3  Head & Neck: Supple, no JVD, MMM  Pulmonary: CTAB, no wheezing, normal respiratory effort  Cardiovascular: Regular rate and rhythm, no murmurs  Gastrointestinal/Abdomen & Pelvis: Non-distended, non-tender  Neurologic/Motor: Moving all extremities, normal strength    TEST RESULTS:                        12.8   8.80  )-----------( 227      ( 23 Nov 2022 06:19 )             38.4       11-23    137  |  100  |  25<H>  ----------------------------<  195<H>  3.9   |  27  |  1.0    Ca    9.1      23 Nov 2022 06:19  Mg     1.7     11-23    TPro  5.7<L>  /  Alb  3.7  /  TBili  0.7  /  DBili  x   /  AST  15  /  ALT  16  /  AlkPhos  78  11-23      87F w/ h/o pAF (not on AC), CAD (s/p 4 stents), IDDM, and pancreatic cancer (s/p distal pancreatectomy Aug 2021), 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    < from: TTE Echo Complete w/o Contrast w/ Doppler (11.18.22 @ 09:23) >    Summary:   1. Normal global left ventricular systolic function, internal cavity   size, and wall thickness.   2. LV Ejection Fraction by Pacheco's Method with a biplane EF of 67 %.   3. Left ventricular diastolic function could not be assessed in this   study due to atrial arrhythmia.   4. Normal right ventricular size with low-normal function.   5. Severely enlarged left atrium.   6. Severe mitral annular calcification with mild mitral regurgitation   and no evidence of stenosis.   7. Sclerotic aortic valve with normal opening.   8. No pericardial effusion.   9. Patient was in atrial fibrillation during this exam.  10. No prior TTE is available for comparison.    < end of copied text >    Patient had PPM placed by EP on 11/21 without complications. Device was interrogated on 11/22:   - Interrogation complete: DDD  bpm, in AF. Normal functioning device.   - CXR noted on 11/21  - EKG  - HOLD any heparin, lovenox or DOACs for 48 hr following procedure to minimize risk of hematoma  - May resume Eliquis tomorrow evening  - May resume PO diet  - May resume plavix  - Increase cardizem for better rate control  - Cont metoprolol 100 mg PO Q 12  - Tylenol for pain  - Ambulate  Patient was seen by her cardiologist 11/23, Cardizem CD increased to 240mg, Plavix resumed 75mg, and plan for Eliquis to resume this evening after discharge. Patient was otherwise medically stable and cleared for discharge home 11/23 with outpatient follow-up.      # Chronic Afib/Aflutter with RVR  - c/w eliquis increased to 5 mg twice daily, c/w plavix, discontinue aspirin  - discharge home on lopressor 100 mg BID and cardizem cd 180 mg, improved rate control achieved at this dose during admission  - echo done with normal EF  - Had pacemaker placed by CT Surgery on 11/21, device was interrogated by EP on 11/22  - Patient can be restarted on Eliquis and Plavix 48h post-op following PPM placement     # Coronary Artery Disease  # Hypertension  # Dyslipidemia  - follows w/ Dr. Peters. S/P PCI x4 (mLAD, dLCX, mRCA, and most recently OM1). Has consistently and continuously refused statin per cardiology.  - Currently on AC  - Currently on metoprolol 50mg TID, home is 50 QD  - Started on Lasix PO 20mg daily per Dr. De La Rosa on 11/22, will be sent on dc    # Hypothyroidism  - Managed via [place] Thyroid 15qd  - c/w levothyroxine 25mcg PO QD  - TSH 2.12    # IDDM  - Likely secondary to distal pancreatectomy. Unknown A1c. Managed via Tresiba 22u qhs.  - Monitor FS TIDAC and QHS  - c/w Lantus 22U SQ QHS  - c/w low-dose ISS  - A1c 7.3    # UTI - completed rocephin course  # Pancreatic cancer (s/p distal pancreatectomy Aug 2021)  # Cholangio carcinoma (mets to liver; s/p partial liver resection Mar 2022 @ [facility])  - outpatient follow up    # Constipation, resolved  # GERD - c/w protonix and mylanta prn  # DVT prophylaxis - on eliquis  # Code status - Full Code      DISCHARGE MEDICATION RECONCILIATION REVIEWED WITH   Dr. Vigil    DISPOSITION:   [ x ] Home,    [  ] Home with Visiting Nursing Services,   [    ]  SNF/ NH,    [   ] Acute Rehab (4A),   [   ] Other (Specify:_________)

## 2022-11-24 RX ORDER — FUROSEMIDE 40 MG
1 TABLET ORAL
Qty: 30 | Refills: 0
Start: 2022-11-24 | End: 2022-12-23

## 2022-11-28 ENCOUNTER — APPOINTMENT (OUTPATIENT)
Dept: CARDIOTHORACIC SURGERY | Facility: CLINIC | Age: 87
End: 2022-11-28

## 2022-11-28 VITALS
SYSTOLIC BLOOD PRESSURE: 122 MMHG | DIASTOLIC BLOOD PRESSURE: 86 MMHG | RESPIRATION RATE: 12 BRPM | TEMPERATURE: 98 F | BODY MASS INDEX: 38.83 KG/M2 | WEIGHT: 185 LBS | HEIGHT: 58 IN | HEART RATE: 66 BPM | OXYGEN SATURATION: 95 %

## 2022-11-28 PROCEDURE — 99024 POSTOP FOLLOW-UP VISIT: CPT

## 2022-11-28 NOTE — ASSESSMENT
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 Patient had PPM placed on 11/21 without complications. Device was interrogated on 11/22.  Here for wound check.\par \par Plan:\par Site raised, but patient reports the swelling has improved since 11/25, she does have bruising which she also noted Friday after removing pressure dressing, sites marked, noted to begin changing colors\par Advised to go to ED if swelling worsens, she denies any pain or drainage from incision, which is closed and healing well\par CBC now, to evaluate HGB\par F/U in 2 days for wound check\par Hold Eliquis until evaluated this coming Wednesday\par F/U with EP\par F/U with PMD\par \par Case and Plan D/W Dr. Olivier

## 2022-11-28 NOTE — COUNSELING
[FreeTextEntry1] : ANNE VILLATORO is a 88 year F that arrives today s/p device implant. They were educated on site care. Incision site is to be washed daily with soap and water, pat dry. NO lotions, perfumes, or ointments to be applied to the incision site until completely healed, usual time frame is 3 weeks. On arrival patient denies fever, chills nausea, and vomiting. Denies SOB or palpitation. On initial implantation restrictions are 6 weeks of no lifting, pushing pulling anything 10lbs or greater. Also the patient is not allowed to lift their arm above heart level for 6 weeks. No driving for 4 weeks.Patient was instructed to follow up with their electrophysiologist as well as their cardiologist within the next 2-4 weeks.\par

## 2022-11-28 NOTE — PHYSICAL EXAM
[No Edema] : no edema [] : no respiratory distress [Respiration, Rhythm And Depth] : normal respiratory rhythm and effort [Exaggerated Use Of Accessory Muscles For Inspiration] : no accessory muscle use [Apical Impulse] : the apical impulse was normal [Clean] : clean [Dry] : dry [Healing Well] : healing well [Bleeding] : no active bleeding [Foul Odor] : no foul smell [Purulent Drainage] : no purulent drainage [Serosanguinous Drainage] : no serosanguinous drainage [Erythema] : not erythematous [Warm] : not warm [Tender] : not tender [FreeTextEntry1] : See Assessment Narrative

## 2022-11-28 NOTE — REASON FOR VISIT
[de-identified] : S/P PPM for Sick Sinus Syndrome/AF [de-identified] : 11/21/2022 [de-identified] : Here for wound check.  Had pressure dressing postop for swelling.  Presents with her aide.

## 2022-11-29 LAB
BASOPHILS # BLD AUTO: 0.1 K/UL
BASOPHILS NFR BLD AUTO: 1.1 %
EOSINOPHIL # BLD AUTO: 0.16 K/UL
EOSINOPHIL NFR BLD AUTO: 1.8 %
HCT VFR BLD CALC: 40.7 %
HGB BLD-MCNC: 13.1 G/DL
IMM GRANULOCYTES NFR BLD AUTO: 0.7 %
LYMPHOCYTES # BLD AUTO: 1.98 K/UL
LYMPHOCYTES NFR BLD AUTO: 22.1 %
MAN DIFF?: NORMAL
MCHC RBC-ENTMCNC: 31.5 PG
MCHC RBC-ENTMCNC: 32.2 G/DL
MCV RBC AUTO: 97.8 FL
MONOCYTES # BLD AUTO: 1.07 K/UL
MONOCYTES NFR BLD AUTO: 11.9 %
NEUTROPHILS # BLD AUTO: 5.59 K/UL
NEUTROPHILS NFR BLD AUTO: 62.4 %
PLATELET # BLD AUTO: 231 K/UL
RBC # BLD: 4.16 M/UL
RBC # FLD: 14.7 %
WBC # FLD AUTO: 8.96 K/UL

## 2022-11-30 ENCOUNTER — APPOINTMENT (OUTPATIENT)
Dept: CARDIOTHORACIC SURGERY | Facility: CLINIC | Age: 87
End: 2022-11-30

## 2022-11-30 VITALS
HEART RATE: 117 BPM | DIASTOLIC BLOOD PRESSURE: 79 MMHG | HEIGHT: 58 IN | BODY MASS INDEX: 38.83 KG/M2 | RESPIRATION RATE: 12 BRPM | WEIGHT: 185 LBS | OXYGEN SATURATION: 95 % | SYSTOLIC BLOOD PRESSURE: 120 MMHG | TEMPERATURE: 98 F

## 2022-11-30 PROCEDURE — 99024 POSTOP FOLLOW-UP VISIT: CPT

## 2022-11-30 NOTE — REASON FOR VISIT
[de-identified] : S/P PPM for Sick Sinus Syndrome/AF [de-identified] : 11/21/2022 [de-identified] : Here for wound check.  Had pressure dressing postop for swelling.  Presents with her aide.

## 2022-11-30 NOTE — PHYSICAL EXAM
[] : no respiratory distress [Respiration, Rhythm And Depth] : normal respiratory rhythm and effort [Exaggerated Use Of Accessory Muscles For Inspiration] : no accessory muscle use [Apical Impulse] : the apical impulse was normal [Clean] : clean [Dry] : dry [Healing Well] : healing well [No Edema] : no edema [Bleeding] : no active bleeding [Foul Odor] : no foul smell [Purulent Drainage] : no purulent drainage [Serosanguinous Drainage] : no serosanguinous drainage [Erythema] : not erythematous [Warm] : not warm [Tender] : not tender [FreeTextEntry1] : See Assessment Narrative

## 2022-11-30 NOTE — ASSESSMENT
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 Patient had PPM placed on 11/21 without complications. Device was interrogated on 11/22.  Here for wound check.\par \par Plan:\par Site raised, but improved from Monday, also bruising has turned green/yellow and improving\par F/U in 2 days for wound check\par Restart Eliquis Tomorrow AM\par F/U in 2 weeks for site check\par F/U with Dr. Huffman for tachycardia and medical management\par F/U with EP for device management \par F/U with PMD\par \par IElla FNP-BC, am acting as scribe for

## 2022-12-05 DIAGNOSIS — I50.31 ACUTE DIASTOLIC (CONGESTIVE) HEART FAILURE: ICD-10-CM

## 2022-12-05 DIAGNOSIS — I48.92 UNSPECIFIED ATRIAL FLUTTER: ICD-10-CM

## 2022-12-05 DIAGNOSIS — I50.810 RIGHT HEART FAILURE, UNSPECIFIED: ICD-10-CM

## 2022-12-05 DIAGNOSIS — I25.10 ATHEROSCLEROTIC HEART DISEASE OF NATIVE CORONARY ARTERY WITHOUT ANGINA PECTORIS: ICD-10-CM

## 2022-12-05 DIAGNOSIS — I49.5 SICK SINUS SYNDROME: ICD-10-CM

## 2022-12-05 DIAGNOSIS — E78.5 HYPERLIPIDEMIA, UNSPECIFIED: ICD-10-CM

## 2022-12-05 DIAGNOSIS — R00.2 PALPITATIONS: ICD-10-CM

## 2022-12-05 DIAGNOSIS — I48.20 CHRONIC ATRIAL FIBRILLATION, UNSPECIFIED: ICD-10-CM

## 2022-12-05 DIAGNOSIS — Z20.822 CONTACT WITH AND (SUSPECTED) EXPOSURE TO COVID-19: ICD-10-CM

## 2022-12-05 DIAGNOSIS — Z87.891 PERSONAL HISTORY OF NICOTINE DEPENDENCE: ICD-10-CM

## 2022-12-05 DIAGNOSIS — G89.29 OTHER CHRONIC PAIN: ICD-10-CM

## 2022-12-05 DIAGNOSIS — Z85.05 PERSONAL HISTORY OF MALIGNANT NEOPLASM OF LIVER: ICD-10-CM

## 2022-12-05 DIAGNOSIS — K59.00 CONSTIPATION, UNSPECIFIED: ICD-10-CM

## 2022-12-05 DIAGNOSIS — Z79.82 LONG TERM (CURRENT) USE OF ASPIRIN: ICD-10-CM

## 2022-12-05 DIAGNOSIS — Z79.02 LONG TERM (CURRENT) USE OF ANTITHROMBOTICS/ANTIPLATELETS: ICD-10-CM

## 2022-12-05 DIAGNOSIS — I48.0 PAROXYSMAL ATRIAL FIBRILLATION: ICD-10-CM

## 2022-12-05 DIAGNOSIS — Z85.09 PERSONAL HISTORY OF MALIGNANT NEOPLASM OF OTHER DIGESTIVE ORGANS: ICD-10-CM

## 2022-12-05 DIAGNOSIS — Z79.890 HORMONE REPLACEMENT THERAPY: ICD-10-CM

## 2022-12-05 DIAGNOSIS — N39.0 URINARY TRACT INFECTION, SITE NOT SPECIFIED: ICD-10-CM

## 2022-12-05 DIAGNOSIS — Z85.07 PERSONAL HISTORY OF MALIGNANT NEOPLASM OF PANCREAS: ICD-10-CM

## 2022-12-05 DIAGNOSIS — E11.9 TYPE 2 DIABETES MELLITUS WITHOUT COMPLICATIONS: ICD-10-CM

## 2022-12-05 DIAGNOSIS — E03.9 HYPOTHYROIDISM, UNSPECIFIED: ICD-10-CM

## 2022-12-05 DIAGNOSIS — I47.1 SUPRAVENTRICULAR TACHYCARDIA: ICD-10-CM

## 2022-12-05 DIAGNOSIS — E13.65 OTHER SPECIFIED DIABETES MELLITUS WITH HYPERGLYCEMIA: ICD-10-CM

## 2022-12-05 DIAGNOSIS — E83.42 HYPOMAGNESEMIA: ICD-10-CM

## 2022-12-05 DIAGNOSIS — Z95.5 PRESENCE OF CORONARY ANGIOPLASTY IMPLANT AND GRAFT: ICD-10-CM

## 2022-12-05 DIAGNOSIS — K21.9 GASTRO-ESOPHAGEAL REFLUX DISEASE WITHOUT ESOPHAGITIS: ICD-10-CM

## 2022-12-05 DIAGNOSIS — Z79.4 LONG TERM (CURRENT) USE OF INSULIN: ICD-10-CM

## 2022-12-05 DIAGNOSIS — E89.1 POSTPROCEDURAL HYPOINSULINEMIA: ICD-10-CM

## 2022-12-14 ENCOUNTER — APPOINTMENT (OUTPATIENT)
Dept: ELECTROPHYSIOLOGY | Facility: CLINIC | Age: 87
End: 2022-12-14

## 2022-12-14 ENCOUNTER — APPOINTMENT (OUTPATIENT)
Dept: CARDIOTHORACIC SURGERY | Facility: CLINIC | Age: 87
End: 2022-12-14

## 2022-12-14 VITALS
TEMPERATURE: 98 F | WEIGHT: 185 LBS | HEIGHT: 58 IN | SYSTOLIC BLOOD PRESSURE: 123 MMHG | RESPIRATION RATE: 12 BRPM | DIASTOLIC BLOOD PRESSURE: 67 MMHG | HEART RATE: 12 BPM | OXYGEN SATURATION: 97 % | BODY MASS INDEX: 38.83 KG/M2

## 2022-12-14 PROCEDURE — 93000 ELECTROCARDIOGRAM COMPLETE: CPT | Mod: 59

## 2022-12-14 PROCEDURE — 99214 OFFICE O/P EST MOD 30 MIN: CPT

## 2022-12-14 PROCEDURE — 99024 POSTOP FOLLOW-UP VISIT: CPT

## 2022-12-14 PROCEDURE — 93280 PM DEVICE PROGR EVAL DUAL: CPT

## 2022-12-14 NOTE — ASSESSMENT
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 Patient had PPM placed on 11/21 without complications. Device was interrogated on 11/22.  Here for wound check.\par \par Plan:\par Site raised, but much improved\par Bruising is essentially resolved \par Medications have been restarted \par F/U with CT Surgery PRN\par F/U with Dr. Huffman for tachycardia and medical management - scheduled for Cardioversion Tuesday 12/18\par F/U with EP for device management\par F/U with PMD for routine medical care\par \par I, Ella Us [facility]-BC, am acting as scribe for \par \par resolving hematoma\par pt with afib and fatigue\par referred pt to Dr. Hess for device check\par spoke with Dr. Whitley; no change in meds, will do DCCV\par -FMR\par I personally performed the services described in the documentation, reviewed the documentation recorded by the scribe in my presence and it accurately and completely records my words and actions.\par

## 2022-12-14 NOTE — DISCUSSION/SUMMARY
[Doing Well] : is doing well minimum assist (75% patients effort) [Excellent Pain Control] : has excellent pain control [No Sign of Infection] : is showing no signs of infection

## 2022-12-14 NOTE — REASON FOR VISIT
[de-identified] : S/P PPM for Sick Sinus Syndrome/AF [de-identified] : 11/21/2022 [de-identified] : Here for wound check.  Had pressure dressing postop for swelling.  Presents with her aide.

## 2022-12-28 NOTE — PROCEDURE
[Pacemaker] : pacemaker [DDD] : DDD [Voltage: ___ volts] : Voltage was [unfilled] volts [Magnet Rate: ___ Ppm] : magnet rate was [unfilled] Ppm [Longevity: ___ months] : The estimated remaining battery life is [unfilled] months [Normal] : The battery status is normal. [Lead Imp:  ___ohms] : lead impedance was [unfilled] ohms [Sensing Amplitude ___mv] : sensing amplitude was [unfilled] mv [___V @] : [unfilled] V [___ ms] : [unfilled] ms [de-identified] : Abbott [de-identified] : [id number] [de-identified] : [id number] [de-identified] : 11/21/2022 [de-identified] : 60 [de-identified] : AP: 0%\par : 6.2%\par Mode Switch: 100%\par On Remote Monitoring

## 2022-12-28 NOTE — PHYSICAL EXAM
[General Appearance - Well Developed] : well developed [Normal Appearance] : normal appearance [Well Groomed] : well groomed [General Appearance - Well Nourished] : well nourished [No Deformities] : no deformities [General Appearance - In No Acute Distress] : no acute distress [Clean] : clean [Dry] : dry [Well-Healed] : well-healed [FreeTextEntry2] : + Small to moderate hematoma without any overlying skin or incision issues, soft

## 2022-12-28 NOTE — HISTORY OF PRESENT ILLNESS
[de-identified] : Ms. VILLATORO is a 88 year-year old female with history of paroxysmal atrial fibrillation, Sinus Node Dysfunction s/p DC-PPM (SJM, 22, Non-dep), HTN, YIFAN, Hypothyroidism,  Pancreatic ca s/p Sx, Cholangioca w/mets s/p liver resection is here for device check.\par \par She is accompanied by her daughter. \par Denies chest pain, shortness of breath, palpitation, dizziness or LOC except noted above.\par \par EKG (12/14/22): AF 81, QRSd 98\par TTE (11/22): EF 67%, Severe LAE, Severe MAC, Mild MR\par Cardio: Dr. De La Rosa\par

## 2022-12-30 ENCOUNTER — APPOINTMENT (OUTPATIENT)
Dept: CARDIOLOGY | Facility: CLINIC | Age: 87
End: 2022-12-30
Payer: MEDICARE

## 2022-12-30 VITALS
SYSTOLIC BLOOD PRESSURE: 108 MMHG | HEIGHT: 56 IN | HEART RATE: 87 BPM | DIASTOLIC BLOOD PRESSURE: 70 MMHG | WEIGHT: 183 LBS | BODY MASS INDEX: 41.17 KG/M2 | TEMPERATURE: 97.2 F

## 2022-12-30 DIAGNOSIS — Z87.891 PERSONAL HISTORY OF NICOTINE DEPENDENCE: ICD-10-CM

## 2022-12-30 DIAGNOSIS — E03.9 HYPOTHYROIDISM, UNSPECIFIED: ICD-10-CM

## 2022-12-30 DIAGNOSIS — Z85.07 PERSONAL HISTORY OF MALIGNANT NEOPLASM OF PANCREAS: ICD-10-CM

## 2022-12-30 DIAGNOSIS — M48.00 SPINAL STENOSIS, SITE UNSPECIFIED: ICD-10-CM

## 2022-12-30 DIAGNOSIS — I25.10 ATHEROSCLEROTIC HEART DISEASE OF NATIVE CORONARY ARTERY W/OUT ANGINA PECTORIS: ICD-10-CM

## 2022-12-30 DIAGNOSIS — Z86.69 PERSONAL HISTORY OF OTHER DISEASES OF THE NERVOUS SYSTEM AND SENSE ORGANS: ICD-10-CM

## 2022-12-30 DIAGNOSIS — Z78.9 OTHER SPECIFIED HEALTH STATUS: ICD-10-CM

## 2022-12-30 DIAGNOSIS — Z82.49 FAMILY HISTORY OF ISCHEMIC HEART DISEASE AND OTHER DISEASES OF THE CIRCULATORY SYSTEM: ICD-10-CM

## 2022-12-30 DIAGNOSIS — Z48.89 ENCOUNTER FOR OTHER SPECIFIED SURGICAL AFTERCARE: ICD-10-CM

## 2022-12-30 DIAGNOSIS — E11.9 TYPE 2 DIABETES MELLITUS W/OUT COMPLICATIONS: ICD-10-CM

## 2022-12-30 DIAGNOSIS — Z95.0 PRESENCE OF CARDIAC PACEMAKER: ICD-10-CM

## 2022-12-30 PROCEDURE — 93000 ELECTROCARDIOGRAM COMPLETE: CPT | Mod: 59

## 2022-12-30 PROCEDURE — 93280 PM DEVICE PROGR EVAL DUAL: CPT

## 2022-12-30 PROCEDURE — 99024 POSTOP FOLLOW-UP VISIT: CPT

## 2023-01-03 ENCOUNTER — OUTPATIENT (OUTPATIENT)
Dept: OUTPATIENT SERVICES | Facility: HOSPITAL | Age: 88
LOS: 1 days | Discharge: HOME | End: 2023-01-03

## 2023-01-03 DIAGNOSIS — I48.91 UNSPECIFIED ATRIAL FIBRILLATION: ICD-10-CM

## 2023-01-03 DIAGNOSIS — Z90.49 ACQUIRED ABSENCE OF OTHER SPECIFIED PARTS OF DIGESTIVE TRACT: Chronic | ICD-10-CM

## 2023-01-03 DIAGNOSIS — Z90.89 ACQUIRED ABSENCE OF OTHER ORGANS: Chronic | ICD-10-CM

## 2023-01-03 DIAGNOSIS — H26.9 UNSPECIFIED CATARACT: Chronic | ICD-10-CM

## 2023-01-03 DIAGNOSIS — Z96.642 PRESENCE OF LEFT ARTIFICIAL HIP JOINT: Chronic | ICD-10-CM

## 2023-01-03 DIAGNOSIS — Z90.411 ACQUIRED PARTIAL ABSENCE OF PANCREAS: Chronic | ICD-10-CM

## 2023-01-03 RX ORDER — PANTOPRAZOLE SODIUM 20 MG/1
1 TABLET, DELAYED RELEASE ORAL
Qty: 0 | Refills: 0 | DISCHARGE

## 2023-01-03 RX ORDER — INSULIN DEGLUDEC 100 U/ML
0 INJECTION, SOLUTION SUBCUTANEOUS
Qty: 0 | Refills: 0 | DISCHARGE

## 2023-01-03 RX ORDER — THYROID 120 MG
1 TABLET ORAL
Qty: 0 | Refills: 0 | DISCHARGE

## 2023-01-03 RX ORDER — ACETAMINOPHEN 500 MG
2 TABLET ORAL
Qty: 0 | Refills: 0 | DISCHARGE

## 2023-01-03 RX ORDER — ONDANSETRON 8 MG/1
4 TABLET, FILM COATED ORAL ONCE
Refills: 0 | Status: DISCONTINUED | OUTPATIENT
Start: 2023-01-03 | End: 2023-01-17

## 2023-01-03 RX ORDER — EMPAGLIFLOZIN 10 MG/1
1 TABLET, FILM COATED ORAL
Qty: 0 | Refills: 0 | DISCHARGE

## 2023-01-03 NOTE — PRE-ANESTHESIA EVALUATION ADULT - NSANTHPMHFT_GEN_ALL_CORE
Chart reviewed, med and cardiology evaluation seen. pt interviewed and examined. Chart reviewed, med and cardiology evaluation seen. pt interviewed and examined. Fs 124mg/dl

## 2023-01-03 NOTE — CARDIOLOGY SUMMARY
[de-identified] : EKG (12/30/2022) AFlutter w/ PVCs, LAD, QRS 92ms   VRate 87 bpm\par EKG (12/14/2022): AF 81, QRS 98ms\par \par  [de-identified] : TTE 11/18/22: LVEF 67%, Severe LAE, Severe MAC, Mild MR, Sclerotic aortic valve with normal opening, Patient was in atrial fibrillation during this exam [de-identified] : 11/21/2022- Abbott dual chamber PPM

## 2023-01-03 NOTE — ASSESSMENT
[FreeTextEntry1] : ## Sinus Node Dysfunction s/p DC-PPM (Abbott/SJD, Non-dep)\par - Wound is healing well, clean, dry, no drainage, nontender\par - Device function normal. All parameters stable. See Device Printout\par - Device Interrogation: AS- 84%, AS-VS 16%, AP 0%  7.2%, AT/AF [place] 99% since November 21, 2022\par - Remote monitoring - patient is enrolled. Remote monitoring was discussed with patient and daughter, schedule, process, as well as associated co-pay that may not be covered by their insurance.\par \par \par ## persistent atrial fibrillation \par - Continue Eliquis 5mg BID. No bleeding. Creat 1.1 on 12/14/22 labs\par - DCCV with Dr. De La Rosa scheduled 1/3/2023.\par - Rate controlled with metoprolol/cardizem- will need to simplify regimen post DCCV.\par \par \par \par -RTO post planned cardioversion\par

## 2023-01-03 NOTE — ASU PATIENT PROFILE, ADULT - FALL HARM RISK - UNIVERSAL INTERVENTIONS
Bed in lowest position, wheels locked, appropriate side rails in place/Call bell, personal items and telephone in reach/Instruct patient to call for assistance before getting out of bed or chair/Non-slip footwear when patient is out of bed/Fidelity to call system/Physically safe environment - no spills, clutter or unnecessary equipment/Purposeful Proactive Rounding/Room/bathroom lighting operational, light cord in reach

## 2023-01-03 NOTE — PHYSICAL EXAM
[General Appearance - Well Developed] : well developed [Normal Appearance] : normal appearance [Well Groomed] : well groomed [General Appearance - Well Nourished] : well nourished [No Deformities] : no deformities [General Appearance - In No Acute Distress] : no acute distress [Clean] : clean [Dry] : dry [Well-Healed] : well-healed [] : no respiratory distress [Respiration, Rhythm And Depth] : normal respiratory rhythm and effort [Exaggerated Use Of Accessory Muscles For Inspiration] : no accessory muscle use [Auscultation Breath Sounds / Voice Sounds] : lungs were clear to auscultation bilaterally [FreeTextEntry1] : pt is in AF

## 2023-01-03 NOTE — H&P CARDIOLOGY - HISTORY OF PRESENT ILLNESS
86 yo F PMHx of DM,  Afib, CAD s/p 4 stents,  pancreatic cancer s/p distal pancreatectomy Aug 2021, cholangiocarcinoma with mets to the  liver s/p partial liver resection presenting for ADITYA/DCCV. Pt reporting frequent palpitations outpatient, previously in sinus. Last Eliquis this am. Pt has no other complaints

## 2023-01-03 NOTE — HISTORY OF PRESENT ILLNESS
[de-identified] : Cardio: Dr. De La Rosa\par \par 88 y.o F w/ PMHx of known CAD s/p PCI x4, DMII, h/o pancreatic cancer (s/p distal pancreatectomy 08/2021), cholangioca carcinoma (mets to liver; s/p partial liver resection 03/2022, PAF, SSS s/p PPM dual chamber(SJD) implanted on 11/21/22.\par \par She presents today for device/wound check. She ambulates w/ rolling walker and is accompanied by her daughter. Pt did not undergo cardioversion due to (+)COVID PCR test on 12/17/22. Repeat COVID PCR test on 12/21 (-).\par Pt is c/o fatigue morning-afternoon which subsides in the evening.\par Denies chest pain, shortness of breath, palpitation, dizziness or LOC except noted above.

## 2023-01-03 NOTE — CHART NOTE - NSCHARTNOTEFT_GEN_A_CORE
POST OPERATIVE PROCEDURAL DOCUMENTATION  PRE-OP DIAGNOSIS: Afib with RVR    POST-OP DIAGNOSIS: Afib s/p successful DCCV with 200J to NSR    PROCEDURE: Transesophageal echocardiogram    Primary Physician: Dr. Peters  Assistant: Dr. Chowdhury    ANESTHESIA TYPE  [  ] General Anesthesia  [ x ] Conscious Sedation  [  ] Local/Regional    CONDITION  [  ] Critical  [  ] Serious  [ X ] Fair  [  ] Good    SPECIMENS REMOVED (IF APPLICABLE): N/A    IMPLANTS (IF APPLICABLE): None    ESTIMATED BLOOD LOSS: None    COMPLICATIONS: None      FINDINGS:    After risks and benefits of procedures were explained, informed consent was obtained and placed in chart.   The patient received topical anesthestic to the oropharynx with viscous lidocaine and benzocaine spray.  Refer to Anesthesia note for sedation details.  The ADITYA probe was passed into the esophagus without difficulty.  Transesophageal and transgastric images were obtained.  The ADITYA probe was removed without difficulty and examined.  There was no evidence for bleeding.  The patient tolerated the procedure well without any immediate ADITYA-related complications.      Preliminary Findings:  HEVER: Left atrial appendage was clear of clot and smoke.  LV: LVEF was estimated at 55-65%  MV: Mild MR, No evidence for MS. Thickening of mitral leaflets  AV: trace AI, no evidence for AS.   TV: mild TR.   IAS: no PFO. NO R-> L shunt.   There was mild, non-mobile atheroma seen in the thoracic aorta.     Patient successfully converted to sinus rhythm with synchronized  200 J of direct current cardioversion.    DIAGNOSIS/IMPRESSION: Afib s/p successful DCCV with 200J to NSR    PLAN OF CARE:  - Cont Eliquis BID, Metoprolol and cardizem  - Follow up with cardiologist outpatient in 1-2 weeks

## 2023-01-03 NOTE — PROCEDURE
[No] : not [Atrial Fibrillation] : atrial fibrillation [See Device Printout] : See device printout [Pacemaker] : pacemaker [DDD] : DDD [Voltage: ___ volts] : Voltage was [unfilled] volts [Magnet Rate: ___ Ppm] : magnet rate was [unfilled] Ppm [Lead Imp:  ___ohms] : lead impedance was [unfilled] ohms [Sensing Amplitude ___mv] : sensing amplitude was [unfilled] mv [___V @] : [unfilled] V [___ ms] : [unfilled] ms [None] : none [de-identified] : Abbott/ANNE [de-identified] : Assurity MRI 7824 [de-identified] : [id number] [de-identified] : 11/21/2022 [de-identified] : 60 [de-identified] : Longevity 9.3-11.2 years [de-identified] : pt in AF [de-identified] : AS- 84%, AS-VS 16%\par AP 0%  7.2%\par AT/AF [place] 99% since November 21, 2022

## 2023-01-04 LAB — GLUCOSE BLDC GLUCOMTR-MCNC: 78 MG/DL — SIGNIFICANT CHANGE UP (ref 70–99)

## 2023-02-13 ENCOUNTER — APPOINTMENT (OUTPATIENT)
Dept: ELECTROPHYSIOLOGY | Facility: CLINIC | Age: 88
End: 2023-02-13
Payer: MEDICARE

## 2023-02-13 VITALS
SYSTOLIC BLOOD PRESSURE: 140 MMHG | DIASTOLIC BLOOD PRESSURE: 70 MMHG | HEART RATE: 64 BPM | RESPIRATION RATE: 14 BRPM | TEMPERATURE: 98 F | WEIGHT: 176 LBS | HEIGHT: 56 IN | BODY MASS INDEX: 39.59 KG/M2

## 2023-02-13 PROCEDURE — 93280 PM DEVICE PROGR EVAL DUAL: CPT

## 2023-02-13 PROCEDURE — 99214 OFFICE O/P EST MOD 30 MIN: CPT

## 2023-02-13 PROCEDURE — 93000 ELECTROCARDIOGRAM COMPLETE: CPT | Mod: 59

## 2023-02-15 NOTE — PROCEDURE
[Pacemaker] : pacemaker [DDD] : DDD [Voltage: ___ volts] : Voltage was [unfilled] volts [Magnet Rate: ___ Ppm] : magnet rate was [unfilled] Ppm [Longevity: ___ months] : The estimated remaining battery life is [unfilled] months [Normal] : The battery status is normal. [Lead Imp:  ___ohms] : lead impedance was [unfilled] ohms [Sensing Amplitude ___mv] : sensing amplitude was [unfilled] mv [No] : not [Sinus Bradycardia] : sinus bradycardia [See Device Printout] : See device printout [___V @] : [unfilled] V [___ ms] : [unfilled] ms [de-identified] : 54 bpm [de-identified] : Abbott [de-identified] : [id number] [de-identified] : [id number] [de-identified] : 11/21/2022 [de-identified] : 60 [de-identified] : 10.2 years [de-identified] : Rate response added, LRL increased to 70 bpm, Atrial cap confirm monitor ON, and V autocapture turned ON [de-identified] : AP: 35%\par : 52%\par Mode Switch: 50%\par On Remote Monitoring

## 2023-02-15 NOTE — HISTORY OF PRESENT ILLNESS
[de-identified] : Ms. VILLATORO is a 88 year-year old female with history of paroxysmal atrial fibrillation, Sinus Node Dysfunction s/p DC-PPM (SJM, 22, Non-dep), HTN, YIFAN, Hypothyroidism,  Pancreatic ca s/p Sx, Cholangioca w/mets s/p liver resection is here for device check.\par She underwent successful ADITYA/DCCV on 1/3/2023.\par Referred to be seen by Dr. Olivia for high burden V-pacing. \par Admits to anxiety due to CA diagnosis, was told no surgical intervention needed. \par She is accompanied by her daughter. \par Denies chest pain, shortness of breath, palpitation, dizziness or LOC except noted above.\par \par EKG (12/14/22): AF 81, QRSd 98\par TTE (11/22): EF 67%, Severe LAE, Severe MAC, Mild MR\par Cardio: Dr. Olivia\par

## 2023-02-15 NOTE — ASSESSMENT
[FreeTextEntry1] : ## Sinus Node Dysfunction s/p DC-PPM (SJM, 22, Non-dep)\par ## persistent atrial fibrillation s/p DCCV on 1/3/2023 to [facility]\par \par - On Eliquis 5mg BID. No s/s bleeding reported. Recent Hgb, creat WNL. \par - Rate controlled with metoprolol/cardizem- recently decreased by Dr. Olivia due to feeling tired. \par - Patient seen with Dr. Neri. On DDD 30 bpm patient in sinus david with APCs, AV delay extends to 360ms - LRL increased to 70 bpm in effort to avoid AF recurrence. Dr. Neri discussed with Dr. Olivia, will  more but will hopefully avoid AF.\par - She is on remote and transmitting\par \par I recommend to continue same medications. \par \par RTO in 6 months

## 2023-02-15 NOTE — CARDIOLOGY SUMMARY
[de-identified] : 2/13/2023: Sinus rhythm, V-paced with occasional retrograde conduction on V-paced beats

## 2023-02-15 NOTE — PHYSICAL EXAM
[General Appearance - Well Developed] : well developed [Normal Appearance] : normal appearance [Well Groomed] : well groomed [General Appearance - Well Nourished] : well nourished [No Deformities] : no deformities [General Appearance - In No Acute Distress] : no acute distress [Clean] : clean [Dry] : dry [Well-Healed] : well-healed [Heart Rate And Rhythm] : heart rate and rhythm were normal [Heart Sounds] : normal S1 and S2 [] : no respiratory distress [Respiration, Rhythm And Depth] : normal respiratory rhythm and effort [Exaggerated Use Of Accessory Muscles For Inspiration] : no accessory muscle use [Left Infraclavicular] : left infraclavicular area [Abdomen Soft] : soft [Nail Clubbing] : no clubbing of the fingernails [Cyanosis, Localized] : no localized cyanosis

## 2023-02-16 NOTE — ED PROVIDER NOTE - IV ALTEPLASE DOOR HIDDEN
Pt is inquiring if he can get medication to help with chest congestion and running nose. His next f/u appt is on 2/23. Please advise.
show

## 2023-02-19 NOTE — ASSESSMENT
[FreeTextEntry1] : ## Sinus Node Dysfunction s/p DC-PPM (SJM, 22, Non-dep)\par ## persistent atrial fibrillation \par \par - Off Eliquis temporarily due to pocket hematoma. Can restart now.\par - Rate controlled with metoprolol/cardizem- will need to simplify regimen post DCCV.\par - Planned for DCCV with Dr. De La Rosa this week. OAC to be started with it.\par - Remote Monitoring \par - Return in 3 weeks to decided about AAD. LFTs in view of hx of liver mets.\par 
4 = No assist / stand by assistance

## 2023-03-11 ENCOUNTER — INPATIENT (INPATIENT)
Facility: HOSPITAL | Age: 88
LOS: 2 days | Discharge: HOME | DRG: 309 | End: 2023-03-14
Attending: INTERNAL MEDICINE | Admitting: INTERNAL MEDICINE
Payer: MEDICARE

## 2023-03-11 VITALS
HEART RATE: 136 BPM | TEMPERATURE: 98 F | DIASTOLIC BLOOD PRESSURE: 54 MMHG | RESPIRATION RATE: 20 BRPM | OXYGEN SATURATION: 97 % | SYSTOLIC BLOOD PRESSURE: 82 MMHG

## 2023-03-11 DIAGNOSIS — Z90.411 ACQUIRED PARTIAL ABSENCE OF PANCREAS: Chronic | ICD-10-CM

## 2023-03-11 DIAGNOSIS — Z96.642 PRESENCE OF LEFT ARTIFICIAL HIP JOINT: Chronic | ICD-10-CM

## 2023-03-11 DIAGNOSIS — Z95.0 PRESENCE OF CARDIAC PACEMAKER: Chronic | ICD-10-CM

## 2023-03-11 DIAGNOSIS — H26.9 UNSPECIFIED CATARACT: Chronic | ICD-10-CM

## 2023-03-11 DIAGNOSIS — Z90.89 ACQUIRED ABSENCE OF OTHER ORGANS: Chronic | ICD-10-CM

## 2023-03-11 DIAGNOSIS — Z90.49 ACQUIRED ABSENCE OF OTHER SPECIFIED PARTS OF DIGESTIVE TRACT: Chronic | ICD-10-CM

## 2023-03-11 DIAGNOSIS — R55 SYNCOPE AND COLLAPSE: ICD-10-CM

## 2023-03-11 LAB
ALBUMIN SERPL ELPH-MCNC: 3.8 G/DL — SIGNIFICANT CHANGE UP (ref 3.5–5.2)
ALP SERPL-CCNC: 69 U/L — SIGNIFICANT CHANGE UP (ref 30–115)
ALT FLD-CCNC: 27 U/L — SIGNIFICANT CHANGE UP (ref 0–41)
ANION GAP SERPL CALC-SCNC: 15 MMOL/L — HIGH (ref 7–14)
ANION GAP SERPL CALC-SCNC: 19 MMOL/L — HIGH (ref 7–14)
APTT BLD: 25.6 SEC — LOW (ref 27–39.2)
AST SERPL-CCNC: 54 U/L — HIGH (ref 0–41)
BASOPHILS # BLD AUTO: 0.1 K/UL — SIGNIFICANT CHANGE UP (ref 0–0.2)
BASOPHILS NFR BLD AUTO: 0.8 % — SIGNIFICANT CHANGE UP (ref 0–1)
BILIRUB SERPL-MCNC: 0.8 MG/DL — SIGNIFICANT CHANGE UP (ref 0.2–1.2)
BUN SERPL-MCNC: 22 MG/DL — HIGH (ref 10–20)
BUN SERPL-MCNC: 25 MG/DL — HIGH (ref 10–20)
CALCIUM SERPL-MCNC: 8.9 MG/DL — SIGNIFICANT CHANGE UP (ref 8.4–10.5)
CALCIUM SERPL-MCNC: 9.7 MG/DL — SIGNIFICANT CHANGE UP (ref 8.4–10.5)
CHLORIDE SERPL-SCNC: 94 MMOL/L — LOW (ref 98–110)
CHLORIDE SERPL-SCNC: 96 MMOL/L — LOW (ref 98–110)
CO2 SERPL-SCNC: 21 MMOL/L — SIGNIFICANT CHANGE UP (ref 17–32)
CO2 SERPL-SCNC: 24 MMOL/L — SIGNIFICANT CHANGE UP (ref 17–32)
CREAT SERPL-MCNC: 0.8 MG/DL — SIGNIFICANT CHANGE UP (ref 0.7–1.5)
CREAT SERPL-MCNC: 1 MG/DL — SIGNIFICANT CHANGE UP (ref 0.7–1.5)
EGFR: 54 ML/MIN/1.73M2 — LOW
EGFR: 71 ML/MIN/1.73M2 — SIGNIFICANT CHANGE UP
EOSINOPHIL # BLD AUTO: 0.13 K/UL — SIGNIFICANT CHANGE UP (ref 0–0.7)
EOSINOPHIL NFR BLD AUTO: 1.1 % — SIGNIFICANT CHANGE UP (ref 0–8)
ETHANOL SERPL-MCNC: <10 MG/DL — SIGNIFICANT CHANGE UP
FLUAV AG NPH QL: SIGNIFICANT CHANGE UP
FLUBV AG NPH QL: SIGNIFICANT CHANGE UP
GLUCOSE BLDC GLUCOMTR-MCNC: 133 MG/DL — HIGH (ref 70–99)
GLUCOSE BLDC GLUCOMTR-MCNC: 135 MG/DL — HIGH (ref 70–99)
GLUCOSE SERPL-MCNC: 155 MG/DL — HIGH (ref 70–99)
GLUCOSE SERPL-MCNC: 193 MG/DL — HIGH (ref 70–99)
HCT VFR BLD CALC: 45.8 % — SIGNIFICANT CHANGE UP (ref 37–47)
HGB BLD-MCNC: 15.5 G/DL — SIGNIFICANT CHANGE UP (ref 12–16)
IMM GRANULOCYTES NFR BLD AUTO: 0.6 % — HIGH (ref 0.1–0.3)
INR BLD: 1.37 RATIO — HIGH (ref 0.65–1.3)
LACTATE SERPL-SCNC: 1.1 MMOL/L — SIGNIFICANT CHANGE UP (ref 0.7–2)
LACTATE SERPL-SCNC: 2.2 MMOL/L — HIGH (ref 0.7–2)
LACTATE SERPL-SCNC: 2.4 MMOL/L — HIGH (ref 0.7–2)
LIDOCAIN IGE QN: 31 U/L — SIGNIFICANT CHANGE UP (ref 7–60)
LYMPHOCYTES # BLD AUTO: 1.9 K/UL — SIGNIFICANT CHANGE UP (ref 1.2–3.4)
LYMPHOCYTES # BLD AUTO: 16.1 % — LOW (ref 20.5–51.1)
MAGNESIUM SERPL-MCNC: 1.8 MG/DL — SIGNIFICANT CHANGE UP (ref 1.8–2.4)
MCHC RBC-ENTMCNC: 31.7 PG — HIGH (ref 27–31)
MCHC RBC-ENTMCNC: 33.8 G/DL — SIGNIFICANT CHANGE UP (ref 32–37)
MCV RBC AUTO: 93.7 FL — SIGNIFICANT CHANGE UP (ref 81–99)
MONOCYTES # BLD AUTO: 1.12 K/UL — HIGH (ref 0.1–0.6)
MONOCYTES NFR BLD AUTO: 9.5 % — HIGH (ref 1.7–9.3)
NEUTROPHILS # BLD AUTO: 8.45 K/UL — HIGH (ref 1.4–6.5)
NEUTROPHILS NFR BLD AUTO: 71.9 % — SIGNIFICANT CHANGE UP (ref 42.2–75.2)
NRBC # BLD: 0 /100 WBCS — SIGNIFICANT CHANGE UP (ref 0–0)
NT-PROBNP SERPL-SCNC: 1484 PG/ML — HIGH (ref 0–300)
PLATELET # BLD AUTO: 241 K/UL — SIGNIFICANT CHANGE UP (ref 130–400)
POTASSIUM SERPL-MCNC: 3.4 MMOL/L — LOW (ref 3.5–5)
POTASSIUM SERPL-MCNC: 5.2 MMOL/L — HIGH (ref 3.5–5)
POTASSIUM SERPL-SCNC: 3.4 MMOL/L — LOW (ref 3.5–5)
POTASSIUM SERPL-SCNC: 5.2 MMOL/L — HIGH (ref 3.5–5)
PROT SERPL-MCNC: 6.9 G/DL — SIGNIFICANT CHANGE UP (ref 6–8)
PROTHROM AB SERPL-ACNC: 15.8 SEC — HIGH (ref 9.95–12.87)
RBC # BLD: 4.89 M/UL — SIGNIFICANT CHANGE UP (ref 4.2–5.4)
RBC # FLD: 14.8 % — HIGH (ref 11.5–14.5)
RSV RNA NPH QL NAA+NON-PROBE: SIGNIFICANT CHANGE UP
SARS-COV-2 RNA SPEC QL NAA+PROBE: SIGNIFICANT CHANGE UP
SODIUM SERPL-SCNC: 134 MMOL/L — LOW (ref 135–146)
SODIUM SERPL-SCNC: 135 MMOL/L — SIGNIFICANT CHANGE UP (ref 135–146)
TROPONIN T SERPL-MCNC: 0.05 NG/ML — CRITICAL HIGH
TROPONIN T SERPL-MCNC: 0.06 NG/ML — CRITICAL HIGH
TROPONIN T SERPL-MCNC: 0.07 NG/ML — CRITICAL HIGH
TROPONIN T SERPL-MCNC: <0.01 NG/ML — SIGNIFICANT CHANGE UP
WBC # BLD: 11.77 K/UL — HIGH (ref 4.8–10.8)
WBC # FLD AUTO: 11.77 K/UL — HIGH (ref 4.8–10.8)

## 2023-03-11 PROCEDURE — 99223 1ST HOSP IP/OBS HIGH 75: CPT

## 2023-03-11 PROCEDURE — 93010 ELECTROCARDIOGRAM REPORT: CPT

## 2023-03-11 PROCEDURE — 93005 ELECTROCARDIOGRAM TRACING: CPT

## 2023-03-11 PROCEDURE — 82962 GLUCOSE BLOOD TEST: CPT

## 2023-03-11 PROCEDURE — 83605 ASSAY OF LACTIC ACID: CPT

## 2023-03-11 PROCEDURE — 93280 PM DEVICE PROGR EVAL DUAL: CPT | Mod: 26

## 2023-03-11 PROCEDURE — 72125 CT NECK SPINE W/O DYE: CPT | Mod: 26,MA

## 2023-03-11 PROCEDURE — 81001 URINALYSIS AUTO W/SCOPE: CPT

## 2023-03-11 PROCEDURE — 74177 CT ABD & PELVIS W/CONTRAST: CPT | Mod: 26,MA

## 2023-03-11 PROCEDURE — 36415 COLL VENOUS BLD VENIPUNCTURE: CPT

## 2023-03-11 PROCEDURE — 97162 PT EVAL MOD COMPLEX 30 MIN: CPT | Mod: GP

## 2023-03-11 PROCEDURE — 83036 HEMOGLOBIN GLYCOSYLATED A1C: CPT

## 2023-03-11 PROCEDURE — 84484 ASSAY OF TROPONIN QUANT: CPT

## 2023-03-11 PROCEDURE — 72170 X-RAY EXAM OF PELVIS: CPT | Mod: 26

## 2023-03-11 PROCEDURE — 80053 COMPREHEN METABOLIC PANEL: CPT

## 2023-03-11 PROCEDURE — 85027 COMPLETE CBC AUTOMATED: CPT

## 2023-03-11 PROCEDURE — 99497 ADVNCD CARE PLAN 30 MIN: CPT | Mod: 25

## 2023-03-11 PROCEDURE — 70450 CT HEAD/BRAIN W/O DYE: CPT | Mod: 26,MA

## 2023-03-11 PROCEDURE — 93306 TTE W/DOPPLER COMPLETE: CPT

## 2023-03-11 PROCEDURE — 71045 X-RAY EXAM CHEST 1 VIEW: CPT | Mod: 26

## 2023-03-11 PROCEDURE — 71260 CT THORAX DX C+: CPT | Mod: 26,MA

## 2023-03-11 PROCEDURE — 83735 ASSAY OF MAGNESIUM: CPT

## 2023-03-11 PROCEDURE — 73630 X-RAY EXAM OF FOOT: CPT | Mod: 26,LT

## 2023-03-11 PROCEDURE — 99222 1ST HOSP IP/OBS MODERATE 55: CPT

## 2023-03-11 PROCEDURE — 93280 PM DEVICE PROGR EVAL DUAL: CPT

## 2023-03-11 PROCEDURE — 99291 CRITICAL CARE FIRST HOUR: CPT | Mod: GC

## 2023-03-11 PROCEDURE — 85025 COMPLETE CBC W/AUTO DIFF WBC: CPT

## 2023-03-11 PROCEDURE — 84100 ASSAY OF PHOSPHORUS: CPT

## 2023-03-11 PROCEDURE — 80048 BASIC METABOLIC PNL TOTAL CA: CPT

## 2023-03-11 RX ORDER — DEXTROSE 50 % IN WATER 50 %
25 SYRINGE (ML) INTRAVENOUS ONCE
Refills: 0 | Status: DISCONTINUED | OUTPATIENT
Start: 2023-03-11 | End: 2023-03-14

## 2023-03-11 RX ORDER — INSULIN DEGLUDEC 100 U/ML
16 INJECTION, SOLUTION SUBCUTANEOUS
Qty: 0 | Refills: 0 | DISCHARGE

## 2023-03-11 RX ORDER — APIXABAN 2.5 MG/1
5 TABLET, FILM COATED ORAL EVERY 12 HOURS
Refills: 0 | Status: DISCONTINUED | OUTPATIENT
Start: 2023-03-11 | End: 2023-03-14

## 2023-03-11 RX ORDER — CLOPIDOGREL BISULFATE 75 MG/1
1 TABLET, FILM COATED ORAL
Qty: 0 | Refills: 0 | DISCHARGE

## 2023-03-11 RX ORDER — SODIUM CHLORIDE 9 MG/ML
1000 INJECTION, SOLUTION INTRAVENOUS
Refills: 0 | Status: DISCONTINUED | OUTPATIENT
Start: 2023-03-11 | End: 2023-03-14

## 2023-03-11 RX ORDER — SODIUM CHLORIDE 9 MG/ML
1000 INJECTION, SOLUTION INTRAVENOUS ONCE
Refills: 0 | Status: COMPLETED | OUTPATIENT
Start: 2023-03-11 | End: 2023-03-11

## 2023-03-11 RX ORDER — PANTOPRAZOLE SODIUM 20 MG/1
1 TABLET, DELAYED RELEASE ORAL
Qty: 0 | Refills: 0 | DISCHARGE

## 2023-03-11 RX ORDER — DILTIAZEM HCL 120 MG
120 CAPSULE, EXT RELEASE 24 HR ORAL DAILY
Refills: 0 | Status: DISCONTINUED | OUTPATIENT
Start: 2023-03-11 | End: 2023-03-12

## 2023-03-11 RX ORDER — GLUCAGON INJECTION, SOLUTION 0.5 MG/.1ML
1 INJECTION, SOLUTION SUBCUTANEOUS ONCE
Refills: 0 | Status: DISCONTINUED | OUTPATIENT
Start: 2023-03-11 | End: 2023-03-14

## 2023-03-11 RX ORDER — FUROSEMIDE 40 MG
1 TABLET ORAL
Qty: 0 | Refills: 0 | DISCHARGE

## 2023-03-11 RX ORDER — INSULIN GLARGINE 100 [IU]/ML
16 INJECTION, SOLUTION SUBCUTANEOUS AT BEDTIME
Refills: 0 | Status: DISCONTINUED | OUTPATIENT
Start: 2023-03-11 | End: 2023-03-14

## 2023-03-11 RX ORDER — CLOPIDOGREL BISULFATE 75 MG/1
75 TABLET, FILM COATED ORAL DAILY
Refills: 0 | Status: DISCONTINUED | OUTPATIENT
Start: 2023-03-11 | End: 2023-03-14

## 2023-03-11 RX ORDER — APIXABAN 2.5 MG/1
1 TABLET, FILM COATED ORAL
Qty: 0 | Refills: 0 | DISCHARGE

## 2023-03-11 RX ORDER — INSULIN LISPRO 100/ML
VIAL (ML) SUBCUTANEOUS
Refills: 0 | Status: DISCONTINUED | OUTPATIENT
Start: 2023-03-11 | End: 2023-03-14

## 2023-03-11 RX ORDER — TETANUS TOXOID, REDUCED DIPHTHERIA TOXOID AND ACELLULAR PERTUSSIS VACCINE, ADSORBED 5; 2.5; 8; 8; 2.5 [IU]/.5ML; [IU]/.5ML; UG/.5ML; UG/.5ML; UG/.5ML
0.5 SUSPENSION INTRAMUSCULAR ONCE
Refills: 0 | Status: COMPLETED | OUTPATIENT
Start: 2023-03-11 | End: 2023-03-11

## 2023-03-11 RX ORDER — PANTOPRAZOLE SODIUM 20 MG/1
40 TABLET, DELAYED RELEASE ORAL
Refills: 0 | Status: DISCONTINUED | OUTPATIENT
Start: 2023-03-11 | End: 2023-03-14

## 2023-03-11 RX ORDER — LEVOTHYROXINE SODIUM 125 MCG
25 TABLET ORAL DAILY
Refills: 0 | Status: DISCONTINUED | OUTPATIENT
Start: 2023-03-11 | End: 2023-03-14

## 2023-03-11 RX ORDER — POTASSIUM CHLORIDE 20 MEQ
40 PACKET (EA) ORAL ONCE
Refills: 0 | Status: COMPLETED | OUTPATIENT
Start: 2023-03-11 | End: 2023-03-11

## 2023-03-11 RX ORDER — DEXTROSE 50 % IN WATER 50 %
12.5 SYRINGE (ML) INTRAVENOUS ONCE
Refills: 0 | Status: DISCONTINUED | OUTPATIENT
Start: 2023-03-11 | End: 2023-03-14

## 2023-03-11 RX ORDER — METOPROLOL TARTRATE 50 MG
1 TABLET ORAL
Qty: 0 | Refills: 0 | DISCHARGE

## 2023-03-11 RX ORDER — SERTRALINE 25 MG/1
50 TABLET, FILM COATED ORAL DAILY
Refills: 0 | Status: DISCONTINUED | OUTPATIENT
Start: 2023-03-11 | End: 2023-03-14

## 2023-03-11 RX ORDER — DILTIAZEM HCL 120 MG
1 CAPSULE, EXT RELEASE 24 HR ORAL
Qty: 0 | Refills: 0 | DISCHARGE

## 2023-03-11 RX ORDER — SERTRALINE 25 MG/1
1 TABLET, FILM COATED ORAL
Qty: 0 | Refills: 0 | DISCHARGE

## 2023-03-11 RX ORDER — SODIUM CHLORIDE 9 MG/ML
250 INJECTION INTRAMUSCULAR; INTRAVENOUS; SUBCUTANEOUS ONCE
Refills: 0 | Status: COMPLETED | OUTPATIENT
Start: 2023-03-11 | End: 2023-03-11

## 2023-03-11 RX ORDER — FUROSEMIDE 40 MG
20 TABLET ORAL DAILY
Refills: 0 | Status: DISCONTINUED | OUTPATIENT
Start: 2023-03-11 | End: 2023-03-12

## 2023-03-11 RX ORDER — DEXTROSE 50 % IN WATER 50 %
15 SYRINGE (ML) INTRAVENOUS ONCE
Refills: 0 | Status: DISCONTINUED | OUTPATIENT
Start: 2023-03-11 | End: 2023-03-14

## 2023-03-11 RX ORDER — INSULIN DEGLUDEC 100 U/ML
10 INJECTION, SOLUTION SUBCUTANEOUS
Qty: 0 | Refills: 0 | DISCHARGE

## 2023-03-11 RX ORDER — EMPAGLIFLOZIN 10 MG/1
1 TABLET, FILM COATED ORAL
Qty: 0 | Refills: 0 | DISCHARGE

## 2023-03-11 RX ORDER — THYROID 120 MG
1 TABLET ORAL
Qty: 0 | Refills: 0 | DISCHARGE

## 2023-03-11 RX ADMIN — Medication 40 MILLIEQUIVALENT(S): at 15:03

## 2023-03-11 RX ADMIN — APIXABAN 5 MILLIGRAM(S): 2.5 TABLET, FILM COATED ORAL at 18:03

## 2023-03-11 RX ADMIN — SODIUM CHLORIDE 1000 MILLILITER(S): 9 INJECTION, SOLUTION INTRAVENOUS at 06:41

## 2023-03-11 RX ADMIN — SODIUM CHLORIDE 250 MILLILITER(S): 9 INJECTION INTRAMUSCULAR; INTRAVENOUS; SUBCUTANEOUS at 06:01

## 2023-03-11 RX ADMIN — INSULIN GLARGINE 16 UNIT(S): 100 INJECTION, SOLUTION SUBCUTANEOUS at 22:26

## 2023-03-11 RX ADMIN — TETANUS TOXOID, REDUCED DIPHTHERIA TOXOID AND ACELLULAR PERTUSSIS VACCINE, ADSORBED 0.5 MILLILITER(S): 5; 2.5; 8; 8; 2.5 SUSPENSION INTRAMUSCULAR at 07:42

## 2023-03-11 NOTE — ED ADULT NURSE NOTE - NSICDXPASTSURGICALHX_GEN_ALL_CORE_FT
PAST SURGICAL HISTORY:  Cardiac pacemaker in situ     Cataract of both eyes     History of appendectomy     History of cholecystectomy     History of hip replacement, total, left     S/P tonsillectomy

## 2023-03-11 NOTE — ED PROVIDER NOTE - PHYSICAL EXAMINATION
VITAL SIGNS: I have reviewed nursing notes and confirm.  CONSTITUTIONAL: Non-toxic, in NAD  SKIN: Skin tear to right forearm noted.   HEAD: NCAT  EYES: No conjunctival injection, scleral anicteric  ENT: Moist mucous membranes, normal pharynx with no erythema or exudates  NECK: Supple; full ROM. Nontender. No cervical LAD  CARD: RRR, no murmurs, rubs or gallops  RESP: Clear to ausculation bilaterally.  No rales, rhonchi, or wheezing.  ABD: Soft, distended, non-tender, no rebound or guarding.   EXT: Left ankle swelling, no pedal edema, no calf tenderness  NEURO: Normal motor, normal sensory.  PSYCH: Cooperative, appropriate.

## 2023-03-11 NOTE — CHART NOTE - NSCHARTNOTEFT_GEN_A_CORE
Electrophysiology    Pts device __DC PPM (SJM)____ was interrogated on 03-11-23  Device working properly  Events: no events, known h/o aF on Eliquis  Pt is ___NOT__ pacemaker dependent   AP  75  % /  97    %  AT/AF burden   1  % (since 2/13/23 last check) 39% since impalnt   Mode DDDR 70-120bpm  Battery 8.6-9.5yrs       Reviewed by attending    Contact EP ACP with any questions [id number]

## 2023-03-11 NOTE — H&P ADULT - NSHPLABSRESULTS_GEN_ALL_CORE
15.5   11.77 )-----------( 241      ( 11 Mar 2023 05:30 )             45.8   	    03-11    135  |  96<L>  |  22<H>  ----------------------------<  155<H>  3.4<L>   |  24  |  0.8    Ca    8.9      11 Mar 2023 12:05  Mg     1.8     03-11    TPro  6.9  /  Alb  3.8  /  TBili  0.8  /  DBili  x   /  AST  54<H>  /  ALT  27  /  AlkPhos  69  03-11      < from: Xray Foot AP + Lateral + Oblique, Left (03.11.23 @ 07:47) >    FINDINGS /  IMPRESSION:    Chronic appearing deformity at the medial aspect of the navicular.   Correlate for point tenderness. Hallux valgus deformity. Midfoot   degenerative changes.    --- End of Report ---      < end of copied text >

## 2023-03-11 NOTE — ED PROVIDER NOTE - NS ED ATTENDING STATEMENT MOD
I have personally provided the amount of critical care time documented below concurrently with the resident/fellow.  This time excludes time spent on separate procedures and time spent teaching. I have reviewed the resident’s / fellow’s documentation and I agree with the history, exam, and assessment and plan of care.
General

## 2023-03-11 NOTE — ED PROVIDER NOTE - OBJECTIVE STATEMENT
Patient is an 88-year-old female past medical history of CAD status post 4 stents, diabetes, spinal stenosis, atrial fibrillation on Eliquis with pacemaker, and hypothyroidism presenting for a fall/syncope. Patient states she woke up around 0230 this morning with sudden onset of chest palpitations and suddenly became dizzy and nauseous when she stood up and then synopsized.  Patient states she woke up few minutes later on the floor; was found by home nurse aide who heard the fall. Patient is now complaining of left ankle pain but otherwise denies any current chest pain, nausea, vomiting, abdominal pain, diarrhea, fevers, back pain, hemoptysis, leg swelling, head pain, neck pain, or additional complaints.

## 2023-03-11 NOTE — ED PROVIDER NOTE - CADM POA PRESS ULCER
Physical Therapy Evaluation    ASSESSMENT:   Patient seen on ICU nursing unit.  Patient admitted from [facility] (0 steps) with spouse with a medical diagnosis of community acquired pneumonia, bacteremia, LLE cellulitis, acute hypoxemic respiratory failure, lactic acidosis, atrial fib with RVR. Prior to hospitalization, patient was ambulating independently without the use of an assistive device. Patient was independent with self-cares and was able to assist  occasionally with cooking and cleaning. Pt denies a history of falls in the past year but notes that she has had difficulty with gait and ambulating longer distances. Patient presents below baseline which was modified independent with mobility. For safe return to prior living situation the patient needs to be at a modified independent  level for mobility.      The patient now presents with impairments in activity tolerance, balance, postural control, safety awareness and strength which impact safe and effective performance in bed mobility, transfers and ambulation.  Patient is current functioning at supervision for bed mobility and sit<>stands and contact guard assist for gait. Patient is very deconditioned and becomes short of breath with 20 standing marches in place. Pt's SpO2 noted to decrease 1 minute after resting to 87-88% on 4L and rebounding to 90% after ~ 1 minute. Pt noted to be hard of hearing and slightly impulsive during session.     Further skilled physical therapy services are reasonable and necessary to address the above impairments and performance deficits    PT Identified Barriers to Discharge: Medical, limited activity tolerance, dyspnea on exertion       PLAN AND RECOMMENDATIONS:   Recommendations for Discharge:  Recommendation for Discharge: PT WI: Other (comment)(NOMAN)      Plan:   Continue skilled PT, including the following Treatment/Interventions: Functional transfer training;Strengthening;ROM;Endurance training;Cognitive  reorientation;Patient/Family training;Equipment eval/education;Bed mobility;Gait training;Compensatory technique education;Continued evaluation;Stairs retraining;Safety Education;Neuromuscular re-education (12/08/19 1401)      PT Frequency: 6 days/week (12/08/19 1401)    Treatment Plan for Next Session: Progress gait and activity tolerance as appropriate, functional strengthening, balance, energy conservation/pacing, breathing technique                EQUIPMENT:   PT/OT Mobility Equipment for Discharge: 2ww (pt owns) (12/08/19 1401)         DIAGNOSIS:   1. Atrial fibrillation with rapid ventricular response (CMS/HCC)    2. Pneumonia due to infectious organism, unspecified laterality, unspecified part of lung    3. Hypoxia           PRECAUTIONS:   Precautions  Other Precautions: Fall risk, O2 Line       EDUCATION:   On this date, the patient was educated on safe use of equipment, bed mobility and transfers.  The response to education was: Verbalizes understanding.     SUBJECTIVE:   Subjective: Patient agreeable to physical and occupational therapy co-evaluation. (12/08/19 1401)       OBJECTIVE:   Bed Mobility:    Bed Mobility  Supine to Sit: Supervision (Supv) (12/08/19 1401)  Sit to Supine: (Not attempted) (12/08/19 1401)  Bed Mobility Comments: Head of bed elevated; use of bed rail. Pt mildly impulsive. + valsalva with cues for breathing technqiue. (12/08/19 1401)     Transfers:    Transfers  Sit to Stand: Supervision (Supv) (12/08/19 1401)  Stand to Sit: Supervision (Supv) (12/08/19 1401)  Stand Pivot Transfers: Supervision (Supv) (12/08/19 1401)  Assistive Device/: 1 Person (12/08/19 1401)  Transfer Comments 1: Patient declining use of gait belt. No difficulty with sit<>stands. + valsalva with cues for breathing technique. Mild unsteadiness on turns, requiring supervision. (12/08/19 1401)      Gait:    Gait  Gait Assistance: Touching/Steadying Assistance (12/08/19 1401)  Assistive Device/Activity  Aide: 1 Person (12/08/19 1401)  Gait Comments 1: Pt declining use of gait belt. Taking 4 steps to bedside chair. Increased bilateral lateral sway. Mildly unsteady. Standing marching in place with minimal foot clearance and patient becoming SOB after 20 reps. (12/08/19 1401)       Stairs:    Stairs Mobility  Stairs Mobility Comments: Not a current goal (12/08/19 1401)        GOALS:   Short Term Goals to Be Reviewed On: 12/15/19 (12/08/19 1401)  Short Term Goals = Discharge Goals: Yes (12/08/19 1401)  Bed Mobility Discharge Goal: Mod I with head of bed flat and no use of bed rail (12/08/19 1401)  Transfer Discharge Goal: Mod I from various heights and surfaces with appropriate use of AD (12/08/19 1401)  Ambulation Discharge Goal: Mod I for 150 feet with appropriate use of AD (12/08/19 1401)       EVALUATION CODE JUSTIFICATION:   Eval Code:  $ PT Eval: Moderate Complexity: 1 Procedure  Problems/Co-morbidities:   Patient Active Problem List   Diagnosis   • Essential hypertension, benign   • Morbid obesity with BMI of 45.0-49.9, adult (CMS/[facility])   • H/O cardiomegaly   • Diverticulosis   • Recurrent cellulitis of lower leg   • GERD (gastroesophageal reflux disease)   • Unspecified venous (peripheral) insufficiency   • Hypersomnia with sleep apnea, unspecified   • Breast calcification, left   • Chest pain   • Type 2 diabetes mellitus with microalbuminuria, without long-term current use of insulin (CMS/[facility])   • Thoracic aortic aneurysm without rupture (CMS/[facility])   • Dyshidrotic eczema   • Dyspnea   • CAD - mild disese noted on Chest CT 8/2015   • Bilateral hearing loss   • Mixed hyperlipidemia   • Fracture of metacarpal of right hand, closed   • Closed nondisplaced fracture of base of fifth metacarpal bone of right hand with routine healing   • Closed nondisplaced fracture of lateral malleolus of left fibula   • Claudication (CMS/[facility])   • Osteopenia   • Recurrent major depressive disorder, in full remission (CMS/[facility])   •  Pneumonia   • Sepsis (CMS/HCC)   • Persistent atrial fibrillation   • Hypokalemia     Clinical Presentation: Evolving clinical presentation with changing characteristics  Predicted patient presentation: Moderate (evolving) - Patient comorbidities and complexities, as defined above, may have varying impact on steady progress for prescribed plan of care.  Clinical decision making: Moderate - patient has 1-2 personal factors/comorbidities that impact the plan of care; addressing 3+ measures from the evaluation; evolving clinical presentation with changing characteristics consistent with moderate clinical decision making complexity     BILLING INFORMATION:   Total Treatment Time: PT Time Spent: 28 minutes   Timed Treatment Minutes:        No

## 2023-03-11 NOTE — CONSULT NOTE ADULT - ASSESSMENT
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

## 2023-03-11 NOTE — H&P ADULT - NSHPPHYSICALEXAM_GEN_ALL_CORE
T(C): 36.6 (03-11-23 @ 16:02), Max: 36.7 (03-11-23 @ 05:30)  HR: 70 (03-11-23 @ 16:02) (70 - 136)  BP: 107/54 (03-11-23 @ 16:02) (82/54 - 131/58)  RR: 18 (03-11-23 @ 16:02) (17 - 20)  SpO2: 95% (03-11-23 @ 16:02) (88% - 100%)    CONSTITUTIONAL: Well groomed, no apparent distress  RESP: No respiratory distress, no use of accessory muscles; CTA b/l, no WRR  CV: RRR, +S1S2, no MRG; no JVD; no peripheral edema  GI: Soft, NT, ND, no rebound, no guarding  PSYCH: Appropriate insight/judgment; A+O x 3, mood and affect appropriate, recent/remote memory intact

## 2023-03-11 NOTE — H&P ADULT - HISTORY OF PRESENT ILLNESS
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 Patient reports she started experiencing palpitations early in the morning, attempted to get out of bed, but became weak, dizzy, and nauseated syncopized, hit her head, and woke up on the floor. Her nurse aide heard the patient fall, and called EMS. The patient's last dose of Eliquis was 3/10 at 9:30pm. The patient is complaining of L ankle pain and and R arm due (due to an abrasion). She is currently in a C-collar, and denies current chest pain, SOB, nausea, vomiting, head or neck pain.     A CODE STEMI was called in the ED based on the patient's presentation and EKG showing diffuse ST depressions with elevation in AVR. 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 Patient reports she started experiencing palpitations early in the morning around 5 am, attempted to get out of bed, but became weak, dizzy, and nauseated syncopized, hit her head, and woke up on the floor. Her nurse aide heard the patient fall, and called EMS. The patient's last dose of Eliquis was 3/10 at 9:30pm. The patient is complaining of L ankle pain and and R arm due (due to an abrasion). She is currently in a C-collar, and denies current chest pain, SOB, nausea, vomiting, head or neck pain.     A CODE STEMI was called in the ED based on the patient's presentation and EKG showing diffuse ST depressions with elevation in AVR. 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 Patient reports she started experiencing palpitations early in the morning around 5 am, attempted to get out of bed, but became weak, dizzy, and nauseated syncopized, hit her head, and woke up on the floor. Her nurse aide heard the patient fall, and called EMS. The patient's last dose of Eliquis was 3/10 at 9:30pm. The patient is complaining of L ankle pain and and R arm due (due to an abrasion). A CODE STEMI was called in the ED based on the patient's presentation and EKG showing diffuse ST depressions with elevation in AVR. 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     Off note patient's cardiologist has bee decreasing he PO medication progressively. Recent decrease in metoprolol from 50mg BID to 25mg BID around 3 days ago    Vital Signs Last 24 Hrs  T(C): 36.7 (11 Mar 2023 06:02), Max: 36.7 (11 Mar 2023 05:30)  T(F): 98.1 (11 Mar 2023 06:02), Max: 98.1 (11 Mar 2023 05:30)  HR: 73 (11 Mar 2023 07:45) (73 - 136)  BP: 131/58 (11 Mar 2023 07:45) (82/54 - 131/58)  BP(mean): 83 (11 Mar 2023 07:45) (83 - 83)  RR: 18 (11 Mar 2023 07:45) (17 - 20)  SpO2: 98% (11 Mar 2023 07:45) (88% - 100%)    Parameters below as of 11 Mar 2023 07:45  Patient On (Oxygen Delivery Method): nasal cannula  O2 Flow (L/min): 2

## 2023-03-11 NOTE — ED PROVIDER NOTE - ATTENDING CONTRIBUTION TO CARE
88-year-old female with PMH A-fib on Eliquis, CAD status post stents x4, DM, spinal stenosis, history of pacemaker, hypothyroidism presents for evaluation status post fall at home.  Patient awoke around 2 AM and was feeling fluttering sensation and nausea and got up to go to the bathroom and then passed out fell to floor.  Patient reports she lost consciousness and awoke on floor with aide who heard the fall.  Patient reports some pain to ankle but denies any  dizziness, chest pain, abdominal pain, neck pain.  No numbness or weakness.  Patient reports some dull fluttering and mild shortness of breath and mild headache.  States last dose of Eliquis was taken at bedtime.    VITAL SIGNS: noted  CONSTITUTIONAL: Well-developed; well-nourished; in no acute distress  HEAD: Normocephalic; atraumatic  EYES: PERRL, EOM intact; conjunctiva and sclera clear  ENT: No nasal discharge; airway clear. MMM  NECK: Supple; non tender. No anterior cervical lymphadenopathy noted  CARD: S1, S2 normal; no murmurs, gallops, or rubs. Tachycardic  RESP: CTAB/L, no wheezes, rales or rhonchi  ABD: Normal bowel sounds; soft; non-distended; non-tender; no CVA tenderness  EXT: Normal ROM. No calf tenderness or edema. Distal pulses intact  NEURO: Alert, oriented. Grossly unremarkable. No focal deficits  SKIN: Skin exam is warm and dry, skin tear to right forearm, no active bleeding  MS: No midline spinal tenderness

## 2023-03-11 NOTE — ED PROVIDER NOTE - CONSIDERATION OF ADMISSION OBSERVATION
pt to be admitted for continued monitoring and evaluation of arrythmia Consideration of Admission/Observation

## 2023-03-11 NOTE — H&P ADULT - ATTENDING COMMENTS
HPI as above.  Interval history: Pt seen and examined at bedside. No cp or sob.   Vital Signs (24 Hrs):  T(C): 36.7 (03-11-23 @ 06:02), Max: 36.7 (03-11-23 @ 05:30)  HR: 73 (03-11-23 @ 07:45) (73 - 136)  BP: 131/58 (03-11-23 @ 07:45) (82/54 - 131/58)  RR: 18 (03-11-23 @ 07:45) (17 - 20)  SpO2: 98% (03-11-23 @ 07:45) (88% - 100%)  Wt(kg): --  Daily     Daily     I&O's Summary    PHYSICAL EXAM:  GENERAL: NAD, well-developed  HEAD:  Atraumatic, Normocephalic  EYES: EOMI, PERRLA, conjunctiva and sclera clear  NECK: Supple, No JVD  CHEST/LUNG: Clear to auscultation bilaterally; No wheeze  HEART: Regular rate and rhythm; No murmurs, rubs, or gallops  ABDOMEN: Soft, Nontender, Nondistended; Bowel sounds present  EXTREMITIES:  2+ Peripheral Pulses, No clubbing, cyanosis, or edema  PSYCH: AAOx3  NEUROLOGY: non-focal  SKIN: No rashes or lesions  Labs reviewed  Imaging reviewed independently and reviewed read  EKG reviewed independently and reviewed read    Plan    #Progress Note Handoff  Pending (specify):    Family discussion: house staff updated pt family  Disposition:   Decision to admit the pt is based on acuity as above HPI as above.  Interval history: Pt seen and examined at bedside. No cp or sob.   Vital Signs (24 Hrs):  T(C): 36.7 (03-11-23 @ 06:02), Max: 36.7 (03-11-23 @ 05:30)  HR: 73 (03-11-23 @ 07:45) (73 - 136)  BP: 131/58 (03-11-23 @ 07:45) (82/54 - 131/58)  RR: 18 (03-11-23 @ 07:45) (17 - 20)  SpO2: 98% (03-11-23 @ 07:45) (88% - 100%)  Wt(kg): --  Daily     Daily     I&O's Summary    PHYSICAL EXAM:  GENERAL: NAD, frail and elderly   HEAD:  Atraumatic, Normocephalic  EYES: EOMI, PERRLA, conjunctiva and sclera clear  NECK: Supple, No JVD  CHEST/LUNG: Clear to auscultation bilaterally; No wheeze  HEART: Regular rate and rhythm; No murmurs, rubs, or gallops  ABDOMEN: Soft, Nontender, Nondistended; Bowel sounds present  EXTREMITIES:  2+ Peripheral Pulses, No clubbing, cyanosis, or edema  PSYCH: AAOx3  NEUROLOGY: non-focal  SKIN: No rashes or lesions  Labs reviewed  Imaging reviewed independently and reviewed read  EKG reviewed independently and reviewed read    Plan as above dw resident in real time, made edits     #Progress Note Handoff  Pending (specify):  follow up syncopal work up, trops, eeg, cardiology   Family discussion: spoke to daughter at bedside  Disposition: home vs snf, PT  Decision to admit the pt is based on acuity as above  High risk given above acuity and comorbidities, wally cardiology dr wheeler, labs reviewed

## 2023-03-11 NOTE — CONSULT NOTE ADULT - ASSESSMENT
88yF w/ PMHx of CAD S/P quadruple stents, DM, spinal stenosis, HTN, afib on Eliquis and plavix with a pacemaker, hypothyroidism, pancreatic cancer s/p distal pancreatectomy, cholangiocarcinoma with mets to the liver s/p partial liver resection seen as a Trauma Alert s/p fall. Patient reports she started experiencing palpitations early in the morning, attempted to get out of bed, but became weak, dizzy, and nauseated syncopized, hit her head, and woke up on the floor. Her nurse aide heard the patient fall, and called EMS. The patient's last dose of Eliquis was 3/10 at 9:30pm. The patient is complaining of L ankle pain and and R arm due (due to an abrasion). She is currently in a C-collar, and denies current chest pain, SOB, nausea, vomiting, head or neck pain. A CODE STEMI was called in the ED based on the patient's presentation and EKG showing diffuse ST depressions with elevation in AVR. STEMI cancelled as the narrow complex regular tachycardia likely AVNRT could explain her symptoms, as well as no definite evidence of ST elevations on EKG per the cardiology team.     PLAN:     -Trauma Imaging: CXR, Pelvic Xray, CT Head,  CT C-spine, CT Max/Face, CT Chest, CT Abd/Pelvis, Extremity films (***)    - Additional consultations: Neurosurgery/Orthopedics/OMFS/ PT/Rehab/SW/Hospitalist/Medicine     - Trauma Labs to include: CBC, BMP, Coags, T&S, UA, EtOH level    - Additional items: ***    - Per cardiology can start cardizem once BP stable, metoprolol 5 BID     Disposition pending results of above labs and imaging (Home/Floor/Tele/SDU/SICU)  Above plan discussed with Trauma attending, Dr. Pearce, patient, patient family, and ED team  --------------------------------------------------------------------------------------  Alcohol, Blood: <10 mg/dL (03-11-23 @ 05:30)

## 2023-03-11 NOTE — CONSULT NOTE ADULT - ATTENDING COMMENTS
This is 87 y/o female w/ PMHx of CAD S/P quadruple stents, DM, spinal stenosis, HTN, afib on Eliquis and plavix with a pacemaker, hypothyroidism, pancreatic cancer s/p distal pancreatectomy, cholangiocarcinoma with mets to the liver s/p partial liver resection seen as a Trauma Alert s/p fall. Patient reports she started experiencing palpitations early in the morning, attempted to get out of bed, but became weak, dizzy, and nauseated syncopized, hit her head, and woke up on the floor. Her nurse aide heard the patient fall, and called EMS. The patient's last dose of Eliquis was 3/10 at 9:30pm. The patient is complaining of L ankle pain and  R arm due (due to an abrasion). She is currently in a C-collar, and denies current chest pain, SOB, nausea, vomiting, head or neck pain.   A CODE STEMI was called in the ED based on the patient's presentation and EKG showing diffuse ST depressions with elevation in AVR. 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     Primary and secondary surveys were performed.    AAO x3  GCS 15.  Neuro intact.  Neck: no step-offs  Chest: slightly decreased breath sounds in bilateral lung bases  CV : irrr  Abdomen: soft, nontender  Extr: Left ankle mild pain to motion.    All images and labs were reviewed.    ASSESSMENT:  87 y/o female, S/P Syncope and Fall.  Concussion with brief LOC.  Left Ankle Contusion.  Coagulopathy due to Eliquis.    PLAN:  Patient was observed over several hours and remains neurologically stable.  Needs further acute workup with Cardiology and Medical Team.  Will follow for tertiary survey.

## 2023-03-11 NOTE — ED PROVIDER NOTE - CARE PLAN
1 Principal Discharge DX:	Syncope  Secondary Diagnosis:	Elevated brain natriuretic peptide (BNP) level

## 2023-03-11 NOTE — ED ADULT TRIAGE NOTE - CHIEF COMPLAINT QUOTE
Pt came s/p fall and +LOC after she woke up with palpitations, pt is on Eliquis for A. Fib, doesn't know if she hit her head or not. Trauma alert was activated.

## 2023-03-11 NOTE — ED PROVIDER NOTE - CLINICAL SUMMARY MEDICAL DECISION MAKING FREE TEXT BOX
Received signout from Dr. Flores.  Patient past medical history as above, on anticoagulation presents after fall and syncope.  No chest pain.  Trauma alert.  Labs with some electrolyte abnormalities but overall reassuring.  No acute traumatic injuries on imaging.  Cleared by trauma surgery.  Intermittent arrhythmia on telemetry and EKGs, STEMI canceled by cardiology.  Stable on my evaluation.  Only complains of mild headache.  No chest pain or shortness of breath.  Vital signs stable.  Admitted to medicine telemetry.

## 2023-03-11 NOTE — H&P ADULT - ASSESSMENT
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    # Syncope   # Afib RVR   # SSS s/p PPM  Syncope most likely in the setting of Afib with RVR after decreases in rate contro  - Follows with Dr Whitley and Dr Enriquez outpatient   - previous cardioversion failed   - PPM in place for SSS and bradycardias >>> currently paced rhythm   - On presentation  Afib with RVR s/p IV metoprolol & cardizem   - Code STEMI cancelled   - Consult Cardiology   - Lactate 2.2 >>> repeat neg   - Elevated troponins >> trend   - f/u orthostatic vitals   - F/u EEG   - Telemetry monitoring   - f/u TTE     # CAD s/p PCI (LAD, RCA, LCX) - last in 2021  - c/w BB and plavix therapy   - no statins outpatient    # Pancreatic Ca s/p distal pancreatectomy Aug 2021  # Cholangiocarcinoma with mets to the  liver s/p partial liver resection s/p chemotherapy last session Jan 2022 (follows at [facility] [place])  - stable   - no current treatments    # Hypothyroidism   Home meds: [place] Thyroid 15mg  - Start 15mcg levothyroxine equivalent     # DM  Home meds: Jardiance & Insulin 16 units bedtime   -  start lantus 16 units and correctional scale    #DVT - Eliquis   #GI - pantoprazole   #Diet - DASH  #Activity - IAT   #Code - Full code     Disposition - Telemetry   Med/Rec: completed bedside with family    88yF w/ PMHx of DM,  Afib, SSS s/p PPM,  CAD s/p PCI with Stents (LAD, RCA, LCX) - last in 2021,  pancreatic cancer s/p distal pancreatectomy Aug 2021, 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    # Syncope   - f/u transthoracic echocardiography Afib now with RVR   # SSS s/p PPM  Syncope most likely in the setting of Afib with RVR after decreases in rate contro  - Follows with Dr Whitley and Dr Enriquez outpatient   - previous cardioversion failed   - PPM in place for SSS and bradycardias >>> currently paced rhythm   - On presentation  Afib with RVR s/p IV metoprolol & cardizem   - Code STEMI cancelled   - Consult Cardiology   - Lactate 2.2 >>> repeat neg   - Elevated troponins >> trend   - f/u orthostatic vitals   - F/u EEG   - Telemetry monitoring   - f/u TTE     #elevated trop likley demand ischemia 2/2 tachycardia- trend trop, follow up cardiology, repeat echo    # CAD s/p PCI (LAD, RCA, LCX) - last in 2021  - c/w BB and plavix therapy   - no statins outpatient    # Pancreatic Ca s/p distal pancreatectomy Aug 2021  # Cholangiocarcinoma with mets to the  liver s/p partial liver resection s/p chemotherapy last session Jan 2022 (follows at [facility] [place])  - stable   - no current treatments    # Hypothyroidism   Home meds: [place] Thyroid 15mg  - Start 15mcg levothyroxine equivalent     # DM  Home meds: Jardiance & Insulin 16 units bedtime   -  start lantus 16 units and correctional scale    #hypokalemia- repleted    #DVT - Eliquis   #GI - pantoprazole   #Diet - DASH  #Activity - IAT   #Code - Full code     Disposition - Telemetry   Med/Rec: completed bedside with family

## 2023-03-11 NOTE — ED PROVIDER NOTE - PROGRESS NOTE DETAILS
BK: Received signout from Dr. Jaramillo. Patient here with syncope and trauma, patient on eliquis. Pending pan scan, trauma re-evaluation, and admission. SO: STEMI alert activated due to EKG changes from previous EKGs. STEMI alert canceled by Cardiology Fellow Dr. Rios patient no longer experiencing chest palpitations. BK: EP consulted to interrogate device. Patient called as a trauma alert on arrival due to fall on Eliquis.  Noted ST depressions on EMS lead and EKG obtained in ED concerning with deep depressions in II, III, aVF with slight elevation in aVR STEMI code called given concerning clinical story; findings all new from multiple previous EKGs performed for patient.  Patient evaluated by cardiology, feels that it is likely rate related and once imaging to rule out bleeding/traumatic injury may give Lopressor 5 mg and oral dose 50 mg if still necessary.  Bedside ultrasound performed, IV fluids administered for mild hypotension. Patient signed out to Dr. Huddleston to follow-up imaging, reassess, follow-up trauma, cardiology, admit for further monitoring and treatment. BK: Patient cleared by trauma. EP came to evaluate device, functioning normally other than some runs of afib RBR and AVNRT.

## 2023-03-12 LAB
ALBUMIN SERPL ELPH-MCNC: 3.8 G/DL — SIGNIFICANT CHANGE UP (ref 3.5–5.2)
ALP SERPL-CCNC: 78 U/L — SIGNIFICANT CHANGE UP (ref 30–115)
ALT FLD-CCNC: 23 U/L — SIGNIFICANT CHANGE UP (ref 0–41)
ANION GAP SERPL CALC-SCNC: 15 MMOL/L — HIGH (ref 7–14)
APPEARANCE UR: CLEAR — SIGNIFICANT CHANGE UP
AST SERPL-CCNC: 26 U/L — SIGNIFICANT CHANGE UP (ref 0–41)
BACTERIA # UR AUTO: NEGATIVE — SIGNIFICANT CHANGE UP
BASOPHILS # BLD AUTO: 0.11 K/UL — SIGNIFICANT CHANGE UP (ref 0–0.2)
BASOPHILS NFR BLD AUTO: 1.1 % — HIGH (ref 0–1)
BILIRUB SERPL-MCNC: 0.9 MG/DL — SIGNIFICANT CHANGE UP (ref 0.2–1.2)
BILIRUB UR-MCNC: ABNORMAL
BUN SERPL-MCNC: 21 MG/DL — HIGH (ref 10–20)
CALCIUM SERPL-MCNC: 9.5 MG/DL — SIGNIFICANT CHANGE UP (ref 8.4–10.5)
CHLORIDE SERPL-SCNC: 98 MMOL/L — SIGNIFICANT CHANGE UP (ref 98–110)
CO2 SERPL-SCNC: 27 MMOL/L — SIGNIFICANT CHANGE UP (ref 17–32)
COLOR SPEC: YELLOW — SIGNIFICANT CHANGE UP
CREAT SERPL-MCNC: 0.9 MG/DL — SIGNIFICANT CHANGE UP (ref 0.7–1.5)
DIFF PNL FLD: NEGATIVE — SIGNIFICANT CHANGE UP
EGFR: 61 ML/MIN/1.73M2 — SIGNIFICANT CHANGE UP
EOSINOPHIL # BLD AUTO: 0.21 K/UL — SIGNIFICANT CHANGE UP (ref 0–0.7)
EOSINOPHIL NFR BLD AUTO: 2 % — SIGNIFICANT CHANGE UP (ref 0–8)
EPI CELLS # UR: 3 /HPF — SIGNIFICANT CHANGE UP (ref 0–5)
GLUCOSE BLDC GLUCOMTR-MCNC: 133 MG/DL — HIGH (ref 70–99)
GLUCOSE BLDC GLUCOMTR-MCNC: 155 MG/DL — HIGH (ref 70–99)
GLUCOSE BLDC GLUCOMTR-MCNC: 189 MG/DL — HIGH (ref 70–99)
GLUCOSE BLDC GLUCOMTR-MCNC: 194 MG/DL — HIGH (ref 70–99)
GLUCOSE SERPL-MCNC: 142 MG/DL — HIGH (ref 70–99)
GLUCOSE UR QL: ABNORMAL
HCT VFR BLD CALC: 41.8 % — SIGNIFICANT CHANGE UP (ref 37–47)
HGB BLD-MCNC: 14.1 G/DL — SIGNIFICANT CHANGE UP (ref 12–16)
HYALINE CASTS # UR AUTO: 0 /LPF — SIGNIFICANT CHANGE UP (ref 0–7)
IMM GRANULOCYTES NFR BLD AUTO: 0.6 % — HIGH (ref 0.1–0.3)
KETONES UR-MCNC: ABNORMAL
LEUKOCYTE ESTERASE UR-ACNC: NEGATIVE — SIGNIFICANT CHANGE UP
LYMPHOCYTES # BLD AUTO: 1.68 K/UL — SIGNIFICANT CHANGE UP (ref 1.2–3.4)
LYMPHOCYTES # BLD AUTO: 16.1 % — LOW (ref 20.5–51.1)
MAGNESIUM SERPL-MCNC: 1.6 MG/DL — LOW (ref 1.8–2.4)
MCHC RBC-ENTMCNC: 32 PG — HIGH (ref 27–31)
MCHC RBC-ENTMCNC: 33.7 G/DL — SIGNIFICANT CHANGE UP (ref 32–37)
MCV RBC AUTO: 94.8 FL — SIGNIFICANT CHANGE UP (ref 81–99)
MONOCYTES # BLD AUTO: 1.34 K/UL — HIGH (ref 0.1–0.6)
MONOCYTES NFR BLD AUTO: 12.8 % — HIGH (ref 1.7–9.3)
NEUTROPHILS # BLD AUTO: 7.05 K/UL — HIGH (ref 1.4–6.5)
NEUTROPHILS NFR BLD AUTO: 67.4 % — SIGNIFICANT CHANGE UP (ref 42.2–75.2)
NITRITE UR-MCNC: NEGATIVE — SIGNIFICANT CHANGE UP
NRBC # BLD: 0 /100 WBCS — SIGNIFICANT CHANGE UP (ref 0–0)
PH UR: 6.5 — SIGNIFICANT CHANGE UP (ref 5–8)
PHOSPHATE SERPL-MCNC: 2.8 MG/DL — SIGNIFICANT CHANGE UP (ref 2.1–4.9)
PLATELET # BLD AUTO: 195 K/UL — SIGNIFICANT CHANGE UP (ref 130–400)
POTASSIUM SERPL-MCNC: 3.7 MMOL/L — SIGNIFICANT CHANGE UP (ref 3.5–5)
POTASSIUM SERPL-SCNC: 3.7 MMOL/L — SIGNIFICANT CHANGE UP (ref 3.5–5)
PROT SERPL-MCNC: 6.1 G/DL — SIGNIFICANT CHANGE UP (ref 6–8)
PROT UR-MCNC: ABNORMAL
RBC # BLD: 4.41 M/UL — SIGNIFICANT CHANGE UP (ref 4.2–5.4)
RBC # FLD: 15.1 % — HIGH (ref 11.5–14.5)
RBC CASTS # UR COMP ASSIST: 1 /HPF — SIGNIFICANT CHANGE UP (ref 0–4)
SODIUM SERPL-SCNC: 140 MMOL/L — SIGNIFICANT CHANGE UP (ref 135–146)
SP GR SPEC: 1.04 — HIGH (ref 1.01–1.03)
TROPONIN T SERPL-MCNC: 0.06 NG/ML — CRITICAL HIGH
URATE CRY FLD QL MICRO: ABNORMAL
UROBILINOGEN FLD QL: SIGNIFICANT CHANGE UP
WBC # BLD: 10.45 K/UL — SIGNIFICANT CHANGE UP (ref 4.8–10.8)
WBC # FLD AUTO: 10.45 K/UL — SIGNIFICANT CHANGE UP (ref 4.8–10.8)
WBC UR QL: 4 /HPF — SIGNIFICANT CHANGE UP (ref 0–5)

## 2023-03-12 PROCEDURE — 99233 SBSQ HOSP IP/OBS HIGH 50: CPT

## 2023-03-12 RX ORDER — METOPROLOL TARTRATE 50 MG
25 TABLET ORAL
Refills: 0 | Status: DISCONTINUED | OUTPATIENT
Start: 2023-03-12 | End: 2023-03-14

## 2023-03-12 RX ADMIN — Medication 2: at 17:47

## 2023-03-12 RX ADMIN — INSULIN GLARGINE 16 UNIT(S): 100 INJECTION, SOLUTION SUBCUTANEOUS at 21:02

## 2023-03-12 RX ADMIN — APIXABAN 5 MILLIGRAM(S): 2.5 TABLET, FILM COATED ORAL at 05:41

## 2023-03-12 RX ADMIN — APIXABAN 5 MILLIGRAM(S): 2.5 TABLET, FILM COATED ORAL at 17:47

## 2023-03-12 RX ADMIN — Medication 20 MILLIGRAM(S): at 05:40

## 2023-03-12 RX ADMIN — Medication 2: at 11:32

## 2023-03-12 RX ADMIN — Medication 120 MILLIGRAM(S): at 05:41

## 2023-03-12 RX ADMIN — Medication 25 MICROGRAM(S): at 05:42

## 2023-03-12 RX ADMIN — SERTRALINE 50 MILLIGRAM(S): 25 TABLET, FILM COATED ORAL at 11:35

## 2023-03-12 RX ADMIN — Medication 25 MILLIGRAM(S): at 17:47

## 2023-03-12 RX ADMIN — PANTOPRAZOLE SODIUM 40 MILLIGRAM(S): 20 TABLET, DELAYED RELEASE ORAL at 05:42

## 2023-03-12 RX ADMIN — CLOPIDOGREL BISULFATE 75 MILLIGRAM(S): 75 TABLET, FILM COATED ORAL at 11:32

## 2023-03-12 NOTE — CONSULT NOTE ADULT - ASSESSMENT
pt w/ hx of cad, af, ppm w/ syncope. pt w/ palp w/ svt now sinus. follow enzymes. check echo. monitor. increase metoprolol to 50 bid.

## 2023-03-12 NOTE — PROGRESS NOTE ADULT - ASSESSMENT
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    # Syncope   - f/u transthoracic echocardiography Afib now with RVR   # SSS s/p PPM  Syncope most likely in the setting of Afib with RVR after decreases in rate contro  - Follows with Dr Whitley and Dr Enriquez outpatient   - previous cardioversion failed   - f/u transthoracic echocardiography lace for SSS and bradycardias >>> currently paced rhythm   - On presentation  Afib with RVR s/p IV metoprolol & cardizem   - Code STEMI cancelled   - Cardiology appreciated   - Lactate 2.2 >>> repeat neg   - Elevated troponin- 0.05/0.06/0.07/0.06  - Orthostats positive, add KATHY stockings , repeat orthostats   - F/u EEG   - Telemetry monitoring   - f/u TTE   - f/u transthoracic echocardiography sawyer,  he recc dc dilt and lasix, add back lopressor, he will evaluate in the am, followed cardiology note today    #elevated trop likley demand ischemia 2/2 tachycardia- trend trop, follow up cardiology, repeat echo  # CAD s/p PCI (LAD, RCA, LCX) - last in 2021  - c/w BB and plavix therapy   - no statins outpatient  - trending down     # Pancreatic Ca s/p distal pancreatectomy Aug 2021  # Cholangiocarcinoma with mets to the  liver s/p partial liver resection s/p chemotherapy last session Jan 2022 (follows at [facility] [place])  - stable   - no current treatments    # Hypothyroidism   Home meds: [place] Thyroid 15mg  - Start 15mcg levothyroxine equivalent     # DM  Home meds: Jardiance & Insulin 16 units bedtime   -  start lantus 16 units and correctional scale    #hypokalemia- repleted    #Progress Note Handoff  Pending (specify): cardiac telemonitoring, cardiology, repeat orthostats    Family discussion: spoke to pt at bedside  Disposition: home vs snf, PT   Decision to admit the pt is based on acuity as above   High risk given above acuity and comorbidities

## 2023-03-12 NOTE — PATIENT PROFILE ADULT - FALL HARM RISK - HARM RISK INTERVENTIONS
Assistance with ambulation/Assistance OOB with selected safe patient handling equipment/Communicate Risk of Fall with Harm to all staff/Discuss with provider need for PT consult/Monitor gait and stability/Provide patient with walking aids - walker, cane, crutches/Reinforce activity limits and safety measures with patient and family/Sit up slowly, dangle for a short time, stand at bedside before walking/Tailored Fall Risk Interventions/Visual Cue: Yellow wristband and red socks/Bed in lowest position, wheels locked, appropriate side rails in place/Call bell, personal items and telephone in reach/Instruct patient to call for assistance before getting out of bed or chair/Non-slip footwear when patient is out of bed/[place] to call system/Physically safe environment - no spills, clutter or unnecessary equipment/Purposeful Proactive Rounding/Room/bathroom lighting operational, light cord in reach

## 2023-03-12 NOTE — PATIENT PROFILE ADULT - FALL HARM RISK - FACTORS
Dizziness/Orthostatic hypotension/Other medical problems/Syncope/Weakness
Anxiety    Other specified abnormal uterine and vaginal bleeding    Polyp of corpus uteri

## 2023-03-12 NOTE — CHART NOTE - NSCHARTNOTEFT_GEN_A_CORE
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 Patient reports she started experiencing palpitations early in the morning, attempted to get out of bed, but became weak, dizzy, and nauseated syncopized, hit her head, and woke up on the floor. Her nurse aide heard the patient fall, and called EMS. The patient's last dose of Eliquis was 3/10 at 9:30pm. The patient is complaining of L ankle pain and and R arm due (due to an abrasion). She is currently in a C-collar, and denies current chest pain, SOB, nausea, vomiting, head or neck pain.     A CODE STEMI was called in the ED based on the patient's presentation and EKG showing diffuse ST depressions with elevation in AVR. 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     Trauma assessment in ED: ABCs intact , GCS 15 , AAOx3.    Patient seen and examined. Trauma tertiary survey performed. No new complaints of pain at this time.     PHYSICAL EXAM:  General: NAD, AAOx3, calm and cooperative  HEENT: Normocephalic, atraumatic, EOMI, PEERLA. no scalp lacerations   Neck: Soft, midline trachea. no c-spine tenderness  Chest: No chest wall tenderness, no subcutaneous emphysema   Cardiac: S1, S2, RRR  Respiratory: Chest rise equal bilaterally, no labored breathing  Abdomen: Soft, non-distended, non-tender, no rebound, no guarding.  Groin: Normal appearing, pelvis stable   Ext:  Moving b/l upper and lower extremities. Palpable Radial b/l UE, b/l DP palpable in LE. right elbow/forearm abrasion present  Back: No T/L/S spine tenderness, No palpable runoff/stepoff/deformity    Vital Signs Last 24 Hrs  T(C): 36.9 (12 Mar 2023 15:44), Max: 36.9 (12 Mar 2023 15:44)  T(F): 98.5 (12 Mar 2023 15:44), Max: 98.5 (12 Mar 2023 15:44)  HR: 69 (12 Mar 2023 15:44) (65 - 71)  BP: 130/59 (12 Mar 2023 15:44) (130/59 - 138/62)  BP(mean): --  RR: 18 (12 Mar 2023 15:44) (18 - 18)  SpO2: 94% (12 Mar 2023 15:44) (94% - 98%)    Parameters below as of 12 Mar 2023 15:44  Patient On (Oxygen Delivery Method): room air      Labs:  CAPILLARY BLOOD GLUCOSE  POCT Blood Glucose.: 194 mg/dL (12 Mar 2023 11:17)  POCT Blood Glucose.: 133 mg/dL (12 Mar 2023 08:28)  POCT Blood Glucose.: 133 mg/dL (11 Mar 2023 21:12)                       14.1   10.45 )-----------( 195      ( 12 Mar 2023 08:01 )             41.8       Auto Neutrophil %: 67.4 % (23 @ 08:01)  Auto Immature Granulocyte %: 0.6 % (23 @ 08:01)        140  |  98  |  21<H>  ----------------------------<  142<H>  3.7   |  27  |  0.9    Calcium, Total Serum: 9.5 mg/dL (23 @ 08:01)    LFTs:             6.1  | 0.9  | 26       ------------------[78      ( 12 Mar 2023 08:01 )  3.8  | x    | 23            Lactate, Blood: 1.1 mmol/L (23 @ 12:05)  Lactate, Blood: 2.2 mmol/L (23 @ 07:20)  Lactate, Blood: 2.4 mmol/L (23 @ 05:30)    Coags:     15.80  ----< 1.37    ( 11 Mar 2023 05:30 )     25.6     CARDIAC MARKERS ( 12 Mar 2023 08:01 )  x     / 0.06 ng/mL / x     / x     / x      CARDIAC MARKERS ( 11 Mar 2023 20:45 )  x     / 0.07 ng/mL / x     / x     / x      CARDIAC MARKERS ( 11 Mar 2023 17:32 )  x     / 0.06 ng/mL / x     / x     / x      CARDIAC MARKERS ( 11 Mar 2023 12:05 )  x     / 0.05 ng/mL / x     / x     / x      CARDIAC MARKERS ( 11 Mar 2023 07:20 )  x     / <0.01 ng/mL / x     / x     / x        Urinalysis Basic - ( 12 Mar 2023 06:18 )    Color: Yellow / Appearance: Clear / S.039 / pH: x  Gluc: x / Ketone: Moderate  / Bili: Small / Urobili: <2 mg/dL   Blood: x / Protein: 30 mg/dL / Nitrite: Negative   Leuk Esterase: Negative / RBC: 1 /HPF / WBC 4 /HPF   Sq Epi: x / Non Sq Epi: 3 /HPF / Bacteria: Negative      RADIOLOGICAL FINDINGS REVIEW:  < from: Xray Pelvis AP only (23 @ 05:52) >  FINDINGS/IMPRESSION:  Normal osseous mineralization. No acute displaced fractures or   dislocation.  Post left total hip arthroplasty without radiographic evidence of   hardware complication.    < from: Xray Chest 1 View AP/PA (23 @ 05:53) >  IMPRESSION:  No radiographic evidence of acute cardiopulmonary disease.    < from: CT Head No Cont (23 @ 06:22) >  IMPRESSION:  1.  Head: No CT evidence for acute intracranial pathology.  1.  Cervical Spine:  No evidence for acute cervical spine fracture or   subluxation. Multilevel degenerative changes.  2.  Left thyroid nodule measuring 2.1 cm. Recommend nonemergent follow-up   thyroid sonography for better characterization.    < from: CT Cervical Spine No Cont (23 @ 06:31) >  IMPRESSION:  1.  Head: No CT evidence for acute intracranial pathology.  1.  Cervical Spine:  No evidence for acute cervical spine fracture or   subluxation. Multilevel degenerative changes.  2.  Left thyroid nodule measuring 2.1 cm. Recommend nonemergent follow-up   thyroid sonography for better characterization.    < from: CT Chest w/ IV Cont (23 @ 06:44) >  IMPRESSION:  1.  No evidence of acute traumatic injury to the chest, abdomen or pelvis.  2.  Stable chronic incidental findings.    < from: CT Abdomen and Pelvis w/ IV Cont (23 @ 06:44) >  IMPRESSION:  1.  No evidence of acute traumatic injury to the chest, abdomen or pelvis.  2.  Stable chronic incidental findings.    < from: Xray Foot AP + Lateral + Oblique, Left (23 @ 07:47) >  FINDINGS /IMPRESSION:  Chronic appearing deformity at the medial aspect of the navicular.   Correlate for point tenderness. Hallux valgus deformity. Midfoot   degenerative changes.        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 Trauma assessment in ED: ABCs intact , GCS 15 , AAOx3 , with physical exam findings, imaging, and labs as documented above, injuries are identified: right forearm abrasion, no other injuries.    - All images/reports reviewed. No further traumatic work-up warranted.  - Please recall Trauma Surgery as needed.    SPECTRA [id number]

## 2023-03-13 LAB
A1C WITH ESTIMATED AVERAGE GLUCOSE RESULT: 7.3 % — HIGH (ref 4–5.6)
ANION GAP SERPL CALC-SCNC: 14 MMOL/L — SIGNIFICANT CHANGE UP (ref 7–14)
BUN SERPL-MCNC: 26 MG/DL — HIGH (ref 10–20)
CALCIUM SERPL-MCNC: 9.5 MG/DL — SIGNIFICANT CHANGE UP (ref 8.4–10.5)
CHLORIDE SERPL-SCNC: 98 MMOL/L — SIGNIFICANT CHANGE UP (ref 98–110)
CO2 SERPL-SCNC: 27 MMOL/L — SIGNIFICANT CHANGE UP (ref 17–32)
CREAT SERPL-MCNC: 0.8 MG/DL — SIGNIFICANT CHANGE UP (ref 0.7–1.5)
EGFR: 71 ML/MIN/1.73M2 — SIGNIFICANT CHANGE UP
ESTIMATED AVERAGE GLUCOSE: 163 MG/DL — HIGH (ref 68–114)
GLUCOSE BLDC GLUCOMTR-MCNC: 117 MG/DL — HIGH (ref 70–99)
GLUCOSE BLDC GLUCOMTR-MCNC: 131 MG/DL — HIGH (ref 70–99)
GLUCOSE BLDC GLUCOMTR-MCNC: 139 MG/DL — HIGH (ref 70–99)
GLUCOSE BLDC GLUCOMTR-MCNC: 147 MG/DL — HIGH (ref 70–99)
GLUCOSE BLDC GLUCOMTR-MCNC: 149 MG/DL — HIGH (ref 70–99)
GLUCOSE SERPL-MCNC: 129 MG/DL — HIGH (ref 70–99)
HCT VFR BLD CALC: 42.4 % — SIGNIFICANT CHANGE UP (ref 37–47)
HGB BLD-MCNC: 14.1 G/DL — SIGNIFICANT CHANGE UP (ref 12–16)
MAGNESIUM SERPL-MCNC: 1.6 MG/DL — LOW (ref 1.8–2.4)
MCHC RBC-ENTMCNC: 31.5 PG — HIGH (ref 27–31)
MCHC RBC-ENTMCNC: 33.3 G/DL — SIGNIFICANT CHANGE UP (ref 32–37)
MCV RBC AUTO: 94.9 FL — SIGNIFICANT CHANGE UP (ref 81–99)
NRBC # BLD: 0 /100 WBCS — SIGNIFICANT CHANGE UP (ref 0–0)
PLATELET # BLD AUTO: 204 K/UL — SIGNIFICANT CHANGE UP (ref 130–400)
POTASSIUM SERPL-MCNC: 3.6 MMOL/L — SIGNIFICANT CHANGE UP (ref 3.5–5)
POTASSIUM SERPL-SCNC: 3.6 MMOL/L — SIGNIFICANT CHANGE UP (ref 3.5–5)
RBC # BLD: 4.47 M/UL — SIGNIFICANT CHANGE UP (ref 4.2–5.4)
RBC # FLD: 15 % — HIGH (ref 11.5–14.5)
SODIUM SERPL-SCNC: 139 MMOL/L — SIGNIFICANT CHANGE UP (ref 135–146)
WBC # BLD: 11.3 K/UL — HIGH (ref 4.8–10.8)
WBC # FLD AUTO: 11.3 K/UL — HIGH (ref 4.8–10.8)

## 2023-03-13 PROCEDURE — 93306 TTE W/DOPPLER COMPLETE: CPT | Mod: 26

## 2023-03-13 PROCEDURE — 99233 SBSQ HOSP IP/OBS HIGH 50: CPT

## 2023-03-13 RX ORDER — MAGNESIUM SULFATE 500 MG/ML
2 VIAL (ML) INJECTION ONCE
Refills: 0 | Status: DISCONTINUED | OUTPATIENT
Start: 2023-03-13 | End: 2023-03-13

## 2023-03-13 RX ORDER — MAGNESIUM OXIDE 400 MG ORAL TABLET 241.3 MG
400 TABLET ORAL
Refills: 0 | Status: COMPLETED | OUTPATIENT
Start: 2023-03-13 | End: 2023-03-14

## 2023-03-13 RX ADMIN — Medication 25 MICROGRAM(S): at 05:47

## 2023-03-13 RX ADMIN — APIXABAN 5 MILLIGRAM(S): 2.5 TABLET, FILM COATED ORAL at 17:06

## 2023-03-13 RX ADMIN — INSULIN GLARGINE 16 UNIT(S): 100 INJECTION, SOLUTION SUBCUTANEOUS at 22:27

## 2023-03-13 RX ADMIN — Medication 25 MILLIGRAM(S): at 17:06

## 2023-03-13 RX ADMIN — MAGNESIUM OXIDE 400 MG ORAL TABLET 400 MILLIGRAM(S): 241.3 TABLET ORAL at 17:10

## 2023-03-13 RX ADMIN — PANTOPRAZOLE SODIUM 40 MILLIGRAM(S): 20 TABLET, DELAYED RELEASE ORAL at 06:04

## 2023-03-13 RX ADMIN — SERTRALINE 50 MILLIGRAM(S): 25 TABLET, FILM COATED ORAL at 13:39

## 2023-03-13 RX ADMIN — Medication 25 MILLIGRAM(S): at 05:47

## 2023-03-13 RX ADMIN — CLOPIDOGREL BISULFATE 75 MILLIGRAM(S): 75 TABLET, FILM COATED ORAL at 13:39

## 2023-03-13 RX ADMIN — APIXABAN 5 MILLIGRAM(S): 2.5 TABLET, FILM COATED ORAL at 05:47

## 2023-03-13 NOTE — PROGRESS NOTE ADULT - ASSESSMENT
pt with cad, s/p stent, pancreatic ca s/p resection, cholangiocarcinoma, s/p resection with recurrence sick sinus syndrome, pafib, s/p dual chamber pacemaker, syncope in setting of arrhythmia and pt also orthostatic.  pt with a nstemi type 2 from supply demand mismatch during arrhythmia.   palliative care was discussed by pt's oncologist and pt was not interested. pt is better and appears back in nsr.  it is unclear initiating cause of syncope but need more info from ep about further details of pacer interrogation( when pt had syncope was it related to a new mode switch and rapid rate?)  cont metoprolol and consider titrate up( pt was on much higher doses of metoprolol and cardizem as an outpt but was decreased secondary to pt fatigue.   conservative medical management  hydrate po  ambulate  cont plavix and eliquis  pt was unable to tolerate statin in past and does not wish to take a cholesterol med

## 2023-03-13 NOTE — PHYSICAL THERAPY INITIAL EVALUATION ADULT - GENERAL OBSERVATIONS, REHAB EVAL
[id number] am Chart reviewed. Pt. seen semirecline in bed , in No apparent distress , + IV lock , telemetry , Pt. alert and oriented X 4, Pt. agreed to activity/therapy.

## 2023-03-13 NOTE — PROGRESS NOTE ADULT - ASSESSMENT
87 yo F PMHx DM II, chronic afib, SSS s/p PPM, DM II, CAD s/p PCI,  pancreatic cancer s/p distal pancreatectomy Aug 2021, cholangiocarcinoma with mets to the  liver s/p partial liver resection s/p chemotherapy last session Jan 2022 (follows at [facility] [place] hypothyroidism presented to the ED for syncope.    Syncope   SSS s/p PPM  -   - f/u transthoracic echocardiography Afib now with RVR   # SSS s/p PPM  Syncope most likely in the setting of Afib with RVR after decreases in rate contro  - Follows with Dr Whitley and Dr Enriquez outpatient   - previous cardioversion failed   - f/u transthoracic echocardiography lace for SSS and bradycardias >>> currently paced rhythm   - On presentation  Afib with RVR s/p IV metoprolol & cardizem   - Code STEMI cancelled   - Cardiology appreciated   - Lactate 2.2 >>> repeat neg   - Elevated troponin- 0.05/0.06/0.07/0.06  - Orthostats positive, add KATHY stockings , repeat orthostats   - F/u EEG   - Telemetry monitoring   - f/u TTE   - f/u transthoracic echocardiography sawyer,  he recc dc dilt and lasix, add back lopressor, he will evaluate in the am, followed cardiology note today    #elevated trop likley demand ischemia 2/2 tachycardia- trend trop, follow up cardiology, repeat echo  # CAD s/p PCI (LAD, RCA, LCX) - last in 2021  - c/w BB and plavix therapy   - no statins outpatient  - trending down     # Pancreatic Ca s/p distal pancreatectomy Aug 2021  # Cholangiocarcinoma with mets to the  liver s/p partial liver resection s/p chemotherapy last session Jan 2022 (follows at [facility] [place])  - stable   - no current treatments    # Hypothyroidism   Home meds: [place] Thyroid 15mg  - Start 15mcg levothyroxine equivalent     # DM  Home meds: Jardiance & Insulin 16 units bedtime   -  start lantus 16 units and correctional scale    #hypokalemia- repleted    #Progress Note Handoff  Pending (specify): cardiac telemonitoring, cardiology, repeat orthostats    Family discussion: spoke to pt at bedside  Disposition: home vs snf, PT   Decision to admit the pt is based on acuity as above   High risk given above acuity and comorbidities  87 yo F PMHx DM II, chronic afib, SSS s/p PPM, DM II, CAD s/p PCI,  pancreatic cancer s/p distal pancreatectomy Aug 2021, cholangiocarcinoma with mets to the  liver s/p partial liver resection s/p chemotherapy last session Jan 2022 ([facility] hypothyroidism presented to the ED for syncope.    Syncope   SSS s/p PPM  Afib with RVR  - Code STEMI called on ER arrival and was cancelled  - On presentation  Afib with RVR s/p IV metoprolol & Cardizem   - previous cardioversion failed   - Elevated troponin likely due to demand from RVR  - Follows with Dr Whitley and Dr Enriquez outpatient   - orthostatics positive, likely cause of hypotension  - c/w KATHY stockings  - c/w telemetry monitoring  - continue adjusting HTN and rate control meds      CAD   - s/p PCI (LAD, RCA, LCX) - last in 2021  - c/w BB and plavix therapy       Pancreatic Ca s/p distal pancreatectomy Aug 2021  Cholangiocarcinoma with mets to the  liver s/p partial liver resection s/p chemotherapy last session Jan 2022 ([facility])  - stable   - no current treatments    Hypothyroidism   - c/w therapeutic interchange [place] Thyroid to Levothyroxine     DM II  - c/w insulin  - FS controlled    Hypokalemia- repleted    #Progress Note Handoff  Pending (specify): cardiac telemonitoring, cardiology, repeat orthostatics    Family discussion: spoke to pt at bedside  Disposition: home

## 2023-03-13 NOTE — PHYSICAL THERAPY INITIAL EVALUATION ADULT - PERTINENT HX OF CURRENT PROBLEM, REHAB EVAL
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 Patient reports she started experiencing palpitations early in the morning around 5 am, attempted to get out of bed, but became weak, dizzy, and nauseated syncopized, hit her head, and woke up on the floor. Her nurse aide heard the patient fall, and called EMS. The patient's last dose of Eliquis was 3/10 at 9:30pm. The patient is complaining of L ankle pain and and R arm due (due to an abrasion). A CODE STEMI was called in the ED based on the patient's presentation and EKG showing diffuse ST depressions with elevation in AVR. 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    Pt. referred to PT for eval and tx.

## 2023-03-13 NOTE — PROGRESS NOTE ADULT - ASSESSMENT
88yF w/ PMHx of DM,  Afib, SSS s/p PPM,  CAD s/p PCI with Stents (LAD, RCA, LCX) - last in 2021,  pancreatic cancer s/p distal pancreatectomy Aug 2021, cholangiocarcinoma with mets to the  liver s/p partial liver resection s/p chemotherapy last session Jan 2022 ([facility] hypothyroidism presented to the ED for syncope.    # Syncope   # SSS s/p PPM  - Follows with Dr Whitley and Dr Enriquez outpatient   - Orthostats positive, add KATHY stockings  - On presentation  Afib with RVR s/p IV metoprolol & cardizem   - c/w metoprolol and Eliquis  - f/u TTE     #Elevated Troponin  #CAD s/p PCI (LAD, RCA, LCX) - last in 2021  - Elevated troponin, peaked at 0.07  - c/w Metoprolol  - c/w Plavix   - No statins outpatient    # Pancreatic Ca s/p distal pancreatectomy Aug 2021  # Cholangiocarcinoma with mets to the  liver s/p partial liver resection s/p chemotherapy last session Jan 2022 ([facility] [place] [facility]  - stable   - no current treatments    # Hypothyroidism   - Home meds: [place] Thyroid 15mg  - Start 15mcg levothyroxine equivalent     #Thyroid nodule  - 2.1 cm  - F/u outpatient     # DM  - Home meds: Jardiance & Insulin 16 units bedtime   - start lantus 16 units and correctional scale

## 2023-03-13 NOTE — PHYSICAL THERAPY INITIAL EVALUATION ADULT - SPECIFY REASON(S)
Upon assessment pt is independent with transfers ;   supervision assist with no Assistive Device with ambulation ; will not require skilled PT at this time.

## 2023-03-14 ENCOUNTER — TRANSCRIPTION ENCOUNTER (OUTPATIENT)
Age: 88
End: 2023-03-14

## 2023-03-14 VITALS
DIASTOLIC BLOOD PRESSURE: 62 MMHG | HEART RATE: 69 BPM | RESPIRATION RATE: 18 BRPM | TEMPERATURE: 98 F | SYSTOLIC BLOOD PRESSURE: 113 MMHG

## 2023-03-14 LAB
ANION GAP SERPL CALC-SCNC: 13 MMOL/L — SIGNIFICANT CHANGE UP (ref 7–14)
BUN SERPL-MCNC: 22 MG/DL — HIGH (ref 10–20)
CALCIUM SERPL-MCNC: 9.4 MG/DL — SIGNIFICANT CHANGE UP (ref 8.4–10.4)
CHLORIDE SERPL-SCNC: 101 MMOL/L — SIGNIFICANT CHANGE UP (ref 98–110)
CO2 SERPL-SCNC: 28 MMOL/L — SIGNIFICANT CHANGE UP (ref 17–32)
CREAT SERPL-MCNC: 0.8 MG/DL — SIGNIFICANT CHANGE UP (ref 0.7–1.5)
EGFR: 71 ML/MIN/1.73M2 — SIGNIFICANT CHANGE UP
GLUCOSE BLDC GLUCOMTR-MCNC: 134 MG/DL — HIGH (ref 70–99)
GLUCOSE BLDC GLUCOMTR-MCNC: 134 MG/DL — HIGH (ref 70–99)
GLUCOSE BLDC GLUCOMTR-MCNC: 80 MG/DL — SIGNIFICANT CHANGE UP (ref 70–99)
GLUCOSE SERPL-MCNC: 93 MG/DL — SIGNIFICANT CHANGE UP (ref 70–99)
HCT VFR BLD CALC: 42.4 % — SIGNIFICANT CHANGE UP (ref 37–47)
HGB BLD-MCNC: 14.1 G/DL — SIGNIFICANT CHANGE UP (ref 12–16)
MAGNESIUM SERPL-MCNC: 1.6 MG/DL — LOW (ref 1.8–2.4)
MCHC RBC-ENTMCNC: 31.3 PG — HIGH (ref 27–31)
MCHC RBC-ENTMCNC: 33.3 G/DL — SIGNIFICANT CHANGE UP (ref 32–37)
MCV RBC AUTO: 94 FL — SIGNIFICANT CHANGE UP (ref 81–99)
NRBC # BLD: 0 /100 WBCS — SIGNIFICANT CHANGE UP (ref 0–0)
PLATELET # BLD AUTO: 203 K/UL — SIGNIFICANT CHANGE UP (ref 130–400)
POTASSIUM SERPL-MCNC: 3.5 MMOL/L — SIGNIFICANT CHANGE UP (ref 3.5–5)
POTASSIUM SERPL-SCNC: 3.5 MMOL/L — SIGNIFICANT CHANGE UP (ref 3.5–5)
RBC # BLD: 4.51 M/UL — SIGNIFICANT CHANGE UP (ref 4.2–5.4)
RBC # FLD: 14.9 % — HIGH (ref 11.5–14.5)
SODIUM SERPL-SCNC: 142 MMOL/L — SIGNIFICANT CHANGE UP (ref 135–146)
WBC # BLD: 9.99 K/UL — SIGNIFICANT CHANGE UP (ref 4.8–10.8)
WBC # FLD AUTO: 9.99 K/UL — SIGNIFICANT CHANGE UP (ref 4.8–10.8)

## 2023-03-14 PROCEDURE — 99223 1ST HOSP IP/OBS HIGH 75: CPT

## 2023-03-14 PROCEDURE — 99238 HOSP IP/OBS DSCHRG MGMT 30/<: CPT

## 2023-03-14 RX ORDER — FUROSEMIDE 40 MG
1 TABLET ORAL
Qty: 0 | Refills: 0 | DISCHARGE

## 2023-03-14 RX ORDER — DILTIAZEM HCL 120 MG
1 CAPSULE, EXT RELEASE 24 HR ORAL
Qty: 0 | Refills: 0 | DISCHARGE

## 2023-03-14 RX ORDER — METOPROLOL TARTRATE 50 MG
1 TABLET ORAL
Qty: 0 | Refills: 0 | DISCHARGE

## 2023-03-14 RX ORDER — MAGNESIUM SULFATE 500 MG/ML
2 VIAL (ML) INJECTION ONCE
Refills: 0 | Status: COMPLETED | OUTPATIENT
Start: 2023-03-14 | End: 2023-03-14

## 2023-03-14 RX ORDER — METOPROLOL TARTRATE 50 MG
1 TABLET ORAL
Qty: 30 | Refills: 0
Start: 2023-03-14 | End: 2023-04-12

## 2023-03-14 RX ORDER — METOPROLOL TARTRATE 50 MG
1 TABLET ORAL
Qty: 0 | Refills: 0 | DISCHARGE
Start: 2023-03-14

## 2023-03-14 RX ADMIN — SERTRALINE 50 MILLIGRAM(S): 25 TABLET, FILM COATED ORAL at 12:07

## 2023-03-14 RX ADMIN — Medication 25 MILLIGRAM(S): at 06:43

## 2023-03-14 RX ADMIN — Medication 25 MICROGRAM(S): at 06:43

## 2023-03-14 RX ADMIN — APIXABAN 5 MILLIGRAM(S): 2.5 TABLET, FILM COATED ORAL at 17:37

## 2023-03-14 RX ADMIN — MAGNESIUM OXIDE 400 MG ORAL TABLET 400 MILLIGRAM(S): 241.3 TABLET ORAL at 08:14

## 2023-03-14 RX ADMIN — Medication 25 MILLIGRAM(S): at 17:37

## 2023-03-14 RX ADMIN — Medication 25 GRAM(S): at 08:15

## 2023-03-14 RX ADMIN — CLOPIDOGREL BISULFATE 75 MILLIGRAM(S): 75 TABLET, FILM COATED ORAL at 12:06

## 2023-03-14 RX ADMIN — APIXABAN 5 MILLIGRAM(S): 2.5 TABLET, FILM COATED ORAL at 06:43

## 2023-03-14 RX ADMIN — PANTOPRAZOLE SODIUM 40 MILLIGRAM(S): 20 TABLET, DELAYED RELEASE ORAL at 06:43

## 2023-03-14 NOTE — DISCHARGE NOTE PROVIDER - CARE PROVIDER_API CALL
Prem Heller (DO)  Internal Medicine  [address] [facility], Admin - Room [address] [place] [address]  Phone: [phone]  Fax: [phone]  Follow Up Time: 1 week   Prem Heller (DO)  Internal Medicine  [address] [facility], Admin - [address]  [place] [address]  Phone: [phone]  Fax: [phone]  Follow Up Time: 1 week    Emmett Galloway  CARDIOVASCULAR DISEASE  [address] [facility] [address]  [place] [address]  Phone: [phone]  Fax: [phone]  Follow Up Time:

## 2023-03-14 NOTE — DISCHARGE NOTE NURSING/CASE MANAGEMENT/SOCIAL WORK - NSDCPEFALRISK_GEN_ALL_CORE
For information on Fall & Injury Prevention, visit: [email or web address] OR  [email or web address] OR  https://www.cdc.gov/steadi/patient.html

## 2023-03-14 NOTE — DISCHARGE NOTE NURSING/CASE MANAGEMENT/SOCIAL WORK - PATIENT PORTAL LINK FT
You can access the FollowMyHealth Patient Portal offered by [facility] by registering at the following website: [email or web address]. By joining [facility]’s FollowMyHealth portal, you will also be able to view your health information using other applications (apps) compatible with our system.

## 2023-03-14 NOTE — DISCHARGE NOTE PROVIDER - PROVIDER TOKENS
PROVIDER:[TOKEN:[[id number]],FOLLOWUP:[1 week]] PROVIDER:[TOKEN:[[id number]],FOLLOWUP:[1 week]],PROVIDER:[TOKEN:[[id number]]]

## 2023-03-14 NOTE — CONSULT NOTE ADULT - SUBJECTIVE AND OBJECTIVE BOX
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 EKG reviewed. Pt seen and examined bedside. Case discussed with Interventional cardiologist on call.  STEMI canceled.    Pt reports she developed palpitations overnight associated with dyspnea. She went up to get her metoprolol and slipped and fell.  She denies angina. Follows with Dr. Sutton. Had PCI to LAD, OM1, and RCA) - last in 2021    PAST MEDICAL & SURGICAL HISTORY  Hypothyroid    CAD (coronary artery disease)    Acute MI    CAD S/P percutaneous coronary angioplasty    DM (diabetes mellitus), secondary    H/O spinal stenosis    Polymyalgia    Arthritis    IBS (irritable bowel syndrome)    S/P tonsillectomy    History of cholecystectomy    History of appendectomy    Cataract of both eyes    History of hip replacement, total, left        FAMILY HISTORY:  FAMILY HISTORY:  No pertinent family history in first degree relatives        SOCIAL HISTORY:  Social History:      ALLERGIES:  Cipro (Diarrhea)  Percocet 5/325 (Unknown)      MEDICATIONS:  sodium chloride 0.9% Bolus 250 milliLiter(s) IV Bolus once    PRN:      HOME MEDICATIONS:  Home Medications:  Cardizem  mg/24 hours oral capsule, extended release: 1 cap(s) orally once a day (03 Jan 2023 12:34)  Eliquis 5 mg oral tablet: 1 tab(s) orally 2 times a day (03 Jan 2023 12:34)  Jardiance 10 mg oral tablet: 1 tab(s) orally once a day (in the morning) (03 Jan 2023 12:34)  lactobacillus acidophilus oral capsule: 1 cap(s) orally 3 times a day (with meals) (03 Jan 2023 12:34)  Lasix 20 mg oral tablet: 1 tab(s) orally once a day (03 Jan 2023 12:34)  melatonin 3 mg oral tablet: 1 tab(s) orally once a day (at bedtime), As needed, Insomnia (03 Jan 2023 12:34)  metoprolol tartrate 25 mg oral tablet: 1 tab(s) orally 2 times a day (03 Jan 2023 12:34)  metroNIDAZOLE 500 mg/100 mL intravenous solution: 100 milliliter(s) intravenous every 8 hours (03 Jan 2023 12:34)  pantoprazole 40 mg oral delayed release tablet: 1 tab(s) orally once a day (03 Jan 2023 12:34)  Plavix 75 mg oral tablet: 1 tab(s) orally once a day (03 Jan 2023 12:34)  saccharomyces boulardii lyo 250 mg oral capsule: 1 cap(s) orally 2 times a day (03 Jan 2023 12:34)  Tresiba 100 units/mL subcutaneous solution: 10 unit(s) subcutaneous once a day (at bedtime) (03 Jan 2023 12:34)      VITALS:   T(F): --  HR: --  BP: --  BP(mean): --  RR: --  SpO2: --    I&O's Summary      REVIEW OF SYSTEMS:  CONSTITUTIONAL: No weakness, fevers or chills  HEENT: No visual changes, neck/ear pain  RESPIRATORY: No cough, sob  CARDIOVASCULAR: See HPI  GASTROINTESTINAL: No abdominal pain. No nausea, vomiting, diarrhea   GENITOURINARY: No dysuria, frequency or hematuria  NEUROLOGICAL: No new focal deficits  SKIN: No new rashes    PHYSICAL EXAM:  General: Not in distress.  Non-toxic appearing.   HEENT: EOMI  Cardio: regular, S1, S2, no murmur  Pulm: B/L BS.  No wheezing / crackles / rales  Abdomen: Soft, non-tender, non-distended. Normoactive bowel sounds  Extremities: No edema b/l le  Neuro: A&O x3. No focal deficits    LABS:                    Troponin trend:            RADIOLOGY:  -CXR:  -TTE:    < from: TTE Echo Complete w/o Contrast w/ Doppler (08.31.21 @ 16:19) >  Summary:   1. Hyperdynamic global left ventricular systolic function.   2. LV Ejection Fraction by Pacheco's Method with a biplane EF of 71 %.   3. Spectral Doppler shows impaired relaxation pattern of left ventricular myocardial filling (Grade I diastolic dysfunction).   4. Moderately enlarged left atrium.   5. Normal right atrial size.   6. Mild to moderate mitral valve regurgitation.   7. Moderate to severe mitral annular calcification.   8. Mild tricuspid regurgitation.   9. Sclerotic aortic valve with normal opening.  10. Estimated pulmonary artery systolic pressure is 36.3 mmHg assuming a right atrial pressure of 5 mmHg, which is consistent with borderline pulmonary hypertension.  11. LA volume Index is 39.2 ml/m² ml/m2.  12. There is mild aortic root calcification.    < end of copied text >      < from: Transesophageal Echocardiogram (01.03.23 @ 13:40) >  Summary:   1. Left ventricular ejection fraction, by visual estimation, is 50 to   55%.   2. Technically good study.   3. Mildly enlarged left atrium.   4. Mild mitral valve regurgitation.   5. Moderate to severe mitral annular calcification.   6. Thickening of the anterior and posterior mitral valve leaflets.   7. Mild tricuspid regurgitation.   8. Afib s/p successful DCCV with single 200J direct current.   9. Color flow doppler and intravenous injection of agitated saline   demonstrates the presence of an intact intra atrial septum.    < end of copied text >    -CCTA:  -STRESS TEST:  -CATHETERIZATION:    Coronary circulation: The coronary circulation is right dominant. There was significant 1-vessel coronary artery disease. Left main: Angiography showed no evidence of disease. LAD: Angiography showed mild diffuse atherosclerosis. Patent prior stent in mid LAD. 1st diagonal: Angiography showed no evidence of disease. 2nd diagonal: Angiography showed mild atherosclerosis with no flow limiting lesions. Proximal circumflex: There was a discrete 50 % stenosis. Distal circumflex: Angiography showed mild atherosclerosis with no flow limiting lesions. Patent prior stent. 1st obtuse marginal: The vessel was medium sized. There was a discrete 90 % stenosis in the proximal third of the vessel segment. The lesion was hazy. There was KEELY grade 3 flow through the vessel (brisk flow). This is a likely culprit for the patient's clinical presentation. An intervention was performed. RCA: The vessel was large sized (dominant). Proximal RCA: Angiography showed mild atherosclerosis with no flow limiting lesions. Mid RCA: Angiography showed mild atherosclerosis with no flow limiting lesions. Patent prior stent. Distal RCA: There was a discrete 50 % stenosis. Right PDA: There was a discrete 50 % stenosis in the middle third of the vessel segment. Right posterolateral segment: Angiography showed mild atherosclerosis with no flow limiting lesions.     PROCEDURE SUMMARY  The coronary anatomy is normal. There is significant single vessel coronary artery disease.  Successful PCI of OM1 with MEAGAN x 1 (AUC score 8).   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
TRAUMA ACTIVATION LEVEL:  ALERT  ACTIVATED BY: ED  INTUBATED: NO      MECHANISM OF INJURY:   [x] Fall	      GCS: 15 	E: 4	V: 5	M: 6    HPI: 88yF w/ PMHx of CAD S/P quadruple stents, DM, spinal stenosis, HTN, afib on Eliquis and plavix with a pacemaker, hypothyroidism, pancreatic cancer s/p distal pancreatectomy, cholangiocarcinoma with mets to the liver s/p partial liver resection seen as a Trauma Alert s/p fall. Patient reports she started experiencing palpitations early in the morning, attempted to get out of bed, but became weak, dizzy, and nauseated syncopized, hit her head, and woke up on the floor. Her nurse aide heard the patient fall, and called EMS. The patient's last dose of Eliquis was 3/10 at 9:30pm. The patient is complaining of L ankle pain and and R arm due (due to an abrasion). She is currently in a C-collar, and denies current chest pain, SOB, nausea, vomiting, head or neck pain.     A CODE STEMI was called in the ED based on the patient's presentation and EKG showing diffuse ST depressions with elevation in AVR. 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      Trauma assessment in ED: ABCs intact , GCS 15 , AAOx3. External signs of trauma include:***    PAST MEDICAL & SURGICAL HISTORY:  Hypothyroid      CAD (coronary artery disease)      Acute MI      CAD S/P percutaneous coronary angioplasty      DM (diabetes mellitus), secondary      H/O spinal stenosis      Polymyalgia      Arthritis      IBS (irritable bowel syndrome)      S/P tonsillectomy      History of cholecystectomy      History of appendectomy      Cataract of both eyes      History of hip replacement, total, left      Cardiac pacemaker in situ          Allergies    Cipro (Diarrhea)  Percocet 5/325 (Unknown)    Intolerances        Home Medications:  Cardizem  mg/24 hours oral capsule, extended release: 1 cap(s) orally once a day (03 Jan 2023 12:34)  Eliquis 5 mg oral tablet: 1 tab(s) orally 2 times a day (03 Jan 2023 12:34)  Jardiance 10 mg oral tablet: 1 tab(s) orally once a day (in the morning) (03 Jan 2023 12:34)  lactobacillus acidophilus oral capsule: 1 cap(s) orally 3 times a day (with meals) (03 Jan 2023 12:34)  Lasix 20 mg oral tablet: 1 tab(s) orally once a day (03 Jan 2023 12:34)  melatonin 3 mg oral tablet: 1 tab(s) orally once a day (at bedtime), As needed, Insomnia (03 Jan 2023 12:34)  metoprolol tartrate 25 mg oral tablet: 1 tab(s) orally 2 times a day (03 Jan 2023 12:34)  metroNIDAZOLE 500 mg/100 mL intravenous solution: 100 milliliter(s) intravenous every 8 hours (03 Jan 2023 12:34)  pantoprazole 40 mg oral delayed release tablet: 1 tab(s) orally once a day (03 Jan 2023 12:34)  Plavix 75 mg oral tablet: 1 tab(s) orally once a day (03 Jan 2023 12:34)  saccharomyces boulardii lyo 250 mg oral capsule: 1 cap(s) orally 2 times a day (03 Jan 2023 12:34)  Tresiba 100 units/mL subcutaneous solution: 10 unit(s) subcutaneous once a day (at bedtime) (03 Jan 2023 12:34)      ROS: 10-system review is otherwise negative except HPI above.      Primary Survey:    A - airway intact  B - bilateral breath sounds and good chest rise  C - palpable pulses in all extremities  D - GCS 15 on arrival, SHAH  Exposure obtained    Vital Signs Last 24 Hrs  T(C): 36.7 (11 Mar 2023 06:02), Max: 36.7 (11 Mar 2023 05:30)  T(F): 98.1 (11 Mar 2023 06:02), Max: 98.1 (11 Mar 2023 05:30)  HR: 130 (11 Mar 2023 06:24) (130 - 136)  BP: 118/62 (11 Mar 2023 06:24) (82/54 - 118/62)  BP(mean): --  RR: 17 (11 Mar 2023 06:24) (17 - 20)  SpO2: 100% (11 Mar 2023 06:26) (88% - 100%)    Parameters below as of 11 Mar 2023 06:26  Patient On (Oxygen Delivery Method): nasal cannula  O2 Flow (L/min): 3    Secondary Survey:   General: NAD  HEENT: Normocephalic, atraumatic, EOMI, PEERLA. no scalp lacerations   Neck: Soft, midline trachea. no c-spine tenderness  Chest: No chest wall tenderness   Cardiac: S1, S2   Respiratory: Bilateral breath sounds, clear and equal bilaterally  Abdomen: Soft, non-distended, non-tender, no rebound, no guarding.  Groin: Normal appearing, pelvis stable   Ext:  Moving b/l upper and lower extremities. Palpable Radial b/l UE, b/l DP palpable in LE. L ankle tenderness. R forearm abrasion  Back: No T/L/S spine tenderness, No palpable runoff/stepoff/deformity  Rectal: Good rectal tone    FAST: Negative    ACCESS / DEVICES:  [x] Peripheral IV      Labs:  CAPILLARY BLOOD GLUCOSE                              15.5   11.77 )-----------( 241      ( 11 Mar 2023 05:30 )             45.8       Auto Neutrophil %: 71.9 % (03-11-23 @ 05:30)  Auto Immature Granulocyte %: 0.6 % (03-11-23 @ 05:30)    03-11    134<L>  |  94<L>  |  25<H>  ----------------------------<  193<H>  5.2<H>   |  21  |  1.0      Calcium, Total Serum: 9.7 mg/dL (03-11-23 @ 05:30)    LFTs:             6.9  | 0.8  | 54       ------------------[69      ( 11 Mar 2023 05:30 )  3.8  | x    | 27          Lipase:31     Amylase:x         Lactate, Blood: 2.4 mmol/L (03-11-23 @ 05:30)      Coags:     15.80  ----< 1.37    ( 11 Mar 2023 05:30 )     25.6            Alcohol, Blood: <10 mg/dL (03-11-23 @ 05:30)      Alcohol, Blood: <10 mg/dL (03-11-23 @ 05:30)      RADIOLOGY & ADDITIONAL STUDIES:  ---------------------------------------------------------------------------------------      
Patient is a 88y old  Female who presents with a chief complaint of Syncope (14 Mar 2023 13:16)    HPI: Patient is an 88 year old female with history of paroxysmal atrial fibrillation, Sinus Node Dysfunction s/p DC-PPM (SJM, 22, Non-dep), HTN, YIFAN, Hypothyroidism, Pancreatic ca s/p Sx, Cholangioca w/mets s/p liver resection who underwent successful ADITYA/DCCV on 1/3/2023 admitted now for episode of syncope on 3/11. Patient states she woke up from palpitations around 2am on 3/11 and sat in her recliner for over an hour. She thought she was feeling better and decided to walk to the kitchen to take an extra dose of Metoprolol (dose had recently been cut from 50 to 25mg 3 days prior by Dr Olivia). When she got to the kitchen she states she felt very weak and "depleted" but not dizzy or lightheaded. States she put her head on the counter and the next thing she recalls is her HHA calling her name, which as per patient the aid reports it was only several seconds. No previous episodes of syncope and pt reports she has been in PAF since . No hx of ablation, only DCCV in the past. Patient found to be orthostatic here in the hospital. Now she is feeling much better.    PAST MEDICAL & SURGICAL HISTORY:  Hypothyroid      CAD (coronary artery disease)      Acute MI      CAD S/P percutaneous coronary angioplasty      DM (diabetes mellitus), secondary      H/O spinal stenosis      Polymyalgia      Arthritis      IBS (irritable bowel syndrome)      S/P tonsillectomy      History of cholecystectomy      History of appendectomy      Cataract of both eyes      History of hip replacement, total, left      Cardiac pacemaker in situ        PREVIOUS DIAGNOSTIC TESTING:      ECHO  FINDINGS:  < from: TTE Echo Complete w/o Contrast w/ Doppler (23 @ 11:16) >    Summary:   1. Left ventricular ejection fraction, by visual estimation, is 60 to   65%.   2. Normal global left ventricular systolic function.   3. Spectral Doppler shows impaired relaxation pattern of left   ventricular myocardial filling (Grade I diastolic dysfunction).   4. Moderately enlarged left atrium.   5. Sclerotic aortic valve with normal opening.   6. Moderate mitral annular calcification.   7. Mild mitral regurgitation.    < end of copied text >    STRESS  FINDINGS:      CATHETERIZATION  FINDINGS:    ELECTROPHYSIOLOGY STUDY  FINDINGS:    CAROTID ULTRASOUND:  FINDINGS    VENOUS DUPLEX SCAN:  FINDINGS:    CHEST CT PULMONARY ANGIO with IV Contrast:  FINDINGS:    MEDICATIONS  (STANDING):  apixaban 5 milliGRAM(s) Oral every 12 hours  clopidogrel Tablet 75 milliGRAM(s) Oral daily  dextrose 5%. 1000 milliLiter(s) (50 mL/Hr) IV Continuous <Continuous>  dextrose 5%. 1000 milliLiter(s) (100 mL/Hr) IV Continuous <Continuous>  dextrose 50% Injectable 25 Gram(s) IV Push once  dextrose 50% Injectable 12.5 Gram(s) IV Push once  dextrose 50% Injectable 25 Gram(s) IV Push once  glucagon  Injectable 1 milliGRAM(s) IntraMuscular once  insulin glargine Injectable (LANTUS) 16 Unit(s) SubCutaneous at bedtime  insulin lispro (ADMELOG) corrective regimen sliding scale   SubCutaneous three times a day before meals  levothyroxine 25 MICROGram(s) Oral daily  metoprolol tartrate 25 milliGRAM(s) Oral two times a day  pantoprazole    Tablet 40 milliGRAM(s) Oral before breakfast  sertraline 50 milliGRAM(s) Oral daily    MEDICATIONS  (PRN):  dextrose Oral Gel 15 Gram(s) Oral once PRN Blood Glucose LESS THAN 70 milliGRAM(s)/deciliter      FAMILY HISTORY:  No pertinent family history in first degree relatives    SOCIAL HISTORY: No smoking, ETOH or illicit drug use    Past Surgical History: see above    Allergies:  Cipro (Diarrhea)  Percocet 5/325 (Unknown)      REVIEW OF SYSTEMS:  CONSTITUTIONAL: No fever, weight loss, chills, shakes, or fatigue  RESPIRATORY: No cough, wheezing, hemoptysis, or shortness of breath  CARDIOVASCULAR: +palpitations; No chest pain, dyspnea, dizziness, paroxysmal nocturnal dyspnea, orthopnea, or arm or leg swelling  GASTROINTESTINAL: No abdominal  or epigastric pain, nausea, vomiting, hematemesis, diarrhea, constipation, melena or bright red blood.  NEUROLOGICAL: +Syncope; No headaches, memory loss, slurred speech, limb weakness, loss of strength, numbness, or tremors  MUSCULOSKELETAL: No joint pain or swelling, muscle, back, or extremity pain      Vital Signs Last 24 Hrs  T(C): 36.4 (14 Mar 2023 13:33), Max: 36.6 (14 Mar 2023 04:59)  T(F): 97.6 (14 Mar 2023 13:33), Max: 97.8 (14 Mar 2023 04:59)  HR: 69 (14 Mar 2023 13:33) (68 - 70)  BP: 113/62 (14 Mar 2023 13:33) (113/62 - 143/66)  BP(mean): --  RR: 18 (14 Mar 2023 13:33) (18 - 18)  SpO2: 96% (14 Mar 2023 10:57) (96% - 96%)    Parameters below as of 14 Mar 2023 10:57  Patient On (Oxygen Delivery Method): room air        PHYSICAL EXAM:  GENERAL: In no apparent distress, well nourished, and hydrated.  NECK: Supple, No JVD   HEART: Regular rate and rhythm; No murmurs, rubs, or gallops.  PULMONARY: Clear to auscultation and perfusion.  No rales, wheezing, or rhonchi bilaterally.  EXTREMITIES:  2+ Peripheral Pulses, no LE edema BL  NEUROLOGICAL: Grossly nonfocal      INTERPRETATION OF TELEMETRY: V-Paced 74 bpm    ECG:  < from: 12 Lead ECG (23 @ 14:07) >    Ventricular Rate 72 BPM    Atrial Rate 72 BPM    P-R Interval 220 ms    QRS Duration 152 ms    Q-T Interval 472 ms    QTC Calculation(Bazett) 516 ms    P Axis 22 degrees    R Axis -63 degrees    T Axis 90 degrees    Diagnosis Line AV dual-paced rhythm  Left axis deviation  Left ventricular hypertrophy with QRS widening and repolarization abnormality  Lateral infarct , age undetermined  Inferior infarct , age undetermined  Abnormal ECG    Confirmed by Harmeet Martinez ([id number]) on 3/11/2023 5:23:26 PM     < end of copied text >      I&O's Detail    13 Mar 2023 07:01  -  14 Mar 2023 07:00  --------------------------------------------------------  IN:    Oral Fluid: 286 mL  Total IN: 286 mL    OUT:  Total OUT: 0 mL    Total NET: 286 mL          LABS:                        14.1   9.99  )-----------( 203      ( 14 Mar 2023 06:13 )             42.4     03-14    142  |  101  |  22<H>  ----------------------------<  93  3.5   |  28  |  0.8    Ca    9.4      14 Mar 2023 06:13  Mg     1.6     03-14              BNP  I&O's Detail    13 Mar 2023 07:01  -  14 Mar 2023 07:00  --------------------------------------------------------  IN:    Oral Fluid: 286 mL  Total IN: 286 mL    OUT:  Total OUT: 0 mL    Total NET: 286 mL        Daily     Daily Weight in k (14 Mar 2023 04:59)    RADIOLOGY & ADDITIONAL STUDIES:
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 Patient reports she started experiencing palpitations early in the morning around 5 am, attempted to get out of bed, but became weak, dizzy, and nauseated syncopized, hit her head, and woke up on the floor. Her nurse aide heard the patient fall, and called EMS. The patient's last dose of Eliquis was 3/10 at 9:30pm. The patient is complaining of L ankle pain and and R arm due (due to an abrasion). A CODE STEMI was called in the ED based on the patient's presentation and EKG showing diffuse ST depressions with elevation in AVR. 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     Off note patient's cardiologist has bee decreasing he PO medication progressively. Recent decrease in metoprolol from 50mg BID to 25mg BID around 3 days ago    Vital Signs Last 24 Hrs  T(C): 36.7 (11 Mar 2023 06:02), Max: 36.7 (11 Mar 2023 05:30)  T(F): 98.1 (11 Mar 2023 06:02), Max: 98.1 (11 Mar 2023 05:30)  HR: 73 (11 Mar 2023 07:45) (73 - 136)  BP: 131/58 (11 Mar 2023 07:45) (82/54 - 131/58)  BP(mean): 83 (11 Mar 2023 07:45) (83 - 83)  RR: 18 (11 Mar 2023 07:45) (17 - 20)  SpO2: 98% (11 Mar 2023 07:45) (88% - 100%)    Parameters below as of 11 Mar 2023 07:45  Patient On (Oxygen Delivery Method): nasal cannula  O2 Flow (L/min): 2     (11 Mar 2023 13:57)      PAST MEDICAL & SURGICAL HISTORY:  Hypothyroid      CAD (coronary artery disease)      Acute MI      CAD S/P percutaneous coronary angioplasty      DM (diabetes mellitus), secondary      H/O spinal stenosis      Polymyalgia      Arthritis      IBS (irritable bowel syndrome)      S/P tonsillectomy      History of cholecystectomy      History of appendectomy      Cataract of both eyes      History of hip replacement, total, left      Cardiac pacemaker in situ                          PREVIOUS DIAGNOSTIC TESTING:      ECHO  FINDINGS:    STRESS  FINDINGS:    CATHETERIZATION  FINDINGS:    MEDICATIONS  (STANDING):  apixaban 5 milliGRAM(s) Oral every 12 hours  clopidogrel Tablet 75 milliGRAM(s) Oral daily  dextrose 5%. 1000 milliLiter(s) (50 mL/Hr) IV Continuous <Continuous>  dextrose 5%. 1000 milliLiter(s) (100 mL/Hr) IV Continuous <Continuous>  dextrose 50% Injectable 25 Gram(s) IV Push once  dextrose 50% Injectable 12.5 Gram(s) IV Push once  dextrose 50% Injectable 25 Gram(s) IV Push once  diltiazem    milliGRAM(s) Oral daily  furosemide    Tablet 20 milliGRAM(s) Oral daily  glucagon  Injectable 1 milliGRAM(s) IntraMuscular once  insulin glargine Injectable (LANTUS) 16 Unit(s) SubCutaneous at bedtime  insulin lispro (ADMELOG) corrective regimen sliding scale   SubCutaneous three times a day before meals  levothyroxine 25 MICROGram(s) Oral daily  pantoprazole    Tablet 40 milliGRAM(s) Oral before breakfast  sertraline 50 milliGRAM(s) Oral daily    MEDICATIONS  (PRN):  dextrose Oral Gel 15 Gram(s) Oral once PRN Blood Glucose LESS THAN 70 milliGRAM(s)/deciliter      FAMILY HISTORY:  No pertinent family history in first degree relatives        SOCIAL HISTORY:    CIGARETTES:    ALCOHOL:        Vital Signs Last 24 Hrs  T(C): 36.6 (12 Mar 2023 07:46), Max: 36.6 (11 Mar 2023 16:02)  T(F): 97.9 (12 Mar 2023 07:46), Max: 97.9 (11 Mar 2023 16:02)  HR: 65 (12 Mar 2023 07:46) (65 - 71)  BP: 130/60 (12 Mar 2023 07:46) (107/54 - 138/62)  BP(mean): --  RR: 18 (12 Mar 2023 07:46) (18 - 18)  SpO2: 98% (12 Mar 2023 07:46) (95% - 98%)    Parameters below as of 12 Mar 2023 07:46  Patient On (Oxygen Delivery Method): room air                INTERPRETATION OF TELEMETRY:    ECG:    I&O's Detail      LABS:                        14.1   10.45 )-----------( 195      ( 12 Mar 2023 08:01 )             41.8     03-12    140  |  98  |  21<H>  ----------------------------<  142<H>  3.7   |  27  |  0.9    Ca    9.5      12 Mar 2023 08:01  Phos  2.8     03-12  Mg     1.6     03-12    TPro  6.1  /  Alb  3.8  /  TBili  0.9  /  DBili  x   /  AST  26  /  ALT  23  /  AlkPhos  78  03-12    CARDIAC MARKERS ( 12 Mar 2023 08:01 )  x     / 0.06 ng/mL / x     / x     / x      CARDIAC MARKERS ( 11 Mar 2023 20:45 )  x     / 0.07 ng/mL / x     / x     / x      CARDIAC MARKERS ( 11 Mar 2023 17:32 )  x     / 0.06 ng/mL / x     / x     / x      CARDIAC MARKERS ( 11 Mar 2023 12:05 )  x     / 0.05 ng/mL / x     / x     / x      CARDIAC MARKERS ( 11 Mar 2023 07:20 )  x     / <0.01 ng/mL / x     / x     / x          PT/INR - ( 11 Mar 2023 05:30 )   PT: 15.80 sec;   INR: 1.37 ratio         PTT - ( 11 Mar 2023 05:30 )  PTT:25.6 sec  Urinalysis Basic - ( 12 Mar 2023 06:18 )    Color: Yellow / Appearance: Clear / S.039 / pH: x  Gluc: x / Ketone: Moderate  / Bili: Small / Urobili: <2 mg/dL   Blood: x / Protein: 30 mg/dL / Nitrite: Negative   Leuk Esterase: Negative / RBC: 1 /HPF / WBC 4 /HPF   Sq Epi: x / Non Sq Epi: 3 /HPF / Bacteria: Negative      I&O's Summary    BNP  RADIOLOGY & ADDITIONAL STUDIES:

## 2023-03-14 NOTE — DISCHARGE NOTE NURSING/CASE MANAGEMENT/SOCIAL WORK - NSDCVIVACCINE_GEN_ALL_CORE_FT
Tdap; 11-Mar-2023 07:42; Dion Potts (MARCO A); [employer]; [id number]   (Exp. Date: 15-Feb-2025); IntraMuscular; Deltoid Left.; 0.5 milliLiter(s); VIS (VIS Published: 09-May-2013, VIS Presented: 11-Mar-2023);

## 2023-03-14 NOTE — DISCHARGE NOTE PROVIDER - NSDCMRMEDTOKEN_GEN_ALL_CORE_FT
[place] Thyroid 15 mg oral tablet: 1 tab(s) orally once a day  Cardizem  mg/24 hours oral capsule, extended release: 1 cap(s) orally once a day  Eliquis 5 mg oral tablet: 1 tab(s) orally 2 times a day  Jardiance 10 mg oral tablet: 1 tab(s) orally once a day (in the morning)  Lasix 20 mg oral tablet: 1 tab(s) orally once a day  Metoprolol Tartrate 25 mg oral tablet: 1 tab(s) orally 2 times a day  metoprolol tartrate 25 mg oral tablet: 1 tab(s) orally 2 times a day  pantoprazole 40 mg oral delayed release tablet: 1 tab(s) orally once a day  Plavix 75 mg oral tablet: 1 tab(s) orally once a day  Tresiba 100 units/mL subcutaneous solution: 16 unit(s) subcutaneous once a day (at bedtime)  Zoloft 50 mg oral tablet: 1 tab(s) orally once a day   [place] Thyroid 15 mg oral tablet: 1 tab(s) orally once a day  Eliquis 5 mg oral tablet: 1 tab(s) orally 2 times a day  Jardiance 10 mg oral tablet: 1 tab(s) orally once a day (in the morning)  Metoprolol Succinate ER 50 mg oral tablet, extended release: 1 tab(s) orally once a day   pantoprazole 40 mg oral delayed release tablet: 1 tab(s) orally once a day  Plavix 75 mg oral tablet: 1 tab(s) orally once a day  Tresiba 100 units/mL subcutaneous solution: 16 unit(s) subcutaneous once a day (at bedtime)  Zoloft 50 mg oral tablet: 1 tab(s) orally once a day

## 2023-03-14 NOTE — PROGRESS NOTE ADULT - ASSESSMENT
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    # Syncope   # SSS s/p PPM  - Follows with Dr Whitley and Dr nEriquez outpatient   - Orthostats positive, add KATHY stockings  - On presentation  Afib with RVR s/p IV metoprolol & cardizem   - c/w metoprolol and Eliquis  - TTE:  60 to 65% EF, GIDD  - Pending EP re-evaluation     #Elevated Troponin  #CAD s/p PCI (LAD, RCA, LCX) - last in 2021  - Elevated troponin, peaked at 0.07  - c/w Metoprolol  - c/w Plavix   - No statins outpatient    # Pancreatic Ca s/p distal pancreatectomy Aug 2021  # Cholangiocarcinoma with mets to the  liver s/p partial liver resection s/p chemotherapy last session Jan 2022 (follows at [facility])  - stable   - no current treatments    # Hypothyroidism   - Home meds: [place] Thyroid 15mg  - Started 15mcg levothyroxine equivalent     #Thyroid nodule  - 2.1 cm  - F/u outpatient     # DM  - Home meds: Jardiance & Insulin 16 units bedtime   - start lantus 16 units and correctional scale

## 2023-03-14 NOTE — CONSULT NOTE ADULT - ASSESSMENT
Cardiologist: Dr Olivia  EP: Dr Neri    Assessment: 88 year old female with history of PAF, SND s/p DC-PPM (SJM, 22, Non-dep), HTN, YIFAN, Hypothyroidism, Pancreatic ca s/p Sx, Cholanginocarcinoma w/mets s/p liver resection admitted for syncope    PPM Interrogation 3/11  Episode AF RVR with Peak V Rate 142 bpm at 5:06am x 1h24m  No VT events  Device functioning properly    Impression:  Syncope  PAF RVR, now in NSR  Orthostatic Hypotension  SND sp DC PPM  HTN  YIFAN  Pancreatic CA  Cholanginocarcinoma    Plan:  - Cont rate control with BB, increase as tolerated  - Treat orthostatic hypotension  - Patient can follow up with Dr Neri as outpatient for further AF treatment management  - Will discuss with attending   Cardiologist: Dr Olivia  EP: Dr Neri    Assessment: 88 year old female with history of PAF, SND s/p DC-PPM (SJM, 22, Non-dep), HTN, YIFAN, Hypothyroidism, Pancreatic ca s/p Sx, Cholanginocarcinoma w/mets s/p liver resection admitted for syncope    PPM Interrogation 3/11  Episode AF RVR with Peak V Rate 142 bpm at 5:06am x 1h24m  No VT events  Device functioning properly    Impression:  Syncope  PAF RVR, now in NSR  Orthostatic Hypotension  SND sp DC PPM  HTN  YIFAN  Pancreatic CA  Cholanginocarcinoma    Plan:  - Cont rate control with BB, increase as tolerated  - Treat orthostatic hypotension  - Patient can follow up with Dr Neri as outpatient for further AF treatment management  - No further EP intervention, recall as needed [id number]   Cardiologist: Dr Olivia  EP: Dr Neri    Assessment: 88 year old female with history of PAF, SND s/p DC-PPM (SJM, 22, Non-dep), HTN, YIFAN, Hypothyroidism, Pancreatic ca s/p Sx, Cholanginocarcinoma w/mets s/p liver resection admitted for syncope    PPM Interrogation 3/11  Episode AF RVR with Peak V Rate 142 bpm at 5:06am x 1h24m  No VT events  Device functioning properly    Impression:  Syncope  PAF RVR, now in NSR  Orthostatic Hypotension  SND sp DC PPM  HTN  YIFAN  Pancreatic CA  Cholanginocarcinoma    Plan:  - Switch to Toprol XL 50mg daily  - Increase fluid intake  - Advised patient to stand up slowly  - Treat orthostatic hypotension  - Patient can follow up with Dr Neri as outpatient for further AF treatment management  - No further EP intervention, recall as needed [id number]

## 2023-03-14 NOTE — PROGRESS NOTE ADULT - SUBJECTIVE AND OBJECTIVE BOX
Subjective:pt feels better with no cp nor sob.        MEDICATIONS  (STANDING):  apixaban 5 milliGRAM(s) Oral every 12 hours  clopidogrel Tablet 75 milliGRAM(s) Oral daily  dextrose 5%. 1000 milliLiter(s) (50 mL/Hr) IV Continuous <Continuous>  dextrose 5%. 1000 milliLiter(s) (100 mL/Hr) IV Continuous <Continuous>  dextrose 50% Injectable 25 Gram(s) IV Push once  dextrose 50% Injectable 12.5 Gram(s) IV Push once  dextrose 50% Injectable 25 Gram(s) IV Push once  glucagon  Injectable 1 milliGRAM(s) IntraMuscular once  insulin glargine Injectable (LANTUS) 16 Unit(s) SubCutaneous at bedtime  insulin lispro (ADMELOG) corrective regimen sliding scale   SubCutaneous three times a day before meals  levothyroxine 25 MICROGram(s) Oral daily  magnesium oxide 400 milliGRAM(s) Oral three times a day with meals  metoprolol tartrate 25 milliGRAM(s) Oral two times a day  pantoprazole    Tablet 40 milliGRAM(s) Oral before breakfast  sertraline 50 milliGRAM(s) Oral daily    MEDICATIONS  (PRN):  dextrose Oral Gel 15 Gram(s) Oral once PRN Blood Glucose LESS THAN 70 milliGRAM(s)/deciliter            Vital Signs Last 24 Hrs  T(C): 36.4 (13 Mar 2023 19:44), Max: 37.2 (12 Mar 2023 21:25)  T(F): 97.5 (13 Mar 2023 19:44), Max: 98.9 (12 Mar 2023 21:25)  HR: 68 (13 Mar 2023 19:44) (60 - 69)  BP: 138/62 (13 Mar 2023 19:44) (115/56 - 138/62)  BP(mean): --  RR: 18 (13 Mar 2023 19:44) (18 - 18)  SpO2: --                 REVIEW OF SYSTEMS:  CONSTITUTIONAL: no fever, no chills, no diaphoresis  CARDIOLOGY: no chest pain, (+)palpitation, no diaphoresis,(+) faint   RESPIRATORY: (+) dyspnea, no wheeze, no orthopnea, no PND   NEUROLOGICAL: no dizziness, headache, focal deficits to report.  GI: no abdominal pain, no dyspepsia, no nausea, no vomiting, no diarrhea.    HEENT: no congestion, no nasal bleeding  SKIN: no ecchymosis, no petechia             PHYSICAL EXAM:  · CONSTITUTIONAL: Looks stable, in no distress  . NECK: Supple, no JVD, no bruit on either carotid side   · RESPIRATORY: Normal air entry to lung base, no wheeze, no crackle, no wet rales  · CARDIOVASCULAR: Normal S1, A2, P2, no murmur, no click, regular rate,  no rub,  · EXTREMITIES: No cyanosis, no clubbing, no edema  · VASCULAR: Pulses are regular, equal, bilateral in upper and lower extremities  	  TELEMETRY:  nsr,   ECG:Diagnosis Line AV dual-paced rhythm  Left axis deviation  Left ventricular hypertrophy with QRS widening and repolarization abnormality  Lateral infarct , age undetermined  Inferior infarct , age undetermined  Abnormal ECG      TTE:Summary:   1. Left ventricular ejection fraction, by visual estimation, is 60 to   65%.   2. Normal global left ventricular systolic function.   3. Spectral Doppler shows impaired relaxation pattern of left   ventricular myocardial filling (Grade I diastolic dysfunction).   4. Moderately enlarged left atrium.   5. Sclerotic aortic valve with normal opening.   6. Moderate mitral annular calcification.   7. Mild mitral regurgitation.        LABS:                        14.1   11.30 )-----------( 204      ( 13 Mar 2023 18:12 )             42.4     03-13    139  |  98  |  26<H>  ----------------------------<  129<H>  3.6   |  27  |  0.8    Ca    9.5      13 Mar 2023 18:12  Phos  2.8     03-12  Mg     1.6     03-13    TPro  6.1  /  Alb  3.8  /  TBili  0.9  /  DBili  x   /  AST  26  /  ALT  23  /  AlkPhos  78  03-12    CARDIAC MARKERS ( 12 Mar 2023 08:01 )  x     / 0.06 ng/mL / x     / x     / x              I&O's Summary    13 Mar 2023 07:01  -  13 Mar 2023 21:22  --------------------------------------------------------  IN: 286 mL / OUT: 0 mL / NET: 286 mL      BNP  RADIOLOGY & ADDITIONAL STUDIES:    IMPRESSION AND PLAN:        
24H events:    Patient is a 88y old Female who presents with a chief complaint of Syncope (13 Mar 2023 12:30)    Primary diagnosis of Syncope       Today is hospital day 2d. This morning patient was seen and examined at bedside, resting comfortably in bed.      PAST MEDICAL & SURGICAL HISTORY  Hypothyroid    CAD (coronary artery disease)    Acute MI    CAD S/P percutaneous coronary angioplasty    DM (diabetes mellitus), secondary    H/O spinal stenosis    Polymyalgia    Arthritis    IBS (irritable bowel syndrome)    S/P tonsillectomy    History of cholecystectomy    History of appendectomy    Cataract of both eyes    History of hip replacement, total, left    Cardiac pacemaker in situ      SOCIAL HISTORY:  Social History:      ALLERGIES:  Cipro (Diarrhea)  Percocet 5/325 (Unknown)    MEDICATIONS:  STANDING MEDICATIONS  apixaban 5 milliGRAM(s) Oral every 12 hours  clopidogrel Tablet 75 milliGRAM(s) Oral daily  dextrose 5%. 1000 milliLiter(s) IV Continuous <Continuous>  dextrose 5%. 1000 milliLiter(s) IV Continuous <Continuous>  dextrose 50% Injectable 25 Gram(s) IV Push once  dextrose 50% Injectable 12.5 Gram(s) IV Push once  dextrose 50% Injectable 25 Gram(s) IV Push once  glucagon  Injectable 1 milliGRAM(s) IntraMuscular once  insulin glargine Injectable (LANTUS) 16 Unit(s) SubCutaneous at bedtime  insulin lispro (ADMELOG) corrective regimen sliding scale   SubCutaneous three times a day before meals  levothyroxine 25 MICROGram(s) Oral daily  metoprolol tartrate 25 milliGRAM(s) Oral two times a day  pantoprazole    Tablet 40 milliGRAM(s) Oral before breakfast  sertraline 50 milliGRAM(s) Oral daily    PRN MEDICATIONS  dextrose Oral Gel 15 Gram(s) Oral once PRN          LABS:                        14.1   10.45 )-----------( 195      ( 12 Mar 2023 08:01 )             41.8     03-12    140  |  98  |  21<H>  ----------------------------<  142<H>  3.7   |  27  |  0.9    Ca    9.5      12 Mar 2023 08:01  Phos  2.8     03-12  Mg     1.6     03-12    TPro  6.1  /  Alb  3.8  /  TBili  0.9  /  DBili  x   /  AST  26  /  ALT  23  /  AlkPhos  78  03-12      Urinalysis Basic - ( 12 Mar 2023 06:18 )    Color: Yellow / Appearance: Clear / S.039 / pH: x  Gluc: x / Ketone: Moderate  / Bili: Small / Urobili: <2 mg/dL   Blood: x / Protein: 30 mg/dL / Nitrite: Negative   Leuk Esterase: Negative / RBC: 1 /HPF / WBC 4 /HPF   Sq Epi: x / Non Sq Epi: 3 /HPF / Bacteria: Negative            CARDIAC MARKERS ( 12 Mar 2023 08:01 )  x     / 0.06 ng/mL / x     / x     / x      CARDIAC MARKERS ( 11 Mar 2023 20:45 )  x     / 0.07 ng/mL / x     / x     / x      CARDIAC MARKERS ( 11 Mar 2023 17:32 )  x     / 0.06 ng/mL / x     / x     / x            RADIOLOGY:    VITALS:   T(F): 97.6  HR: 66  BP: 115/56  RR: 18  SpO2: 96%    PHYSICAL EXAM:    GENERAL: NAD  HEAD:  Atraumatic, Normocephalic  NERVOUS SYSTEM:  Alert & Oriented X3  CHEST/LUNG: Clear to auscultation bilaterally; No rales, rhonchi, wheezing, or rubs  HEART: Regular rate and rhythm; No audible murmurs  ABDOMEN: Soft, Nontender, Nondistended; Bowel sounds present  EXTREMITIES: Trace edema  LYMPH: No lymphadenopathy noted  SKIN: No rashes or lesions      
24H events:    Patient is a 88y old Female who presents with a chief complaint of Syncope (14 Mar 2023 13:53)    Primary diagnosis of Syncope       Today is hospital day 3d. This morning patient was seen and examined at bedside, resting comfortably in bed.      PAST MEDICAL & SURGICAL HISTORY  Hypothyroid    CAD (coronary artery disease)    Acute MI    CAD S/P percutaneous coronary angioplasty    DM (diabetes mellitus), secondary    H/O spinal stenosis    Polymyalgia    Arthritis    IBS (irritable bowel syndrome)    S/P tonsillectomy    History of cholecystectomy    History of appendectomy    Cataract of both eyes    History of hip replacement, total, left    Cardiac pacemaker in situ      SOCIAL HISTORY:  Social History:      ALLERGIES:  Cipro (Diarrhea)  Percocet 5/325 (Unknown)    MEDICATIONS:  STANDING MEDICATIONS  apixaban 5 milliGRAM(s) Oral every 12 hours  clopidogrel Tablet 75 milliGRAM(s) Oral daily  dextrose 5%. 1000 milliLiter(s) IV Continuous <Continuous>  dextrose 5%. 1000 milliLiter(s) IV Continuous <Continuous>  dextrose 50% Injectable 25 Gram(s) IV Push once  dextrose 50% Injectable 12.5 Gram(s) IV Push once  dextrose 50% Injectable 25 Gram(s) IV Push once  glucagon  Injectable 1 milliGRAM(s) IntraMuscular once  insulin glargine Injectable (LANTUS) 16 Unit(s) SubCutaneous at bedtime  insulin lispro (ADMELOG) corrective regimen sliding scale   SubCutaneous three times a day before meals  levothyroxine 25 MICROGram(s) Oral daily  metoprolol tartrate 25 milliGRAM(s) Oral two times a day  pantoprazole    Tablet 40 milliGRAM(s) Oral before breakfast  sertraline 50 milliGRAM(s) Oral daily    PRN MEDICATIONS  dextrose Oral Gel 15 Gram(s) Oral once PRN        03-13-23 @ 07:01  -  03-14-23 @ 07:00  --------------------------------------------------------  IN: 286 mL / OUT: 0 mL / NET: 286 mL        LABS:                        14.1   9.99  )-----------( 203      ( 14 Mar 2023 06:13 )             42.4     03-14    142  |  101  |  22<H>  ----------------------------<  93  3.5   |  28  |  0.8    Ca    9.4      14 Mar 2023 06:13  Mg     1.6     03-14    RADIOLOGY:    VITALS:   T(F): 97.6  HR: 69  BP: 113/62  RR: 18  SpO2: 96%    PHYSICAL EXAM:    GENERAL: NAD  HEAD:  Atraumatic, Normocephalic  NERVOUS SYSTEM:  Alert & Oriented X3  CHEST/LUNG: Clear to auscultation bilaterally; No rales, rhonchi, wheezing, or rubs  HEART: Regular rate and rhythm; No audible murmurs  ABDOMEN: Soft, Nontender, Nondistended; Bowel sounds present  EXTREMITIES: Trace edema  LYMPH: No lymphadenopathy noted  SKIN: No rashes or lesions      
CHIEF COMPLAINT:    Patient is a 88y old  Female who presents with a chief complaint of Syncope     INTERVAL HPI/OVERNIGHT EVENTS:    Patient seen and examined at bedside. No acute overnight events occurred.    ROS: Denies SOB, chest pain. All other systems are negative.    Medications:  Standing  apixaban 5 milliGRAM(s) Oral every 12 hours  clopidogrel Tablet 75 milliGRAM(s) Oral daily  dextrose 5%. 1000 milliLiter(s) IV Continuous <Continuous>  dextrose 5%. 1000 milliLiter(s) IV Continuous <Continuous>  dextrose 50% Injectable 25 Gram(s) IV Push once  dextrose 50% Injectable 12.5 Gram(s) IV Push once  dextrose 50% Injectable 25 Gram(s) IV Push once  glucagon  Injectable 1 milliGRAM(s) IntraMuscular once  insulin glargine Injectable (LANTUS) 16 Unit(s) SubCutaneous at bedtime  insulin lispro (ADMELOG) corrective regimen sliding scale   SubCutaneous three times a day before meals  levothyroxine 25 MICROGram(s) Oral daily  metoprolol tartrate 25 milliGRAM(s) Oral two times a day  pantoprazole    Tablet 40 milliGRAM(s) Oral before breakfast  sertraline 50 milliGRAM(s) Oral daily    PRN Meds  dextrose Oral Gel 15 Gram(s) Oral once PRN        Vital Signs:    T(F): 97.6 (23 @ 05:06), Max: 98.9 (23 @ 21:25)  HR: 66 (23 @ 05:06) (60 - 70)  BP: 115/56 (23 @ 05:06) (115/56 - 146/61)  RR: 18 (23 @ 05:06) (18 - 18)  SpO2: 96% (23 @ 20:15) (94% - 96%)  I&O's Summary    Daily     Daily Weight in k (13 Mar 2023 05:06)  CAPILLARY BLOOD GLUCOSE      POCT Blood Glucose.: 117 mg/dL (13 Mar 2023 07:53)  POCT Blood Glucose.: 189 mg/dL (12 Mar 2023 21:01)  POCT Blood Glucose.: 155 mg/dL (12 Mar 2023 17:44)  POCT Blood Glucose.: 139 mg/dL (12 Mar 2023 16:37)      PHYSICAL EXAM:  GENERAL:  NAD  SKIN: No rashes or lesions  HEENT: Atraumatic. Normocephalic. Anicteric  NECK:  No JVD.   PULMONARY: Clear to ausculation bilaterally. No wheezing. No rales  CVS: Normal S1, S2. Regular rate and rhythm. No murmurs.  ABDOMEN/GI: Soft, Nontender, Nondistended; Bowel sounds are present  EXTREMITIES:  No edema B/L LE.  NEUROLOGIC:  No motor deficit.  PSYCH: Alert & oriented x 3, normal affect      LABS:                        14.1   10.45 )-----------( 195      ( 12 Mar 2023 08:01 )             41.8     03-12    140  |  98  |  21<H>  ----------------------------<  142<H>  3.7   |  27  |  0.9    Ca    9.5      12 Mar 2023 08:01  Phos  2.8     03-12  Mg     1.6     03-12    TPro  6.1  /  Alb  3.8  /  TBili  0.9  /  DBili  x   /  AST  26  /  ALT  23  /  AlkPhos  78  03-12        Trop 0.06, CKMB --, CK --, 23 @ 08:01  Trop 0.07, CKMB --, CK --, 23 @ 20:45  Trop 0.06, CKMB --, CK --, 23 @ 17:32  Trop 0.05, CKMB --, CK --, 23 @ 12:05  Trop <0.01, CKMB --, CK --, 23 @ 07:20        RADIOLOGY & ADDITIONAL TESTS:  Imaging or report Personally Reviewed:  [ ] YES  [ ] NO -->no new images    Telemetry reviewed independently - NSR, no acute events  EKG reviewed independently -->no new EKGs    Consultant(s) Notes Reviewed:  [ ] YES  [ ] NO  Care Discussed with Consultants/Other Providers [ ] YES  [ ] NO    Case discussed with resident  Care discussed with pt        
Patient is a 88y old  Female who presents with a chief complaint of Syncope (03-12-23)      Pt seen and examined at bedside. No CP or SOB.      PAST MEDICAL & SURGICAL HISTORY:  Hypothyroid  CAD (coronary artery disease)  Acute MI  CAD S/P percutaneous coronary angioplasty  DM (diabetes mellitus), secondary  H/O spinal stenosis  Polymyalgia  Arthritis  IBS (irritable bowel syndrome)\  S/P tonsillectomy  History of cholecystectomy  History of appendectomy  Cataract of both eyes  History of hip replacement, total, left  Cardiac pacemaker in situ        VITAL SIGNS (Last 24 hrs):  T(C): 36.6 (03-12-23 @ 07:46), Max: 36.6 (03-11-23 @ 16:02)  HR: 65 (03-12-23 @ 07:46) (65 - 71)  BP: 130/60 (03-12-23 @ 07:46) (107/54 - 138/62)  RR: 18 (03-12-23 @ 07:46) (18 - 18)  SpO2: 98% (03-12-23 @ 07:46) (95% - 98%)  Wt(kg): --  Daily     Daily     I&O's Summary      PHYSICAL EXAM:  GENERAL: NAD, well-developed  HEAD:  Atraumatic, Normocephalic  EYES: EOMI, PERRLA, conjunctiva and sclera clear  NECK: Supple, No JVD  CHEST/LUNG: Clear to auscultation bilaterally; No wheeze  HEART: Regular rate and rhythm; No murmurs, rubs, or gallops  ABDOMEN: Soft, Nontender, Nondistended; Bowel sounds present  EXTREMITIES:  2+ Peripheral Pulses, No clubbing, cyanosis, or edema  PSYCH: AAOx3  NEUROLOGY: non-focal  SKIN: No rashes or lesions    Labs Reviewed  Spoke to patient in regards to abnormal labs.    CBC Full  -  ( 12 Mar 2023 08:01 )  WBC Count : 10.45 K/uL  Hemoglobin : 14.1 g/dL  Hematocrit : 41.8 %  Platelet Count - Automated : 195 K/uL  Mean Cell Volume : 94.8 fL  Mean Cell Hemoglobin : 32.0 pg  Mean Cell Hemoglobin Concentration : 33.7 g/dL  Auto Neutrophil # : 7.05 K/uL  Auto Lymphocyte # : 1.68 K/uL  Auto Monocyte # : 1.34 K/uL  Auto Eosinophil # : 0.21 K/uL  Auto Basophil # : 0.11 K/uL  Auto Neutrophil % : 67.4 %  Auto Lymphocyte % : 16.1 %  Auto Monocyte % : 12.8 %  Auto Eosinophil % : 2.0 %  Auto Basophil % : 1.1 %    BMP:    03-12 @ 08:01    Blood Urea Nitrogen - 21  Calcium - 9.5  Carbond Dioxide - 27  Chloride - 98  Creatinine - 0.9  Glucose - 142  Potassium - 3.7  Sodium - 140      Troponin T, Serum in AM (03.12.23 @ 08:01)    Troponin T, Serum: 0.06: Critical value: ng/mL    Hemoglobin A1c -   PT/INR - ( 11 Mar 2023 05:30 )   PT: 15.80 sec;   INR: 1.37 ratio         PTT - ( 11 Mar 2023 05:30 )  PTT:25.6 sec  Urine Culture:        COVID Labs  CRP:      D-Dimer:            Imaging reviewed independently and reviewed read  < from: Xray Foot AP + Lateral + Oblique, Left (03.11.23 @ 07:47) >  IMPRESSION:    Chronic appearing deformity at the medial aspect of the navicular.   Correlate for point tenderness. Hallux valgus deformity. Midfoot   degenerative changes.        MEDICATIONS  (STANDING):  apixaban 5 milliGRAM(s) Oral every 12 hours  clopidogrel Tablet 75 milliGRAM(s) Oral daily  dextrose 5%. 1000 milliLiter(s) (50 mL/Hr) IV Continuous <Continuous>  dextrose 5%. 1000 milliLiter(s) (100 mL/Hr) IV Continuous <Continuous>  dextrose 50% Injectable 25 Gram(s) IV Push once  dextrose 50% Injectable 12.5 Gram(s) IV Push once  dextrose 50% Injectable 25 Gram(s) IV Push once  diltiazem    milliGRAM(s) Oral daily  furosemide    Tablet 20 milliGRAM(s) Oral daily  glucagon  Injectable 1 milliGRAM(s) IntraMuscular once  insulin glargine Injectable (LANTUS) 16 Unit(s) SubCutaneous at bedtime  insulin lispro (ADMELOG) corrective regimen sliding scale   SubCutaneous three times a day before meals  levothyroxine 25 MICROGram(s) Oral daily  pantoprazole    Tablet 40 milliGRAM(s) Oral before breakfast  sertraline 50 milliGRAM(s) Oral daily    MEDICATIONS  (PRN):  dextrose Oral Gel 15 Gram(s) Oral once PRN Blood Glucose LESS THAN 70 milliGRAM(s)/deciliter

## 2023-03-14 NOTE — DISCHARGE NOTE PROVIDER - HOSPITAL COURSE
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 Admitted and managed for the following:    Syncope   SSS s/p PPM  Afib with RVR  - previous cardioversion failed   - On presentation  Afib with RVR s/p IV metoprolol & Cardizem   - Elevated troponin likely due to demand from RVR  - Rate controlled on Metoprolol  - Follows with Dr Whitley and Dr Enriquez outpatient   - orthostatics positive, likely cause of hypotension  - c/w KATHY stockings  - Pacemaker interrogated; no events, functioning properly    CAD   - s/p PCI (LAD, RCA, LCX) - last in 2021  - c/w BB and plavix therapy     Pancreatic Ca s/p distal pancreatectomy Aug 2021  Cholangiocarcinoma with mets to the  liver s/p partial liver resection s/p chemotherapy last session Jan 2022 (follow[place] [facility] [place])  - stable   - no current treatments

## 2023-03-14 NOTE — DISCHARGE NOTE PROVIDER - NSDCPNSUBOBJ_GEN_ALL_CORE
Pt seen and examined at bedside. Pt feels well. Anahi d/w cardiologist at bedside. Recommended metoprolol succ 50 mg q24 hours with 1/2 dose if palpitations occur. Pt is aware and agreeable.

## 2023-03-14 NOTE — DISCHARGE NOTE PROVIDER - NSDCFUSCHEDAPPT_GEN_ALL_CORE_FT
[facility] Physician [facility]  CARDIOLOGY [id number] [facility]  Scheduled Appointment: 04/06/2023

## 2023-03-14 NOTE — DISCHARGE NOTE PROVIDER - NSDCCPCAREPLAN_GEN_ALL_CORE_FT
PRINCIPAL DISCHARGE DIAGNOSIS  Diagnosis: Syncope  Assessment and Plan of Treatment: Syncope  Syncope is when you temporarily lose consciousness, also called fainting or passing out. It is caused by a sudden decrease in blood flow to the brain. Even though most causes of syncope are not dangerous, syncope can possibly be a sign of a serious medical problem. Signs that you may be about to faint include feeling dizzy, lightheaded, nausea, visual changes, or cold/clammy skin. Do not drive, operate heavy machinery, or play sports until your health care provider says it is okay.  In your case the syncope is likely due to blood pressure dropping down when you stand up and due to the rapid heart rate (Afib).  SEEK IMMEDIATE MEDICAL CARE IF YOU HAVE ANY OF THE FOLLOWING SYMPTOMS: severe headache, pain in your chest/abdomen/back, bleeding from your mouth or rectum, palpitations, shortness of breath, pain with breathing, seizure, confusion, or trouble walking.        SECONDARY DISCHARGE DIAGNOSES  Diagnosis: Afib  Assessment and Plan of Treatment: Atrial fibrillation is a type of irregular heartbeat (arrhythmia) where the heart quivers continuously in a chaotic pattern that makes the heart unable to pump blood normally. This can increase the risk for stroke, heart failure, and other heart-related conditions. Atrial fibrillation can be caused by a variety of conditions and may be temporary, intermittent, or permanent. Symptoms include feeling that your heart is beating rapidly or irregularly, chest discomfort, shortness of breath, or dizziness/lightheadedness that may be worse with exertion. Treatment is varied but may involve medication or electrical shock (cardioversion).  SEEK IMMEDIATE MEDICAL CARE IF YOU HAVE ANY OF THE FOLLOWING SYMPTOMS: chest pain, shortness of breath, abdominal pain, sweating, vomiting, blood in vomit/bowel movements/urine, dizziness/lightheadedness, weakness or numbness to face/arm/leg, trouble speaking or understanding, facial droop.

## 2023-03-18 ENCOUNTER — EMERGENCY (EMERGENCY)
Facility: HOSPITAL | Age: 88
LOS: 0 days | Discharge: HOME | End: 2023-03-18
Attending: EMERGENCY MEDICINE
Payer: MEDICARE

## 2023-03-18 VITALS
DIASTOLIC BLOOD PRESSURE: 90 MMHG | SYSTOLIC BLOOD PRESSURE: 130 MMHG | OXYGEN SATURATION: 95 % | RESPIRATION RATE: 18 BRPM | TEMPERATURE: 99 F | HEART RATE: 66 BPM

## 2023-03-18 DIAGNOSIS — Z90.49 ACQUIRED ABSENCE OF OTHER SPECIFIED PARTS OF DIGESTIVE TRACT: Chronic | ICD-10-CM

## 2023-03-18 DIAGNOSIS — I10 ESSENTIAL (PRIMARY) HYPERTENSION: ICD-10-CM

## 2023-03-18 DIAGNOSIS — H26.9 UNSPECIFIED CATARACT: Chronic | ICD-10-CM

## 2023-03-18 DIAGNOSIS — Z79.01 LONG TERM (CURRENT) USE OF ANTICOAGULANTS: ICD-10-CM

## 2023-03-18 DIAGNOSIS — Z90.89 ACQUIRED ABSENCE OF OTHER ORGANS: Chronic | ICD-10-CM

## 2023-03-18 DIAGNOSIS — Z88.6 ALLERGY STATUS TO ANALGESIC AGENT: ICD-10-CM

## 2023-03-18 DIAGNOSIS — Z79.84 LONG TERM (CURRENT) USE OF ORAL HYPOGLYCEMIC DRUGS: ICD-10-CM

## 2023-03-18 DIAGNOSIS — Z79.4 LONG TERM (CURRENT) USE OF INSULIN: ICD-10-CM

## 2023-03-18 DIAGNOSIS — Z96.642 PRESENCE OF LEFT ARTIFICIAL HIP JOINT: Chronic | ICD-10-CM

## 2023-03-18 DIAGNOSIS — Z95.0 PRESENCE OF CARDIAC PACEMAKER: Chronic | ICD-10-CM

## 2023-03-18 DIAGNOSIS — I48.91 UNSPECIFIED ATRIAL FIBRILLATION: ICD-10-CM
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
## 2023-03-18 DIAGNOSIS — Z79.02 LONG TERM (CURRENT) USE OF ANTITHROMBOTICS/ANTIPLATELETS: ICD-10-CM

## 2023-03-18 DIAGNOSIS — Z90.411 ACQUIRED PARTIAL ABSENCE OF PANCREAS: Chronic | ICD-10-CM

## 2023-03-18 DIAGNOSIS — Z88.1 ALLERGY STATUS TO OTHER ANTIBIOTIC AGENTS STATUS: ICD-10-CM

## 2023-03-18 DIAGNOSIS — E11.9 TYPE 2 DIABETES MELLITUS WITHOUT COMPLICATIONS: ICD-10-CM
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
## 2023-03-18 DIAGNOSIS — S60.222A CONTUSION OF LEFT HAND, INITIAL ENCOUNTER: ICD-10-CM

## 2023-03-18 PROCEDURE — 99282 EMERGENCY DEPT VISIT SF MDM: CPT

## 2023-03-18 PROCEDURE — 99284 EMERGENCY DEPT VISIT MOD MDM: CPT | Mod: GC

## 2023-03-18 NOTE — ED PROVIDER NOTE - NSFOLLOWUPINSTRUCTIONS_ED_ALL_ED_FT
Please return if your skin become mores red and warm, concerning for infection, or if you develop fever. Please return if your pain becomes worsening. Please return if you have any other concerns.

## 2023-03-18 NOTE — ED PROVIDER NOTE - PATIENT PORTAL LINK FT
You can access the FollowMyHealth Patient Portal offered by [facility] by registering at the following website: [email or web address]. By joining [facility]’s FollowMyHealth portal, you will also be able to view your health information using other applications (apps) compatible with our system.

## 2023-03-18 NOTE — ED PROVIDER NOTE - OBJECTIVE STATEMENT
88F w/ pmhx of afib on eliquis, DM, HTN who present with left UE evaluation. she was discharged from hospital 4 days ago and began having increased bruising at 2 IV site. pain is mild, patient came in because worried about bleeding. no sob f c n v cp fatigue. she's been using cold compress.

## 2023-03-18 NOTE — ED PROVIDER NOTE - CLINICAL SUMMARY MEDICAL DECISION MAKING FREE TEXT BOX
pt on AC here wth brusing to L hand at site of IV during recent admission. on exam, nontoxic, vs noted. L hand with ecchymosis on dorsal L hand between 1-2 MCs. no swelling. no signs of infection. nv intact. clincially does not appear to be consistent with DVT, US cancelled. counsled pt and duaghter about progression of ecchymosis in setting of AC. In my opinion, based on current evaluation and results, an acute medical or surgical emergency does not appear to be occurring at this time and I feel that the pt is stable for further outpatient work up and/or treatment. Return precautions discussed.

## 2023-03-18 NOTE — ED PROVIDER NOTE - PHYSICAL EXAMINATION
CONSTITUTIONAL: well-developed, well-nourished, in no acute distress  SKIN: warm, dry  HEAD: Normocephalic  EYES: no conjunctival erythema  ENT: no nasal discharge, airway clear  NECK: full ROM,  CARD: regular rate and rhythm  RESP: normal respiratory effort, no wheezes, rales or rhonchi  EXT: moving all extremities spontaneously left UE: +2 radial pulse, dorsal hand behind 1st and 2nd digit bruising, no significant hematoma, AC bruising no significant hematoma, no erythema warmth or discharge or fluctuance   NEURO: alert and oriented, grossly unremarkable  PSYCH: cooperative, appropriate

## 2023-03-18 NOTE — ED ADULT TRIAGE NOTE - CHIEF COMPLAINT QUOTE
pt BIBA from home for bruising/pain to LUE at sites of blood draw/iv insertion from recent hospitalization

## 2023-03-18 NOTE — ED PROVIDER NOTE - CARE PROVIDER_API CALL
LOIS, [facility]  Internal Medicine  [address] [place] [address]  Phone: [phone]  Fax: [phone]  Established Patient  Follow Up Time: 1-3 Days

## 2023-03-18 NOTE — ED PROVIDER NOTE - NS ED ROS FT
Constitutional: No fever   Eyes:  No visual changes  Ears:  No hearing changes  Neck: No neck pain  Cardiac:  No chest pain  Respiratory:  No SOB   GI:  No abdominal pain, nausea, or vomiting  :  No dysuria  MS:  No back pain  Neuro:  No headache or weakness.  No LOC  Skin:  +bruise

## 2023-03-23 RX ORDER — ASPIRIN/CALCIUM CARB/MAGNESIUM 324 MG
1 TABLET ORAL
Qty: 0 | Refills: 0 | DISCHARGE

## 2023-03-23 RX ORDER — CLOPIDOGREL BISULFATE 75 MG/1
1 TABLET, FILM COATED ORAL
Qty: 0 | Refills: 0 | DISCHARGE

## 2023-03-23 RX ORDER — THYROID 120 MG
1 TABLET ORAL
Qty: 0 | Refills: 0 | DISCHARGE

## 2023-03-23 RX ORDER — INSULIN DEGLUDEC 100 U/ML
22 INJECTION, SOLUTION SUBCUTANEOUS
Qty: 0 | Refills: 0 | DISCHARGE

## 2023-03-23 RX ORDER — METOPROLOL TARTRATE 50 MG
0.5 TABLET ORAL
Qty: 0 | Refills: 0 | DISCHARGE

## 2023-03-27 DIAGNOSIS — Z85.05 PERSONAL HISTORY OF MALIGNANT NEOPLASM OF LIVER: ICD-10-CM

## 2023-03-27 DIAGNOSIS — Z88.1 ALLERGY STATUS TO OTHER ANTIBIOTIC AGENTS STATUS: ICD-10-CM

## 2023-03-27 DIAGNOSIS — Z95.5 PRESENCE OF CORONARY ANGIOPLASTY IMPLANT AND GRAFT: ICD-10-CM

## 2023-03-27 DIAGNOSIS — Z90.49 ACQUIRED ABSENCE OF OTHER SPECIFIED PARTS OF DIGESTIVE TRACT: ICD-10-CM

## 2023-03-27 DIAGNOSIS — E87.6 HYPOKALEMIA: ICD-10-CM

## 2023-03-27 DIAGNOSIS — Z85.07 PERSONAL HISTORY OF MALIGNANT NEOPLASM OF PANCREAS: ICD-10-CM

## 2023-03-27 DIAGNOSIS — I47.1 SUPRAVENTRICULAR TACHYCARDIA: ICD-10-CM

## 2023-03-27 DIAGNOSIS — Z95.0 PRESENCE OF CARDIAC PACEMAKER: ICD-10-CM

## 2023-03-27 DIAGNOSIS — E04.1 NONTOXIC SINGLE THYROID NODULE: ICD-10-CM

## 2023-03-27 DIAGNOSIS — Z96.642 PRESENCE OF LEFT ARTIFICIAL HIP JOINT: ICD-10-CM

## 2023-03-27 DIAGNOSIS — I25.10 ATHEROSCLEROTIC HEART DISEASE OF NATIVE CORONARY ARTERY WITHOUT ANGINA PECTORIS: ICD-10-CM

## 2023-03-27 DIAGNOSIS — R55 SYNCOPE AND COLLAPSE: ICD-10-CM

## 2023-03-27 DIAGNOSIS — E03.9 HYPOTHYROIDISM, UNSPECIFIED: ICD-10-CM

## 2023-03-27 DIAGNOSIS — K58.9 IRRITABLE BOWEL SYNDROME WITHOUT DIARRHEA: ICD-10-CM

## 2023-03-27 DIAGNOSIS — I25.2 OLD MYOCARDIAL INFARCTION: ICD-10-CM

## 2023-03-27 DIAGNOSIS — E11.9 TYPE 2 DIABETES MELLITUS WITHOUT COMPLICATIONS: ICD-10-CM

## 2023-03-27 DIAGNOSIS — Z92.21 PERSONAL HISTORY OF ANTINEOPLASTIC CHEMOTHERAPY: ICD-10-CM

## 2023-03-27 DIAGNOSIS — Z79.890 HORMONE REPLACEMENT THERAPY: ICD-10-CM

## 2023-03-27 DIAGNOSIS — G47.33 OBSTRUCTIVE SLEEP APNEA (ADULT) (PEDIATRIC): ICD-10-CM

## 2023-03-27 DIAGNOSIS — Z79.84 LONG TERM (CURRENT) USE OF ORAL HYPOGLYCEMIC DRUGS: ICD-10-CM

## 2023-03-27 DIAGNOSIS — D68.9 COAGULATION DEFECT, UNSPECIFIED: ICD-10-CM

## 2023-03-27 DIAGNOSIS — Z79.01 LONG TERM (CURRENT) USE OF ANTICOAGULANTS: ICD-10-CM

## 2023-03-27 DIAGNOSIS — M35.3 POLYMYALGIA RHEUMATICA: ICD-10-CM

## 2023-03-27 DIAGNOSIS — M19.90 UNSPECIFIED OSTEOARTHRITIS, UNSPECIFIED SITE: ICD-10-CM

## 2023-03-27 DIAGNOSIS — I48.0 PAROXYSMAL ATRIAL FIBRILLATION: ICD-10-CM

## 2023-03-27 DIAGNOSIS — I24.8 OTHER FORMS OF ACUTE ISCHEMIC HEART DISEASE: ICD-10-CM

## 2023-03-28 ENCOUNTER — APPOINTMENT (OUTPATIENT)
Dept: ELECTROPHYSIOLOGY | Facility: CLINIC | Age: 88
End: 2023-03-28

## 2023-04-06 ENCOUNTER — APPOINTMENT (OUTPATIENT)
Dept: CARDIOLOGY | Facility: CLINIC | Age: 88
End: 2023-04-06
Payer: MEDICARE

## 2023-04-06 ENCOUNTER — NON-APPOINTMENT (OUTPATIENT)
Age: 88
End: 2023-04-06

## 2023-04-06 PROBLEM — I25.10 ATHEROSCLEROTIC HEART DISEASE OF NATIVE CORONARY ARTERY WITHOUT ANGINA PECTORIS: Chronic | Status: ACTIVE | Noted: 2022-11-13

## 2023-04-06 PROBLEM — C22.1 INTRAHEPATIC BILE DUCT CARCINOMA: Chronic | Status: ACTIVE | Noted: 2022-11-13

## 2023-04-06 PROBLEM — E11.9 TYPE 2 DIABETES MELLITUS WITHOUT COMPLICATIONS: Chronic | Status: ACTIVE | Noted: 2022-11-13

## 2023-04-06 PROBLEM — C25.9 MALIGNANT NEOPLASM OF PANCREAS, UNSPECIFIED: Chronic | Status: ACTIVE | Noted: 2022-11-13

## 2023-04-06 PROBLEM — I48.20 CHRONIC ATRIAL FIBRILLATION, UNSPECIFIED: Chronic | Status: ACTIVE | Noted: 2022-11-13

## 2023-04-06 PROCEDURE — 93294 REM INTERROG EVL PM/LDLS PM: CPT

## 2023-04-06 PROCEDURE — 93296 REM INTERROG EVL PM/IDS: CPT

## 2023-07-06 ENCOUNTER — APPOINTMENT (OUTPATIENT)
Dept: CARDIOLOGY | Facility: CLINIC | Age: 88
End: 2023-07-06
Payer: MEDICARE

## 2023-07-06 ENCOUNTER — NON-APPOINTMENT (OUTPATIENT)
Age: 88
End: 2023-07-06

## 2023-07-06 PROCEDURE — 93294 REM INTERROG EVL PM/LDLS PM: CPT

## 2023-07-06 PROCEDURE — 93296 REM INTERROG EVL PM/IDS: CPT

## 2023-08-07 ENCOUNTER — APPOINTMENT (OUTPATIENT)
Dept: OPHTHALMOLOGY | Facility: CLINIC | Age: 88
End: 2023-08-07
Payer: MEDICARE

## 2023-08-07 ENCOUNTER — OUTPATIENT (OUTPATIENT)
Dept: OUTPATIENT SERVICES | Facility: HOSPITAL | Age: 88
LOS: 1 days | End: 2023-08-07
Payer: MEDICARE

## 2023-08-07 DIAGNOSIS — H53.8 OTHER VISUAL DISTURBANCES: ICD-10-CM

## 2023-08-07 DIAGNOSIS — Z96.642 PRESENCE OF LEFT ARTIFICIAL HIP JOINT: Chronic | ICD-10-CM

## 2023-08-07 DIAGNOSIS — Z90.49 ACQUIRED ABSENCE OF OTHER SPECIFIED PARTS OF DIGESTIVE TRACT: Chronic | ICD-10-CM

## 2023-08-07 DIAGNOSIS — H26.9 UNSPECIFIED CATARACT: Chronic | ICD-10-CM

## 2023-08-07 DIAGNOSIS — Z90.411 ACQUIRED PARTIAL ABSENCE OF PANCREAS: Chronic | ICD-10-CM

## 2023-08-07 DIAGNOSIS — Z90.89 ACQUIRED ABSENCE OF OTHER ORGANS: Chronic | ICD-10-CM

## 2023-08-07 DIAGNOSIS — Z95.0 PRESENCE OF CARDIAC PACEMAKER: Chronic | ICD-10-CM

## 2023-08-07 PROCEDURE — 92012 INTRM OPH EXAM EST PATIENT: CPT

## 2023-08-07 PROCEDURE — 92002 INTRM OPH EXAM NEW PATIENT: CPT

## 2023-08-14 ENCOUNTER — APPOINTMENT (OUTPATIENT)
Dept: ELECTROPHYSIOLOGY | Facility: CLINIC | Age: 88
End: 2023-08-14
Payer: MEDICARE

## 2023-08-14 VITALS
DIASTOLIC BLOOD PRESSURE: 60 MMHG | HEART RATE: 88 BPM | TEMPERATURE: 97.1 F | HEIGHT: 56 IN | SYSTOLIC BLOOD PRESSURE: 100 MMHG | RESPIRATION RATE: 14 BRPM | WEIGHT: 167 LBS | OXYGEN SATURATION: 95 % | BODY MASS INDEX: 37.57 KG/M2

## 2023-08-14 PROCEDURE — 93280 PM DEVICE PROGR EVAL DUAL: CPT

## 2023-08-14 PROCEDURE — 99214 OFFICE O/P EST MOD 30 MIN: CPT

## 2023-08-14 RX ORDER — FUROSEMIDE 20 MG/1
20 TABLET ORAL DAILY
Refills: 0 | Status: COMPLETED | COMMUNITY
End: 2023-08-14

## 2023-08-14 RX ORDER — METOPROLOL TARTRATE 50 MG/1
50 TABLET, FILM COATED ORAL
Qty: 180 | Refills: 3 | Status: COMPLETED | COMMUNITY
End: 2023-08-14

## 2023-08-14 RX ORDER — DILTIAZEM HYDROCHLORIDE 180 MG/1
180 CAPSULE, COATED, EXTENDED RELEASE ORAL
Qty: 90 | Refills: 0 | Status: COMPLETED | COMMUNITY
End: 2023-08-14

## 2023-08-14 RX ORDER — OMEGA-3 FATTY ACIDS/FISH OIL 360-1200MG
CAPSULE ORAL DAILY
Refills: 0 | Status: COMPLETED | COMMUNITY
End: 2023-08-14

## 2023-08-14 RX ORDER — BUPROPION HYDROCHLORIDE 150 MG/1
150 TABLET, EXTENDED RELEASE ORAL DAILY
Refills: 0 | Status: COMPLETED | COMMUNITY
End: 2023-08-14

## 2023-08-14 NOTE — ASSESSMENT
[FreeTextEntry1] : ## Sinus Node Dysfunction s/p DC-PPM (SJM, 22, Non-dep) ## persistent atrial fibrillation s/p DCCV on 1/3/2023 to SR  - On Eliquis 5mg BID. No s/s bleeding reported. CBC, BMP at least yearly.  - Rate controlled with metoprolol. She was on cardizem but it was discontinued by Dr. Olivia. - LOV changes made to decrease AF burden. AF burden now 0% but patient is pacing more as we suspected. I decreased AV delay and added VIP extension (180 + 150ms total) in an effort to mitigate AF but decrease pacing burden. Will continue to evaluate.  - She is on remote and transmitting  - BP on lower side today.  - Patient reports feeling tired during the day - I recommend to try taking metoprolol in PM to mitigate symptoms.  - I recommend to continue same medications.   RTO in 6 months

## 2023-08-14 NOTE — PHYSICAL EXAM
[General Appearance - Well Developed] : well developed [Normal Appearance] : normal appearance [Well Groomed] : well groomed [General Appearance - Well Nourished] : well nourished [No Deformities] : no deformities [General Appearance - In No Acute Distress] : no acute distress [Heart Rate And Rhythm] : heart rate and rhythm were normal [Heart Sounds] : normal S1 and S2 [] : no respiratory distress [Respiration, Rhythm And Depth] : normal respiratory rhythm and effort [Exaggerated Use Of Accessory Muscles For Inspiration] : no accessory muscle use [Left Infraclavicular] : left infraclavicular area [Clean] : clean [Dry] : dry [Well-Healed] : well-healed [Abdomen Soft] : soft [Nail Clubbing] : no clubbing of the fingernails [Cyanosis, Localized] : no localized cyanosis

## 2023-08-14 NOTE — CARDIOLOGY SUMMARY
[de-identified] : 2/13/2023: Sinus rhythm, V-paced with occasional retrograde conduction on V-paced beats

## 2023-08-14 NOTE — HISTORY OF PRESENT ILLNESS
[de-identified] : Ms. VILLATORO is a 88 year-year old female with history of paroxysmal atrial fibrillation, Sinus Node Dysfunction s/p DC-PPM (SJM, 22, Non-dep), HTN, YIFAN, Hypothyroidism,  Pancreatic ca s/p Sx, Cholangioca w/mets s/p liver resection is here for device check.\par  She underwent successful ADITYA/DCCV on 1/3/2023.\par  Referred to be seen by Dr. Olivia for high burden V-pacing. \par  Admits to anxiety due to CA diagnosis, was told no surgical intervention needed. \par  She is accompanied by her daughter. \par  Denies chest pain, shortness of breath, palpitation, dizziness or LOC except noted above.\par  \par  EKG (12/14/22): AF 81, QRSd 98\par  TTE (11/22): EF 67%, Severe LAE, Severe MAC, Mild MR\par  Cardio: Dr. Olivia\par

## 2023-08-14 NOTE — PROCEDURE
[No] : not [Sinus Bradycardia] : sinus bradycardia [See Device Printout] : See device printout [Pacemaker] : pacemaker [Voltage: ___ volts] : Voltage was [unfilled] volts [Magnet Rate: ___ Ppm] : magnet rate was [unfilled] Ppm [Longevity: ___ months] : The estimated remaining battery life is [unfilled] months [Normal] : The battery status is normal. [Lead Imp:  ___ohms] : lead impedance was [unfilled] ohms [Sensing Amplitude ___mv] : sensing amplitude was [unfilled] mv [___V @] : [unfilled] V [___ ms] : [unfilled] ms [DDDR] : DDDR [de-identified] : 54 bpm [de-identified] : Abbott [de-identified] : [id number] [de-identified] : [id number] [de-identified] : 11/21/2022 [de-identified] : 70/120 [de-identified] : 8.3 years [de-identified] : AV delay decreased 180ms, VIP extension added 150ms, V auto cap turned OFF with max output 2.5V [de-identified] : AP: 72% : 67% Mode Switch: 0% On Remote Monitoring

## 2023-08-21 DIAGNOSIS — H51.11 CONVERGENCE INSUFFICIENCY: ICD-10-CM

## 2023-08-21 DIAGNOSIS — H00.025 HORDEOLUM INTERNUM LEFT LOWER EYELID: ICD-10-CM

## 2023-11-13 ENCOUNTER — APPOINTMENT (OUTPATIENT)
Dept: CARDIOLOGY | Facility: CLINIC | Age: 88
End: 2023-11-13
Payer: MEDICARE

## 2023-11-13 ENCOUNTER — NON-APPOINTMENT (OUTPATIENT)
Age: 88
End: 2023-11-13

## 2023-11-13 PROCEDURE — 93296 REM INTERROG EVL PM/IDS: CPT

## 2023-11-13 PROCEDURE — 93294 REM INTERROG EVL PM/LDLS PM: CPT

## 2023-11-27 NOTE — ED ADULT NURSE NOTE - CCCP TRG CHIEF CMPLNT
Left Ssv Fragmentation And Discontinuity: No Reflux (In Seconds - Leave Blank If Not Applicable): 1.7 Continue Conservative Therapy: Yes Right Tributary Reflux (In Seconds - Leave Blank If Not Applicable): 2.0 Right Intraluminal Thrombus- Yes: The right deep veins were imaged from the level of the common femoral vein to the posterior tibial veins. There was evidence of intraluminal thrombus as noted above. See Attached Documentation Text: Please refer to the attached ultrasound documentation for complete details of the procedure and the venous findings. Size In Mm: 2.7 Size In Mm: 3.1 Size In Mm: 3.4 Size In Mm: 4.5 Treatment Number (Optional): Venous: BL GSV, D GSV Size Options: Use Free Text Reflux (In Seconds - Leave Blank If Not Applicable): 0.9 Size In Mm: 5.0 Left Tributary Size In Mm: 4.2 Size In Mm: 3.5 Size In Mm: 4.1 Size In Mm: 3.8 Reflux (In Seconds - Leave Blank If Not Applicable): 1.6 Right Lower Extremity Comments: GSV becomes epifacial in distal thigh. \\nTRIBS MEDIAL THIGH/KNEE AND LATERAL THIGH/CALF. Size In Mm: 3.2 Size In Mm: 6.2 Right Tributary Size In Mm: 4.0 Left Lower Extremity Comments: TRIBS MEDIAL THIGH/CALF. Reflux (In Seconds - Leave Blank If Not Applicable): 1.5 Detail Level: Simple Size In Mm: 6.4 palpitations

## 2024-01-19 NOTE — ED PROVIDER NOTE - DISPOSITION TYPE
[Today's FEV: ___%] : Today's FEV: [unfilled]% [Albuterol] : albuterol [Hypertonic Saline] : hypertonic saline [Pulmozyme] : pulmozyme [Nebulizer/equipment reviewed] : nebulizer/equipment reviewed [Vest Percussion] : vest percussion [Times per day: ____] : My goal is to do airway clearance: [unfilled] times per day [4 Quarterly visits with your CF care team] : - 4 quarterly visits with your CF care team [Yearly CXR] : - Yearly CXR [Quarterly PFTs] : - Quarterly PFTs [Pulmozyme: ___] : - Pulmozyme: [unfilled] [BMI: ___] : - BMI: [unfilled] [Supplements/tub feedings: ___] : - Supplements/tub feedings: [unfilled] [Date: ___] : Date: [unfilled] [FreeTextEntry6] : 1. Albuterol inhaler with spacer (start using new inhaler), 2. hypertonic saline (start using everyday) 3. pulmozyme  [FreeTextEntry8] : nasal saline rinses and then flonase daily for congestion, extra albuterol every 4 hours as needed for coughing.  [FreeTextEntry9] : NEEDS SINUS CT WITHOUT CONTRAST; CT CHEST WITH/ WITHOUT CONTRAST ADMIT

## 2024-02-26 ENCOUNTER — APPOINTMENT (OUTPATIENT)
Dept: ELECTROPHYSIOLOGY | Facility: CLINIC | Age: 89
End: 2024-02-26

## 2024-03-28 ENCOUNTER — APPOINTMENT (OUTPATIENT)
Dept: ELECTROPHYSIOLOGY | Facility: CLINIC | Age: 89
End: 2024-03-28
Payer: MEDICARE

## 2024-03-28 VITALS
TEMPERATURE: 97.8 F | RESPIRATION RATE: 17 BRPM | HEIGHT: 56 IN | DIASTOLIC BLOOD PRESSURE: 67 MMHG | SYSTOLIC BLOOD PRESSURE: 112 MMHG | WEIGHT: 147 LBS | HEART RATE: 73 BPM | BODY MASS INDEX: 33.07 KG/M2

## 2024-03-28 DIAGNOSIS — R00.1 BRADYCARDIA, UNSPECIFIED: ICD-10-CM

## 2024-03-28 DIAGNOSIS — I48.19 OTHER PERSISTENT ATRIAL FIBRILLATION: ICD-10-CM

## 2024-03-28 DIAGNOSIS — Z45.018 ENCOUNTER FOR ADJUSTMENT AND MANAGEMENT OF OTHER PART OF CARDIAC PACEMAKER: ICD-10-CM

## 2024-03-28 DIAGNOSIS — I48.92 UNSPECIFIED ATRIAL FLUTTER: ICD-10-CM

## 2024-03-28 PROCEDURE — 99214 OFFICE O/P EST MOD 30 MIN: CPT

## 2024-03-28 PROCEDURE — 93280 PM DEVICE PROGR EVAL DUAL: CPT

## 2024-03-28 RX ORDER — ERGOCALCIFEROL 1.25 MG/1
1.25 MG CAPSULE, LIQUID FILLED ORAL
Qty: 30 | Refills: 0 | Status: ACTIVE | COMMUNITY
Start: 2022-12-30

## 2024-03-28 RX ORDER — METOPROLOL SUCCINATE 50 MG/1
50 TABLET, EXTENDED RELEASE ORAL DAILY
Refills: 0 | Status: ACTIVE | COMMUNITY

## 2024-03-28 RX ORDER — THYROID, PORCINE 90 MG/1
TABLET ORAL DAILY
Refills: 0 | Status: ACTIVE | COMMUNITY

## 2024-03-28 RX ORDER — APIXABAN 5 MG/1
5 TABLET, FILM COATED ORAL
Refills: 0 | Status: ACTIVE | COMMUNITY

## 2024-03-28 RX ORDER — CLOPIDOGREL 75 MG/1
75 TABLET, FILM COATED ORAL DAILY
Refills: 0 | Status: ACTIVE | COMMUNITY

## 2024-03-28 RX ORDER — INSULIN GLARGINE 100 [IU]/ML
100 INJECTION, SOLUTION SUBCUTANEOUS DAILY
Refills: 0 | Status: ACTIVE | COMMUNITY

## 2024-03-28 RX ORDER — INSULIN DEGLUDEC INJECTION 100 U/ML
INJECTION, SOLUTION SUBCUTANEOUS DAILY
Refills: 0 | Status: ACTIVE | COMMUNITY

## 2024-03-28 RX ORDER — PANTOPRAZOLE 40 MG/1
40 TABLET, DELAYED RELEASE ORAL DAILY
Refills: 0 | Status: ACTIVE | COMMUNITY

## 2024-03-28 NOTE — CARDIOLOGY SUMMARY
[de-identified] : ADITYA (1/3/2023): LVEF 50-55%, mildly enlarged LA, mild MVR, mod to severe mitral annular calcification, thickening of the anterior and posterior MV leaflets, mild TR TTE (11/22): EF 67%, Severe LAE, Severe MAC, Mild MR  [de-identified] : 2/13/2023: Sinus rhythm, V-paced with occasional retrograde conduction on V-paced beats EKG (12/14/22): AF 81, QRSd 98

## 2024-03-28 NOTE — PROCEDURE
[No] : not [Sinus Bradycardia] : sinus bradycardia [See Device Printout] : See device printout [Pacemaker] : pacemaker [DDDR] : DDDR [Voltage: ___ volts] : Voltage was [unfilled] volts [Magnet Rate: ___ Ppm] : magnet rate was [unfilled] Ppm [Normal] : The battery status is normal. [Lead Imp:  ___ohms] : lead impedance was [unfilled] ohms [Sensing Amplitude ___mv] : sensing amplitude was [unfilled] mv [___V @] : [unfilled] V [___ ms] : [unfilled] ms [None] : none [Counters Reset] : the counters were reset [de-identified] : 54 bpm [de-identified] : Abbott [de-identified] : [id number] [de-identified] : [id number] [de-identified] : 70/120 [de-identified] : 11/21/2022 [de-identified] : 7.4 years [de-identified] : AP: 69% : 83% 16 AMS c/w AF and AT Mode Switch: <1% On Remote Monitoring

## 2024-03-28 NOTE — ASSESSMENT
[FreeTextEntry1] : ## Sinus Node Dysfunction s/p DC-PPM (SJM, 22, Non-dep) ## persistent atrial fibrillation s/p DCCV on 1/3/2023 to SR  - On Eliquis 5mg BID. No s/s bleeding reported. CBC, BMP at least yearly.  - Rate controlled with metoprolol. She was on cardizem but it was discontinued by Dr. Olivia. - LOV changes made to decrease AF burden. AF burden now <1% but patient is pacing more as we suspected. Will continue to evaluate.  - She is on remote and transmitting  - BP stable - I recommend to continue same medications.   RTO in 7-9 months

## 2024-03-28 NOTE — HISTORY OF PRESENT ILLNESS
[de-identified] : Ms. VILLATORO is a 89 year-year old female with history of paroxysmal atrial fibrillation, Sinus Node Dysfunction s/p DC-PPM (SJM, 22, Non-dep), HTN, YIFAN, Hypothyroidism,  Pancreatic ca s/p Sx, Cholangioca w/mets s/p liver resection is here for device check. She underwent successful ADITYA/DCCV on 1/3/2023. Referred to be seen by Dr. Olivia for high burden V-pacing.  Admits to anxiety due to CA diagnosis, was told no surgical intervention needed. Pt is now on palliative care with [facility]. She is accompanied by her daughter.  Denies chest pain, shortness of breath, palpitation, dizziness or LOC except noted above.   Cardio: Dr. Olivia

## 2024-03-28 NOTE — PHYSICAL EXAM
[Normal Appearance] : normal appearance [General Appearance - Well Developed] : well developed [Well Groomed] : well groomed [General Appearance - Well Nourished] : well nourished [No Deformities] : no deformities [General Appearance - In No Acute Distress] : no acute distress [Heart Rate And Rhythm] : heart rate and rhythm were normal [Heart Sounds] : normal S1 and S2 [] : no respiratory distress [Respiration, Rhythm And Depth] : normal respiratory rhythm and effort [Left Infraclavicular] : left infraclavicular area [Exaggerated Use Of Accessory Muscles For Inspiration] : no accessory muscle use [Clean] : clean [Dry] : dry [Abdomen Soft] : soft [Well-Healed] : well-healed [Nail Clubbing] : no clubbing of the fingernails [Cyanosis, Localized] : no localized cyanosis

## 2024-06-28 ENCOUNTER — APPOINTMENT (OUTPATIENT)
Dept: CARDIOLOGY | Facility: CLINIC | Age: 89
End: 2024-06-28

## 2024-06-28 PROCEDURE — 93296 REM INTERROG EVL PM/IDS: CPT

## 2024-06-28 PROCEDURE — 93295 DEV INTERROG REMOTE 1/2/MLT: CPT

## 2024-07-16 NOTE — CONSULT NOTE ADULT - NSCONSULTADDITIONALINFOA_GEN_ALL_CORE
chest wall non-tender, breathing is unlabored without accessory muscle use, normal breath sounds Attending at the station:    87-year-old female with pancreatic cancer status post distal pancreatectomy in August 2021 and cholangiocarcinoma status post liver resection on 3/28 at [facility] admitted for perihepatic fluid collection with leukocytosis.  Plan is for CT-guided aspiration/drainage. Patient is on antiplatelet medication which should be held if possible. Tentatively planned for Monday.  If the patient is acutely worse as please contact the on-call IR team.

## 2024-09-10 NOTE — PATIENT PROFILE ADULT - VISION (WITH CORRECTIVE LENSES IF THE PATIENT USUALLY WEARS THEM):
DUPLICATE REQUEST.   
Partially impaired: cannot see medication labels or newsprint, but can see obstacles in path, and the surrounding layout; can count fingers at arm's length

## 2024-09-24 ENCOUNTER — APPOINTMENT (OUTPATIENT)
Dept: ORTHOPEDIC SURGERY | Facility: CLINIC | Age: 89
End: 2024-09-24
Payer: MEDICARE

## 2024-09-24 DIAGNOSIS — M16.11 UNILATERAL PRIMARY OSTEOARTHRITIS, RIGHT HIP: ICD-10-CM

## 2024-09-24 PROCEDURE — 99203 OFFICE O/P NEW LOW 30 MIN: CPT

## 2024-09-24 PROCEDURE — 73502 X-RAY EXAM HIP UNI 2-3 VIEWS: CPT

## 2024-09-24 NOTE — HISTORY OF PRESENT ILLNESS
[de-identified] : new eval of right hip has pain in hip/groin prior left oumar 2013 not interested in oumar, she can cancer and is almost 89 years old  exam shows mild pain with rom of hip no contracture  Imaging: X-rays were reviewed with the patient.   AP and Lateral views were obtained of the hips, including the contralateral side for comparison purposes. X-rays are negative for acute bone or soft tissue trauma. Left total hip replacement in good position.  plan: she will see pain management for possible inj  can not take nsaids has already done PT

## 2024-09-26 ENCOUNTER — NON-APPOINTMENT (OUTPATIENT)
Age: 89
End: 2024-09-26

## 2024-09-27 ENCOUNTER — APPOINTMENT (OUTPATIENT)
Dept: CARDIOLOGY | Facility: CLINIC | Age: 89
End: 2024-09-27
Payer: MEDICARE

## 2024-09-27 PROCEDURE — 93296 REM INTERROG EVL PM/IDS: CPT

## 2024-09-27 PROCEDURE — 93295 DEV INTERROG REMOTE 1/2/MLT: CPT

## 2024-11-04 ENCOUNTER — APPOINTMENT (OUTPATIENT)
Dept: ELECTROPHYSIOLOGY | Facility: CLINIC | Age: 89
End: 2024-11-04
Payer: MEDICARE

## 2024-11-04 VITALS
DIASTOLIC BLOOD PRESSURE: 76 MMHG | WEIGHT: 151 LBS | SYSTOLIC BLOOD PRESSURE: 122 MMHG | HEIGHT: 56 IN | HEART RATE: 116 BPM | TEMPERATURE: 98 F | BODY MASS INDEX: 33.97 KG/M2

## 2024-11-04 DIAGNOSIS — Z45.018 ENCOUNTER FOR ADJUSTMENT AND MANAGEMENT OF OTHER PART OF CARDIAC PACEMAKER: ICD-10-CM

## 2024-11-04 DIAGNOSIS — I48.92 UNSPECIFIED ATRIAL FLUTTER: ICD-10-CM

## 2024-11-04 DIAGNOSIS — I48.19 OTHER PERSISTENT ATRIAL FIBRILLATION: ICD-10-CM

## 2024-11-04 DIAGNOSIS — R00.1 BRADYCARDIA, UNSPECIFIED: ICD-10-CM

## 2024-11-04 PROCEDURE — 93280 PM DEVICE PROGR EVAL DUAL: CPT

## 2024-11-04 PROCEDURE — 99214 OFFICE O/P EST MOD 30 MIN: CPT

## 2024-11-04 RX ORDER — PREGABALIN 50 MG/1
50 CAPSULE ORAL 3 TIMES DAILY
Refills: 0 | Status: ACTIVE | COMMUNITY

## 2024-11-25 ENCOUNTER — INPATIENT (INPATIENT)
Facility: HOSPITAL | Age: 88
LOS: 9 days | Discharge: HOSPICE | DRG: 948 | End: 2024-12-05
Attending: INTERNAL MEDICINE | Admitting: STUDENT IN AN ORGANIZED HEALTH CARE EDUCATION/TRAINING PROGRAM
Payer: MEDICARE

## 2024-11-25 VITALS
OXYGEN SATURATION: 98 % | WEIGHT: 149.91 LBS | HEART RATE: 125 BPM | TEMPERATURE: 98 F | RESPIRATION RATE: 15 BRPM | DIASTOLIC BLOOD PRESSURE: 73 MMHG | SYSTOLIC BLOOD PRESSURE: 102 MMHG

## 2024-11-25 DIAGNOSIS — Z90.49 ACQUIRED ABSENCE OF OTHER SPECIFIED PARTS OF DIGESTIVE TRACT: Chronic | ICD-10-CM

## 2024-11-25 DIAGNOSIS — Z90.89 ACQUIRED ABSENCE OF OTHER ORGANS: Chronic | ICD-10-CM

## 2024-11-25 DIAGNOSIS — Z96.642 PRESENCE OF LEFT ARTIFICIAL HIP JOINT: Chronic | ICD-10-CM

## 2024-11-25 DIAGNOSIS — H26.9 UNSPECIFIED CATARACT: Chronic | ICD-10-CM

## 2024-11-25 DIAGNOSIS — Z95.0 PRESENCE OF CARDIAC PACEMAKER: Chronic | ICD-10-CM

## 2024-11-25 DIAGNOSIS — Z90.411 ACQUIRED PARTIAL ABSENCE OF PANCREAS: Chronic | ICD-10-CM

## 2024-11-25 PROCEDURE — 99285 EMERGENCY DEPT VISIT HI MDM: CPT | Mod: FS

## 2024-11-25 PROCEDURE — 93010 ELECTROCARDIOGRAM REPORT: CPT

## 2024-11-25 NOTE — ED ADULT TRIAGE NOTE - CHIEF COMPLAINT QUOTE
pt bibems from home co "not feeling well" and weakness x 5 days. pt has extensive CA hx . ems placed on 3L NC bc low in field

## 2024-11-26 DIAGNOSIS — C22.1 INTRAHEPATIC BILE DUCT CARCINOMA: ICD-10-CM

## 2024-11-26 DIAGNOSIS — Z51.5 ENCOUNTER FOR PALLIATIVE CARE: ICD-10-CM

## 2024-11-26 DIAGNOSIS — J18.9 PNEUMONIA, UNSPECIFIED ORGANISM: ICD-10-CM

## 2024-11-26 DIAGNOSIS — R53.1 WEAKNESS: ICD-10-CM

## 2024-11-26 LAB
-  STREPTOCOCCUS SP. (NOT GRP A, B OR S PNEUMONIAE): SIGNIFICANT CHANGE UP
ALBUMIN SERPL ELPH-MCNC: 3.7 G/DL — SIGNIFICANT CHANGE UP (ref 3.5–5.2)
ALP SERPL-CCNC: 139 U/L — HIGH (ref 30–115)
ALT FLD-CCNC: 10 U/L — SIGNIFICANT CHANGE UP (ref 0–41)
ANION GAP SERPL CALC-SCNC: 11 MMOL/L — SIGNIFICANT CHANGE UP (ref 7–14)
ANION GAP SERPL CALC-SCNC: 12 MMOL/L — SIGNIFICANT CHANGE UP (ref 7–14)
APPEARANCE UR: ABNORMAL
APTT BLD: 36.2 SEC — SIGNIFICANT CHANGE UP (ref 27–39.2)
AST SERPL-CCNC: 23 U/L — SIGNIFICANT CHANGE UP (ref 0–41)
BASOPHILS # BLD AUTO: 0 K/UL — SIGNIFICANT CHANGE UP (ref 0–0.2)
BASOPHILS # BLD AUTO: 0.03 K/UL — SIGNIFICANT CHANGE UP (ref 0–0.2)
BASOPHILS NFR BLD AUTO: 0 % — SIGNIFICANT CHANGE UP (ref 0–1)
BASOPHILS NFR BLD AUTO: 0.2 % — SIGNIFICANT CHANGE UP (ref 0–1)
BILIRUB SERPL-MCNC: 1.1 MG/DL — SIGNIFICANT CHANGE UP (ref 0.2–1.2)
BILIRUB UR-MCNC: NEGATIVE — SIGNIFICANT CHANGE UP
BUN SERPL-MCNC: 22 MG/DL — HIGH (ref 10–20)
BUN SERPL-MCNC: 24 MG/DL — HIGH (ref 10–20)
CALCIUM SERPL-MCNC: 8.9 MG/DL — SIGNIFICANT CHANGE UP (ref 8.4–10.5)
CALCIUM SERPL-MCNC: 9.5 MG/DL — SIGNIFICANT CHANGE UP (ref 8.4–10.5)
CHLORIDE SERPL-SCNC: 93 MMOL/L — LOW (ref 98–110)
CHLORIDE SERPL-SCNC: 95 MMOL/L — LOW (ref 98–110)
CO2 SERPL-SCNC: 23 MMOL/L — SIGNIFICANT CHANGE UP (ref 17–32)
CO2 SERPL-SCNC: 25 MMOL/L — SIGNIFICANT CHANGE UP (ref 17–32)
COLOR SPEC: YELLOW — SIGNIFICANT CHANGE UP
CREAT SERPL-MCNC: 0.8 MG/DL — SIGNIFICANT CHANGE UP (ref 0.7–1.5)
CREAT SERPL-MCNC: 1 MG/DL — SIGNIFICANT CHANGE UP (ref 0.7–1.5)
CRP SERPL-MCNC: 51.2 MG/L — HIGH
DIFF PNL FLD: ABNORMAL
EGFR: 54 ML/MIN/1.73M2 — LOW
EGFR: 70 ML/MIN/1.73M2 — SIGNIFICANT CHANGE UP
EOSINOPHIL # BLD AUTO: 0 K/UL — SIGNIFICANT CHANGE UP (ref 0–0.7)
EOSINOPHIL # BLD AUTO: 0.01 K/UL — SIGNIFICANT CHANGE UP (ref 0–0.7)
EOSINOPHIL NFR BLD AUTO: 0 % — SIGNIFICANT CHANGE UP (ref 0–8)
EOSINOPHIL NFR BLD AUTO: 0.1 % — SIGNIFICANT CHANGE UP (ref 0–8)
ERYTHROCYTE [SEDIMENTATION RATE] IN BLOOD: 36 MM/HR — HIGH (ref 0–20)
FLUAV AG NPH QL: SIGNIFICANT CHANGE UP
FLUBV AG NPH QL: SIGNIFICANT CHANGE UP
GLUCOSE BLDC GLUCOMTR-MCNC: 106 MG/DL — HIGH (ref 70–99)
GLUCOSE BLDC GLUCOMTR-MCNC: 109 MG/DL — HIGH (ref 70–99)
GLUCOSE BLDC GLUCOMTR-MCNC: 125 MG/DL — HIGH (ref 70–99)
GLUCOSE SERPL-MCNC: 113 MG/DL — HIGH (ref 70–99)
GLUCOSE SERPL-MCNC: 92 MG/DL — SIGNIFICANT CHANGE UP (ref 70–99)
GLUCOSE UR QL: 250 MG/DL
GRAM STN FLD: ABNORMAL
HCT VFR BLD CALC: 43.9 % — SIGNIFICANT CHANGE UP (ref 37–47)
HCT VFR BLD CALC: 45.7 % — SIGNIFICANT CHANGE UP (ref 37–47)
HGB BLD-MCNC: 14.3 G/DL — SIGNIFICANT CHANGE UP (ref 12–16)
HGB BLD-MCNC: 14.9 G/DL — SIGNIFICANT CHANGE UP (ref 12–16)
IMM GRANULOCYTES NFR BLD AUTO: 0.8 % — HIGH (ref 0.1–0.3)
INR BLD: 1.95 RATIO — HIGH (ref 0.65–1.3)
KETONES UR-MCNC: NEGATIVE MG/DL — SIGNIFICANT CHANGE UP
LACTATE SERPL-SCNC: 1.8 MMOL/L — SIGNIFICANT CHANGE UP (ref 0.7–2)
LEUKOCYTE ESTERASE UR-ACNC: ABNORMAL
LYMPHOCYTES # BLD AUTO: 0.49 K/UL — LOW (ref 1.2–3.4)
LYMPHOCYTES # BLD AUTO: 0.71 K/UL — LOW (ref 1.2–3.4)
LYMPHOCYTES # BLD AUTO: 2.6 % — LOW (ref 20.5–51.1)
LYMPHOCYTES # BLD AUTO: 4 % — LOW (ref 20.5–51.1)
MAGNESIUM SERPL-MCNC: 2 MG/DL — SIGNIFICANT CHANGE UP (ref 1.8–2.4)
MCHC RBC-ENTMCNC: 29.8 PG — SIGNIFICANT CHANGE UP (ref 27–31)
MCHC RBC-ENTMCNC: 30.3 PG — SIGNIFICANT CHANGE UP (ref 27–31)
MCHC RBC-ENTMCNC: 32.6 G/DL — SIGNIFICANT CHANGE UP (ref 32–37)
MCHC RBC-ENTMCNC: 32.6 G/DL — SIGNIFICANT CHANGE UP (ref 32–37)
MCV RBC AUTO: 91.5 FL — SIGNIFICANT CHANGE UP (ref 81–99)
MCV RBC AUTO: 92.9 FL — SIGNIFICANT CHANGE UP (ref 81–99)
METHOD TYPE: SIGNIFICANT CHANGE UP
MONOCYTES # BLD AUTO: 0.53 K/UL — SIGNIFICANT CHANGE UP (ref 0.1–0.6)
MONOCYTES # BLD AUTO: 1.15 K/UL — HIGH (ref 0.1–0.6)
MONOCYTES NFR BLD AUTO: 3 % — SIGNIFICANT CHANGE UP (ref 1.7–9.3)
MONOCYTES NFR BLD AUTO: 6 % — SIGNIFICANT CHANGE UP (ref 1.7–9.3)
NEUTROPHILS # BLD AUTO: 16.57 K/UL — HIGH (ref 1.4–6.5)
NEUTROPHILS # BLD AUTO: 17.3 K/UL — HIGH (ref 1.4–6.5)
NEUTROPHILS NFR BLD AUTO: 90.3 % — HIGH (ref 42.2–75.2)
NEUTROPHILS NFR BLD AUTO: 93 % — HIGH (ref 42.2–75.2)
NITRITE UR-MCNC: NEGATIVE — SIGNIFICANT CHANGE UP
NRBC # BLD: 0 /100 WBCS — SIGNIFICANT CHANGE UP (ref 0–0)
NRBC # BLD: SIGNIFICANT CHANGE UP /100 WBCS (ref 0–0)
NT-PROBNP SERPL-SCNC: 6223 PG/ML — HIGH (ref 0–300)
PH UR: 6 — SIGNIFICANT CHANGE UP (ref 5–8)
PLATELET # BLD AUTO: 228 K/UL — SIGNIFICANT CHANGE UP (ref 130–400)
PLATELET # BLD AUTO: 232 K/UL — SIGNIFICANT CHANGE UP (ref 130–400)
PMV BLD: 11.2 FL — HIGH (ref 7.4–10.4)
PMV BLD: 11.5 FL — HIGH (ref 7.4–10.4)
POTASSIUM SERPL-MCNC: 4.1 MMOL/L — SIGNIFICANT CHANGE UP (ref 3.5–5)
POTASSIUM SERPL-MCNC: 4.5 MMOL/L — SIGNIFICANT CHANGE UP (ref 3.5–5)
POTASSIUM SERPL-SCNC: 4.1 MMOL/L — SIGNIFICANT CHANGE UP (ref 3.5–5)
POTASSIUM SERPL-SCNC: 4.5 MMOL/L — SIGNIFICANT CHANGE UP (ref 3.5–5)
PROT SERPL-MCNC: 6.8 G/DL — SIGNIFICANT CHANGE UP (ref 6–8)
PROT UR-MCNC: SIGNIFICANT CHANGE UP MG/DL
PROTHROM AB SERPL-ACNC: 23.3 SEC — HIGH (ref 9.95–12.87)
RBC # BLD: 4.8 M/UL — SIGNIFICANT CHANGE UP (ref 4.2–5.4)
RBC # BLD: 4.92 M/UL — SIGNIFICANT CHANGE UP (ref 4.2–5.4)
RBC # FLD: 16.6 % — HIGH (ref 11.5–14.5)
RBC # FLD: 16.8 % — HIGH (ref 11.5–14.5)
RSV RNA NPH QL NAA+NON-PROBE: SIGNIFICANT CHANGE UP
SARS-COV-2 RNA SPEC QL NAA+PROBE: SIGNIFICANT CHANGE UP
SODIUM SERPL-SCNC: 129 MMOL/L — LOW (ref 135–146)
SODIUM SERPL-SCNC: 130 MMOL/L — LOW (ref 135–146)
SP GR SPEC: 1.02 — SIGNIFICANT CHANGE UP (ref 1–1.03)
SPECIMEN SOURCE: SIGNIFICANT CHANGE UP
SPECIMEN SOURCE: SIGNIFICANT CHANGE UP
TROPONIN T, HIGH SENSITIVITY RESULT: 15 NG/L — HIGH (ref 6–13)
UROBILINOGEN FLD QL: 0.2 MG/DL — SIGNIFICANT CHANGE UP (ref 0.2–1)
WBC # BLD: 17.82 K/UL — HIGH (ref 4.8–10.8)
WBC # BLD: 19.13 K/UL — HIGH (ref 4.8–10.8)
WBC # FLD AUTO: 17.82 K/UL — HIGH (ref 4.8–10.8)
WBC # FLD AUTO: 19.13 K/UL — HIGH (ref 4.8–10.8)

## 2024-11-26 PROCEDURE — 84132 ASSAY OF SERUM POTASSIUM: CPT

## 2024-11-26 PROCEDURE — 83605 ASSAY OF LACTIC ACID: CPT

## 2024-11-26 PROCEDURE — 85027 COMPLETE CBC AUTOMATED: CPT

## 2024-11-26 PROCEDURE — 87449 NOS EACH ORGANISM AG IA: CPT

## 2024-11-26 PROCEDURE — 82962 GLUCOSE BLOOD TEST: CPT

## 2024-11-26 PROCEDURE — 70450 CT HEAD/BRAIN W/O DYE: CPT | Mod: 26,MC

## 2024-11-26 PROCEDURE — 36415 COLL VENOUS BLD VENIPUNCTURE: CPT

## 2024-11-26 PROCEDURE — 84145 PROCALCITONIN (PCT): CPT

## 2024-11-26 PROCEDURE — 80048 BASIC METABOLIC PNL TOTAL CA: CPT

## 2024-11-26 PROCEDURE — 93280 PM DEVICE PROGR EVAL DUAL: CPT

## 2024-11-26 PROCEDURE — 71275 CT ANGIOGRAPHY CHEST: CPT | Mod: 26,MC

## 2024-11-26 PROCEDURE — 84436 ASSAY OF TOTAL THYROXINE: CPT

## 2024-11-26 PROCEDURE — 86900 BLOOD TYPING SEROLOGIC ABO: CPT

## 2024-11-26 PROCEDURE — 71045 X-RAY EXAM CHEST 1 VIEW: CPT

## 2024-11-26 PROCEDURE — 83880 ASSAY OF NATRIURETIC PEPTIDE: CPT

## 2024-11-26 PROCEDURE — 84443 ASSAY THYROID STIM HORMONE: CPT

## 2024-11-26 PROCEDURE — 85014 HEMATOCRIT: CPT

## 2024-11-26 PROCEDURE — 83036 HEMOGLOBIN GLYCOSYLATED A1C: CPT

## 2024-11-26 PROCEDURE — 82330 ASSAY OF CALCIUM: CPT

## 2024-11-26 PROCEDURE — 84295 ASSAY OF SERUM SODIUM: CPT

## 2024-11-26 PROCEDURE — 85652 RBC SED RATE AUTOMATED: CPT

## 2024-11-26 PROCEDURE — 0241U: CPT

## 2024-11-26 PROCEDURE — 70450 CT HEAD/BRAIN W/O DYE: CPT | Mod: MC

## 2024-11-26 PROCEDURE — 81001 URINALYSIS AUTO W/SCOPE: CPT

## 2024-11-26 PROCEDURE — 85018 HEMOGLOBIN: CPT

## 2024-11-26 PROCEDURE — 83735 ASSAY OF MAGNESIUM: CPT

## 2024-11-26 PROCEDURE — 87040 BLOOD CULTURE FOR BACTERIA: CPT

## 2024-11-26 PROCEDURE — 86901 BLOOD TYPING SEROLOGIC RH(D): CPT

## 2024-11-26 PROCEDURE — 93306 TTE W/DOPPLER COMPLETE: CPT

## 2024-11-26 PROCEDURE — 93005 ELECTROCARDIOGRAM TRACING: CPT

## 2024-11-26 PROCEDURE — 85025 COMPLETE CBC W/AUTO DIFF WBC: CPT

## 2024-11-26 PROCEDURE — 99222 1ST HOSP IP/OBS MODERATE 55: CPT

## 2024-11-26 PROCEDURE — 80053 COMPREHEN METABOLIC PANEL: CPT

## 2024-11-26 PROCEDURE — 86850 RBC ANTIBODY SCREEN: CPT

## 2024-11-26 PROCEDURE — 86140 C-REACTIVE PROTEIN: CPT

## 2024-11-26 PROCEDURE — 97163 PT EVAL HIGH COMPLEX 45 MIN: CPT | Mod: GP

## 2024-11-26 PROCEDURE — 93010 ELECTROCARDIOGRAM REPORT: CPT

## 2024-11-26 PROCEDURE — 82803 BLOOD GASES ANY COMBINATION: CPT

## 2024-11-26 PROCEDURE — 87086 URINE CULTURE/COLONY COUNT: CPT

## 2024-11-26 PROCEDURE — 87899 AGENT NOS ASSAY W/OPTIC: CPT

## 2024-11-26 PROCEDURE — 99223 1ST HOSP IP/OBS HIGH 75: CPT

## 2024-11-26 RX ORDER — PANTOPRAZOLE SODIUM 40 MG/1
1 TABLET, DELAYED RELEASE ORAL
Refills: 0 | DISCHARGE

## 2024-11-26 RX ORDER — VANCOMYCIN HCL 900 MCG/MG
1000 POWDER (GRAM) MISCELLANEOUS ONCE
Refills: 0 | Status: COMPLETED | OUTPATIENT
Start: 2024-11-26 | End: 2024-11-26

## 2024-11-26 RX ORDER — PREGABALIN 75 MG/1
1 CAPSULE ORAL
Refills: 0 | DISCHARGE

## 2024-11-26 RX ORDER — PANTOPRAZOLE SODIUM 40 MG/1
40 TABLET, DELAYED RELEASE ORAL
Refills: 0 | Status: DISCONTINUED | OUTPATIENT
Start: 2024-11-26 | End: 2024-12-05

## 2024-11-26 RX ORDER — INSULIN GLARGINE 100 [IU]/ML
5 INJECTION, SOLUTION SUBCUTANEOUS AT BEDTIME
Refills: 0 | Status: DISCONTINUED | OUTPATIENT
Start: 2024-11-26 | End: 2024-12-05

## 2024-11-26 RX ORDER — FENTANYL 12 UG/H
1 PATCH, EXTENDED RELEASE TRANSDERMAL
Refills: 0 | Status: DISCONTINUED | OUTPATIENT
Start: 2024-11-26 | End: 2024-11-27

## 2024-11-26 RX ORDER — DEXAMETHASONE 1.5 MG/1
1 TABLET ORAL
Refills: 0 | DISCHARGE

## 2024-11-26 RX ORDER — CEFTRIAXONE SODIUM 1 G
1000 VIAL (EA) INJECTION EVERY 24 HOURS
Refills: 0 | Status: DISCONTINUED | OUTPATIENT
Start: 2024-11-26 | End: 2024-11-27

## 2024-11-26 RX ORDER — FENTANYL 12 UG/H
1 PATCH, EXTENDED RELEASE TRANSDERMAL
Refills: 0 | DISCHARGE

## 2024-11-26 RX ORDER — 0.9 % SODIUM CHLORIDE 0.9 %
1000 INTRAVENOUS SOLUTION INTRAVENOUS
Refills: 0 | Status: DISCONTINUED | OUTPATIENT
Start: 2024-11-26 | End: 2024-12-05

## 2024-11-26 RX ORDER — EMPAGLIFLOZIN 25 MG/1
1 TABLET, FILM COATED ORAL
Refills: 0 | DISCHARGE

## 2024-11-26 RX ORDER — METOPROLOL TARTRATE 100 MG/1
50 TABLET, FILM COATED ORAL THREE TIMES A DAY
Refills: 0 | Status: DISCONTINUED | OUTPATIENT
Start: 2024-11-26 | End: 2024-11-28

## 2024-11-26 RX ORDER — 0.9 % SODIUM CHLORIDE 0.9 %
1000 INTRAVENOUS SOLUTION INTRAVENOUS ONCE
Refills: 0 | Status: COMPLETED | OUTPATIENT
Start: 2024-11-26 | End: 2024-11-26

## 2024-11-26 RX ORDER — APIXABAN 2.5 MG/1
5 TABLET, FILM COATED ORAL EVERY 12 HOURS
Refills: 0 | Status: DISCONTINUED | OUTPATIENT
Start: 2024-11-26 | End: 2024-11-29

## 2024-11-26 RX ORDER — CEFEPIME 2 G/1
2000 INJECTION, POWDER, FOR SOLUTION INTRAVENOUS ONCE
Refills: 0 | Status: COMPLETED | OUTPATIENT
Start: 2024-11-26 | End: 2024-11-26

## 2024-11-26 RX ORDER — PREGABALIN 75 MG/1
50 CAPSULE ORAL THREE TIMES A DAY
Refills: 0 | Status: DISCONTINUED | OUTPATIENT
Start: 2024-11-26 | End: 2024-12-03

## 2024-11-26 RX ORDER — APIXABAN 2.5 MG/1
1 TABLET, FILM COATED ORAL
Refills: 0 | DISCHARGE

## 2024-11-26 RX ORDER — CLOPIDOGREL 75 MG/1
75 TABLET, FILM COATED ORAL DAILY
Refills: 0 | Status: DISCONTINUED | OUTPATIENT
Start: 2024-11-26 | End: 2024-12-05

## 2024-11-26 RX ORDER — INSULIN DEGLUDEC 100 U/ML
10 INJECTION, SOLUTION SUBCUTANEOUS
Refills: 0 | DISCHARGE

## 2024-11-26 RX ORDER — AZITHROMYCIN 250 MG/1
500 TABLET, FILM COATED ORAL EVERY 24 HOURS
Refills: 0 | Status: DISCONTINUED | OUTPATIENT
Start: 2024-11-26 | End: 2024-11-27

## 2024-11-26 RX ORDER — FUROSEMIDE 40 MG/1
20 TABLET ORAL ONCE
Refills: 0 | Status: COMPLETED | OUTPATIENT
Start: 2024-11-26 | End: 2024-11-26

## 2024-11-26 RX ORDER — ESCITALOPRAM OXALATE 10 MG/1
1 TABLET, FILM COATED ORAL
Refills: 0 | DISCHARGE

## 2024-11-26 RX ORDER — ESCITALOPRAM OXALATE 10 MG/1
10 TABLET, FILM COATED ORAL DAILY
Refills: 0 | Status: DISCONTINUED | OUTPATIENT
Start: 2024-11-26 | End: 2024-12-05

## 2024-11-26 RX ORDER — GLUCAGON INJECTION, SOLUTION 0.5 MG/.1ML
1 INJECTION, SOLUTION SUBCUTANEOUS ONCE
Refills: 0 | Status: DISCONTINUED | OUTPATIENT
Start: 2024-11-26 | End: 2024-12-05

## 2024-11-26 RX ORDER — CLOPIDOGREL 75 MG/1
1 TABLET, FILM COATED ORAL
Refills: 0 | DISCHARGE

## 2024-11-26 RX ADMIN — CLOPIDOGREL 75 MILLIGRAM(S): 75 TABLET, FILM COATED ORAL at 09:49

## 2024-11-26 RX ADMIN — Medication 250 MILLIGRAM(S): at 06:32

## 2024-11-26 RX ADMIN — ESCITALOPRAM OXALATE 10 MILLIGRAM(S): 10 TABLET, FILM COATED ORAL at 10:40

## 2024-11-26 RX ADMIN — METOPROLOL TARTRATE 50 MILLIGRAM(S): 100 TABLET, FILM COATED ORAL at 13:21

## 2024-11-26 RX ADMIN — METOPROLOL TARTRATE 50 MILLIGRAM(S): 100 TABLET, FILM COATED ORAL at 09:49

## 2024-11-26 RX ADMIN — METOPROLOL TARTRATE 50 MILLIGRAM(S): 100 TABLET, FILM COATED ORAL at 21:31

## 2024-11-26 RX ADMIN — APIXABAN 5 MILLIGRAM(S): 2.5 TABLET, FILM COATED ORAL at 17:17

## 2024-11-26 RX ADMIN — CEFEPIME 100 MILLIGRAM(S): 2 INJECTION, POWDER, FOR SOLUTION INTRAVENOUS at 05:48

## 2024-11-26 RX ADMIN — AZITHROMYCIN 255 MILLIGRAM(S): 250 TABLET, FILM COATED ORAL at 18:24

## 2024-11-26 RX ADMIN — FUROSEMIDE 20 MILLIGRAM(S): 40 TABLET ORAL at 09:49

## 2024-11-26 RX ADMIN — Medication 1000 MILLILITER(S): at 02:41

## 2024-11-26 RX ADMIN — PREGABALIN 50 MILLIGRAM(S): 75 CAPSULE ORAL at 13:21

## 2024-11-26 RX ADMIN — PREGABALIN 50 MILLIGRAM(S): 75 CAPSULE ORAL at 21:31

## 2024-11-26 RX ADMIN — Medication 100 MILLIGRAM(S): at 17:17

## 2024-11-26 RX ADMIN — INSULIN GLARGINE 5 UNIT(S): 100 INJECTION, SOLUTION SUBCUTANEOUS at 21:34

## 2024-11-26 NOTE — ED PROVIDER NOTE - OBJECTIVE STATEMENT
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  As per patient's daughter at the bedside, patient supposed to go for mapping for radiation of the left scapula to attempt to improve her pain in that area.  About 2 months ago had a Pleurx left lung that was removed.  Patient is heart rate has been in the 120s for few weeks now.  Patient has been following cardiologist Dr. Huffman who is aware of this and recently changed patient's metoprolol to 150 mg.  Patient also wears a fentanyl patch that was increased from 15 mg to 37 mg about 1 week ago.  Patient reports she will with the fentanyl patch on Sunday.  Denies runny nose, sore throat, cough, chest pain, shortness of breath, abdominal pain, nausea, vomiting, diarrhea, constipation, urinary symptoms, rashes, recent travel, recent trauma, or recent sick contacts.

## 2024-11-26 NOTE — ED PROVIDER NOTE - CLINICAL SUMMARY MEDICAL DECISION MAKING FREE TEXT BOX
pt chrnoically ill, having gen weakness, found to have persistent tachycardia, cta neg for pe. hypoxic to the low 90s but improvd on o2. will admit for further care.

## 2024-11-26 NOTE — H&P ADULT - CONVERSATION DETAILS
Discussed the possibility of decompensation in the hospital. The Daughter who is the HCP mentioned that she would not want excessive life saving measures, but would like to discuss it more in detail at a later time, maybe tomorrow.

## 2024-11-26 NOTE — H&P ADULT - ASSESSMENT
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 The pt was admitted to medicine for pneumonia.    Plan  #Hypoxia 2/2 CAP and possible progression of malignant effusions with mild volume overload  #SIRS POA (tachy, WBC)   - Lactate normal  - On 3L via NC satting at 98%  - Pt is also on dexamethasone 2mg daily for metastasis related pain, Raised WBC could be 2/2 to steroids  - Will hold dexa for now  - Pt has been tachycardic for the past 3 weeks, EKG shows accelerated junctional rhythm, could be 2/2 hypoxia vs infection  - will cover with ceftriaxone and azithro for now.  - F/u BCx, procal and RVP  - F/u ESR and CRP  - Will give one dose of lasix since pt appears slightly volume overloaded    #CAD  #Afib   #SSS s/p PPM  -C/w metoprolol tart 50 TID  -C/w eliquis  -C/w plavix    #anxiety  #depression  - C/w lexapro 10 once daily    #DM  - as per pt on tresiba 10-12 units at night  - C/w lantus and SS  - On jardiance 10mg once daily    #hypothyroidism  - Check TSH  - Pt on armour thyroid (dessicated thyroid combo of t3/t4)  - Has been on it for years  - Unsure of dose, fam will bring it in      GI ppx- Pantop  DVT ppx- Eliquis  Diet- DASH/fluid restrict  Ambulate as tolerated     Assessment  89-year-old female with a past medical history of diabetes, A-fib on Eliquis, SSS S/p PPM placement, CAD with 4 stents on Plavix, history of MI, history of pancreatic cancer status post distal pancreatectomy in August 2021, 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 The pt was admitted to medicine for pneumonia.    Plan  #Hypoxia 2/2 CAP and possible progression of malignant effusions with mild volume overload  #SIRS POA (tachy, WBC)   - Lactate normal  - On 3L via NC satting at 98%  - Pt is also on dexamethasone 2mg daily for metastasis related pain, Raised WBC could be 2/2 to steroids  - Will hold dexa for now  - Pt has been tachycardic for the past 3 weeks, EKG shows accelerated junctional rhythm, could be 2/2 hypoxia vs infection  - will cover with ceftriaxone and azithro for now.  - F/u BCx, procal and RVP  - F/u ESR and CRP  - Will give one dose of lasix since pt appears slightly volume overloaded    #CAD  #Afib   #SSS s/p PPM  -C/w metoprolol tart 50 TID  -C/w eliquis  -C/w plavix    #cancer related pain 2/2 mets  - C/w pregabalin and fentanyl patch    #anxiety  #depression  - C/w lexapro 10 once daily    #DM  - as per pt on tresiba 10-12 units at night  - C/w lantus and SS  - On jardiance 10mg once daily    #hypothyroidism  - Check TSH  - Pt on armour thyroid (dessicated thyroid combo of t3/t4)  - Has been on it for years  - Unsure of dose, fam will bring it in      GI ppx- Pantop  DVT ppx- Eliquis  Diet- DASH/fluid restrict  Ambulate as tolerated     Assessment  89-year-old female with a past medical history of diabetes, A-fib on Eliquis, SSS S/p PPM placement, CAD with 4 stents on Plavix, history of MI, history of pancreatic cancer status post distal pancreatectomy in August 2021, 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 The pt was admitted to medicine for pneumonia.    Plan  #Hypoxia 2/2 CAP and possible progression of malignant effusions with mild volume overload  - Lactate normal  - On 3L via NC satting at 98%  - Pt is also on dexamethasone 2mg daily for metastasis related pain, Raised WBC could be 2/2 to steroids  - Will hold dexa for now  - Pt has been tachycardic for the past 3 weeks, EKG shows accelerated junctional rhythm, could be 2/2 hypoxia vs infection  - will cover with ceftriaxone and azithro for now.  - F/u BCx, procal and RVP  - F/u ESR and CRP  - Will give one dose of lasix since pt appears slightly volume overloaded    #CAD  #Afib   #SSS s/p PPM  -C/w metoprolol tart 50 TID  -C/w eliquis  -C/w plavix    #cancer related pain 2/2 mets  - C/w pregabalin and fentanyl patch    #anxiety  #depression  - C/w lexapro 10 once daily    #DM  - as per pt on tresiba 10-12 units at night  - C/w lantus and SS  - On jardiance 10mg once daily    #hypothyroidism  - Check TSH  - Pt on armour thyroid (dessicated thyroid combo of t3/t4)  - Has been on it for years  - Unsure of dose, fam will bring it in      GI ppx- Pantop  DVT ppx- Eliquis  Diet- DASH/fluid restrict  Ambulate as tolerated     Assessment  89-year-old female with a past medical history of diabetes, A-fib on Eliquis, SSS S/p PPM placement, CAD with 4 stents on Plavix, history of MI, history of pancreatic cancer status post distal pancreatectomy in August 2021, 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 The pt was admitted to medicine for pneumonia.    Plan  #Hypoxia 2/2 CAP and possible progression of malignant effusions with mild volume overload  - Lactate normal  - On 3L via NC satting at 98%  - Pt is also on dexamethasone 2mg daily for metastasis related pain, Raised WBC could be 2/2 to steroids  - Will hold dexa for now  - Pt has been tachycardic for the past 3 weeks, EKG shows accelerated junctional rhythm, could be 2/2 hypoxia vs infection  - will cover with ceftriaxone and azithro for now.  - F/u BCx, procal and RVP  -F/u urine strep and legionella  - F/u ESR and CRP  - If all signs of infection negative can DC abx  - Will give one dose of lasix since pt appears slightly volume overloaded    #CAD  #Afib   #SSS s/p PPM  -C/w metoprolol tart 50 TID  -C/w eliquis  -C/w plavix    #cancer related pain 2/2 mets  - C/w pregabalin and fentanyl patch    #anxiety  #depression  - C/w lexapro 10 once daily    #DM  - as per pt on tresiba 10-12 units at night  - C/w lantus and SS  - On jardiance 10mg once daily    #hypothyroidism  - Check TSH  - Pt on armour thyroid (dessicated thyroid combo of t3/t4)  - Has been on it for years  - Unsure of dose, fam will bring it in      GI ppx- Pantop  DVT ppx- Eliquis  Diet- DASH/fluid restrict  Ambulate as tolerated

## 2024-11-26 NOTE — ED PROVIDER NOTE - PHYSICAL EXAMINATION
Physical Exam    Vital Signs: I have reviewed the initial vital signs.  Constitutional: well-nourished, appears stated age, no acute distress  Eyes: Conjunctiva pink, Sclera clear  Cardiovascular: (+) tachycardia, regular rhythm, well-perfused extremities, radial pulses equal and 2+ b/l.   Respiratory: unlabored respiratory effort, (+) decrease right sided breath sounds. pt is speaking full sentences. no accessory muscle use.   Gastrointestinal: soft, non-tender, nondistended abdomen, no pulsatile mass, no rebound, no guarding  Musculoskeletal: supple neck, (+) trace b/l lower extremity edema, no calf tenderness  Integumentary: warm, dry, no rash  Neurologic: awake, alert  Psychiatric: appropriate mood, appropriate affect

## 2024-11-26 NOTE — ED ADULT NURSE NOTE - NSFALLHARMRISKINTERV_ED_ALL_ED

## 2024-11-26 NOTE — H&P ADULT - HISTORY OF PRESENT ILLNESS
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  As per patient's daughter at the bedside, patient supposed to go for mapping for radiation of the left scapula to attempt to improve her pain in that area.  About 2 months ago had a Pleurx left lung that was removed.  Patient is heart rate has been in the 120s for few weeks now.  Patient has been following cardiologist Dr. Huffman who is aware of this and recently changed patient's metoprolol to 150 mg.  Patient also wears a fentanyl patch that was increased from 15 mg to 37 mg about 1 week ago.  Patient reports she will with the fentanyl patch on Sunday.  Denies runny nose, sore throat, cough, chest pain, shortness of breath, abdominal pain, nausea, vomiting, diarrhea, constipation, urinary symptoms, rashes, recent travel, recent trauma, or recent sick contacts. 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  As per patient's daughter at the bedside, patient supposed to go for mapping for radiation of the left scapula to attempt to improve her pain in that area.  About 2 months ago had a Pleurx left lung that was removed. Her heart rate has been in the 130s for a 3-4 weeks and her cardiologist Dr. Huffman who is aware of this had recently changed patient's metoprolol to 150 mg.  She also wears a fentanyl patch. She denies having a sore throat, cough, chest pain, fever, shortness of breath, abdominal pain, nausea, vomiting, diarrhea, constipation, urinary symptoms, rashes, recent travel, recent trauma, or recent sick contacts.    In the ED,  Vital Signs Last 24 Hrs  T(C): 36.7 (26 Nov 2024 07:38), Max: 37.7 (26 Nov 2024 00:31)  T(F): 98.1 (26 Nov 2024 07:38), Max: 99.8 (26 Nov 2024 00:31)  HR: 122 (26 Nov 2024 07:38) (122 - 125)  BP: 136/81 (26 Nov 2024 07:38) (102/73 - 136/81)  BP(mean): --  RR: 20 (26 Nov 2024 07:38) (15 - 20)  SpO2: 96% (26 Nov 2024 07:38) (96% - 98%)    Parameters below as of 26 Nov 2024 07:38  Patient On (Oxygen Delivery Method): nasal cannula  O2 Flow (L/min): 2    Labs were significant for a WBC count of 17k, INR of 1.95, lactate normal, ProBNP of 6k    Imaging was significant for   Interlobular septal thickening with patchy groundglass opacities and   moderate right effusion possibly representing CHF.  Since 3/11/2023 interval left lung volume loss with leftward   mediastinal shift. Large consolidation in the left lung, greater in the   left lower lobe. Given significant cancer history, images can be  submitted to treating institution to evaluate any interval changes if   clinically relevant, as component of findings may be related to   treatment-related change.    The pt was given Cefepime, vanc an LR bolus of 1L and admitted to medicine

## 2024-11-26 NOTE — PATIENT PROFILE ADULT - FALL HARM RISK - HARM RISK INTERVENTIONS
Assistance with ambulation/Assistance OOB with selected safe patient handling equipment/Communicate Risk of Fall with Harm to all staff/Monitor gait and stability/Reinforce activity limits and safety measures with patient and family/Sit up slowly, dangle for a short time, stand at bedside before walking/Tailored Fall Risk Interventions/Visual Cue: Yellow wristband and red socks/Bed in lowest position, wheels locked, appropriate side rails in place/Call bell, personal items and telephone in reach/Instruct patient to call for assistance before getting out of bed or chair/Non-slip footwear when patient is out of bed/[place] to call system/Physically safe environment - no spills, clutter or unnecessary equipment/Purposeful Proactive Rounding/Room/bathroom lighting operational, light cord in reach

## 2024-11-26 NOTE — H&P ADULT - ATTENDING COMMENTS
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  No fever, chills.    PE Tachycardia, hypoxic on 02 NC, otherwise vitals stable  eomi  decreased breath sounds b/l  rrr s1s2  no edema    Labs/CT scan results reviewed    a/p      Plan  #Hypoxia, unclear if secondary to metastatic carcinoma or possibly pneumonia but patient doesn't have classic features of acute pna yet patient remains immunocompromised and is at high risk for infectious process  #CAD  #Chronic Afib   #SSS s/p PPM  #Metastatic cancer related pain 2/2 mets  #anxiety  #depression  #DMT2  #hypothyroidism  #Hyponatremia, chronic, not clinically relevant  #CKD 3, stable, no intervention  -plan as outlined  -will consult ID for opinion regarding possible pna and possible treatment  -would consider Pulm involvement but only if procedure required

## 2024-11-26 NOTE — CONSULT NOTE ADULT - ASSESSMENT
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  As per patient's daughter at the bedside, patient supposed to go for mapping for radiation of the left scapula to attempt to improve her pain in that area.  About 2 months ago had a Pleurx left lung that was removed. Her heart rate has been in the 130s for a 3-4 weeks and her cardiologist Dr. Huffman who is aware of this had recently changed patient's metoprolol to 150 mg.  She also wears a fentanyl patch. She denies having a sore throat, cough, chest pain, fever, shortness of breath, abdominal pain, nausea, vomiting, diarrhea, constipation, urinary symptoms, rashes, recent travel, recent trauma, or recent sick contacts.    Discussed with primary team; they had had discussion with patient and daughter, who would like to wait until tomorrow to discuss in further detail how aggressive we should be if patient decompensates while she is in the hospital. I was able to meet with both patient and daughter at bedside today. Patient appears to have intact cognition and is able to describe her medical situation. Patient's daughter expresses that they are hoping that patient can remain comfortable and maintain the status quo. Hospice has been brought up to the patient previously, but she has not been ready.     Patient follows with palliative at [facility]. Was recently started on fentanyl 37mcg, but patient found this to be too sedating, and is now on 25mcg. She was also on oxycodone 10mg PRN but typically avoids using this.     Plan:  - continue fentanyl patch 25mcg/hr q72h  - can start PO oxycodone IR 5mg q4h PRN for pain  - bowel regimen  - will discuss advance care planning with patient    Palliative care will continue to follow.   Please call [phone] with questions or concerns 24/7.   _____________  Zhao Michaels MD  Palliative Medicine  [facility]  [phone]

## 2024-11-26 NOTE — H&P ADULT - NSHPPHYSICALEXAM_GEN_ALL_CORE
Constitutional: well-nourished, appears stated age, no acute distress  Eyes: Conjunctiva pink, Sclera clear  Cardiovascular: (+) tachycardia, regular rhythm, well-perfused extremities, radial pulses equal and 2+ b/l.   Respiratory: unlabored respiratory effort, left sided crackles and rhonchi  Gastrointestinal: soft, non-tender, nondistended abdomen, no pulsatile mass, no rebound, no guarding  Musculoskeletal: supple neck, (+) trace b/l lower extremity edema, no calf tenderness  Integumentary: warm, dry, no rash  Neurologic: awake, alert  Psychiatric: appropriate mood, appropriate affect

## 2024-11-26 NOTE — H&P ADULT - TIME BILLING
Interviewing patient and support staff at bedside, speaking with residents, examining patient, reviewing prior notes and labs/rads, documenting, formulating plan of care, coordinating consults

## 2024-11-26 NOTE — ED PROVIDER NOTE - CARE PLAN
Principal Discharge DX:	Weakness  Secondary Diagnosis:	Leukocytosis  Secondary Diagnosis:	Hypoxia  Secondary Diagnosis:	Pleural effusion, right  Secondary Diagnosis:	Sinus tachycardia   1

## 2024-11-26 NOTE — H&P ADULT - NSHPLABSRESULTS_GEN_ALL_CORE
LABS:                          14.9   17.82 )-----------( 232      ( 26 Nov 2024 00:36 )             45.7     11-26    129[L]  |  93[L]  |  24[H]  ----------------------------<  113[H]  4.5   |  25  |  1.0    Ca    9.5      26 Nov 2024 00:36  Mg     2.0     11-26    TPro  6.8  /  Alb  3.7  /  TBili  1.1  /  DBili  x   /  AST  23  /  ALT  10  /  AlkPhos  139[H]  11-26    PT/INR - ( 26 Nov 2024 02:30 )   PT: 23.30 sec;   INR: 1.95 ratio         PTT - ( 26 Nov 2024 02:30 )  PTT:36.2 sec

## 2024-11-27 DIAGNOSIS — Z71.89 OTHER SPECIFIED COUNSELING: ICD-10-CM

## 2024-11-27 DIAGNOSIS — R52 PAIN, UNSPECIFIED: ICD-10-CM

## 2024-11-27 LAB
A1C WITH ESTIMATED AVERAGE GLUCOSE RESULT: 7.6 % — HIGH (ref 4–5.6)
ALBUMIN SERPL ELPH-MCNC: 2.9 G/DL — LOW (ref 3.5–5.2)
ALP SERPL-CCNC: 118 U/L — HIGH (ref 30–115)
ALT FLD-CCNC: 9 U/L — SIGNIFICANT CHANGE UP (ref 0–41)
ANION GAP SERPL CALC-SCNC: 9 MMOL/L — SIGNIFICANT CHANGE UP (ref 7–14)
AST SERPL-CCNC: 18 U/L — SIGNIFICANT CHANGE UP (ref 0–41)
BASOPHILS # BLD AUTO: 0.05 K/UL — SIGNIFICANT CHANGE UP (ref 0–0.2)
BASOPHILS NFR BLD AUTO: 0.4 % — SIGNIFICANT CHANGE UP (ref 0–1)
BILIRUB SERPL-MCNC: 0.7 MG/DL — SIGNIFICANT CHANGE UP (ref 0.2–1.2)
BUN SERPL-MCNC: 22 MG/DL — HIGH (ref 10–20)
CALCIUM SERPL-MCNC: 8.5 MG/DL — SIGNIFICANT CHANGE UP (ref 8.4–10.5)
CHLORIDE SERPL-SCNC: 96 MMOL/L — LOW (ref 98–110)
CO2 SERPL-SCNC: 26 MMOL/L — SIGNIFICANT CHANGE UP (ref 17–32)
CREAT SERPL-MCNC: 0.9 MG/DL — SIGNIFICANT CHANGE UP (ref 0.7–1.5)
EGFR: 61 ML/MIN/1.73M2 — SIGNIFICANT CHANGE UP
EOSINOPHIL # BLD AUTO: 0.25 K/UL — SIGNIFICANT CHANGE UP (ref 0–0.7)
EOSINOPHIL NFR BLD AUTO: 2 % — SIGNIFICANT CHANGE UP (ref 0–8)
ESTIMATED AVERAGE GLUCOSE: 171 MG/DL — HIGH (ref 68–114)
GLUCOSE BLDC GLUCOMTR-MCNC: 105 MG/DL — HIGH (ref 70–99)
GLUCOSE BLDC GLUCOMTR-MCNC: 136 MG/DL — HIGH (ref 70–99)
GLUCOSE BLDC GLUCOMTR-MCNC: 153 MG/DL — HIGH (ref 70–99)
GLUCOSE BLDC GLUCOMTR-MCNC: 86 MG/DL — SIGNIFICANT CHANGE UP (ref 70–99)
GLUCOSE SERPL-MCNC: 99 MG/DL — SIGNIFICANT CHANGE UP (ref 70–99)
HCT VFR BLD CALC: 41.4 % — SIGNIFICANT CHANGE UP (ref 37–47)
HGB BLD-MCNC: 13.6 G/DL — SIGNIFICANT CHANGE UP (ref 12–16)
IMM GRANULOCYTES NFR BLD AUTO: 0.4 % — HIGH (ref 0.1–0.3)
LEGIONELLA AG UR QL: NEGATIVE — SIGNIFICANT CHANGE UP
LYMPHOCYTES # BLD AUTO: 0.77 K/UL — LOW (ref 1.2–3.4)
LYMPHOCYTES # BLD AUTO: 6.2 % — LOW (ref 20.5–51.1)
MCHC RBC-ENTMCNC: 30.2 PG — SIGNIFICANT CHANGE UP (ref 27–31)
MCHC RBC-ENTMCNC: 32.9 G/DL — SIGNIFICANT CHANGE UP (ref 32–37)
MCV RBC AUTO: 92 FL — SIGNIFICANT CHANGE UP (ref 81–99)
MONOCYTES # BLD AUTO: 1.11 K/UL — HIGH (ref 0.1–0.6)
MONOCYTES NFR BLD AUTO: 9 % — SIGNIFICANT CHANGE UP (ref 1.7–9.3)
NEUTROPHILS # BLD AUTO: 10.13 K/UL — HIGH (ref 1.4–6.5)
NEUTROPHILS NFR BLD AUTO: 82 % — HIGH (ref 42.2–75.2)
NRBC # BLD: 0 /100 WBCS — SIGNIFICANT CHANGE UP (ref 0–0)
PLATELET # BLD AUTO: 218 K/UL — SIGNIFICANT CHANGE UP (ref 130–400)
PMV BLD: 11.9 FL — HIGH (ref 7.4–10.4)
POTASSIUM SERPL-MCNC: 3.7 MMOL/L — SIGNIFICANT CHANGE UP (ref 3.5–5)
POTASSIUM SERPL-SCNC: 3.7 MMOL/L — SIGNIFICANT CHANGE UP (ref 3.5–5)
PROCALCITONIN SERPL-MCNC: 0.17 NG/ML — HIGH (ref 0.02–0.1)
PROT SERPL-MCNC: 5.2 G/DL — LOW (ref 6–8)
RBC # BLD: 4.5 M/UL — SIGNIFICANT CHANGE UP (ref 4.2–5.4)
RBC # FLD: 16.6 % — HIGH (ref 11.5–14.5)
S PNEUM AG UR QL: NEGATIVE — SIGNIFICANT CHANGE UP
SODIUM SERPL-SCNC: 131 MMOL/L — LOW (ref 135–146)
T4 AB SER-ACNC: 4.7 UG/DL — SIGNIFICANT CHANGE UP (ref 4.6–12)
TSH SERPL-MCNC: 2.48 UIU/ML — SIGNIFICANT CHANGE UP (ref 0.27–4.2)
WBC # BLD: 12.36 K/UL — HIGH (ref 4.8–10.8)
WBC # FLD AUTO: 12.36 K/UL — HIGH (ref 4.8–10.8)

## 2024-11-27 PROCEDURE — 99233 SBSQ HOSP IP/OBS HIGH 50: CPT

## 2024-11-27 PROCEDURE — 99497 ADVNCD CARE PLAN 30 MIN: CPT | Mod: 25

## 2024-11-27 PROCEDURE — 71045 X-RAY EXAM CHEST 1 VIEW: CPT | Mod: 26

## 2024-11-27 PROCEDURE — 99233 SBSQ HOSP IP/OBS HIGH 50: CPT | Mod: 25

## 2024-11-27 PROCEDURE — 99497 ADVNCD CARE PLAN 30 MIN: CPT

## 2024-11-27 RX ORDER — CEFTRIAXONE SODIUM 1 G
2000 VIAL (EA) INJECTION EVERY 24 HOURS
Refills: 0 | Status: COMPLETED | OUTPATIENT
Start: 2024-11-27 | End: 2024-12-04

## 2024-11-27 RX ORDER — DOXYCYCLINE HYCLATE 150 MG/1
100 TABLET, COATED ORAL ONCE
Refills: 0 | Status: COMPLETED | OUTPATIENT
Start: 2024-11-27 | End: 2024-11-27

## 2024-11-27 RX ORDER — 0.9 % SODIUM CHLORIDE 0.9 %
500 INTRAVENOUS SOLUTION INTRAVENOUS ONCE
Refills: 0 | Status: COMPLETED | OUTPATIENT
Start: 2024-11-27 | End: 2024-11-27

## 2024-11-27 RX ORDER — DOXYCYCLINE HYCLATE 150 MG/1
100 TABLET, COATED ORAL EVERY 12 HOURS
Refills: 0 | Status: DISCONTINUED | OUTPATIENT
Start: 2024-11-28 | End: 2024-12-05

## 2024-11-27 RX ORDER — DOXYCYCLINE HYCLATE 150 MG/1
TABLET, COATED ORAL
Refills: 0 | Status: DISCONTINUED | OUTPATIENT
Start: 2024-11-27 | End: 2024-12-05

## 2024-11-27 RX ADMIN — CLOPIDOGREL 75 MILLIGRAM(S): 75 TABLET, FILM COATED ORAL at 12:20

## 2024-11-27 RX ADMIN — Medication 100 MILLIGRAM(S): at 17:56

## 2024-11-27 RX ADMIN — Medication 500 MILLILITER(S): at 10:00

## 2024-11-27 RX ADMIN — PREGABALIN 50 MILLIGRAM(S): 75 CAPSULE ORAL at 06:00

## 2024-11-27 RX ADMIN — METOPROLOL TARTRATE 50 MILLIGRAM(S): 100 TABLET, FILM COATED ORAL at 06:00

## 2024-11-27 RX ADMIN — ESCITALOPRAM OXALATE 10 MILLIGRAM(S): 10 TABLET, FILM COATED ORAL at 12:20

## 2024-11-27 RX ADMIN — APIXABAN 5 MILLIGRAM(S): 2.5 TABLET, FILM COATED ORAL at 17:56

## 2024-11-27 RX ADMIN — PREGABALIN 50 MILLIGRAM(S): 75 CAPSULE ORAL at 21:27

## 2024-11-27 RX ADMIN — PANTOPRAZOLE SODIUM 40 MILLIGRAM(S): 40 TABLET, DELAYED RELEASE ORAL at 06:00

## 2024-11-27 RX ADMIN — PREGABALIN 50 MILLIGRAM(S): 75 CAPSULE ORAL at 14:59

## 2024-11-27 RX ADMIN — APIXABAN 5 MILLIGRAM(S): 2.5 TABLET, FILM COATED ORAL at 06:00

## 2024-11-27 RX ADMIN — DOXYCYCLINE HYCLATE 100 MILLIGRAM(S): 150 TABLET, COATED ORAL at 14:36

## 2024-11-27 RX ADMIN — INSULIN GLARGINE 5 UNIT(S): 100 INJECTION, SOLUTION SUBCUTANEOUS at 21:28

## 2024-11-27 NOTE — CONSULT NOTE ADULT - ASSESSMENT
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  As per patient's daughter at the bedside, patient supposed to go for mapping for radiation of the left scapula to attempt to improve her pain in that area.  About 2 months ago had a Pleurx left lung that was removed. Her heart rate has been in the 130s for a 3-4 weeks and her cardiologist Dr. Huffman who is aware of this had recently changed patient's metoprolol to 150 mg.  She also wears a fentanyl patch. She denies having a sore throat, cough, chest pain, fever, shortness of breath, abdominal pain, nausea, vomiting, diarrhea, constipation, urinary symptoms, rashes, recent travel, recent trauma, or recent sick contacts.    In the ED,  Vital Signs Last 24 Hrs HR: 122/m, RR: 20 / m. WBC 17    < from: CT Angio Chest PE Protocol w/ IV Cont (11.26.24 @ 03:42) >  1.  No PE  2.  CHF.  3.  Since 3/11/2023 interval left lung volume loss with leftward mediastinal shift. Large consolidation in the left lung, greater in the   left lower lobe. Given significant cancer history, images can besubmitted to treating institution to evaluate any interval changes if   clinically relevant, as component of findings may be related to treatment-related change.    IMPRESSION/RECOMMENDATIONS  Immunosuppression/Immunosenescence ( above age 60 yrs there is a exponential decline in immunity which could result in poor clinical outcomes.  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  As per patient's daughter at the bedside, patient supposed to go for mapping for radiation of the left scapula to attempt to improve her pain in that area.  About 2 months ago had a Pleurx left lung that was removed. Her heart rate has been in the 130s for a 3-4 weeks and her cardiologist Dr. Huffman who is aware of this had recently changed patient's metoprolol to 150 mg.  She also wears a fentanyl patch. She denies having a sore throat, cough, chest pain, fever, shortness of breath, abdominal pain, nausea, vomiting, diarrhea, constipation, urinary symptoms, rashes, recent travel, recent trauma, or recent sick contacts.    In the ED,  Vital Signs Last 24 Hrs HR: 122/m, RR: 20 / m. WBC 17    < from: CT Angio Chest PE Protocol w/ IV Cont (11.26.24 @ 03:42) >  1.  No PE  2.  CHF.  3.  Since 3/11/2023 interval left lung volume loss with leftward mediastinal shift. Large consolidation in the left lung, greater in the   left lower lobe. Given significant cancer history, images can besubmitted to treating institution to evaluate any interval changes if   clinically relevant, as component of findings may be related to treatment-related change.    IMPRESSION/RECOMMENDATIONS  Immunosuppression/Immunosenescence ( above age 60 yrs there is a exponential decline in immunity which could result in poor clinical outcomes.  Acute illness (Sepsi/ PNA ) which poses a threat to life or bodily function without treatment   Sepsis on presentation  PNA left  11/26 CT chest : Large consolidation in the left lung, greater in the left lower lobe.  Septicemia with Sreptococci  11/26 BCx Streptococci  CBC :  ( WBC 12.3  ), ( Hg 13.6  ), CMP ( Cr 0.9   ) reviewed .   Procal 0.17 ( despite it would recommend ABx )    diabetes  A-fib on Eliquis  SSS S/p PPM placement, CAD with 4 stents on Plavix, history of MI  Pancreatic cancer status post distal pancreatectomy in August 2021  Cholangiocarcinoma with mets to the liver status post partial liver resection, mets to the lung, and mets to the bone    -Off loading to prevent pressure sores and preventive measures to avoid aspiration  -ECHO  -FU final BCx  -Urine for strep pneumonia/legionella antigen  -Nares ORSA  -Sputum gm stain, cultures  -BCx   -Rocephin 2 gm iv q24h  -Doxycycline 100 mg iv q12h  -d/c Azithromycin    Discussion of management/test results/antibiotic regimen  with primary medical team.

## 2024-11-27 NOTE — PROGRESS NOTE ADULT - ASSESSMENT
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 The pt was admitted to medicine for pneumonia.    Plan  #Hypoxia 2/2 CAP and possible progression of malignant effusions with mild volume overload  - Lactate normal  - On 3L via NC satting at 98%  - Pt is also on dexamethasone 2mg daily for metastasis related pain, Raised WBC could be 2/2 to steroids  - Will hold dexa for now  - Pt has been tachycardic for the past 3 weeks, EKG shows accelerated junctional rhythm, could be 2/2 hypoxia vs infection  - will cover with ceftriaxone and azithro for now.  - F/u BCx, procal and RVP  -F/u urine strep and legionella  - F/u ESR and CRP  - If all signs of infection negative can DC abx  - Will give one dose of lasix since pt appears slightly volume overloaded    #bacteremic  - blood culture growing gram positive cocci in pairs  - wait for sensitivities  - currently on azithro and ceftri    #Hypotension  - less PO intake over the last few days   - 500 LR bolus to see if responsive   - may need to hold metoprolol if continue to be low    #CAD  #Afib   #SSS s/p PPM  -C/w metoprolol tart 50 TID  -C/w eliquis  -C/w plavix    #cancer related pain 2/2 mets  - C/w pregabalin and fentanyl patch    #anxiety  #depression  - C/w lexapro 10 once daily    #DM  - as per pt on tresiba 10-12 units at night  - C/w lantus and SS  - On jardiance 10mg once daily    #hypothyroidism  - Check TSH  - Pt on armour thyroid (dessicated thyroid combo of t3/t4)  - Has been on it for years  - Unsure of dose, fam will bring it in      GI ppx- Pantop  DVT ppx- Eliquis  Diet- DASH/fluid restrict  Ambulate as tolerated    Handoff: f/u sens, monitor BP 89-year-old female with a past medical history of diabetes, A-fib on Eliquis, SSS S/p PPM placement, CAD with 4 stents on Plavix, history of MI, history of pancreatic cancer status post distal pancreatectomy in August 2021, 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 The pt was admitted to medicine for pneumonia.    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 waiting on sensitivities  -F/u urine strep and legionella  - patient hypotensive - give fluids improved   - hold metoprolol for now    #CAD  #Afib   #SSS s/p PPM  - hold metoprolol tart 50 TID  -C/w eliquis  -C/w plavix    #cancer related pain 2/2 mets  - C/w pregabalin and fentanyl patch    #anxiety  #depression  - C/w lexapro 10 once daily    #DM  - as per pt on tresiba 10-12 units at night  - C/w lantus and SS  - On jardiance 10mg once daily    #hypothyroidism  - Check TSH  - Pt on armour thyroid (dessicated thyroid combo of t3/t4)  - resume home meds      GI ppx- Pantop  DVT ppx- Eliquis  Diet- DASH/fluid restrict  Ambulate as tolerated  Dispo> 72 hours  89-year-old female with a past medical history of diabetes, A-fib on Eliquis, SSS S/p PPM placement, CAD with 4 stents on Plavix, history of MI, history of pancreatic cancer status post distal pancreatectomy in August 2021, 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 The pt was admitted to medicine for pneumonia.    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 waiting on sensitivities  -F/u urine strep and legionella  - patient hypotensive - give fluids improved   - echo   - hold metoprolol for now    #CAD  #Afib   #SSS s/p PPM  - hold metoprolol tart 50 TID  -C/w eliquis  -C/w plavix    #cancer related pain 2/2 mets  - C/w pregabalin and fentanyl patch    #anxiety  #depression  - C/w lexapro 10 once daily    #DM  - as per pt on tresiba 10-12 units at night  - C/w lantus and SS  - On jardiance 10mg once daily    #hypothyroidism  - Check TSH  - Pt on armour thyroid (dessicated thyroid combo of t3/t4)  - resume home meds      GI ppx- Pantop  DVT ppx- Eliquis  Diet- DASH/fluid restrict  Ambulate as tolerated  Dispo> 72 hours

## 2024-11-27 NOTE — CONSULT NOTE ADULT - NS ATTEST RISK PROBLEM GEN_ALL_CORE FT
-My assessment required an independent historian and is independent of the teaching service  -I independently interpreted the most recent imaging ( CXR ) and labs ( CBC, CMP) and cultures ( along with the sensitivities / BC )  -I discussed my recommendations with the primary team housestaff/Attending  -I assisted with initiation of antibiotics

## 2024-11-27 NOTE — PROGRESS NOTE ADULT - CONVERSATION DETAILS
Reviewed patient's medical and social history as well as events leading to patient's hospitalization. Writer discussed patient's current diagnosis pnuemonia with bacteremia medical condition and management,  prognosis, and life expectancy. Inquired about patient's wishes regarding extent of medical care to be provided including escalation of medical care, cardiopulmonary resuscitation, IVF, antibiotics, and further investigative studies such as blood draws and radiology. patient and daughter showed insight into medical condition. All questions answered. Patient consented to DNR/DNI status.
Spoke with the patient and daughter at bedside. They both report that they are hoping for her to get back to her baseline prior to admission and get back home with her HHA. The patient would like to work with PT to see if she can walk still. Even if she cannot walk, she does not want to go to [facility]. They report that the patient has been having discussions re: [place] [facility] [place] yet. She is not on any chemo and does not want to pursue it. Discussed code status. The patient does not want CPR or intubation and asked to place a DNR/DNI order.

## 2024-11-27 NOTE — PROGRESS NOTE ADULT - NS ATTEST RISK PROBLEM GEN_ALL_CORE FT
the following   --------------------------------Complexity of data reviewed ----------------------------------------------  The Patients complexity of data reviewed  is Low [ ] Moderate [ ] High [ x] due to the following:   Reviewed prior external records [ x]  Considering/ Ordered a unique test:  Labs [x ] Imaging [x ] Stress Test  [ ] Other: Specify [ ]  Reviewed each unique test result [x ]   Assessment requiring an independent historian [x daughter  ]  Independent interpretation of:   X-Ray [x ] EKG [x ]  Other: Specify  [ ]  Discussion of management of tests with clinician outside of my group [ID ]  -------------------------------------Risk of Morbidity-------------------------------------------------------  The Patients risk of morbidity is Low [ ] Moderate [ ] High [ ] due to the following:   Prescription Drug Management [x ]  Decision regarding elective or emergent: minor surgery [ ] major surgery [ ]  Decision regarding hospitalization or escalation of hospital-level care [ x]  SDOH: Hearing/Vision impaired [ ] Food insecurity [ ] Homelessness/unsafe condition [ ]   Financial strain [ ] un/underemployed [ ] Lack of Transport [ ]  DNR or De-Escalation of care because of poor prognosis [x ]  Drug Therapy requiring intensive monitoring for toxicity [ ]  Parenteral controlled substance [ ]  ------------------------------------------------------------------------------------------------------------------  Conversation of advanced directives [x ]  Present on admission: Pressure Ulcers [ ] C-Diff  [ ] Lemus [ ] Central lines [ ]   Suspected/Confirmed MRSA [ ]

## 2024-11-27 NOTE — PROGRESS NOTE ADULT - SUBJECTIVE AND OBJECTIVE BOX
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  As per patient's daughter at the bedside, patient supposed to go for mapping for radiation of the left scapula to attempt to improve her pain in that area.  About 2 months ago had a Pleurx left lung that was removed. Her heart rate has been in the 130s for a 3-4 weeks and her cardiologist Dr. Huffman who is aware of this had recently changed patient's metoprolol to 150 mg.  She also wears a fentanyl patch. She denies having a sore throat, cough, chest pain, fever, shortness of breath, abdominal pain, nausea, vomiting, diarrhea, constipation, urinary symptoms, rashes, recent travel, recent trauma, or recent sick contacts.    In the ED,  Vital Signs Last 24 Hrs  T(C): 36.7 (26 Nov 2024 07:38), Max: 37.7 (26 Nov 2024 00:31)  T(F): 98.1 (26 Nov 2024 07:38), Max: 99.8 (26 Nov 2024 00:31)  HR: 122 (26 Nov 2024 07:38) (122 - 125)  BP: 136/81 (26 Nov 2024 07:38) (102/73 - 136/81)  BP(mean): --  RR: 20 (26 Nov 2024 07:38) (15 - 20)  SpO2: 96% (26 Nov 2024 07:38) (96% - 98%)    Parameters below as of 26 Nov 2024 07:38  Patient On (Oxygen Delivery Method): nasal cannula  O2 Flow (L/min): 2    Labs were significant for a WBC count of 17k, INR of 1.95, lactate normal, ProBNP of 6k    Imaging was significant for   Interlobular septal thickening with patchy groundglass opacities and   moderate right effusion possibly representing CHF.  Since 3/11/2023 interval left lung volume loss with leftward   mediastinal shift. Large consolidation in the left lung, greater in the   left lower lobe. Given significant cancer history, images can be  submitted to treating institution to evaluate any interval changes if   clinically relevant, as component of findings may be related to   treatment-related change.    The pt was given Cefepime, vanc an LR bolus of 1L and admitted to medicine (26 Nov 2024 07:46)      Interval history:     11/27: patient reports being comfortable on exam. she is able to recount her hospital course and medical issues to me. daughter is at bedside.       ADVANCE DIRECTIVES:     [ X] Full Code [ ] DNR  MOLST  [ ]  Living Will  [ ]   DECISION MAKER(s): Patient has capacity  [ X ] Health Care Proxy(s)  [ ] Surrogate(s)  [ ] Guardian           Name(s): Phone Number(s): Cassie (daughter)      BASELINE (I)ADL(s) (prior to admission):    [place]: [ ]Total  [ X  Moderate [ ]Dependent  Palliative Performance Status Version 2:         %    http://npcrc.org/files/news/palliative_performance_scale_ppsv2.pdf    Allergies    Percocet 5/325 (Unknown)  Cipro (Diarrhea)    Intolerances    MEDICATIONS  (STANDING):  apixaban 5 milliGRAM(s) Oral every 12 hours  azithromycin  IVPB 500 milliGRAM(s) IV Intermittent every 24 hours  cefTRIAXone   IVPB 2000 milliGRAM(s) IV Intermittent every 24 hours  clopidogrel Tablet 75 milliGRAM(s) Oral daily  dextrose 5%. 1000 milliLiter(s) (50 mL/Hr) IV Continuous <Continuous>  dextrose 5%. 1000 milliLiter(s) (100 mL/Hr) IV Continuous <Continuous>  dextrose 50% Injectable 25 Gram(s) IV Push once  dextrose 50% Injectable 12.5 Gram(s) IV Push once  dextrose 50% Injectable 25 Gram(s) IV Push once  escitalopram 10 milliGRAM(s) Oral daily  fentaNYL   Patch  25 MICROgram(s)/Hr 1 Patch Transdermal every 72 hours  glucagon  Injectable 1 milliGRAM(s) IntraMuscular once  insulin glargine Injectable (LANTUS) 5 Unit(s) SubCutaneous at bedtime  insulin lispro (ADMELOG) corrective regimen sliding scale   SubCutaneous three times a day before meals  metoprolol tartrate 50 milliGRAM(s) Oral three times a day  pantoprazole    Tablet 40 milliGRAM(s) Oral before breakfast  pregabalin 50 milliGRAM(s) Oral three times a day    MEDICATIONS  (PRN):  dextrose Oral Gel 15 Gram(s) Oral once PRN Blood Glucose LESS THAN 70 milliGRAM(s)/deciliter    PRESENT SYMPTOMS: [ ]Unable to obtain due to poor mentation   Source if other than patient:  [ ]Family   [ ]Team     Pain: [X ]yes [ ]no  QOL impact - mild- moderate- movement hurts with her arm  Location -                   back/shoulder/arm  Aggravating factors - movement  Quality - sharp  Radiation -  Timing-  Severity (0-10 scale):  Minimal acceptable level (0-10 scale):     CPOT:    https://[email or web address]/getattachment/[email or web address]/Critical-Care-Pain-Observation-Tool-(CPOT)    PAIN AD Score:   [email or web address]/painad.pdf (press ctrl +  left click to view)    Dyspnea:                           [X ]None[ ]Mild [ ]Moderate [ ]Severe     Respiratory Distress Observation Scale (RDOS):   A score of 0 to 2 signifies little or no respiratory distress, 3 signifies mild distress, scores 4 to 6 indicate moderate distress, and scores greater than 7 signify severe distress  [email or web address]/[email or web address]/[email or web address]-nkdljctrubr-inqzsvjl-ymhyhhaysru-[email or web address]    Anxiety:                             [ ]None[ ]Mild [ ]Moderate [ ]Severe   Fatigue:                             [ ]None[ ]Mild [ ]Moderate [ ]Severe   Nausea:                             [ ]None[ ]Mild [ ]Moderate [ ]Severe   Loss of appetite:              [ ]None[ ]Mild [ ]Moderate [ ]Severe   Constipation:                    [ ]None[ ]Mild [ ]Moderate [ ]Severe    Other Symptoms:  [ ]All other review of systems negative     Palliative Performance Status Version 2:         %    http://npcrc.org/files/news/palliative_performance_scale_ppsv2.pdf    PHYSICAL EXAM:  Vital Signs Last 24 Hrs  T(C): 36.3 (27 Nov 2024 07:44), Max: 36.6 (26 Nov 2024 23:52)  T(F): 97.3 (27 Nov 2024 07:44), Max: 97.8 (26 Nov 2024 23:52)  HR: 125 (27 Nov 2024 10:51) (56 - 133)  BP: 95/67 (27 Nov 2024 10:51) (73/60 - 125/78)  BP(mean): 76 (27 Nov 2024 10:51) (64 - 94)  RR: 18 (27 Nov 2024 07:44) (18 - 20)  SpO2: 96% (27 Nov 2024 07:44) (96% - 98%)    GENERAL:  [ X] No acute distress [ ]Lethargic  [ ]Unarousable  [X ]Verbal  [ ]Non-Verbal [ ]Cachexia    BEHAVIORAL/PSYCH:  [X ]Alert and Oriented x  4 [ ] Anxiety [ ] Delirium [ ] Agitation [X ] Calm   EYES: [X] No scleral icterus [ ] Scleral icterus [ ] Closed  ENMT:  [ ]Dry mouth  [ X]No external oral lesions [ X] No external ear or nose lesions  CARDIOVASCULAR:  [ ]Regular [ ]Irregular [ ]Tachy [ ]Not Tachy  [ ]Chung [ ] Edema [ X] No edema  PULMONARY:  [ ]Tachypnea  [ ]Audible excessive secretions [X ] No labored breathing [ ] labored breathing  GASTROINTESTINAL: [ ]Soft  [ ]Distended  [ X]Not distended [ ]Non tender [ ]Tender  MUSCULOSKELETAL: [X ]No clubbing [ ] clubbing  [ ] No cyanosis [ ] cyanosis  NEUROLOGIC: [ ]No focal deficits  [X ]Follows commands  [ ]Does not follow commands  [ ]Cognitive impairment  [ ]Dysphagia  [ ]Dysarthria  [ ]Paresis   SKIN: [ ] Jaundiced [X ] Non-jaundiced [ ]Rash [ ]No Rash [ ] Warm [ ] Dry  MISC/LINES: [ ] ET tube [ ] Trach [ ]NGT/OGT [ ]PEG [ ]Lemus      LABS: reviewed by me                        13.6   12.36 )-----------( 218      ( 27 Nov 2024 09:19 )             41.4   11-27    131[L]  |  96[L]  |  22[H]  ----------------------------<  99  3.7   |  26  |  0.9    Ca    8.5      27 Nov 2024 09:19  Mg     2.0     11-26    TPro  5.2[L]  /  Alb  2.9[L]  /  TBili  0.7  /  DBili  x   /  AST  18  /  ALT  9   /  AlkPhos  118[H]  11-27  PT/INR - ( 26 Nov 2024 02:30 )   PT: 23.30 sec;   INR: 1.95 ratio         PTT - ( 26 Nov 2024 02:30 )  PTT:36.2 sec    Urinalysis Basic - ( 27 Nov 2024 09:19 )    Color: x / Appearance: x / SG: x / pH: x  Gluc: 99 mg/dL / Ketone: x  / Bili: x / Urobili: x   Blood: x / Protein: x / Nitrite: x   Leuk Esterase: x / RBC: x / WBC x   Sq Epi: x / Non Sq Epi: x / Bacteria: x      RADIOLOGY & ADDITIONAL STUDIES: reviewed by me    EKG: reviewed by me    Patient discussed with primary medical team MD  Palliative care education provided to patient and/or family

## 2024-11-27 NOTE — PROGRESS NOTE ADULT - SUBJECTIVE AND OBJECTIVE BOX
24H events:    Patient is a 89y old Female who presents with a chief complaint of Weakness (27 Nov 2024 05:28)    Primary diagnosis of Weakness          Today is hospital day 1d.   This morning patient was seen and examined at bedside, resting comfortably in bed.    No acute or major events overnight.    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; s/p partial liver resection Mar 2022 @ [facility]    S/P tonsillectomy    History of cholecystectomy    History of appendectomy    Cataract of both eyes    History of hip replacement, total, left    History of partial pancreatectomy  s/p distal pancreatectomy Aug 2021    H/O resection of liver  mets to liver; s/p partial liver resection Mar 2022 @ [facility]    Cardiac pacemaker in situ      SOCIAL HISTORY:  Social History:      ALLERGIES:  Percocet 5/325 (Unknown)  Cipro (Diarrhea)    MEDICATIONS:  STANDING MEDICATIONS  apixaban 5 milliGRAM(s) Oral every 12 hours  azithromycin  IVPB 500 milliGRAM(s) IV Intermittent every 24 hours  cefTRIAXone   IVPB 2000 milliGRAM(s) IV Intermittent every 24 hours  clopidogrel Tablet 75 milliGRAM(s) Oral daily  dextrose 5%. 1000 milliLiter(s) IV Continuous <Continuous>  dextrose 5%. 1000 milliLiter(s) IV Continuous <Continuous>  dextrose 50% Injectable 25 Gram(s) IV Push once  dextrose 50% Injectable 12.5 Gram(s) IV Push once  dextrose 50% Injectable 25 Gram(s) IV Push once  escitalopram 10 milliGRAM(s) Oral daily  fentaNYL   Patch  25 MICROgram(s)/Hr 1 Patch Transdermal every 72 hours  glucagon  Injectable 1 milliGRAM(s) IntraMuscular once  insulin glargine Injectable (LANTUS) 5 Unit(s) SubCutaneous at bedtime  insulin lispro (ADMELOG) corrective regimen sliding scale   SubCutaneous three times a day before meals  metoprolol tartrate 50 milliGRAM(s) Oral three times a day  pantoprazole    Tablet 40 milliGRAM(s) Oral before breakfast  pregabalin 50 milliGRAM(s) Oral three times a day    PRN MEDICATIONS  dextrose Oral Gel 15 Gram(s) Oral once PRN    VITALS:   T(F): 97.3  HR: 123  BP: 84/68  RR: 18  SpO2: 96%    PHYSICAL EXAM:  GENERAL: NAD, lying in bed comfortably  HEAD:  Atraumatic, Normocephalic  EYES: conjunctiva and sclera clear  CHEST/LUNG: Clear to auscultation bilaterally; left sided crackles  HEART: Regular rate and rhythm; No murmurs, rubs, or gallops  ABDOMEN: BSx4; Soft, nontender, nondistended  EXTREMITIES:  2+ Peripheral Pulses, brisk capillary refill. No clubbing, cyanosis, or edema  NERVOUS SYSTEM:  A&Ox3, no focal deficits   SKIN: No rashes or lesions      LABS:                        13.6   12.36 )-----------( 218      ( 27 Nov 2024 09:19 )             41.4     11-27    131[L]  |  96[L]  |  22[H]  ----------------------------<  99  3.7   |  26  |  0.9    Ca    8.5      27 Nov 2024 09:19  Mg     2.0     11-26    TPro  5.2[L]  /  Alb  2.9[L]  /  TBili  0.7  /  DBili  x   /  AST  18  /  ALT  9   /  AlkPhos  118[H]  11-27    PT/INR - ( 26 Nov 2024 02:30 )   PT: 23.30 sec;   INR: 1.95 ratio         PTT - ( 26 Nov 2024 02:30 )  PTT:36.2 sec  Urinalysis Basic - ( 27 Nov 2024 09:19 )    Color: x / Appearance: x / SG: x / pH: x  Gluc: 99 mg/dL / Ketone: x  / Bili: x / Urobili: x   Blood: x / Protein: x / Nitrite: x   Leuk Esterase: x / RBC: x / WBC x   Sq Epi: x / Non Sq Epi: x / Bacteria: x        Sedimentation Rate, Erythrocyte: 36 mm/Hr *H* (11-26-24 @ 11:28)      Urinalysis with Rflx Culture (collected 26 Nov 2024 14:00)    Culture - Blood (collected 26 Nov 2024 03:20)  Source: .Blood BLOOD  Gram Stain (26 Nov 2024 23:02):    Growth in anaerobic bottle: Gram positive cocci in pairs    Growth in aerobic bottle: Gram Positive Cocci in Pairs and Chains  Preliminary Report (26 Nov 2024 23:02):    Growth in anaerobic bottle: Gram positive cocci in pairs    Growth in aerobic bottle: Gram Positive Cocci in Pairs and Chains    Direct identification is available within approximately 3-5    hours either by Blood Panel Multiplexed PCR or Direct    MALDI-TOF. Details: [email or web address]  Organism: Blood Culture PCR (26 Nov 2024 23:11)  Organism: Blood Culture PCR (26 Nov 2024 23:11)    Culture - Blood (collected 26 Nov 2024 03:20)  Source: .Blood BLOOD  Gram Stain (26 Nov 2024 22:40):    Growth in aerobic and anaerobic bottles: Gram positive cocci in pairs  Preliminary Report (26 Nov 2024 22:40):    Growth in aerobic and anaerobic bottles: Gram positive cocci in pairs          RADIOLOGY:  CT Angio Chest PE Protocol w/ IV Cont:   ACC: [id number] EXAM:  CT ANGIO CHEST PULM ART [facility]   ORDERED BY:   SHIRAZ POZO     PROCEDURE DATE:  11/26/2024          INTERPRETATION:  CLINICAL INFORMATION: Tachycardia. History of pancreatic   cancer, cholangiograms were with liver metastases and lung metastases.    COMPARISON: CT chest 3/11/2023    CONTRAST/COMPLICATIONS:  IV Contrast: Omnipaque 350  65 cc administered   35 cc discarded  Oral Contrast: NONE      PROCEDURE:  CT Angiography of the Chest.  Sagittal and coronal reformats were performed as well as 3D (MIP)   reconstructions.    FINDINGS:    LUNGS/PLEURA/AIRWAYS: Interlobular septal thickening and patchy   groundglass opacities with moderate right effusion suggestive of CHF. No   pneumothorax. Interval development of volume loss of the left lung with   leftward mediastinal shift with large lower greater than upper lobe   consolidations  VESSELS: Atherosclerosis. No acute pulmonary embolism identified.   Probably prominent main pulmonary artery can be seen with pulmonary   arterial hypertension.  HEART: Mitral annulus calcifications. Coronary artery calcifications.   Cardiomegaly.  MEDIASTINUM AND CONSTANZA: No lymphadenopathy.  CHEST WALL AND LOWER NECK: Diffuse anasarca. Left chest wall pacer  VISUALIZED UPPER ABDOMEN: Partially imaged mild ascites. Surgically   absent gallbladder  BONES: Osteopenia. Degenerative changes.    IMPRESSION:  1.  No evidence of acute pulmonary embolism.  2.  Interlobular septal thickening with patchy groundglass opacities and   moderate right effusion possibly representing CHF.  3.  Since 3/11/2023 interval left lung volume loss with leftward   mediastinal shift. Large consolidation in the left lung, greater in the   left lower lobe. Given significant cancer history, images can be  submitted to treating institution to evaluate any interval changes if   clinically relevant, as component of findings may be related to   treatment-related change.        --- End of Report ---            ELAINE MOY MD; Attending Radiologist  Thisdocument has been electronically signed. Nov 26 2024  4:56AM (11-26-24 @ 03:42)           24H events:    Patient is a 89y old Female who presents with a chief complaint of Weakness (27 Nov 2024 05:28)    Primary diagnosis of Weakness          Today is hospital day 1d.   This morning patient was seen and examined at bedside, resting comfortably in bed.    No acute or major events overnight.    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; s/p partial liver resection Mar 2022 @ [facility]    S/P tonsillectomy    History of cholecystectomy    History of appendectomy    Cataract of both eyes    History of hip replacement, total, left    History of partial pancreatectomy  s/p distal pancreatectomy Aug 2021    H/O resection of liver  mets to liver; s/p partial liver resection Mar 2022 @ [facility]    Cardiac pacemaker in situ      SOCIAL HISTORY:  Social History:      ALLERGIES:  Percocet 5/325 (Unknown)  Cipro (Diarrhea)    MEDICATIONS:  STANDING MEDICATIONS  apixaban 5 milliGRAM(s) Oral every 12 hours  azithromycin  IVPB 500 milliGRAM(s) IV Intermittent every 24 hours  cefTRIAXone   IVPB 2000 milliGRAM(s) IV Intermittent every 24 hours  clopidogrel Tablet 75 milliGRAM(s) Oral daily  dextrose 5%. 1000 milliLiter(s) IV Continuous <Continuous>  dextrose 5%. 1000 milliLiter(s) IV Continuous <Continuous>  dextrose 50% Injectable 25 Gram(s) IV Push once  dextrose 50% Injectable 12.5 Gram(s) IV Push once  dextrose 50% Injectable 25 Gram(s) IV Push once  escitalopram 10 milliGRAM(s) Oral daily  fentaNYL   Patch  25 MICROgram(s)/Hr 1 Patch Transdermal every 72 hours  glucagon  Injectable 1 milliGRAM(s) IntraMuscular once  insulin glargine Injectable (LANTUS) 5 Unit(s) SubCutaneous at bedtime  insulin lispro (ADMELOG) corrective regimen sliding scale   SubCutaneous three times a day before meals  metoprolol tartrate 50 milliGRAM(s) Oral three times a day  pantoprazole    Tablet 40 milliGRAM(s) Oral before breakfast  pregabalin 50 milliGRAM(s) Oral three times a day    PRN MEDICATIONS  dextrose Oral Gel 15 Gram(s) Oral once PRN    VITALS:   T(F): 97.3  HR: 123  BP: 84/68  RR: 18  SpO2: 96%    PHYSICAL EXAM:  GENERAL: chronically ill appearing  CHEST/LUNG: Clear to auscultation bilaterally; right sided crackles  HEART: Regular rate and rhythm; No murmurs, rubs, or gallops  ABDOMEN: BSx4; Soft, nontender, nondistended  EXTREMITIES:  2+ Peripheral Pulses, brisk capillary refill. No clubbing, cyanosis, or edema  NERVOUS SYSTEM:  A&Ox3, no focal deficits     LABS:                        13.6   12.36 )-----------( 218      ( 27 Nov 2024 09:19 )             41.4     11-27    131[L]  |  96[L]  |  22[H]  ----------------------------<  99  3.7   |  26  |  0.9    Ca    8.5      27 Nov 2024 09:19  Mg     2.0     11-26    TPro  5.2[L]  /  Alb  2.9[L]  /  TBili  0.7  /  DBili  x   /  AST  18  /  ALT  9   /  AlkPhos  118[H]  11-27    PT/INR - ( 26 Nov 2024 02:30 )   PT: 23.30 sec;   INR: 1.95 ratio         PTT - ( 26 Nov 2024 02:30 )  PTT:36.2 sec  Urinalysis Basic - ( 27 Nov 2024 09:19 )    Color: x / Appearance: x / SG: x / pH: x  Gluc: 99 mg/dL / Ketone: x  / Bili: x / Urobili: x   Blood: x / Protein: x / Nitrite: x   Leuk Esterase: x / RBC: x / WBC x   Sq Epi: x / Non Sq Epi: x / Bacteria: x        Sedimentation Rate, Erythrocyte: 36 mm/Hr *H* (11-26-24 @ 11:28)      Urinalysis with Rflx Culture (collected 26 Nov 2024 14:00)    Culture - Blood (collected 26 Nov 2024 03:20)  Source: .Blood BLOOD  Gram Stain (26 Nov 2024 23:02):    Growth in anaerobic bottle: Gram positive cocci in pairs    Growth in aerobic bottle: Gram Positive Cocci in Pairs and Chains  Preliminary Report (26 Nov 2024 23:02):    Growth in anaerobic bottle: Gram positive cocci in pairs    Growth in aerobic bottle: Gram Positive Cocci in Pairs and Chains    Direct identification is available within approximately 3-5    hours either by Blood Panel Multiplexed PCR or Direct    MALDI-TOF. Details: [email or web address]  Organism: Blood Culture PCR (26 Nov 2024 23:11)  Organism: Blood Culture PCR (26 Nov 2024 23:11)    Culture - Blood (collected 26 Nov 2024 03:20)  Source: .Blood BLOOD  Gram Stain (26 Nov 2024 22:40):    Growth in aerobic and anaerobic bottles: Gram positive cocci in pairs  Preliminary Report (26 Nov 2024 22:40):    Growth in aerobic and anaerobic bottles: Gram positive cocci in pairs          RADIOLOGY:  CT Angio Chest PE Protocol w/ IV Cont:   ACC: [id number] EXAM:  CT ANGIO CHEST PULM ART [facility]   ORDERED BY:   SHIRAZ POZO     PROCEDURE DATE:  11/26/2024          INTERPRETATION:  CLINICAL INFORMATION: Tachycardia. History of pancreatic   cancer, cholangiograms were with liver metastases and lung metastases.    COMPARISON: CT chest 3/11/2023    CONTRAST/COMPLICATIONS:  IV Contrast: Omnipaque 350  65 cc administered   35 cc discarded  Oral Contrast: NONE      PROCEDURE:  CT Angiography of the Chest.  Sagittal and coronal reformats were performed as well as 3D (MIP)   reconstructions.    FINDINGS:    LUNGS/PLEURA/AIRWAYS: Interlobular septal thickening and patchy   groundglass opacities with moderate right effusion suggestive of CHF. No   pneumothorax. Interval development of volume loss of the left lung with   leftward mediastinal shift with large lower greater than upper lobe   consolidations  VESSELS: Atherosclerosis. No acute pulmonary embolism identified.   Probably prominent main pulmonary artery can be seen with pulmonary   arterial hypertension.  HEART: Mitral annulus calcifications. Coronary artery calcifications.   Cardiomegaly.  MEDIASTINUM AND CONSTANZA: No lymphadenopathy.  CHEST WALL AND LOWER NECK: Diffuse anasarca. Left chest wall pacer  VISUALIZED UPPER ABDOMEN: Partially imaged mild ascites. Surgically   absent gallbladder  BONES: Osteopenia. Degenerative changes.    IMPRESSION:  1.  No evidence of acute pulmonary embolism.  2.  Interlobular septal thickening with patchy groundglass opacities and   moderate right effusion possibly representing CHF.  3.  Since 3/11/2023 interval left lung volume loss with leftward   mediastinal shift. Large consolidation in the left lung, greater in the   left lower lobe. Given significant cancer history, images can be  submitted to treating institution to evaluate any interval changes if   clinically relevant, as component of findings may be related to   treatment-related change.        --- End of Report ---            ELAINE MOY MD; Attending Radiologist  Thisdocument has been electronically signed. Nov 26 2024  4:56AM (11-26-24 @ 03:42)

## 2024-11-27 NOTE — PROGRESS NOTE ADULT - PROBLEM SELECTOR PLAN 3
2/2 bone mets in scapula  continue with fentanyl patch 25 mcg  c/w Lyrica 20 mg TID   f/u palliative clinic

## 2024-11-27 NOTE — CONSULT NOTE ADULT - CONSTITUTIONAL
Physical Therapy    Visit Type: treatment  Precautions:  Medical precautions:  fall risk; standard precautions.   76 year old male who presented to [facility] 1/31/23 for planned cervical decompression and fusion secondary to degenerative cervical stenosis with severe cord compression. Pt underwent C3-6 laminectomy, bilateral foraminotomy and fusion.  Post operatively pt with noted right C5 nerve palsy.    PMH: HF, HTN, HLD, PVD, BPH  Lines:       Lines in chart and on patient reviewed, precautions maintained throughout session.  Spine:     Cervical: hard brace on at all times, no lifting, no bending and no rotation  Safety Measures: bed alarm and chair alarm  SUBJECTIVE  Patient agreed to participate in therapy this date.  \"Am I going home today? Why did they dress me in my going home clothes? No one can give me an answer.\"    Patient / Family Goal: return home and return to previous functional status    Pain     At onset of session (out of 10): 0     OBJECTIVE     Cognitive Status   Affect/Behavior    - cooperative  Orientation    - Oriented to: person, place, time and situation  Attention Span    - Attention: - difficulty attending to directions - difficulty dividing attention   - Attention impairment: perseveration and distractibility  Following Direction   - follows one step commands with repetition  Safety Awareness/Insight   - decreased awareness of need for assistance and decreased awareness of need for safety  Awareness of Deficits   - decreased awareness of deficits  Poor insight    Pt perseverates on his clothing          Transfers  Assistive devices: gait belt  - Sit to stand: stand by assist  - Stand to sit: stand by assist  Training tasks completed for transfers.    Ambulation / Gait  - Assistive device: gait belt  - Distance (feet unless otherwise indicated): 400  - Assist Level: stand by assist  - Surface: even  - Description: unsteady, shuffling, decreased jenise/pace, decreased step length left and  decreased step length right         Neuromuscular Re-Education  BUE wall push ups; mod cues and assist for RUE WB, positioning and wt shift x10 reps    Therapeutic Exercise  - Nustep: 12 min, level: 6 BUE/LE  Skilled input: verbal instruction/cues, tactile instruction/cues, facilitation and posture correction  Verbal Consent: Writer verbally educated and received verbal consent for hand placement, positioning of patient, and techniques to be performed today from patient for clothing adjustments for techniques, hand placement and palpation for techniques and therapist position for techniques as described above and how they are pertinent to the patient's plan of care.         ASSESSMENT   Impairments: activity tolerance, balance, decreased insight into deficits, strength, safety awareness, pain and coordination  Functional Limitations: all functional mobility      Discharge Recommendations  Recommendation for Discharge: PT IL: Patient requires 24 HOUR assistance to perform mobility and/or ADLs safely, Patient is appropriate for Physical Therapy 1-3 times per week  PT/OT Mobility Equipment for Discharge: TBD; likely no AD d/t pt non-compliant  PT/OT ADL Equipment for Discharge: TBD  PT Identified Barriers to Discharge: RUE weakness, impaired balance, high risk of falls, decreased insight into deficits and need for assistance            Progress: progressing toward goals    • Skilled therapy is required to address these limitations in attempt to maximize the patient's independence.    Therapy Participation: This patient participated in all scheduled physical therapy time this session.    Education:   - Present and ready to learn: patient and patient's family  - Present and not ready to learn: patient  Education provided during session:  - Results of above outlined education: Needs reinforcement, Verbalizes understanding and Demonstrates understanding    Patient at End of Session:   Location: in wheelchair  Safety  measures: alarm system in place/re-engaged and lines intact  Handoff to: rehab aide/tech    PLAN   Suggestions for next session as indicated: Frequency: 5-7 days per week  Frequency Comments: 45-90 min/day   Duration: 2/25/2023    Interventions: balance, bed mobility, compensatory technique education, continued evaluation, endurance training, equipment eval/education, energy conservation, gait training, HEP train/position, functional transfer training, stairs retraining, patient/family training, safety education, neuromuscular re-education and strengthening  Agreement to plan and goals: patient agrees with goals and treatment plan (Pt agreeable to strengthening R arm, otherwise decreased insight into further deficits)      GOALS  Review date: 2/28/2023  Short Term Goals (STGs): to be met 7 days from date established, unless otherwise stated.   - Patient will complete all bed mobility at stand by assist level - MET   - Patient will perform sit to/from stand at stand by assist level - MET    - Patient will perform stand pivot transfers at stand by assist level with no device - MET   - Patient will ambulate 150 feet at stand by assist level with least restrictive assistive device - UNMET   - Patient will negotiate 4 stairs at stand by assist level with no device and 1 railings - UNMET    STATUS: partially met due to pt continuing to demo instability with ambulation and pt non-compliant with use of AD    Status: all STGs met except as noted  Long Term Goals (LTGs): to be met by discharge from rehab program.   - Patient will complete all bed mobility at modified Independent level   - Patient will perform sit to/from stand at modified Independent level   - Patient will perform stand pivot transfers at modified Independent level with no device   - Patient will ambulate 150 feet at supervision level with least restrictive assistive device   - Patient will negotiate 8 stairs at SBA level and 1 railings  Status: all LTGs  progressing    Documented in the chart in the following areas: Assessment.      Therapy procedure time and total treatment time can be found documented on the Time Entry flowsheet   obese

## 2024-11-27 NOTE — PROGRESS NOTE ADULT - ASSESSMENT
89-year-old female with a past medical history of diabetes, A-fib on Eliquis, SSS S/p PPM placement, CAD with 4 stents on Plavix, history of MI, history of pancreatic cancer status post distal pancreatectomy in August 2021, cholangiocarcinoma with mets to the liver status post partial liver resection, mets to the lung, and mets to the bone specifically the left scapula followed by [facility] no longer on treatment, and hypothyroidism presents to the ED for evaluation of weakness that has worsened since this past Friday.     GOC discussion above. Patient and daughter do not yet want hospice care and want to continue medical management/hospital re-admissions as needed. Patient did make herself DNR/DNI. Patient follows in palliative clinic at [facility].     MEDD (morphine equivalent daily dose):    Education about palliative care provided to patient/family.  See Recs below.    Please call [phone] with questions or concerns 24/7.   We will continue to follow.

## 2024-11-27 NOTE — PHARMACOTHERAPY INTERVENTION NOTE - COMMENTS
Recommended discontinuing azithromycin as per ID consult recommendations.
Recommended initiating ceftriaxone IV 2g q24hrs in the setting of Streptococcus sp. bacteremia.     Marguerite Ordaz, PharmD  Clinical Pharmacy Specialist, Infectious Diseases  Tele-Antimicrobial Stewardship Program (Tele-ASP)  Tele-ASP Phone: [phone] 
Recommended ceftriaxone 2g IV q24h as per ID Consult recommendations.    Tyler Moss, PharmD, [facility]  Clinical Pharmacy Specialist, Infectious Diseases  Tele-Antimicrobial Stewardship Program (Tele-ASP)  Tele-ASP Phone: [phone] 
Recommended doxycycline 100mg IV q12h as per ID consult recommendations.    Tyler Moss, PharmD, [facility]  Clinical Pharmacy Specialist, Infectious Diseases  Tele-Antimicrobial Stewardship Program (Tele-ASP)  Tele-ASP Phone: [phone]

## 2024-11-27 NOTE — PROGRESS NOTE ADULT - PROBLEM SELECTOR PLAN 2
with mets to lungs, bone  not of chemo and not pursuing cancer tx  following with palliative clinic at [facility]   patient does not yet want hospice care

## 2024-11-27 NOTE — PHARMACOTHERAPY INTERVENTION NOTE - NSPHARMCOMMASP
ASP - Therapy recommended/ Alternative therapy
ASP - Therapy recommended/ Alternative therapy
ASP - De-escalation
ASP - Therapy recommended/ Alternative therapy

## 2024-11-28 ENCOUNTER — RESULT REVIEW (OUTPATIENT)
Age: 89
End: 2024-11-28

## 2024-11-28 LAB
-  CEFTRIAXONE: SIGNIFICANT CHANGE UP
-  PENICILLIN: SIGNIFICANT CHANGE UP
-  VANCOMYCIN: SIGNIFICANT CHANGE UP
ALBUMIN SERPL ELPH-MCNC: 2.9 G/DL — LOW (ref 3.5–5.2)
ALP SERPL-CCNC: 118 U/L — HIGH (ref 30–115)
ALT FLD-CCNC: 8 U/L — SIGNIFICANT CHANGE UP (ref 0–41)
ANION GAP SERPL CALC-SCNC: 15 MMOL/L — HIGH (ref 7–14)
AST SERPL-CCNC: 17 U/L — SIGNIFICANT CHANGE UP (ref 0–41)
BASOPHILS # BLD AUTO: 0.09 K/UL — SIGNIFICANT CHANGE UP (ref 0–0.2)
BASOPHILS NFR BLD AUTO: 0.8 % — SIGNIFICANT CHANGE UP (ref 0–1)
BILIRUB SERPL-MCNC: 0.5 MG/DL — SIGNIFICANT CHANGE UP (ref 0.2–1.2)
BUN SERPL-MCNC: 18 MG/DL — SIGNIFICANT CHANGE UP (ref 10–20)
CALCIUM SERPL-MCNC: 8.5 MG/DL — SIGNIFICANT CHANGE UP (ref 8.4–10.5)
CHLORIDE SERPL-SCNC: 98 MMOL/L — SIGNIFICANT CHANGE UP (ref 98–110)
CO2 SERPL-SCNC: 23 MMOL/L — SIGNIFICANT CHANGE UP (ref 17–32)
CREAT SERPL-MCNC: 0.9 MG/DL — SIGNIFICANT CHANGE UP (ref 0.7–1.5)
CULTURE RESULTS: ABNORMAL
CULTURE RESULTS: ABNORMAL
EGFR: 61 ML/MIN/1.73M2 — SIGNIFICANT CHANGE UP
EOSINOPHIL # BLD AUTO: 0.37 K/UL — SIGNIFICANT CHANGE UP (ref 0–0.7)
EOSINOPHIL NFR BLD AUTO: 3.3 % — SIGNIFICANT CHANGE UP (ref 0–8)
GLUCOSE BLDC GLUCOMTR-MCNC: 116 MG/DL — HIGH (ref 70–99)
GLUCOSE BLDC GLUCOMTR-MCNC: 118 MG/DL — HIGH (ref 70–99)
GLUCOSE BLDC GLUCOMTR-MCNC: 140 MG/DL — HIGH (ref 70–99)
GLUCOSE BLDC GLUCOMTR-MCNC: 151 MG/DL — HIGH (ref 70–99)
GLUCOSE SERPL-MCNC: 104 MG/DL — HIGH (ref 70–99)
HCT VFR BLD CALC: 40.6 % — SIGNIFICANT CHANGE UP (ref 37–47)
HGB BLD-MCNC: 13.4 G/DL — SIGNIFICANT CHANGE UP (ref 12–16)
IMM GRANULOCYTES NFR BLD AUTO: 0.4 % — HIGH (ref 0.1–0.3)
LYMPHOCYTES # BLD AUTO: 0.91 K/UL — LOW (ref 1.2–3.4)
LYMPHOCYTES # BLD AUTO: 8.2 % — LOW (ref 20.5–51.1)
MAGNESIUM SERPL-MCNC: 2.1 MG/DL — SIGNIFICANT CHANGE UP (ref 1.8–2.4)
MCHC RBC-ENTMCNC: 30 PG — SIGNIFICANT CHANGE UP (ref 27–31)
MCHC RBC-ENTMCNC: 33 G/DL — SIGNIFICANT CHANGE UP (ref 32–37)
MCV RBC AUTO: 91 FL — SIGNIFICANT CHANGE UP (ref 81–99)
METHOD TYPE: SIGNIFICANT CHANGE UP
MONOCYTES # BLD AUTO: 1.19 K/UL — HIGH (ref 0.1–0.6)
MONOCYTES NFR BLD AUTO: 10.7 % — HIGH (ref 1.7–9.3)
NEUTROPHILS # BLD AUTO: 8.53 K/UL — HIGH (ref 1.4–6.5)
NEUTROPHILS NFR BLD AUTO: 76.6 % — HIGH (ref 42.2–75.2)
NRBC # BLD: 0 /100 WBCS — SIGNIFICANT CHANGE UP (ref 0–0)
ORGANISM # SPEC MICROSCOPIC CNT: ABNORMAL
ORGANISM # SPEC MICROSCOPIC CNT: ABNORMAL
ORGANISM # SPEC MICROSCOPIC CNT: SIGNIFICANT CHANGE UP
ORGANISM # SPEC MICROSCOPIC CNT: SIGNIFICANT CHANGE UP
PLATELET # BLD AUTO: 231 K/UL — SIGNIFICANT CHANGE UP (ref 130–400)
PMV BLD: 11.8 FL — HIGH (ref 7.4–10.4)
POTASSIUM SERPL-MCNC: 4 MMOL/L — SIGNIFICANT CHANGE UP (ref 3.5–5)
POTASSIUM SERPL-SCNC: 4 MMOL/L — SIGNIFICANT CHANGE UP (ref 3.5–5)
PROT SERPL-MCNC: 5.3 G/DL — LOW (ref 6–8)
RBC # BLD: 4.46 M/UL — SIGNIFICANT CHANGE UP (ref 4.2–5.4)
RBC # FLD: 16.7 % — HIGH (ref 11.5–14.5)
SODIUM SERPL-SCNC: 136 MMOL/L — SIGNIFICANT CHANGE UP (ref 135–146)
SPECIMEN SOURCE: SIGNIFICANT CHANGE UP
SPECIMEN SOURCE: SIGNIFICANT CHANGE UP
WBC # BLD: 11.14 K/UL — HIGH (ref 4.8–10.8)
WBC # FLD AUTO: 11.14 K/UL — HIGH (ref 4.8–10.8)

## 2024-11-28 PROCEDURE — 99233 SBSQ HOSP IP/OBS HIGH 50: CPT

## 2024-11-28 PROCEDURE — 93306 TTE W/DOPPLER COMPLETE: CPT | Mod: 26

## 2024-11-28 RX ORDER — METOPROLOL TARTRATE 100 MG/1
50 TABLET, FILM COATED ORAL
Refills: 0 | Status: DISCONTINUED | OUTPATIENT
Start: 2024-11-28 | End: 2024-12-05

## 2024-11-28 RX ADMIN — CLOPIDOGREL 75 MILLIGRAM(S): 75 TABLET, FILM COATED ORAL at 11:36

## 2024-11-28 RX ADMIN — PREGABALIN 50 MILLIGRAM(S): 75 CAPSULE ORAL at 13:23

## 2024-11-28 RX ADMIN — DOXYCYCLINE HYCLATE 100 MILLIGRAM(S): 150 TABLET, COATED ORAL at 17:20

## 2024-11-28 RX ADMIN — ESCITALOPRAM OXALATE 10 MILLIGRAM(S): 10 TABLET, FILM COATED ORAL at 11:37

## 2024-11-28 RX ADMIN — PREGABALIN 50 MILLIGRAM(S): 75 CAPSULE ORAL at 06:33

## 2024-11-28 RX ADMIN — INSULIN GLARGINE 5 UNIT(S): 100 INJECTION, SOLUTION SUBCUTANEOUS at 21:49

## 2024-11-28 RX ADMIN — METOPROLOL TARTRATE 50 MILLIGRAM(S): 100 TABLET, FILM COATED ORAL at 17:20

## 2024-11-28 RX ADMIN — APIXABAN 5 MILLIGRAM(S): 2.5 TABLET, FILM COATED ORAL at 06:33

## 2024-11-28 RX ADMIN — PREGABALIN 50 MILLIGRAM(S): 75 CAPSULE ORAL at 21:49

## 2024-11-28 RX ADMIN — METOPROLOL TARTRATE 50 MILLIGRAM(S): 100 TABLET, FILM COATED ORAL at 06:33

## 2024-11-28 RX ADMIN — PANTOPRAZOLE SODIUM 40 MILLIGRAM(S): 40 TABLET, DELAYED RELEASE ORAL at 06:33

## 2024-11-28 RX ADMIN — DOXYCYCLINE HYCLATE 100 MILLIGRAM(S): 150 TABLET, COATED ORAL at 06:32

## 2024-11-28 RX ADMIN — Medication 100 MILLIGRAM(S): at 16:40

## 2024-11-28 RX ADMIN — Medication 15 MILLIGRAM(S): at 06:33

## 2024-11-28 RX ADMIN — APIXABAN 5 MILLIGRAM(S): 2.5 TABLET, FILM COATED ORAL at 17:20

## 2024-11-28 NOTE — PROGRESS NOTE ADULT - SUBJECTIVE AND OBJECTIVE BOX
ANNE VILLATORO  89y Female    CHIEF COMPLAINT:    Patient is a 89y old  Female who presents with a chief complaint of Weakness (27 Nov 2024 12:47)      INTERVAL HPI/OVERNIGHT EVENTS:    Patient seen and examined.    ROS: All other systems are negative.    Vital Signs:    T(F): 98.1 (11-28-24 @ 04:01), Max: 98.1 (11-28-24 @ 00:03)  HR: 126 (11-28-24 @ 04:01) (123 - 128)  BP: 128/91 (11-28-24 @ 04:01) (95/67 - 128/91)  RR: 18 (11-28-24 @ 04:01) (18 - 18)  SpO2: 100% (11-28-24 @ 04:01) (96% - 100%)  I&O's Summary    27 Nov 2024 07:01  -  28 Nov 2024 07:00  --------------------------------------------------------  IN: 118 mL / OUT: 0 mL / NET: 118 mL      Daily     Daily   CAPILLARY BLOOD GLUCOSE      POCT Blood Glucose.: 118 mg/dL (28 Nov 2024 07:33)  POCT Blood Glucose.: 153 mg/dL (27 Nov 2024 21:12)  POCT Blood Glucose.: 136 mg/dL (27 Nov 2024 16:48)  POCT Blood Glucose.: 105 mg/dL (27 Nov 2024 12:12)  POCT Blood Glucose.: 86 mg/dL (27 Nov 2024 08:12)      PHYSICAL EXAM:    GENERAL:  NAD  SKIN: No rashes or lesions  HENT: Atraumatic. Normocephalic. PERRL. Moist membranes.  NECK: Supple, No JVD. No lymphadenopathy.  PULMONARY: CTA B/L. No wheezing. No rales  CVS: Normal S1, S2. Rate and Rhythm are regular. No murmurs.  ABDOMEN/GI: Soft, Nontender, Nondistended; BS present  EXTREMITIES: Peripheral pulses intact. No edema B/L LE.  NEUROLOGIC:  No motor or sensory deficit.  PSYCH: Alert & oriented x 3    Consultant(s) Notes Reviewed:  [x ] YES  [ ] NO  Care Discussed with Consultants/Other Providers [ x] YES  [ ] NO    EKG reviewed  Telemetry reviewed    LABS:                        13.6   12.36 )-----------( 218      ( 27 Nov 2024 09:19 )             41.4     11-27    131[L]  |  96[L]  |  22[H]  ----------------------------<  99  3.7   |  26  |  0.9    Ca    8.5      27 Nov 2024 09:19    TPro  5.2[L]  /  Alb  2.9[L]  /  TBili  0.7  /  DBili  x   /  AST  18  /  ALT  9   /  AlkPhos  118[H]  11-27            Culture - Urine (collected 26 Nov 2024 14:00)  Source: Clean Catch None  Final Report (27 Nov 2024 18:43):    <10,000 CFU/mL Normal Urogenital Katherine    Urinalysis with Rflx Culture (collected 26 Nov 2024 14:00)    Culture - Blood (collected 26 Nov 2024 03:20)  Source: .Blood BLOOD  Gram Stain (26 Nov 2024 23:02):    Growth in anaerobic bottle: Gram positive cocci in pairs    Growth in aerobic bottle: Gram Positive Cocci in Pairs and Chains  Preliminary Report (27 Nov 2024 13:55):    Growth in aerobic and anaerobic bottles: Streptococcus lutetiensis    "Susceptibilities not performed"    Direct identification is available within approximately 3-5    hours either by Blood Panel Multiplexed PCR or Direct    MALDI-TOF. Details: [email or web address]  Organism: Blood Culture PCR (26 Nov 2024 23:11)  Organism: Blood Culture PCR (26 Nov 2024 23:11)    Culture - Blood (collected 26 Nov 2024 03:20)  Source: .Blood BLOOD  Gram Stain (26 Nov 2024 22:40):    Growth in aerobic and anaerobic bottles: Gram positive cocci in pairs  Preliminary Report (27 Nov 2024 13:50):    Growth in aerobic and anaerobic bottles: Streptococcus lutetiensis    "Susceptibilities not performed"        RADIOLOGY & ADDITIONAL TESTS:    < from: CT Angio Chest PE Protocol w/ IV Cont (11.26.24 @ 03:42) >  IMPRESSION:  1.  No evidence of acute pulmonary embolism.  2.  Interlobular septal thickening with patchy groundglass opacities and   moderate right effusion possibly representing CHF.  3.  Since 3/11/2023 interval left lung volume loss with leftward   mediastinal shift. Large consolidation in the left lung, greater in the   left lower lobe. Given significant cancer history, images can be  submitted to treating institution to evaluate any interval changes if   clinically relevant, as component of findings may be related to   treatment-related change.    < end of copied text >    Imaging or report Personally Reviewed:  [x ] YES  [ ] NO    Medications:  Standing  apixaban 5 milliGRAM(s) Oral every 12 hours  cefTRIAXone   IVPB 2000 milliGRAM(s) IV Intermittent every 24 hours  clopidogrel Tablet 75 milliGRAM(s) Oral daily  dextrose 5%. 1000 milliLiter(s) IV Continuous <Continuous>  dextrose 5%. 1000 milliLiter(s) IV Continuous <Continuous>  dextrose 50% Injectable 25 Gram(s) IV Push once  dextrose 50% Injectable 12.5 Gram(s) IV Push once  dextrose 50% Injectable 25 Gram(s) IV Push once  doxycycline IVPB      doxycycline IVPB 100 milliGRAM(s) IV Intermittent every 12 hours  escitalopram 10 milliGRAM(s) Oral daily  fentaNYL   Patch  25 MICROgram(s)/Hr 1 Patch Transdermal every 72 hours  glucagon  Injectable 1 milliGRAM(s) IntraMuscular once  insulin glargine Injectable (LANTUS) 5 Unit(s) SubCutaneous at bedtime  insulin lispro (ADMELOG) corrective regimen sliding scale   SubCutaneous three times a day before meals  metoprolol tartrate 50 milliGRAM(s) Oral three times a day  pantoprazole    Tablet 40 milliGRAM(s) Oral before breakfast  pregabalin 50 milliGRAM(s) Oral three times a day  thyroid 15 milliGRAM(s) Oral <User Schedule>    PRN Meds  dextrose Oral Gel 15 Gram(s) Oral once PRN      Case discussed with resident    Care discussed with pt/family           ANNE VILLATORO  89y Female    CHIEF COMPLAINT:    Patient is a 89y old  Female who presents with a chief complaint of Weakness (27 Nov 2024 12:47)      INTERVAL HPI/OVERNIGHT EVENTS:    Patient seen and examined. C/O feeling weak. No cough. No fever. Denies sob on O2    ROS: All other systems are negative.    Vital Signs:    T(F): 98.1 (11-28-24 @ 04:01), Max: 98.1 (11-28-24 @ 00:03)  HR: 126 (11-28-24 @ 04:01) (123 - 128)  BP: 128/91 (11-28-24 @ 04:01) (95/67 - 128/91)  RR: 18 (11-28-24 @ 04:01) (18 - 18)  SpO2: 100% (11-28-24 @ 04:01) (96% - 100%)  I&O's Summary    27 Nov 2024 07:01  -  28 Nov 2024 07:00  --------------------------------------------------------  IN: 118 mL / OUT: 0 mL / NET: 118 mL      Daily     Daily   CAPILLARY BLOOD GLUCOSE      POCT Blood Glucose.: 118 mg/dL (28 Nov 2024 07:33)  POCT Blood Glucose.: 153 mg/dL (27 Nov 2024 21:12)  POCT Blood Glucose.: 136 mg/dL (27 Nov 2024 16:48)  POCT Blood Glucose.: 105 mg/dL (27 Nov 2024 12:12)  POCT Blood Glucose.: 86 mg/dL (27 Nov 2024 08:12)      PHYSICAL EXAM:    GENERAL:  NAD  SKIN: No rashes or lesions  HENT: Atraumatic. Normocephalic. PERRL. Moist membranes.  NECK: Supple, No JVD. No lymphadenopathy.  PULMONARY: Decreased BS in L lower chest post. +ve rales in L base  CVS: Normal S1, S2. Rate and Rhythm are regular. No murmurs.  ABDOMEN/GI: Soft, Nontender, Nondistended; BS present  EXTREMITIES: Peripheral pulses intact. No edema B/L LE.  NEUROLOGIC:  No motor or sensory deficit.  PSYCH: Alert & oriented x 3    Consultant(s) Notes Reviewed:  [x ] YES  [ ] NO  Care Discussed with Consultants/Other Providers [ x] YES  [ ] NO    EKG reviewed  Telemetry reviewed    LABS:                        13.6   12.36 )-----------( 218      ( 27 Nov 2024 09:19 )             41.4     11-27    131[L]  |  96[L]  |  22[H]  ----------------------------<  99  3.7   |  26  |  0.9    Ca    8.5      27 Nov 2024 09:19    TPro  5.2[L]  /  Alb  2.9[L]  /  TBili  0.7  /  DBili  x   /  AST  18  /  ALT  9   /  AlkPhos  118[H]  11-27            Culture - Urine (collected 26 Nov 2024 14:00)  Source: Clean Catch None  Final Report (27 Nov 2024 18:43):    <10,000 CFU/mL Normal Urogenital Katherine    Urinalysis with Rflx Culture (collected 26 Nov 2024 14:00)    Culture - Blood (collected 26 Nov 2024 03:20)  Source: .Blood BLOOD  Gram Stain (26 Nov 2024 23:02):    Growth in anaerobic bottle: Gram positive cocci in pairs    Growth in aerobic bottle: Gram Positive Cocci in Pairs and Chains  Preliminary Report (27 Nov 2024 13:55):    Growth in aerobic and anaerobic bottles: Streptococcus lutetiensis    "Susceptibilities not performed"    Direct identification is available within approximately 3-5    hours either by Blood Panel Multiplexed PCR or Direct    MALDI-TOF. Details: [email or web address]  Organism: Blood Culture PCR (26 Nov 2024 23:11)  Organism: Blood Culture PCR (26 Nov 2024 23:11)    Culture - Blood (collected 26 Nov 2024 03:20)  Source: .Blood BLOOD  Gram Stain (26 Nov 2024 22:40):    Growth in aerobic and anaerobic bottles: Gram positive cocci in pairs  Preliminary Report (27 Nov 2024 13:50):    Growth in aerobic and anaerobic bottles: Streptococcus lutetiensis    "Susceptibilities not performed"        RADIOLOGY & ADDITIONAL TESTS:    < from: CT Angio Chest PE Protocol w/ IV Cont (11.26.24 @ 03:42) >  IMPRESSION:  1.  No evidence of acute pulmonary embolism.  2.  Interlobular septal thickening with patchy groundglass opacities and   moderate right effusion possibly representing CHF.  3.  Since 3/11/2023 interval left lung volume loss with leftward   mediastinal shift. Large consolidation in the left lung, greater in the   left lower lobe. Given significant cancer history, images can be  submitted to treating institution to evaluate any interval changes if   clinically relevant, as component of findings may be related to   treatment-related change.    < end of copied text >    Imaging or report Personally Reviewed:  [x ] YES  [ ] NO    Medications:  Standing  apixaban 5 milliGRAM(s) Oral every 12 hours  cefTRIAXone   IVPB 2000 milliGRAM(s) IV Intermittent every 24 hours  clopidogrel Tablet 75 milliGRAM(s) Oral daily  dextrose 5%. 1000 milliLiter(s) IV Continuous <Continuous>  dextrose 5%. 1000 milliLiter(s) IV Continuous <Continuous>  dextrose 50% Injectable 25 Gram(s) IV Push once  dextrose 50% Injectable 12.5 Gram(s) IV Push once  dextrose 50% Injectable 25 Gram(s) IV Push once  doxycycline IVPB      doxycycline IVPB 100 milliGRAM(s) IV Intermittent every 12 hours  escitalopram 10 milliGRAM(s) Oral daily  fentaNYL   Patch  25 MICROgram(s)/Hr 1 Patch Transdermal every 72 hours  glucagon  Injectable 1 milliGRAM(s) IntraMuscular once  insulin glargine Injectable (LANTUS) 5 Unit(s) SubCutaneous at bedtime  insulin lispro (ADMELOG) corrective regimen sliding scale   SubCutaneous three times a day before meals  metoprolol tartrate 50 milliGRAM(s) Oral three times a day  pantoprazole    Tablet 40 milliGRAM(s) Oral before breakfast  pregabalin 50 milliGRAM(s) Oral three times a day  thyroid 15 milliGRAM(s) Oral <User Schedule>    PRN Meds  dextrose Oral Gel 15 Gram(s) Oral once PRN      Case discussed with resident    Care discussed with pt/family

## 2024-11-28 NOTE — PROGRESS NOTE ADULT - ASSESSMENT
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      Sepsis present on admission  Streptococcus lutetiensis bacteremia  LLL PNA  Chronic A-fib on Eliquis  H/O CAD s/p PCI  H/O Pancreatic cancer  H/O Cholangiocarcinoma with mets to bones and lungs  Immunosuppression/Immunosenescence                   PLAN:    ·	Tele reviewed by me. In rapid A-fib  ·	Called pt's cardiologist and discussed care with him  ·	Increase Metoprolol to 50 mg po q 6h. Hold for SBP <90  ·	ECHO  ·	Cardiology consult  ·	CTA chest reviewed. No PE.  Since 3/11/2023 interval left lung volume loss with leftward mediastinal shift. Large consolidation in the left lung, greater in the left lower lobe.  ·	Blood cxs are growing Streptococcus lutetiensis sensitive to Ceftriaxone  ·	ID eval noted. Recommended Ceftriaxone 2 gm iv daily and Doxycycline 100 mg po q 12h  ·	On 2L NC O2 sat is 100%. Wean of O2  ·	Pain control  ·	Cont her other home meds  ·	PT eval    Progress Note Handoff    Pending (specify):  Consults__Cardiology_______, Tests________, Test Results_______, Other_________  Family discussion:  Diagnosis and plan of care d/w the daughter on bedside.   Disposition: Home___/SNF___/Other________/Unknown at this time________    Cali Paulson MD  Spectra: [phone]

## 2024-11-29 LAB
GLUCOSE BLDC GLUCOMTR-MCNC: 118 MG/DL — HIGH (ref 70–99)
GLUCOSE BLDC GLUCOMTR-MCNC: 140 MG/DL — HIGH (ref 70–99)
GLUCOSE BLDC GLUCOMTR-MCNC: 143 MG/DL — HIGH (ref 70–99)
GLUCOSE BLDC GLUCOMTR-MCNC: 145 MG/DL — HIGH (ref 70–99)

## 2024-11-29 PROCEDURE — 99233 SBSQ HOSP IP/OBS HIGH 50: CPT

## 2024-11-29 RX ORDER — POLYETHYLENE GLYCOL 3350 17 G/17G
17 POWDER, FOR SOLUTION ORAL DAILY
Refills: 0 | Status: DISCONTINUED | OUTPATIENT
Start: 2024-11-29 | End: 2024-12-03

## 2024-11-29 RX ORDER — SENNOSIDES 8.6 MG
2 TABLET ORAL AT BEDTIME
Refills: 0 | Status: DISCONTINUED | OUTPATIENT
Start: 2024-11-29 | End: 2024-12-05

## 2024-11-29 RX ORDER — CHLORHEXIDINE GLUCONATE 1.2 MG/ML
1 RINSE ORAL DAILY
Refills: 0 | Status: DISCONTINUED | OUTPATIENT
Start: 2024-11-29 | End: 2024-12-05

## 2024-11-29 RX ORDER — DILTIAZEM HYDROCHLORIDE 240 MG/1
30 CAPSULE, COATED, EXTENDED RELEASE ORAL EVERY 6 HOURS
Refills: 0 | Status: DISCONTINUED | OUTPATIENT
Start: 2024-11-29 | End: 2024-12-03

## 2024-11-29 RX ADMIN — CLOPIDOGREL 75 MILLIGRAM(S): 75 TABLET, FILM COATED ORAL at 11:41

## 2024-11-29 RX ADMIN — Medication 2 TABLET(S): at 21:53

## 2024-11-29 RX ADMIN — PREGABALIN 50 MILLIGRAM(S): 75 CAPSULE ORAL at 13:05

## 2024-11-29 RX ADMIN — POLYETHYLENE GLYCOL 3350 17 GRAM(S): 17 POWDER, FOR SOLUTION ORAL at 21:53

## 2024-11-29 RX ADMIN — APIXABAN 5 MILLIGRAM(S): 2.5 TABLET, FILM COATED ORAL at 06:49

## 2024-11-29 RX ADMIN — PREGABALIN 50 MILLIGRAM(S): 75 CAPSULE ORAL at 06:48

## 2024-11-29 RX ADMIN — DILTIAZEM HYDROCHLORIDE 30 MILLIGRAM(S): 240 CAPSULE, COATED, EXTENDED RELEASE ORAL at 17:27

## 2024-11-29 RX ADMIN — Medication 15 MILLIGRAM(S): at 06:48

## 2024-11-29 RX ADMIN — METOPROLOL TARTRATE 50 MILLIGRAM(S): 100 TABLET, FILM COATED ORAL at 01:08

## 2024-11-29 RX ADMIN — DOXYCYCLINE HYCLATE 100 MILLIGRAM(S): 150 TABLET, COATED ORAL at 17:28

## 2024-11-29 RX ADMIN — Medication 100 MILLIGRAM(S): at 16:37

## 2024-11-29 RX ADMIN — PREGABALIN 50 MILLIGRAM(S): 75 CAPSULE ORAL at 21:53

## 2024-11-29 RX ADMIN — METOPROLOL TARTRATE 50 MILLIGRAM(S): 100 TABLET, FILM COATED ORAL at 17:27

## 2024-11-29 RX ADMIN — METOPROLOL TARTRATE 50 MILLIGRAM(S): 100 TABLET, FILM COATED ORAL at 11:41

## 2024-11-29 RX ADMIN — INSULIN GLARGINE 5 UNIT(S): 100 INJECTION, SOLUTION SUBCUTANEOUS at 21:53

## 2024-11-29 RX ADMIN — DOXYCYCLINE HYCLATE 100 MILLIGRAM(S): 150 TABLET, COATED ORAL at 06:49

## 2024-11-29 RX ADMIN — CHLORHEXIDINE GLUCONATE 1 APPLICATION(S): 1.2 RINSE ORAL at 11:43

## 2024-11-29 RX ADMIN — PANTOPRAZOLE SODIUM 40 MILLIGRAM(S): 40 TABLET, DELAYED RELEASE ORAL at 06:48

## 2024-11-29 RX ADMIN — ESCITALOPRAM OXALATE 10 MILLIGRAM(S): 10 TABLET, FILM COATED ORAL at 11:41

## 2024-11-29 RX ADMIN — METOPROLOL TARTRATE 50 MILLIGRAM(S): 100 TABLET, FILM COATED ORAL at 06:49

## 2024-11-29 NOTE — PROGRESS NOTE ADULT - SUBJECTIVE AND OBJECTIVE BOX
ANNE VILLATORO  89y Female    CHIEF COMPLAINT:    Patient is a 89y old  Female who presents with a chief complaint of Weakness (28 Nov 2024 08:09)      INTERVAL HPI/OVERNIGHT EVENTS:    Patient seen and examined. Had epistaxis from L nostril. S/P L nasal packing    ROS: All other systems are negative.    Vital Signs:    T(F): 97.4 (11-29-24 @ 09:45), Max: 98 (11-28-24 @ 14:39)  HR: 120 (11-29-24 @ 09:45) (115 - 125)  BP: 137/86 (11-29-24 @ 09:45) (95/67 - 137/86)  RR: 18 (11-29-24 @ 09:45) (18 - 18)  SpO2: 96% (11-29-24 @ 09:45) (96% - 98%)  I&O's Summary    28 Nov 2024 07:01  -  29 Nov 2024 07:00  --------------------------------------------------------  IN: 614 mL / OUT: 1150 mL / NET: -536 mL      Daily     Daily   CAPILLARY BLOOD GLUCOSE      POCT Blood Glucose.: 151 mg/dL (28 Nov 2024 21:21)  POCT Blood Glucose.: 140 mg/dL (28 Nov 2024 16:18)  POCT Blood Glucose.: 116 mg/dL (28 Nov 2024 11:21)      PHYSICAL EXAM:    GENERAL:  NAD  SKIN: No rashes or lesions  HENT: Atraumatic. Normocephalic. PERRL. Moist membranes.  NECK: Supple, No JVD. No lymphadenopathy.  PULMONARY: Decreased BS and rales in L lower chest post. No wheezing.   CVS: Normal S1, S2. Rate and Rhythm are regular. No murmurs.  ABDOMEN/GI: Soft, Nontender, Nondistended; BS present  EXTREMITIES: Peripheral pulses intact. No edema B/L LE.  NEUROLOGIC:  No motor or sensory deficit.  PSYCH: Alert & oriented x 3    Consultant(s) Notes Reviewed:  [x ] YES  [ ] NO  Care Discussed with Consultants/Other Providers [ x] YES  [ ] NO    EKG reviewed  Telemetry reviewed    LABS:                        13.4   11.14 )-----------( 231      ( 28 Nov 2024 05:09 )             40.6     11-28    136  |  98  |  18  ----------------------------<  104[H]  4.0   |  23  |  0.9    Ca    8.5      28 Nov 2024 05:09  Mg     2.1     11-28    TPro  5.3[L]  /  Alb  2.9[L]  /  TBili  0.5  /  DBili  x   /  AST  17  /  ALT  8   /  AlkPhos  118[H]  11-28            Culture - Urine (collected 26 Nov 2024 14:00)  Source: Clean Catch None  Final Report (27 Nov 2024 18:43):    <10,000 CFU/mL Normal Urogenital Katherine    Urinalysis with Rflx Culture (collected 26 Nov 2024 14:00)        RADIOLOGY & ADDITIONAL TESTS:    < from: TTE Echo Complete w/o Contrast w/ Doppler (11.28.24 @ 12:46) >    Summary:   1. Left ventricular ejection fraction, by visual estimation, is 65 to   70%.   2. Technically difficult study.   3. Hyperdynamic global left ventricular systolic function.   4. Severely enlarged left atrium.   5. Moderate concentric left ventricular hypertrophy.   6. Normal right atrial size.   7. Degenerative mitral valve.   8. Mild mitral valve regurgitation.   9. Mitral annular calcification.  10. Thickening and calcification of the anterior and posterior mitral   valve leaflets.  11. Mild tricuspid regurgitation.  12. Sclerotic aortic valve with decreased opening.  13. No definite vegetations on this study. If clinically suspect   endocarditis, consider additional imaging.    < end of copied text >    Imaging or report Personally Reviewed:  [x ] YES  [ ] NO    Medications:  Standing  apixaban 5 milliGRAM(s) Oral every 12 hours  cefTRIAXone   IVPB 2000 milliGRAM(s) IV Intermittent every 24 hours  chlorhexidine 2% Cloths 1 Application(s) Topical daily  clopidogrel Tablet 75 milliGRAM(s) Oral daily  dextrose 5%. 1000 milliLiter(s) IV Continuous <Continuous>  dextrose 5%. 1000 milliLiter(s) IV Continuous <Continuous>  dextrose 50% Injectable 25 Gram(s) IV Push once  dextrose 50% Injectable 12.5 Gram(s) IV Push once  dextrose 50% Injectable 25 Gram(s) IV Push once  doxycycline IVPB      doxycycline IVPB 100 milliGRAM(s) IV Intermittent every 12 hours  escitalopram 10 milliGRAM(s) Oral daily  fentaNYL   Patch  25 MICROgram(s)/Hr 1 Patch Transdermal every 72 hours  glucagon  Injectable 1 milliGRAM(s) IntraMuscular once  insulin glargine Injectable (LANTUS) 5 Unit(s) SubCutaneous at bedtime  insulin lispro (ADMELOG) corrective regimen sliding scale   SubCutaneous three times a day before meals  metoprolol tartrate 50 milliGRAM(s) Oral four times a day  pantoprazole    Tablet 40 milliGRAM(s) Oral before breakfast  pregabalin 50 milliGRAM(s) Oral three times a day  thyroid 15 milliGRAM(s) Oral <User Schedule>    PRN Meds  dextrose Oral Gel 15 Gram(s) Oral once PRN      Case discussed with resident    Care discussed with pt/family

## 2024-11-29 NOTE — PROGRESS NOTE ADULT - SUBJECTIVE AND OBJECTIVE BOX
ANNE VILLATORO  89y, Female    All available historical data reviewed    OVERNIGHT EVENTS:    feels well and has no new complaints  No fevers    ROS:  General: Denies rigors, nightsweats  HEENT: Denies headache, rhinorrhea, sore throat, eye pain  CV: Denies CP, palpitations  PULM: Denies wheezing, hemoptysis  GI: Denies hematemesis, hematochezia, melena  : Denies discharge, hematuria  MSK: Denies arthralgias, myalgias  SKIN: Denies rash, lesions  NEURO: weakness  PSYCH: Denies depression, anxiety    VITALS:  T(F): 97.8, Max: 98 (11-28-24 @ 14:39)  HR: 125  BP: 125/83  RR: 17Vital Signs Last 24 Hrs  T(C): 36.6 (29 Nov 2024 13:22), Max: 36.7 (28 Nov 2024 14:39)  T(F): 97.8 (29 Nov 2024 13:22), Max: 98 (28 Nov 2024 14:39)  HR: 125 (29 Nov 2024 13:22) (115 - 125)  BP: 125/83 (29 Nov 2024 13:22) (95/67 - 137/86)  BP(mean): 103 (29 Nov 2024 09:45) (76 - 103)  RR: 17 (29 Nov 2024 13:22) (17 - 18)  SpO2: 96% (29 Nov 2024 09:45) (96% - 98%)    Parameters below as of 29 Nov 2024 09:45  Patient On (Oxygen Delivery Method): nasal cannula  O2 Flow (L/min): 2      TESTS & MEASUREMENTS:                        13.4   11.14 )-----------( 231      ( 28 Nov 2024 05:09 )             40.6     11-28    136  |  98  |  18  ----------------------------<  104[H]  4.0   |  23  |  0.9    Ca    8.5      28 Nov 2024 05:09  Mg     2.1     11-28    TPro  5.3[L]  /  Alb  2.9[L]  /  TBili  0.5  /  DBili  x   /  AST  17  /  ALT  8   /  AlkPhos  118[H]  11-28    LIVER FUNCTIONS - ( 28 Nov 2024 05:09 )  Alb: 2.9 g/dL / Pro: 5.3 g/dL / ALK PHOS: 118 U/L / ALT: 8 U/L / AST: 17 U/L / GGT: x             Culture - Urine (collected 11-26-24 @ 14:00)  Source: Clean Catch None  Final Report (11-27-24 @ 18:43):    <10,000 CFU/mL Normal Urogenital Katherine    Urinalysis with Rflx Culture (collected 11-26-24 @ 14:00)    Culture - Blood (collected 11-26-24 @ 03:20)  Source: .Blood BLOOD  Gram Stain (11-26-24 @ 23:02):    Growth in anaerobic bottle: Gram positive cocci in pairs    Growth in aerobic bottle: Gram Positive Cocci in Pairs and Chains  Final Report (11-28-24 @ 10:31):    Growth in aerobic and anaerobic bottles: Streptococcus lutetiensis    "Susceptibilities not performed"    Direct identification is available within approximately 3-5    hours either by Blood Panel Multiplexed PCR or Direct    MALDI-TOF. Details: [email or web address]  Organism: Blood Culture PCR (11-28-24 @ 10:31)  Organism: Blood Culture PCR (11-28-24 @ 10:31)      Method Type: PCR      -  Streptococcus sp. (Not Grp A, B or S pneumoniae): Detec    Culture - Blood (collected 11-26-24 @ 03:20)  Source: .Blood BLOOD  Gram Stain (11-26-24 @ 22:40):    Growth in aerobic and anaerobic bottles: Gram positive cocci in pairs  Final Report (11-28-24 @ 10:30):    Growth in aerobic and anaerobic bottles: Streptococcus lutetiensis  Organism: Streptococcus lutetiensis (11-28-24 @ 10:30)  Organism: Streptococcus lutetiensis (11-28-24 @ 10:30)      Method Type: BC      -  Ceftriaxone: S <=0.25      -  Penicillin: S 0.12      -  Vancomycin: S 0.25      Urinalysis Basic - ( 28 Nov 2024 05:09 )    Color: x / Appearance: x / SG: x / pH: x  Gluc: 104 mg/dL / Ketone: x  / Bili: x / Urobili: x   Blood: x / Protein: x / Nitrite: x   Leuk Esterase: x / RBC: x / WBC x   Sq Epi: x / Non Sq Epi: x / Bacteria: x          Social History:  Tobacco Use: No  Alcohol Use: No  Drug Use: No    RADIOLOGY & ADDITIONAL TESTS:  Personal review of radiological diagnostics performed  Echo and EKG results noted when applicable.     MEDICATIONS:  apixaban 5 milliGRAM(s) Oral every 12 hours  cefTRIAXone   IVPB 2000 milliGRAM(s) IV Intermittent every 24 hours  chlorhexidine 2% Cloths 1 Application(s) Topical daily  clopidogrel Tablet 75 milliGRAM(s) Oral daily  dextrose 5%. 1000 milliLiter(s) IV Continuous <Continuous>  dextrose 5%. 1000 milliLiter(s) IV Continuous <Continuous>  dextrose 50% Injectable 25 Gram(s) IV Push once  dextrose 50% Injectable 12.5 Gram(s) IV Push once  dextrose 50% Injectable 25 Gram(s) IV Push once  dextrose Oral Gel 15 Gram(s) Oral once PRN  diltiazem    Tablet 30 milliGRAM(s) Oral every 6 hours  doxycycline IVPB      doxycycline IVPB 100 milliGRAM(s) IV Intermittent every 12 hours  escitalopram 10 milliGRAM(s) Oral daily  fentaNYL   Patch  25 MICROgram(s)/Hr 1 Patch Transdermal every 72 hours  glucagon  Injectable 1 milliGRAM(s) IntraMuscular once  insulin glargine Injectable (LANTUS) 5 Unit(s) SubCutaneous at bedtime  insulin lispro (ADMELOG) corrective regimen sliding scale   SubCutaneous three times a day before meals  metoprolol tartrate 50 milliGRAM(s) Oral four times a day  pantoprazole    Tablet 40 milliGRAM(s) Oral before breakfast  pregabalin 50 milliGRAM(s) Oral three times a day  thyroid 15 milliGRAM(s) Oral <User Schedule>      ANTIBIOTICS:  cefTRIAXone   IVPB 2000 milliGRAM(s) IV Intermittent every 24 hours  doxycycline IVPB      doxycycline IVPB 100 milliGRAM(s) IV Intermittent every 12 hours

## 2024-11-29 NOTE — PROGRESS NOTE ADULT - ASSESSMENT
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  As per patient's daughter at the bedside, patient supposed to go for mapping for radiation of the left scapula to attempt to improve her pain in that area.  About 2 months ago had a Pleurx left lung that was removed. Her heart rate has been in the 130s for a 3-4 weeks and her cardiologist Dr. Huffman who is aware of this had recently changed patient's metoprolol to 150 mg.  She also wears a fentanyl patch. She denies having a sore throat, cough, chest pain, fever, shortness of breath, abdominal pain, nausea, vomiting, diarrhea, constipation, urinary symptoms, rashes, recent travel, recent trauma, or recent sick contacts.    In the ED,  Vital Signs Last 24 Hrs HR: 122/m, RR: 20 / m. WBC 17    < from: CT Angio Chest PE Protocol w/ IV Cont (11.26.24 @ 03:42) >  1.  No PE  2.  CHF.  3.  Since 3/11/2023 interval left lung volume loss with leftward mediastinal shift. Large consolidation in the left lung, greater in the   left lower lobe. Given significant cancer history, images can besubmitted to treating institution to evaluate any interval changes if   clinically relevant, as component of findings may be related to treatment-related change.    IMPRESSION/RECOMMENDATIONS  Immunosuppression/Immunosenescence ( above age 60 yrs there is a exponential decline in immunity which could result in poor clinical outcomes.  Acute illness (Sepsi/ PNA ) which poses a threat to life or bodily function without treatment   Sepsis on presentation  PNA left  11/26 CT chest : Large consolidation in the left lung, greater in the left lower lobe.  Septicemia with Sreptococci lutetiensis  11/26 BCx Streptococci lutetiensis  11/26 ECHO no vegetations    CBC :  ( WBC 11.1 ), ( Hg 13.4  ), CMP ( Cr 0.9   ) reviewed .   Procal 0.17 ( despite it would recommend ABx )  11/26 Urine for strep pneumonia/legionella antigen    diabetes  A-fib on Eliquis  SSS S/p PPM placement, CAD with 4 stents on Plavix, history of MI  Pancreatic cancer status post distal pancreatectomy in August 2021  Cholangiocarcinoma with mets to the liver status post partial liver resection, mets to the lung, and mets to the bone    -Off loading to prevent pressure sores and preventive measures to avoid aspiration  -Rocephin 2 gm iv q24h  -Doxycycline 100 mg iv q12h  -on 12/2 could change to po Augmentin 875 mg q12h and o Doxycycline 100 mg q12h till 12/6    Discussion of management/test results/antibiotic regimen  with primary medical team.

## 2024-11-29 NOTE — CONSULT NOTE ADULT - ASSESSMENT
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   pt with aflutter with difficult to control heart rate , blood cultures x2 sets postive in all 4 bottles for strep.  pt is aware her prognosis is terminal but still has not decided on hospcise.  pt is aware of goal is to medically mange her medical problems and not to do aggressive tx.  pt is not a candidate for valve replacement even if had endocarditis and therefore would not do a murphy but rather tx with antibiotics.  Will try to better control heart rate with meds. Medical goal is for quality of life.  repeat blood cultures on antibiotics  add cardizem 30 mgs po q6 for better heart rate control  pt had nose bleed and has packing, hold eliquis for 24 hours and then reconsider  cont plavix and metoprolol  pt's son was present for above.

## 2024-11-29 NOTE — PROGRESS NOTE ADULT - SUBJECTIVE AND OBJECTIVE BOX
SUBJECTIVE/OVERNIGHT EVENTS  Today is hospital day 3d. This morning patient was seen and examined at bedside, resting comfortably in bed. No acute or major events overnight.    MEDICATIONS  STANDING MEDICATIONS  apixaban 5 milliGRAM(s) Oral every 12 hours  cefTRIAXone   IVPB 2000 milliGRAM(s) IV Intermittent every 24 hours  chlorhexidine 2% Cloths 1 Application(s) Topical daily  clopidogrel Tablet 75 milliGRAM(s) Oral daily  dextrose 5%. 1000 milliLiter(s) IV Continuous <Continuous>  dextrose 5%. 1000 milliLiter(s) IV Continuous <Continuous>  dextrose 50% Injectable 25 Gram(s) IV Push once  dextrose 50% Injectable 12.5 Gram(s) IV Push once  dextrose 50% Injectable 25 Gram(s) IV Push once  diltiazem    Tablet 30 milliGRAM(s) Oral every 6 hours  doxycycline IVPB      doxycycline IVPB 100 milliGRAM(s) IV Intermittent every 12 hours  escitalopram 10 milliGRAM(s) Oral daily  fentaNYL   Patch  25 MICROgram(s)/Hr 1 Patch Transdermal every 72 hours  glucagon  Injectable 1 milliGRAM(s) IntraMuscular once  insulin glargine Injectable (LANTUS) 5 Unit(s) SubCutaneous at bedtime  insulin lispro (ADMELOG) corrective regimen sliding scale   SubCutaneous three times a day before meals  metoprolol tartrate 50 milliGRAM(s) Oral four times a day  pantoprazole    Tablet 40 milliGRAM(s) Oral before breakfast  pregabalin 50 milliGRAM(s) Oral three times a day  thyroid 15 milliGRAM(s) Oral <User Schedule>    PRN MEDICATIONS  dextrose Oral Gel 15 Gram(s) Oral once PRN    VITALS  T(F): 97.8 (11-29-24 @ 13:22), Max: 98 (11-28-24 @ 14:39)  HR: 125 (11-29-24 @ 13:22) (115 - 125)  BP: 125/83 (11-29-24 @ 13:22) (95/67 - 137/86)  RR: 17 (11-29-24 @ 13:22) (17 - 18)  SpO2: 96% (11-29-24 @ 09:45) (96% - 98%)  POCT Blood Glucose.: 145 mg/dL (11-29-24 @ 11:14)  POCT Blood Glucose.: 118 mg/dL (11-29-24 @ 07:19)  POCT Blood Glucose.: 151 mg/dL (11-28-24 @ 21:21)  POCT Blood Glucose.: 140 mg/dL (11-28-24 @ 16:18)    PHYSICAL EXAM  GENERAL  ( + ) NAD, lying in bed comfortably     (  ) obtunded     (  ) lethargic     (  ) somnolent    HEAD  ( + ) Atraumatic     (  ) hematoma     (  ) laceration (specify location:       )     NECK  ( + ) Supple     (  ) neck stiffness     (  ) nuchal rigidity     (  )  no JVD     (  ) JVD present ( -- cm)    HEART  Rate -->  (+  ) normal rate    (  ) bradycardic    (  ) tachycardic  Rhythm -->  (  ) regular    (  ) regularly irregular    (  ) irregularly irregular  Murmurs -->  (  ) normal s1/s2    (  ) systolic murmur    (  ) diastolic murmur    (  ) continuous murmur     (  ) S3 present    (  ) S4 present    LUNGS  ( + )Unlabored respirations     (  ) tachypnea  (  ) B/L air entry     (  ) decreased breath sounds in:  (location     )    (  ) no adventitious sound     ( + ) crackles     (  ) wheezing      (  ) rhonchi      (specify location:       )  (  ) chest wall tenderness (specify location:       )    ABDOMEN  ( + ) Soft     (  ) tense   |   (  ) nondistended     (  ) distended   |   (  ) +BS     (  ) hypoactive bowel sounds     (  ) hyperactive bowel sounds  (  ) nontender     (  ) RUQ tenderness     (  ) RLQ tenderness     (  ) LLQ tenderness     (  ) epigastric tenderness     (  ) diffuse tenderness  (  ) Splenomegaly      (  ) Hepatomegaly      (  ) Jaundice     (  ) ecchymosis     EXTREMITIES  ( + ) trace edema (  ) Rash     (  ) ecchymosis     (  ) varicose veins      (  ) pitting edema     (  ) non-pitting edema   (  ) ulceration     (  ) gangrene:     (location:     )    NERVOUS SYSTEM  ( + ) A&Ox3     (  ) confused     (  ) lethargic  CN II-XII:     (  ) Intact     (  ) focal deficits  (Specify:     )   Upper extremities:     (  ) strength X/5     (  ) focal deficit (specify:    )  Lower extremities:     (  ) strength  X/5    (  ) focal deficit (specify:    )    SKIN  ( + ) No rashes or lesions     (  ) maculopapular rash     (  ) pustules     (  ) vesicles     (  ) ulcer     (  ) ecchymosis     (specify location:     )    LABS             13.4   11.14 )-----------( 231      ( 11-28-24 @ 05:09 )             40.6     136  |  98  |  18  -------------------------<  104   11-28-24 @ 05:09  4.0  |  23  |  0.9    Ca      8.5     11-28-24 @ 05:09  Mg     2.1     11-28-24 @ 05:09    TPro  5.3  /  Alb  2.9  /  TBili  0.5  /  DBili  x   /  AST  17  /  ALT  8   /  AlkPhos  118  /  GGT  x     11-28-24 @ 05:09    Urinalysis Basic - ( 28 Nov 2024 05:09 )    Color: x / Appearance: x / SG: x / pH: x  Gluc: 104 mg/dL / Ketone: x  / Bili: x / Urobili: x   Blood: x / Protein: x / Nitrite: x   Leuk Esterase: x / RBC: x / WBC x   Sq Epi: x / Non Sq Epi: x / Bacteria: x    IMAGING  Xray Chest 1 View- PORTABLE-Routine (Xray Chest 1 View- PORTABLE-Routine in AM.) (11.27.24 @ 04:24)     IMPRESSION:    Worsening opacifications and effusions with cardiomegaly.    CT Angio Chest PE Protocol w/ IV Cont (11.26.24 @ 03:42)   IMPRESSION:  1.  No evidence of acute pulmonary embolism.  2.  Interlobular septal thickening with patchy groundglass opacities and   moderate right effusion possibly representing CHF.  3.  Since 3/11/2023 interval left lung volume loss with leftward   mediastinal shift. Large consolidation in the left lung, greater in the   left lower lobe. Given significant cancer history, images can be  submitted to treating institution to evaluate any interval changes if   clinically relevant, as component of findings may be related to   treatment-related change.

## 2024-11-29 NOTE — CONSULT NOTE ADULT - SUBJECTIVE AND OBJECTIVE BOX
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  As per patient's daughter at the bedside, patient supposed to go for mapping for radiation of the left scapula to attempt to improve her pain in that area.  About 2 months ago had a Pleurx left lung that was removed. Her heart rate has been in the 130s for a 3-4 weeks and her cardiologist Dr. Huffman who is aware of this had recently changed patient's metoprolol to 150 mg.  She also wears a fentanyl patch. She denies having a sore throat, cough, chest pain, fever, shortness of breath, abdominal pain, nausea, vomiting, diarrhea, constipation, urinary symptoms, rashes, recent travel, recent trauma, or recent sick contacts.    In the ED,  Vital Signs Last 24 Hrs  T(C): 36.7 (26 Nov 2024 07:38), Max: 37.7 (26 Nov 2024 00:31)  T(F): 98.1 (26 Nov 2024 07:38), Max: 99.8 (26 Nov 2024 00:31)  HR: 122 (26 Nov 2024 07:38) (122 - 125)  BP: 136/81 (26 Nov 2024 07:38) (102/73 - 136/81)  BP(mean): --  RR: 20 (26 Nov 2024 07:38) (15 - 20)  SpO2: 96% (26 Nov 2024 07:38) (96% - 98%)    Parameters below as of 26 Nov 2024 07:38  Patient On (Oxygen Delivery Method): nasal cannula  O2 Flow (L/min): 2    Labs were significant for a WBC count of 17k, INR of 1.95, lactate normal, ProBNP of 6k    Imaging was significant for   Interlobular septal thickening with patchy groundglass opacities and   moderate right effusion possibly representing CHF.  Since 3/11/2023 interval left lung volume loss with leftward   mediastinal shift. Large consolidation in the left lung, greater in the   left lower lobe. Given significant cancer history, images can be  submitted to treating institution to evaluate any interval changes if   clinically relevant, as component of findings may be related to   treatment-related change.    The pt was given Cefepime, vanc an LR bolus of 1L and admitted to medicine (26 Nov 2024 07:46)    Discussed with patient and daughter.     ITEMS NOT CHECKED ARE NOT PRESENT    SOCIAL HISTORY:   Significant other/partner[ ]  Children[x ]  Roman Catholic/Spirituality:  Substance hx:  [ ]   Tobacco hx:  [ ]   Alcohol hx: [ ]   Living Situation: [ ]Home  [ ]Long term care  [ ]Rehab [ ]Other  Home Services: [ ] [facility] [ ] Visting RN [ ] Hospice  Occupation:  Home Opioid hx:  [ ] Y [ ] N [ ] I-Stop Reference No: [id number]      PDI	Current Rx	Drug Type	Rx Written	Rx Dispensed	Drug	Quantity	Days Supply	Prescriber Name	Prescriber DUSTY #	Payment Method	Dispenser  A	Y	O	11/15/2024	11/19/2024	fentanyl 12 mcg/hr patch	10	30	Gin Webster	[id number]	Medicare	[id number]  A	Y		11/18/2024	11/18/2024	pregabalin 50 mg capsule	90	30	Gin Webster	[id number]	Medicare	[id number]  A	Y	O	11/07/2024	11/13/2024	fentanyl 25 mcg/hr patch	10	30	Jeffy Brown [employer]	[id number]	Medicare	[id number]  A	N		10/15/2024	10/16/2024	pregabalin 50 mg capsule	90	30	Gin Webster	[id number]	Medicare	[id number]  A	N	O	10/11/2024	10/14/2024	fentanyl 25 mcg/hr patch	10	30	Monik Chen	[id number]	Medicare	[id number]  A	N		09/27/2024	09/27/2024	pregabalin 25 mg capsule	30	30	Monik Chen	[id number]	Medicare	[id number]  A	N	O	07/16/2024	07/17/2024	hydrocodone-acetaminophen 5-325 mg tablet	12	3	Gin Webster	[id number]	Medicare	[id number]    ADVANCE DIRECTIVES:    [ ] Full Code [ ] DNR  MOLST  [ ]  Living Will  [ ]   DECISION MAKER(s):  [ ] Health Care Proxy(s)  [ ] Surrogate(s)  [ ] Guardian           Name(s): Phone Number(s):    BASELINE (I)ADL(s) (prior to admission):  [place]: [ ]Total  [ ] Moderate [ ]Dependent  Palliative Performance Status Version 2:         %    [email or web address]    Allergies    Percocet 5/325 (Unknown)  Cipro (Diarrhea)    Intolerances    MEDICATIONS  (STANDING):  apixaban 5 milliGRAM(s) Oral every 12 hours  azithromycin  IVPB 500 milliGRAM(s) IV Intermittent every 24 hours  cefTRIAXone   IVPB 1000 milliGRAM(s) IV Intermittent every 24 hours  clopidogrel Tablet 75 milliGRAM(s) Oral daily  dextrose 5%. 1000 milliLiter(s) (50 mL/Hr) IV Continuous <Continuous>  dextrose 5%. 1000 milliLiter(s) (100 mL/Hr) IV Continuous <Continuous>  dextrose 50% Injectable 25 Gram(s) IV Push once  dextrose 50% Injectable 12.5 Gram(s) IV Push once  dextrose 50% Injectable 25 Gram(s) IV Push once  escitalopram 10 milliGRAM(s) Oral daily  fentaNYL   Patch  25 MICROgram(s)/Hr 1 Patch Transdermal every 72 hours  glucagon  Injectable 1 milliGRAM(s) IntraMuscular once  insulin glargine Injectable (LANTUS) 5 Unit(s) SubCutaneous at bedtime  insulin lispro (ADMELOG) corrective regimen sliding scale   SubCutaneous three times a day before meals  metoprolol tartrate 50 milliGRAM(s) Oral three times a day  pantoprazole    Tablet 40 milliGRAM(s) Oral before breakfast  pregabalin 50 milliGRAM(s) Oral three times a day    MEDICATIONS  (PRN):  dextrose Oral Gel 15 Gram(s) Oral once PRN Blood Glucose LESS THAN 70 milliGRAM(s)/deciliter      PRESENT SYMPTOMS: [ ]Unable to obtain due to poor mentation   Source if other than patient:  [ ]Family   [ ]Team     Pain: [x ]yes [ ]no  QOL impact -   Location - shoulder                    Aggravating factors - movement  Alleviating factors - pain meds  Quality - sharp  Radiation - no  Timing - chronic  Severity (0-10 scale):  Minimal acceptable level (0-10 scale):     CPOT:    https://www.[facility].org/getattachment/[email or web address]-9343c8464a6m/Critical-Care-Pain-Observation-Tool-(CPOT)    PAIN AD Score:   [email or web address]/painad.pdf     Dyspnea:                           [x ]None[ ]Mild [ ]Moderate [ ]Severe     Respiratory Distress Observation Scale (RDOS):   A score of 0 to 2 signifies little or no respiratory distress, 3 signifies mild distress, scores 4 to 6 indicate moderate distress, and scores greater than 7 signify severe distress  [email or web address]-[email or web address]    Anxiety:                             [ ]None[ ]Mild [ ]Moderate [ ]Severe   Fatigue:                             [ ]None[ ]Mild [x ]Moderate [ ]Severe   Nausea:                             [ ]None[ ]Mild [ ]Moderate [ ]Severe   Loss of appetite:              [ ]None[ ]Mild [x ]Moderate [ ]Severe   Constipation:                    [ ]None[ ]Mild [ ]Moderate [ ]Severe    Other Symptoms:  [x ]All other review of systems negative     Palliative Performance Status Version 2:         %    http://[facility]pcrc.org/files/news/palliative_performance_scale_[email or web address].pdf  PHYSICAL EXAM:    GENERAL:  NAD   PULMONARY:  Non labored breathing  NEUROLOGIC: Grossly intact  BEHAVIORAL/PSYCH:  Calm    LABS: I have reviewed daily labs                          14.3   19.13 )-----------( 228      ( 26 Nov 2024 11:28 )             43.9       11-26    130[L]  |  95[L]  |  22[H]  ----------------------------<  92  4.1   |  23  |  0.8    Ca    8.9      26 Nov 2024 11:28  Mg     2.0     11-26    TPro  6.8  /  Alb  3.7  /  TBili  1.1  /  DBili  x   /  AST  23  /  ALT  10  /  AlkPhos  139[H]  11-26          RADIOLOGY & ADDITIONAL STUDIES: I have reviewed new imaging    PROTEIN CALORIE MALNUTRITION PRESENT: [ ]mild [ ]moderate [ ]severe [ ]underweight [ ]morbid obesity  https://www.andeal.org/vault/2440/web/files/ONC/Table_Clinical%20Characteristics%20to%20Document%20Malnutrition-White%20JV%20et%20al%202012.pdf      Weight (kg): 68 (11-25-24 @ 22:54)    [ ]PPSV2 < or = to 30% [ ]significant weight loss  [ ]poor nutritional intake  [ ]anasarca      [ ]Artificial Nutrition      Palliative Care Spiritual/Emotional Screening Tool Question  Severity (0-4):                    OR                    [ x] Unable to determine. Will assess at later time if appropriate.  Score of 2 or greater indicates recommendation of Chaplaincy and/or SW referral  Chaplaincy Referral: [ ] Yes [ ] Refused [ ] Following     Caregiver [place]:  [ ] Yes [ ] No    OR    [x ] Unable to determine. Will assess at later time if appropriate.  Social Work Referral [ ]  Patient and Family Centered Care Referral [ ]    Anticipatory Grief Present: [ ] Yes [ ] No    OR     [ x] Unable to determine. Will assess at later time if appropriate.  Social Work Referral [ ]  Patient and Family Centered Care Referral [ ]    REFERRALS:   [ ]Chaplaincy  [ ]Hospice  [ ]Child Life  [ ]Social Work  [ ]Case management [ ]Holistic Therapy     Palliative care education provided to patient and/or family
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  As per patient she was  supposed to go for mapping for radiation of the left scapula to attempt to improve her pain in that area.  About 2 months ago had a Pleurx left lung that was removed. Her heart rate has been in the 130s for a 3-4 weeks and her metoprolol dose has been adjusted to  150 mg.  She also wears a fentanyl patch. She denies having a sore throat, cough, chest pain, fever, shortness of breath, abdominal pain, nausea, vomiting, diarrhea, constipation, urinary symptoms, rashes, recent travel, recent trauma, or recent sick contacts.    In the ED,  Vital Signs Last 24 Hrs  T(C): 36.7 (26 Nov 2024 07:38), Max: 37.7 (26 Nov 2024 00:31)  T(F): 98.1 (26 Nov 2024 07:38), Max: 99.8 (26 Nov 2024 00:31)  HR: 122 (26 Nov 2024 07:38) (122 - 125)  BP: 136/81 (26 Nov 2024 07:38) (102/73 - 136/81)  BP(mean): --  RR: 20 (26 Nov 2024 07:38) (15 - 20)  SpO2: 96% (26 Nov 2024 07:38) (96% - 98%)    Parameters below as of 26 Nov 2024 07:38  Patient On (Oxygen Delivery Method): nasal cannula  O2 Flow (L/min): 2    Labs were significant for a WBC count of 17k, INR of 1.95, lactate normal, ProBNP of 6k    Imaging was significant for   Interlobular septal thickening with patchy groundglass opacities and   moderate right effusion possibly representing CHF.  Since 3/11/2023 interval left lung volume loss with leftward   mediastinal shift. Large consolidation in the left lung, greater in the   left lower lobe. Given significant cancer history, images can be  submitted to treating institution to evaluate any interval changes if   clinically relevant, as component of findings may be related to   treatment-related change.    The pt was given Cefepime, vanc an LR bolus of 1L and admitted to medicine (26 Nov 2024 07:46)      PAST MEDICAL & SURGICAL HISTORY:  Hypothyroid      Acute MI      CAD S/P percutaneous coronary angioplasty      DM (diabetes mellitus), secondary      H/O spinal stenosis      Polymyalgia      Arthritis      IBS (irritable bowel syndrome)      CAD (coronary artery disease)      CAD (coronary artery disease)  s/p 4 stents      Chronic atrial fibrillation  not on AC      Diabetes mellitus      Pancreatic cancer  s/p distal pancreatectomy Aug 2021      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; s/p partial liver resection Mar 2022 @ [facility]      S/P tonsillectomy      History of cholecystectomy      History of appendectomy      Cataract of both eyes      History of hip replacement, total, left      History of partial pancreatectomy  s/p distal pancreatectomy Aug 2021      H/O resection of liver  mets to liver; s/p partial liver resection Mar 2022 @ [facility]      Cardiac pacemaker in situ          PREVIOUS DIAGNOSTIC TESTING:      ECHO  FINDINGS:Summary:   1. Left ventricular ejection fraction, by visual estimation, is 65 to   70%.   2. Technically difficult study.   3. Hyperdynamic global left ventricular systolic function.   4. Severely enlarged left atrium.   5. Moderate concentric left ventricular hypertrophy.   6. Normal right atrial size.   7. Degenerative mitral valve.   8. Mild mitral valve regurgitation.   9. Mitral annular calcification.  10. Thickening and calcification of the anterior and posterior mitral   valve leaflets.  11. Mild tricuspid regurgitation.  12. Sclerotic aortic valve with decreased opening.  13. No definite vegetations on this study. If clinically suspect   endocarditis, consider additional imaging.      STRESS TEST  FINDINGS:    CATHETERIZATION  FINDINGS:    MEDICATIONS  (STANDING):  cefTRIAXone   IVPB 2000 milliGRAM(s) IV Intermittent every 24 hours  chlorhexidine 2% Cloths 1 Application(s) Topical daily  clopidogrel Tablet 75 milliGRAM(s) Oral daily  dextrose 5%. 1000 milliLiter(s) (50 mL/Hr) IV Continuous <Continuous>  dextrose 5%. 1000 milliLiter(s) (100 mL/Hr) IV Continuous <Continuous>  dextrose 50% Injectable 25 Gram(s) IV Push once  dextrose 50% Injectable 12.5 Gram(s) IV Push once  dextrose 50% Injectable 25 Gram(s) IV Push once  diltiazem    Tablet 30 milliGRAM(s) Oral every 6 hours  doxycycline IVPB      doxycycline IVPB 100 milliGRAM(s) IV Intermittent every 12 hours  escitalopram 10 milliGRAM(s) Oral daily  fentaNYL   Patch  25 MICROgram(s)/Hr 1 Patch Transdermal every 72 hours  glucagon  Injectable 1 milliGRAM(s) IntraMuscular once  insulin glargine Injectable (LANTUS) 5 Unit(s) SubCutaneous at bedtime  insulin lispro (ADMELOG) corrective regimen sliding scale   SubCutaneous three times a day before meals  metoprolol tartrate 50 milliGRAM(s) Oral four times a day  pantoprazole    Tablet 40 milliGRAM(s) Oral before breakfast  pregabalin 50 milliGRAM(s) Oral three times a day  thyroid 15 milliGRAM(s) Oral <User Schedule>    MEDICATIONS  (PRN):  dextrose Oral Gel 15 Gram(s) Oral once PRN Blood Glucose LESS THAN 70 milliGRAM(s)/deciliter      FAMILY HISTORY:  No pertinent family history in first degree relatives    No pertinent family history in first degree relatives        SOCIAL HISTORY:  CIGARETTES:none  ALCOHOL:none  DRUGS:none                      REVIEW OF SYSTEMS:  CONSTITUTIONAL: No distress, Looks stable  NECK: No pain or stiffnes  RESPIRATORY: No cough, wheezing, shortness of breath  CARDIOVASCULAR: No chest pain, SOB, palpitations, leg swelling  GASTROINTESTINAL: No abdominal or epigastric pain. No nausea, vomiting, or hematemesis;  No melena.  NEUROLOGICAL: No dizziness, headaches, memory loss, loss of strength  SKIN: No itching, burning, rashes, or lesions   ENDOCRINE: No heat or cold intolerance  MUSCULOSKELETAL: No joint pain, No  swelling; No muscle pain  PSYCHIATRIC: No depression, anxiety, mood swings, or difficulty sleeping  ALLERGY: No hives, itching, rash          Vital Signs Last 24 Hrs  T(C): 36.6 (29 Nov 2024 13:22), Max: 36.7 (28 Nov 2024 20:15)  T(F): 97.8 (29 Nov 2024 13:22), Max: 98 (28 Nov 2024 20:15)  HR: 125 (29 Nov 2024 13:22) (115 - 125)  BP: 125/83 (29 Nov 2024 13:22) (100/68 - 137/86)  BP(mean): 103 (29 Nov 2024 09:45) (91 - 103)  RR: 17 (29 Nov 2024 13:22) (17 - 18)  SpO2: 96% (29 Nov 2024 09:45) (96% - 98%)    Parameters below as of 29 Nov 2024 09:45  Patient On (Oxygen Delivery Method): nasal cannula  O2 Flow (L/min): 2                        PHYSICAL EXAM:  GENERAL: No distress, well developed  HEAD:  Atraumatic, Normocephalic  NECK: Supple, No JVD, No Bruit of either carotid arteries  NERVOUS SYSTEM:  Alert, Awake, Oriented to time, place, person; Normal memory and speech; Normal motor Strength 5/5 B/L upper and lower extremities  CHEST/LUNG: decreased BS bilateral with more decreased on the left side.  ; No wheeze, crackle, rales, rhonchi  HEART: rapid Regular heart beat, S1, A2, P2, No S3, No S4, No gallop, No murmur  ABDOMEN: Soft, Non tender, Non distended; Bowel sounds present  EXTREMITIES:  2+ Peripheral Pulses, No clubbing, No edema  SKIN: No rashes or lesions    TELEMETRY: aflutter    ECG:Diagnosis Line Atrial flutter with 2:1 A-V conduction  Nonspecific T wave abnormality  Abnormal ECG    CXRAY    Worsening opacifications and effusions with cardiomegaly.          I&O's Detail    28 Nov 2024 07:01  -  29 Nov 2024 07:00  --------------------------------------------------------  IN:    Oral Fluid: 614 mL  Total IN: 614 mL    OUT:    Voided (mL): 1150 mL  Total OUT: 1150 mL    Total NET: -536 mL      29 Nov 2024 07:01  -  29 Nov 2024 16:03  --------------------------------------------------------  IN:  Total IN: 0 mL    OUT:    Voided (mL): 300 mL  Total OUT: 300 mL    Total NET: -300 mL          LABS:                        13.4   11.14 )-----------( 231      ( 28 Nov 2024 05:09 )             40.6     11-28    136  |  98  |  18  ----------------------------<  104[H]  4.0   |  23  |  0.9    Ca    8.5      28 Nov 2024 05:09  Mg     2.1     11-28    TPro  5.3[L]  /  Alb  2.9[L]  /  TBili  0.5  /  DBili  x   /  AST  17  /  ALT  8   /  AlkPhos  118[H]  11-28          Urinalysis Basic - ( 28 Nov 2024 05:09 )    Color: x / Appearance: x / SG: x / pH: x  Gluc: 104 mg/dL / Ketone: x  / Bili: x / Urobili: x   Blood: x / Protein: x / Nitrite: x   Leuk Esterase: x / RBC: x / WBC x   Sq Epi: x / Non Sq Epi: x / Bacteria: x      I&O's Summary    28 Nov 2024 07:01  -  29 Nov 2024 07:00  --------------------------------------------------------  IN: 614 mL / OUT: 1150 mL / NET: -536 mL    29 Nov 2024 07:01  -  29 Nov 2024 16:03  --------------------------------------------------------  IN: 0 mL / OUT: 300 mL / NET: -300 mL        RADIOLOGY & ADDITIONAL STUDIES:
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 Patient admitted for hypoxia secondary to CAP or possible progression of malignant infusion. ENT c/s for epistaxis. Pt having epistaxis from left nare. She is on Eliquis. Pt bleeding did not stop with pressure. She has been coughing up blood during this episode. She reports epistaxis of the right nare in the past. Left nare packing in place with 7.5 Rhinorocket. See procedure note.     PAST MEDICAL & SURGICAL HISTORY:  Hypothyroid      CAD (coronary artery disease)      Acute MI      CAD S/P percutaneous coronary angioplasty      DM (diabetes mellitus), secondary      H/O spinal stenosis      Polymyalgia      Arthritis      IBS (irritable bowel syndrome)      CAD (coronary artery disease)  s/p 4 stents      Chronic atrial fibrillation  not on AC      Diabetes mellitus      Pancreatic cancer  s/p distal pancreatectomy Aug 2021      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; s/p partial liver resection Mar 2022 @ [facility]      S/P tonsillectomy      History of cholecystectomy      History of appendectomy      Cataract of both eyes      History of hip replacement, total, left      History of partial pancreatectomy  s/p distal pancreatectomy Aug 2021      H/O resection of liver  mets to liver; s/p partial liver resection Mar 2022 @ [facility]      Cardiac pacemaker in situ          MEDICATIONS  (STANDING):  apixaban 5 milliGRAM(s) Oral every 12 hours  cefTRIAXone   IVPB 2000 milliGRAM(s) IV Intermittent every 24 hours  chlorhexidine 2% Cloths 1 Application(s) Topical daily  clopidogrel Tablet 75 milliGRAM(s) Oral daily  dextrose 5%. 1000 milliLiter(s) (50 mL/Hr) IV Continuous <Continuous>  dextrose 5%. 1000 milliLiter(s) (100 mL/Hr) IV Continuous <Continuous>  dextrose 50% Injectable 25 Gram(s) IV Push once  dextrose 50% Injectable 12.5 Gram(s) IV Push once  dextrose 50% Injectable 25 Gram(s) IV Push once  doxycycline IVPB      doxycycline IVPB 100 milliGRAM(s) IV Intermittent every 12 hours  escitalopram 10 milliGRAM(s) Oral daily  fentaNYL   Patch  25 MICROgram(s)/Hr 1 Patch Transdermal every 72 hours  glucagon  Injectable 1 milliGRAM(s) IntraMuscular once  insulin glargine Injectable (LANTUS) 5 Unit(s) SubCutaneous at bedtime  insulin lispro (ADMELOG) corrective regimen sliding scale   SubCutaneous three times a day before meals  metoprolol tartrate 50 milliGRAM(s) Oral four times a day  pantoprazole    Tablet 40 milliGRAM(s) Oral before breakfast  pregabalin 50 milliGRAM(s) Oral three times a day  thyroid 15 milliGRAM(s) Oral <User Schedule>    MEDICATIONS  (PRN):  dextrose Oral Gel 15 Gram(s) Oral once PRN Blood Glucose LESS THAN 70 milliGRAM(s)/deciliter      Allergies    Percocet 5/325 (Unknown)  Cipro (Diarrhea)    Intolerances          SOCIAL HISTORY:    FAMILY HISTORY:  No pertinent family history in first degree relatives    No pertinent family history in first degree relatives        REVIEW OF SYSTEMS   [x] A ten-point review of systems was otherwise negative except as noted.  [ ] Due to altered mental status/intubation, subjective information were not able to be obtained from patient. History was obtained, to the extent possible, from review of the chart and collateral sources of information.    Vital Signs Last 24 Hrs  T(C): 36.3 (29 Nov 2024 09:45), Max: 36.7 (28 Nov 2024 14:39)  T(F): 97.4 (29 Nov 2024 09:45), Max: 98 (28 Nov 2024 14:39)  HR: 120 (29 Nov 2024 09:45) (115 - 125)  BP: 137/86 (29 Nov 2024 09:45) (95/67 - 137/86)  BP(mean): 103 (29 Nov 2024 09:45) (76 - 103)  RR: 18 (29 Nov 2024 09:45) (18 - 18)  SpO2: 96% (29 Nov 2024 09:45) (96% - 98%)    Parameters below as of 29 Nov 2024 09:45  Patient On (Oxygen Delivery Method): nasal cannula  O2 Flow (L/min): 2      GEN: NAD, awake and alert. No drooling or pooling of secretions. No stridor or stertor. Good vocal quality, no hoarseness.   SKIN: Good color, non diaphoretic.  HEENT: Oral mucosa pink and moist with blood tinged secretions and mucus in the mouth. No active bleeding noted in posterior oropharynx. Uvula midline.   NECK: Trachea midline, Neck supple.  RESP: No dyspnea, non-labored breathing. No use of accessory muscles. on NC  CARDIO: +S1/S2  EXT: SHAH x 4      LABS:                        13.4   11.14 )-----------( 231      ( 28 Nov 2024 05:09 )             40.6     11-28    136  |  98  |  18  ----------------------------<  104[H]  4.0   |  23  |  0.9    Ca    8.5      28 Nov 2024 05:09  Mg     2.1     11-28    TPro  5.3[L]  /  Alb  2.9[L]  /  TBili  0.5  /  DBili  x   /  AST  17  /  ALT  8   /  AlkPhos  118[H]  11-28          RADIOLOGY & ADDITIONAL STUDIES:
  ANNE VILLATORO  89y, Female  Allergy: Percocet 5/325 (Unknown)  Cipro (Diarrhea)      All historical available data reviewed.    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  As per patient's daughter at the bedside, patient supposed to go for mapping for radiation of the left scapula to attempt to improve her pain in that area.  About 2 months ago had a Pleurx left lung that was removed. Her heart rate has been in the 130s for a 3-4 weeks and her cardiologist Dr. Huffman who is aware of this had recently changed patient's metoprolol to 150 mg.  She also wears a fentanyl patch. She denies having a sore throat, cough, chest pain, fever, shortness of breath, abdominal pain, nausea, vomiting, diarrhea, constipation, urinary symptoms, rashes, recent travel, recent trauma, or recent sick contacts.    In the ED,  Vital Signs Last 24 Hrs  T(C): 36.7 (2024 07:38), Max: 37.7 (2024 00:31)  T(F): 98.1 (2024 07:38), Max: 99.8 (2024 00:31)  HR: 122 (2024 07:38) (122 - 125)  BP: 136/81 (2024 07:38) (102/73 - 136/81)  BP(mean): --  RR: 20 (:38) (15 - 20)  SpO2: 96% (2024 07:38) (96% - 98%)    Parameters below as of 2024 07:38  Patient On (Oxygen Delivery Method): nasal cannula  O2 Flow (L/min): 2    Labs were significant for a WBC count of 17k, INR of 1.95, lactate normal, ProBNP of 6k    Imaging was significant for   Interlobular septal thickening with patchy groundglass opacities and   moderate right effusion possibly representing CHF.  Since 3/11/2023 interval left lung volume loss with leftward   mediastinal shift. Large consolidation in the left lung, greater in the   left lower lobe. Given significant cancer history, images can be  submitted to treating institution to evaluate any interval changes if   clinically relevant, as component of findings may be related to   treatment-related change.    The pt was given Cefepime, vanc an LR bolus of 1L and admitted to medicine (2024 07:46)    FAMILY HISTORY:  No pertinent family history in first degree relatives    No pertinent family history in first degree relatives      PAST MEDICAL & SURGICAL HISTORY:  Hypothyroid      CAD (coronary artery disease)      Acute MI      CAD S/P percutaneous coronary angioplasty      DM (diabetes mellitus), secondary      H/O spinal stenosis      Polymyalgia      Arthritis      IBS (irritable bowel syndrome)      CAD (coronary artery disease)  s/p 4 stents      Chronic atrial fibrillation  not on AC      Diabetes mellitus      Pancreatic cancer  s/p distal pancreatectomy Aug 2021      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; s/p partial liver resection Mar 2022 @ [facility]      S/P tonsillectomy      History of cholecystectomy      History of appendectomy      Cataract of both eyes      History of hip replacement, total, left      History of partial pancreatectomy  s/p distal pancreatectomy Aug 2021      H/O resection of liver  mets to liver; s/p partial liver resection Mar 2022 @ [facility]      Cardiac pacemaker in situ            VITALS:  T(F): 97.8, Max: 98.1 (24 @ 07:38)  HR: 101  BP: 97/59  RR: 18Vital Signs Last 24 Hrs  T(C): 36.6 (2024 23:52), Max: 36.7 (2024 07:38)  T(F): 97.8 (2024 23:52), Max: 98.1 (2024 07:38)  HR: 101 (2024 23:52) (101 - 133)  BP: 97/59 (2024 23:52) (73/60 - 136/81)  BP(mean): 94 (2024 21:30) (64 - 94)  RR: 18 (2024 23:52) (18 - 20)  SpO2: 97% (2024 23:52) (96% - 98%)    Parameters below as of 2024 21:30  Patient On (Oxygen Delivery Method): nasal cannula  O2 Flow (L/min): 2      TESTS & MEASUREMENTS:                        14.3   19.13 )-----------( 228      ( 2024 11:28 )             43.9         130[L]  |  95[L]  |  22[H]  ----------------------------<  92  4.1   |  23  |  0.8    Ca    8.9      2024 11:  Mg     2.0         TPro  6.8  /  Alb  3.7  /  TBili  1.1  /  DBili  x   /  AST  23  /  ALT  10  /  AlkPhos  139[H]      LIVER FUNCTIONS - ( 2024 00:36 )  Alb: 3.7 g/dL / Pro: 6.8 g/dL / ALK PHOS: 139 U/L / ALT: 10 U/L / AST: 23 U/L / GGT: x             Urinalysis with Rflx Culture (collected 24 @ 14:00)    Culture - Blood (collected 24 @ 03:20)  Source: .Blood BLOOD  Gram Stain (24 @ 23:02):    Growth in anaerobic bottle: Gram positive cocci in pairs    Growth in aerobic bottle: Gram Positive Cocci in Pairs and Chains  Preliminary Report (24 @ 23:02):    Growth in anaerobic bottle: Gram positive cocci in pairs    Growth in aerobic bottle: Gram Positive Cocci in Pairs and Chains    Direct identification is available within approximately 3-5    hours either by Blood Panel Multiplexed PCR or Direct    MALDI-TOF. Details: [email or web address]  Organism: Blood Culture PCR (24 @ 23:11)  Organism: Blood Culture PCR (24 @ 23:11)      Method Type: PCR      -  Streptococcus sp. (Not Grp A, B or S pneumoniae): Detec    Culture - Blood (collected 24 @ 03:20)  Source: .Blood BLOOD  Gram Stain (24 @ 22:40):    Growth in aerobic and anaerobic bottles: Gram positive cocci in pairs  Preliminary Report (24 @ 22:40):    Growth in aerobic and anaerobic bottles: Gram positive cocci in pairs      Urinalysis Basic - ( 2024 14:00 )    Color: Yellow / Appearance: Cloudy / S.022 / pH: x  Gluc: x / Ketone: Negative mg/dL  / Bili: Negative / Urobili: 0.2 mg/dL   Blood: x / Protein: Trace mg/dL / Nitrite: Negative   Leuk Esterase: Small / RBC: 22 /HPF / WBC 1 /HPF   Sq Epi: x / Non Sq Epi: 1 /HPF / Bacteria: Moderate /HPF          RADIOLOGY & ADDITIONAL TESTS:  Personal review of radiological diagnostics performed  Echo and EKG results noted when applicable.     MEDICATIONS:  apixaban 5 milliGRAM(s) Oral every 12 hours  azithromycin  IVPB 500 milliGRAM(s) IV Intermittent every 24 hours  cefTRIAXone   IVPB 2000 milliGRAM(s) IV Intermittent every 24 hours  clopidogrel Tablet 75 milliGRAM(s) Oral daily  dextrose 5%. 1000 milliLiter(s) IV Continuous <Continuous>  dextrose 5%. 1000 milliLiter(s) IV Continuous <Continuous>  dextrose 50% Injectable 25 Gram(s) IV Push once  dextrose 50% Injectable 12.5 Gram(s) IV Push once  dextrose 50% Injectable 25 Gram(s) IV Push once  dextrose Oral Gel 15 Gram(s) Oral once PRN  escitalopram 10 milliGRAM(s) Oral daily  fentaNYL   Patch  25 MICROgram(s)/Hr 1 Patch Transdermal every 72 hours  glucagon  Injectable 1 milliGRAM(s) IntraMuscular once  insulin glargine Injectable (LANTUS) 5 Unit(s) SubCutaneous at bedtime  insulin lispro (ADMELOG) corrective regimen sliding scale   SubCutaneous three times a day before meals  metoprolol tartrate 50 milliGRAM(s) Oral three times a day  pantoprazole    Tablet 40 milliGRAM(s) Oral before breakfast  pregabalin 50 milliGRAM(s) Oral three times a day      ANTIBIOTICS:  azithromycin  IVPB 500 milliGRAM(s) IV Intermittent every 24 hours  cefTRIAXone   IVPB 2000 milliGRAM(s) IV Intermittent every 24 hours

## 2024-11-29 NOTE — PROGRESS NOTE ADULT - ASSESSMENT
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 The pt was admitted to medicine for pneumonia.    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   #Immunocompromised   - lactate 1.8  - on 3L via NC satting at 98%  - blood culture + streptococcus lutetiensis   - daily blood cx until neg   - urine strep and legionella negative   - c/w ceftriaxone and doxycycline    #epistaxis - resolved    -  hb stable   - ENT consulted   - monitor H&H    #CAD  #Afib   #SSS s/p PPM  - echo noted EF< 65 to 70%  - c/w metoprolol 50mg q6hr   -c/w eliquis  -c/w plavix    #cancer related pain 2/2 mets  - C/w pregabalin and fentanyl patch    #anxiety  #depression  - C/w lexapro 10 once daily    #DM  - as per pt on tresiba 10-12 units at night  - c/w lantus and SS  - on jardiance 10mg once daily    #hypothyroidism  - TSH 2.48  - pt on armour thyroid (dessicated thyroid combo of t3/t4)  - resume home meds    DVT ppx: Eliquis  GI ppx: PPI   Diet- DASH/fluid restrict  Pending: negative blood cx

## 2024-11-29 NOTE — PROGRESS NOTE ADULT - ASSESSMENT
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      Sepsis present on admission  Streptococcus lutetiensis bacteremia  LLL PNA  Chronic A-fib on Eliquis  H/O CAD s/p PCI  H/O Pancreatic cancer  H/O Cholangiocarcinoma with mets to bones and lungs  Immunosuppression/Immunosenescence  Epistaxis s/p nasal packing                   PLAN:    ·	Tele reviewed by me. In rapid A-fib  ·	Epistaxis from L nostril. S/P L nasal packing by the ENT.   ·	Called pt's cardiologist and discussed care with him  ·	Increase Metoprolol to 50 mg po q 6h. Hold for SBP <90  ·	ECHO reviewed. EF is 65-70%  ·	Cardiology consult  ·	CTA chest reviewed. No PE.  Since 3/11/2023 interval left lung volume loss with leftward mediastinal shift. Large consolidation in the left lung, greater in the left lower lobe.  ·	Blood cxs are growing Streptococcus lutetiensis sensitive to Ceftriaxone  ·	ID eval noted. Cont Ceftriaxone 2 gm iv daily and Doxycycline 100 mg po q 12h  ·	Check RA POX  ·	Pain control  ·	Cont her other home meds  ·	PT eval    Progress Note Handoff    Pending (specify):  Consults__Cardiology_______, Tests________, Test Results_______, Other__L nasal packing_______  Family discussion:  Diagnosis and plan of care d/w the daughter on bedside.   Disposition: Home___/SNF___/Other________/Unknown at this time________    Cali Paulson MD  Spectra: [phone]

## 2024-11-29 NOTE — CONSULT NOTE ADULT - ASSESSMENT
89yo Female with PMH diabetes, A-fib on Eliquis, SSS S/p PPM placement, CAD with 4 stents on Plavix, history of MI, history of pancreatic cancer status post distal pancreatectomy in August 2021, cholangiocarcinoma with mets to the liver status post partial liver resection, mets to the lung, and mets to the bone specifically the left scapula followed by [facility] no longer on treatment, and hypothyroidism admitted with hypoxia secondary to CAP. ENT c/s for epistaxis    Plan:  - Keep packing in place for 48-72 hrs and evaluate for removal  - Notify ENT if repeat bleeding occurs  - Hold AC/AP as able per Hospital medicine /Cardio  - Humidified O2  - Prophylactic antibiotics  Will discuss with attending 89yo Female with PMH diabetes, A-fib on Eliquis, SSS S/p PPM placement, CAD with 4 stents on Plavix, history of MI, history of pancreatic cancer status post distal pancreatectomy in August 2021, cholangiocarcinoma with mets to the liver status post partial liver resection, mets to the lung, and mets to the bone specifically the left scapula followed by [facility] no longer on treatment, and hypothyroidism admitted with hypoxia secondary to CAP vs progression of malignant effusion. ENT c/s for epistaxis    Plan:  - Keep packing in place for 48-72 hrs and evaluate for removal  - Notify ENT if repeat bleeding occurs  - Hold AC/AP as able per Hospital medicine /Cardio  - Humidified O2  - Prophylactic antibiotics  - Maintain Normotension  Will discuss with attending 89yo Female with PMH diabetes, A-fib on Eliquis, SSS S/p PPM placement, CAD with 4 stents on Plavix, history of MI, history of pancreatic cancer status post distal pancreatectomy in August 2021, cholangiocarcinoma with mets to the liver status post partial liver resection, mets to the lung, and mets to the bone specifically the left scapula followed by [facility] no longer on treatment, and hypothyroidism admitted with hypoxia secondary to CAP vs progression of malignant effusion. ENT c/s for epistaxis    Plan:  - Keep packing in place for 48-72 hrs and evaluate for removal  - Notify ENT if repeat bleeding occurs  - Hold AC/AP as able per Hospital medicine /Cardio  - Trend H/H, transfuse PRN  - Humidified O2  - Prophylactic antibiotics  - Maintain Normotension  Will discuss with attending

## 2024-11-30 LAB
ALBUMIN SERPL ELPH-MCNC: 2.9 G/DL — LOW (ref 3.5–5.2)
ALP SERPL-CCNC: 118 U/L — HIGH (ref 30–115)
ALT FLD-CCNC: 8 U/L — SIGNIFICANT CHANGE UP (ref 0–41)
ANION GAP SERPL CALC-SCNC: 10 MMOL/L — SIGNIFICANT CHANGE UP (ref 7–14)
AST SERPL-CCNC: 14 U/L — SIGNIFICANT CHANGE UP (ref 0–41)
BILIRUB SERPL-MCNC: 0.4 MG/DL — SIGNIFICANT CHANGE UP (ref 0.2–1.2)
BUN SERPL-MCNC: 14 MG/DL — SIGNIFICANT CHANGE UP (ref 10–20)
CALCIUM SERPL-MCNC: 8.6 MG/DL — SIGNIFICANT CHANGE UP (ref 8.4–10.4)
CHLORIDE SERPL-SCNC: 95 MMOL/L — LOW (ref 98–110)
CO2 SERPL-SCNC: 26 MMOL/L — SIGNIFICANT CHANGE UP (ref 17–32)
CREAT SERPL-MCNC: 0.7 MG/DL — SIGNIFICANT CHANGE UP (ref 0.7–1.5)
EGFR: 82 ML/MIN/1.73M2 — SIGNIFICANT CHANGE UP
GLUCOSE BLDC GLUCOMTR-MCNC: 130 MG/DL — HIGH (ref 70–99)
GLUCOSE BLDC GLUCOMTR-MCNC: 150 MG/DL — HIGH (ref 70–99)
GLUCOSE BLDC GLUCOMTR-MCNC: 153 MG/DL — HIGH (ref 70–99)
GLUCOSE BLDC GLUCOMTR-MCNC: 171 MG/DL — HIGH (ref 70–99)
GLUCOSE SERPL-MCNC: 120 MG/DL — HIGH (ref 70–99)
HCT VFR BLD CALC: 41 % — SIGNIFICANT CHANGE UP (ref 37–47)
HGB BLD-MCNC: 13.6 G/DL — SIGNIFICANT CHANGE UP (ref 12–16)
MAGNESIUM SERPL-MCNC: 1.8 MG/DL — SIGNIFICANT CHANGE UP (ref 1.8–2.4)
MCHC RBC-ENTMCNC: 30.3 PG — SIGNIFICANT CHANGE UP (ref 27–31)
MCHC RBC-ENTMCNC: 33.2 G/DL — SIGNIFICANT CHANGE UP (ref 32–37)
MCV RBC AUTO: 91.3 FL — SIGNIFICANT CHANGE UP (ref 81–99)
NRBC # BLD: 0 /100 WBCS — SIGNIFICANT CHANGE UP (ref 0–0)
PLATELET # BLD AUTO: 252 K/UL — SIGNIFICANT CHANGE UP (ref 130–400)
PMV BLD: 11.2 FL — HIGH (ref 7.4–10.4)
POTASSIUM SERPL-MCNC: 4.1 MMOL/L — SIGNIFICANT CHANGE UP (ref 3.5–5)
POTASSIUM SERPL-SCNC: 4.1 MMOL/L — SIGNIFICANT CHANGE UP (ref 3.5–5)
PROT SERPL-MCNC: 5.6 G/DL — LOW (ref 6–8)
RBC # BLD: 4.49 M/UL — SIGNIFICANT CHANGE UP (ref 4.2–5.4)
RBC # FLD: 16.5 % — HIGH (ref 11.5–14.5)
SODIUM SERPL-SCNC: 131 MMOL/L — LOW (ref 135–146)
WBC # BLD: 12.03 K/UL — HIGH (ref 4.8–10.8)
WBC # FLD AUTO: 12.03 K/UL — HIGH (ref 4.8–10.8)

## 2024-11-30 PROCEDURE — 99233 SBSQ HOSP IP/OBS HIGH 50: CPT

## 2024-11-30 PROCEDURE — 93280 PM DEVICE PROGR EVAL DUAL: CPT | Mod: 26

## 2024-11-30 RX ORDER — APIXABAN 2.5 MG/1
5 TABLET, FILM COATED ORAL EVERY 12 HOURS
Refills: 0 | Status: DISCONTINUED | OUTPATIENT
Start: 2024-11-30 | End: 2024-12-05

## 2024-11-30 RX ADMIN — METOPROLOL TARTRATE 50 MILLIGRAM(S): 100 TABLET, FILM COATED ORAL at 11:52

## 2024-11-30 RX ADMIN — DILTIAZEM HYDROCHLORIDE 30 MILLIGRAM(S): 240 CAPSULE, COATED, EXTENDED RELEASE ORAL at 00:46

## 2024-11-30 RX ADMIN — PREGABALIN 50 MILLIGRAM(S): 75 CAPSULE ORAL at 05:50

## 2024-11-30 RX ADMIN — DILTIAZEM HYDROCHLORIDE 30 MILLIGRAM(S): 240 CAPSULE, COATED, EXTENDED RELEASE ORAL at 11:52

## 2024-11-30 RX ADMIN — DOXYCYCLINE HYCLATE 100 MILLIGRAM(S): 150 TABLET, COATED ORAL at 05:50

## 2024-11-30 RX ADMIN — METOPROLOL TARTRATE 50 MILLIGRAM(S): 100 TABLET, FILM COATED ORAL at 05:50

## 2024-11-30 RX ADMIN — POLYETHYLENE GLYCOL 3350 17 GRAM(S): 17 POWDER, FOR SOLUTION ORAL at 11:53

## 2024-11-30 RX ADMIN — CLOPIDOGREL 75 MILLIGRAM(S): 75 TABLET, FILM COATED ORAL at 11:53

## 2024-11-30 RX ADMIN — PREGABALIN 50 MILLIGRAM(S): 75 CAPSULE ORAL at 14:21

## 2024-11-30 RX ADMIN — DILTIAZEM HYDROCHLORIDE 30 MILLIGRAM(S): 240 CAPSULE, COATED, EXTENDED RELEASE ORAL at 05:50

## 2024-11-30 RX ADMIN — METOPROLOL TARTRATE 50 MILLIGRAM(S): 100 TABLET, FILM COATED ORAL at 00:45

## 2024-11-30 RX ADMIN — APIXABAN 5 MILLIGRAM(S): 2.5 TABLET, FILM COATED ORAL at 17:39

## 2024-11-30 RX ADMIN — PANTOPRAZOLE SODIUM 40 MILLIGRAM(S): 40 TABLET, DELAYED RELEASE ORAL at 05:50

## 2024-11-30 RX ADMIN — INSULIN GLARGINE 5 UNIT(S): 100 INJECTION, SOLUTION SUBCUTANEOUS at 22:12

## 2024-11-30 RX ADMIN — Medication 100 MILLIGRAM(S): at 17:40

## 2024-11-30 RX ADMIN — ESCITALOPRAM OXALATE 10 MILLIGRAM(S): 10 TABLET, FILM COATED ORAL at 11:52

## 2024-11-30 RX ADMIN — Medication 2 TABLET(S): at 22:13

## 2024-11-30 RX ADMIN — Medication 25 GRAM(S): at 08:43

## 2024-11-30 RX ADMIN — DOXYCYCLINE HYCLATE 100 MILLIGRAM(S): 150 TABLET, COATED ORAL at 18:16

## 2024-11-30 RX ADMIN — Medication 25 GRAM(S): at 10:40

## 2024-11-30 RX ADMIN — DILTIAZEM HYDROCHLORIDE 30 MILLIGRAM(S): 240 CAPSULE, COATED, EXTENDED RELEASE ORAL at 17:39

## 2024-11-30 RX ADMIN — Medication 1: at 11:54

## 2024-11-30 RX ADMIN — CHLORHEXIDINE GLUCONATE 1 APPLICATION(S): 1.2 RINSE ORAL at 12:02

## 2024-11-30 RX ADMIN — PREGABALIN 50 MILLIGRAM(S): 75 CAPSULE ORAL at 22:13

## 2024-11-30 RX ADMIN — METOPROLOL TARTRATE 50 MILLIGRAM(S): 100 TABLET, FILM COATED ORAL at 17:40

## 2024-11-30 NOTE — PROGRESS NOTE ADULT - ASSESSMENT
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 The pt was admitted to medicine for pneumonia.    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   #Immunocompromised   - lactate 1.8  - on 3L via NC satting at 98%  - blood culture + streptococcus lutetiensis   - daily blood cx until neg   - urine strep and legionella negative   - c/w ceftriaxone and doxycycline    #epistaxis - resolved    - hb stable   - ENT consulted   - monitor H&H  - Eliquis held for now     #CAD  #Afib   #SSS s/p PPM  #chronic tachycardia - hx of afib  #a fib RVR w/ aberrancy   - echo noted EF< 65 to 70%  - c/w metoprolol 50mg q6hr   -c/w eliquis - held for now   -c/w plavix  - per EP: device is working properly, AFL, chronic condition on Eliquis at  home, currently on hold due to epistaxis     #cancer related pain 2/2 mets  - C/w pregabalin and fentanyl patch    #anxiety  #depression  - C/w lexapro 10 once daily    #DM  - as per pt on tresiba 10-12 units at night  - c/w lantus and SS  - on jardiance 10mg once daily    #hypothyroidism  - TSH 2.48  - pt on armour thyroid (dessicated thyroid combo of t3/t4)  - resume home meds    DVT ppx: Eliquis (held)   GI ppx: PPI   Diet- DASH/fluid restrict  Pending: negative blood cx  89-year-old female with a past medical history of diabetes, A-fib on Eliquis, SSS S/p PPM placement, CAD with 4 stents on Plavix, history of MI, history of pancreatic cancer status post distal pancreatectomy in August 2021, 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 The pt was admitted to medicine for pneumonia.    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   #Immunocompromised   - lactate 1.8  - on 3L via NC satting at 98%  - blood culture + streptococcus lutetiensis   - daily blood cx until neg   - urine strep and legionella negative   - c/w ceftriaxone and doxycycline    #epistaxis - resolved    - hb stable   - ENT consulted   - monitor H&H  - resume Eliquis 5mg BID per ENT      #CAD  #Afib   #SSS s/p PPM  #chronic tachycardia - hx of afib  #a fib RVR w/ aberrancy   - echo noted EF< 65 to 70%  - c/w metoprolol 50mg q6hr   -c/w eliquis   -c/w plavix  - per EP: device is working properly, AFL, chronic condition on Eliquis    #cancer related pain 2/2 mets  - C/w pregabalin and fentanyl patch    #anxiety  #depression  - C/w lexapro 10 once daily    #DM  - as per pt on tresiba 10-12 units at night  - c/w lantus and SS  - on jardiance 10mg once daily    #hypothyroidism  - TSH 2.48  - pt on armour thyroid (dessicated thyroid combo of t3/t4)  - resume home meds    DVT ppx: Eliquis   GI ppx: PPI   Diet- DASH/fluid restrict  Pending: negative blood cx

## 2024-11-30 NOTE — PROGRESS NOTE ADULT - ATTENDING COMMENTS
Edited the note above    Tx for sepsis due to pneumonia with Strep. Lut. bacteremia
Edited the note above. tx for Sepsis due to pnuemonia

## 2024-11-30 NOTE — PROGRESS NOTE ADULT - ASSESSMENT
Pt is a 89y F that ENT is following for L epistaxis, s/p L rhinorocket packing on 11/29.    ·	Cont L nasal packing for now, will plan to deflate/remove packing in 48-72hrs from placement   ·	Ppx abx while packing in place   ·	Trend h/h; transfuse prn  ·	Will d/w attng

## 2024-11-30 NOTE — PROGRESS NOTE ADULT - SUBJECTIVE AND OBJECTIVE BOX
SUBJECTIVE/OVERNIGHT EVENTS  Today is hospital day 4d. This morning patient was seen and examined at bedside, resting comfortably in bed. No acute or major events overnight.    MEDICATIONS  STANDING MEDICATIONS  cefTRIAXone   IVPB 2000 milliGRAM(s) IV Intermittent every 24 hours  chlorhexidine 2% Cloths 1 Application(s) Topical daily  clopidogrel Tablet 75 milliGRAM(s) Oral daily  dextrose 5%. 1000 milliLiter(s) IV Continuous <Continuous>  dextrose 5%. 1000 milliLiter(s) IV Continuous <Continuous>  dextrose 50% Injectable 25 Gram(s) IV Push once  dextrose 50% Injectable 12.5 Gram(s) IV Push once  dextrose 50% Injectable 25 Gram(s) IV Push once  diltiazem    Tablet 30 milliGRAM(s) Oral every 6 hours  doxycycline IVPB      doxycycline IVPB 100 milliGRAM(s) IV Intermittent every 12 hours  escitalopram 10 milliGRAM(s) Oral daily  fentaNYL   Patch  25 MICROgram(s)/Hr 1 Patch Transdermal every 72 hours  glucagon  Injectable 1 milliGRAM(s) IntraMuscular once  insulin glargine Injectable (LANTUS) 5 Unit(s) SubCutaneous at bedtime  insulin lispro (ADMELOG) corrective regimen sliding scale   SubCutaneous three times a day before meals  metoprolol tartrate 50 milliGRAM(s) Oral four times a day  pantoprazole    Tablet 40 milliGRAM(s) Oral before breakfast  polyethylene glycol 3350 17 Gram(s) Oral daily  pregabalin 50 milliGRAM(s) Oral three times a day  senna 2 Tablet(s) Oral at bedtime  thyroid 15 milliGRAM(s) Oral <User Schedule>    PRN MEDICATIONS  dextrose Oral Gel 15 Gram(s) Oral once PRN    VITALS  T(F): 97.4 (11-30-24 @ 04:42), Max: 98.2 (11-29-24 @ 20:15)  HR: 120 (11-30-24 @ 04:42) (78 - 125)  BP: 107/73 (11-30-24 @ 04:42) (107/73 - 125/83)  RR: 18 (11-30-24 @ 04:42) (17 - 18)  SpO2: 95% (11-30-24 @ 09:59) (95% - 99%)  POCT Blood Glucose.: 171 mg/dL (11-30-24 @ 11:10)  POCT Blood Glucose.: 130 mg/dL (11-30-24 @ 07:30)  POCT Blood Glucose.: 143 mg/dL (11-29-24 @ 21:41)  POCT Blood Glucose.: 140 mg/dL (11-29-24 @ 16:25)    PHYSICAL EXAM  GENERAL  ( + ) NAD, lying in bed comfortably     (  ) obtunded     (  ) lethargic     (  ) somnolent    HEAD  ( + ) Atraumatic     (  ) hematoma     (  ) laceration (specify location:       )     NECK  ( +) Supple     (  ) neck stiffness     (  ) nuchal rigidity     (  )  no JVD     (  ) JVD present ( -- cm)    HEART  Rate -->  ( + ) normal rate    (  ) bradycardic    (  ) tachycardic  Rhythm -->  (  ) regular    (  ) regularly irregular    (  ) irregularly irregular  Murmurs -->  (  ) normal s1/s2    (  ) systolic murmur    (  ) diastolic murmur    (  ) continuous murmur     (  ) S3 present    (  ) S4 present    LUNGS  ( + )Unlabored respirations     (  ) tachypnea  ( + ) B/L air entry     (  ) decreased breath sounds in:  (location     )    (  ) no adventitious sound     ( + ) crackles     (  ) wheezing      (  ) rhonchi      (specify location:       )  (  ) chest wall tenderness (specify location:       )    ABDOMEN  ( + ) Soft     (  ) tense   |   (  ) nondistended     (  ) distended   |   (  ) +BS     (  ) hypoactive bowel sounds     (  ) hyperactive bowel sounds  ( + ) nontender     (  ) RUQ tenderness     (  ) RLQ tenderness     (  ) LLQ tenderness     (  ) epigastric tenderness     (  ) diffuse tenderness  (  ) Splenomegaly      (  ) Hepatomegaly      (  ) Jaundice     (  ) ecchymosis     EXTREMITIES  ( + ) Normal     (  ) Rash     (  ) ecchymosis     (  ) varicose veins      (  ) pitting edema     (  ) non-pitting edema   (  ) ulceration     (  ) gangrene:     (location:     )    NERVOUS SYSTEM  ( + ) A&Ox3     (  ) confused     (  ) lethargic  CN II-XII:     (  ) Intact     (  ) focal deficits  (Specify:     )   Upper extremities:     (  ) strength X/5     (  ) focal deficit (specify:    )  Lower extremities:     (  ) strength  X/5    (  ) focal deficit (specify:    )    SKIN  ( + ) No rashes or lesions     (  ) maculopapular rash     (  ) pustules     (  ) vesicles     (  ) ulcer     (  ) ecchymosis     (specify location:     )    LABS             13.6   12.03 )-----------( 252      ( 11-30-24 @ 04:48 )             41.0     131  |  95  |  14  -------------------------<  120   11-30-24 @ 04:48  4.1  |  26  |  0.7    Ca      8.6     11-30-24 @ 04:48  Mg     1.8     11-30-24 @ 04:48    TPro  5.6  /  Alb  2.9  /  TBili  0.4  /  DBili  x   /  AST  14  /  ALT  8   /  AlkPhos  118  /  GGT  x     11-30-24 @ 04:48    Urinalysis Basic - ( 30 Nov 2024 04:48 )    Color: x / Appearance: x / SG: x / pH: x  Gluc: 120 mg/dL / Ketone: x  / Bili: x / Urobili: x   Blood: x / Protein: x / Nitrite: x   Leuk Esterase: x / RBC: x / WBC x   Sq Epi: x / Non Sq Epi: x / Bacteria: x SUBJECTIVE/OVERNIGHT EVENTS  Today is hospital day 4d. This morning patient was seen and examined at bedside, resting comfortably in bed. No acute or major events overnight.    MEDICATIONS  STANDING MEDICATIONS  cefTRIAXone   IVPB 2000 milliGRAM(s) IV Intermittent every 24 hours  chlorhexidine 2% Cloths 1 Application(s) Topical daily  clopidogrel Tablet 75 milliGRAM(s) Oral daily  dextrose 5%. 1000 milliLiter(s) IV Continuous <Continuous>  dextrose 5%. 1000 milliLiter(s) IV Continuous <Continuous>  dextrose 50% Injectable 25 Gram(s) IV Push once  dextrose 50% Injectable 12.5 Gram(s) IV Push once  dextrose 50% Injectable 25 Gram(s) IV Push once  diltiazem    Tablet 30 milliGRAM(s) Oral every 6 hours  doxycycline IVPB      doxycycline IVPB 100 milliGRAM(s) IV Intermittent every 12 hours  escitalopram 10 milliGRAM(s) Oral daily  fentaNYL   Patch  25 MICROgram(s)/Hr 1 Patch Transdermal every 72 hours  glucagon  Injectable 1 milliGRAM(s) IntraMuscular once  insulin glargine Injectable (LANTUS) 5 Unit(s) SubCutaneous at bedtime  insulin lispro (ADMELOG) corrective regimen sliding scale   SubCutaneous three times a day before meals  metoprolol tartrate 50 milliGRAM(s) Oral four times a day  pantoprazole    Tablet 40 milliGRAM(s) Oral before breakfast  polyethylene glycol 3350 17 Gram(s) Oral daily  pregabalin 50 milliGRAM(s) Oral three times a day  senna 2 Tablet(s) Oral at bedtime  thyroid 15 milliGRAM(s) Oral <User Schedule>    PRN MEDICATIONS  dextrose Oral Gel 15 Gram(s) Oral once PRN    VITALS  T(F): 97.4 (11-30-24 @ 04:42), Max: 98.2 (11-29-24 @ 20:15)  HR: 120 (11-30-24 @ 04:42) (78 - 125)  BP: 107/73 (11-30-24 @ 04:42) (107/73 - 125/83)  RR: 18 (11-30-24 @ 04:42) (17 - 18)  SpO2: 95% (11-30-24 @ 09:59) (95% - 99%)  POCT Blood Glucose.: 171 mg/dL (11-30-24 @ 11:10)  POCT Blood Glucose.: 130 mg/dL (11-30-24 @ 07:30)  POCT Blood Glucose.: 143 mg/dL (11-29-24 @ 21:41)  POCT Blood Glucose.: 140 mg/dL (11-29-24 @ 16:25)    PHYSICAL EXAM  GENERAL  ( + ) NAD, lying in bed comfortably     (  ) obtunded     (  ) lethargic     (  ) somnolent    HEAD  ( + ) Atraumatic     (  ) hematoma     (  ) laceration (specify location:       )     NECK  ( +) Supple     (  ) neck stiffness     (  ) nuchal rigidity     (  )  no JVD     (  ) JVD present ( -- cm)    HEART  Rate -->  ( + ) normal rate    (  ) bradycardic    (  ) tachycardic  Rhythm -->  (  ) regular    (  ) regularly irregular    (x  ) irregularly irregular  Murmurs -->  ( x ) normal s1/s2    (  ) systolic murmur    (  ) diastolic murmur    (  ) continuous murmur     (  ) S3 present    (  ) S4 present    LUNGS  ( + )Unlabored respirations     (  ) tachypnea  ( + ) B/L air entry     (  ) decreased breath sounds in:  (location     )    (  ) no adventitious sound     ( + ) crackles     (  ) wheezing      (  ) rhonchi      (specify location:       )  (  ) chest wall tenderness (specify location:       )    ABDOMEN  ( + ) Soft     (  ) tense   |   (  ) nondistended     (  ) distended   |   (  ) +BS     (  ) hypoactive bowel sounds     (  ) hyperactive bowel sounds  ( + ) nontender     (  ) RUQ tenderness     (  ) RLQ tenderness     (  ) LLQ tenderness     (  ) epigastric tenderness     (  ) diffuse tenderness  (  ) Splenomegaly      (  ) Hepatomegaly      (  ) Jaundice     (  ) ecchymosis     EXTREMITIES  ( + ) Normal     (  ) Rash     (  ) ecchymosis     (  ) varicose veins      (  ) pitting edema     (  ) non-pitting edema   (  ) ulceration     (  ) gangrene:     (location:     )    NERVOUS SYSTEM  ( + ) A&Ox3     (  ) confused     (  ) lethargic  CN II-XII:     (  ) Intact     (  ) focal deficits  (Specify:     )   Upper extremities:     (  ) strength X/5     (  ) focal deficit (specify:    )  Lower extremities:     (  ) strength  X/5    (  ) focal deficit (specify:    )    SKIN  ( + ) No rashes or lesions     (  ) maculopapular rash     (  ) pustules     (  ) vesicles     (  ) ulcer     (  ) ecchymosis     (specify location:     )    LABS             13.6   12.03 )-----------( 252      ( 11-30-24 @ 04:48 )             41.0     131  |  95  |  14  -------------------------<  120   11-30-24 @ 04:48  4.1  |  26  |  0.7    Ca      8.6     11-30-24 @ 04:48  Mg     1.8     11-30-24 @ 04:48    TPro  5.6  /  Alb  2.9  /  TBili  0.4  /  DBili  x   /  AST  14  /  ALT  8   /  AlkPhos  118  /  GGT  x     11-30-24 @ 04:48    Urinalysis Basic - ( 30 Nov 2024 04:48 )    Color: x / Appearance: x / SG: x / pH: x  Gluc: 120 mg/dL / Ketone: x  / Bili: x / Urobili: x   Blood: x / Protein: x / Nitrite: x   Leuk Esterase: x / RBC: x / WBC x   Sq Epi: x / Non Sq Epi: x / Bacteria: x

## 2024-11-30 NOTE — PROGRESS NOTE ADULT - SUBJECTIVE AND OBJECTIVE BOX
ENT PROGRESS NOTE    Pt is a 89y F that ENT is following for L epistaxis, s/p L rhinorocket packing on 11/29. Pt seen at bedside today AM. States no further bleeding since packing placement.     REVIEW OF SYSTEMS   [x] A ten-point review of systems was otherwise negative except as noted.    Allergies  Percocet 5/325 (Unknown)  Cipro (Diarrhea)      MEDICATIONS:  cefTRIAXone   IVPB 2000 milliGRAM(s) IV Intermittent every 24 hours  chlorhexidine 2% Cloths 1 Application(s) Topical daily  clopidogrel Tablet 75 milliGRAM(s) Oral daily  dextrose 5%. 1000 milliLiter(s) IV Continuous <Continuous>  dextrose 5%. 1000 milliLiter(s) IV Continuous <Continuous>  dextrose 50% Injectable 25 Gram(s) IV Push once  dextrose 50% Injectable 12.5 Gram(s) IV Push once  dextrose 50% Injectable 25 Gram(s) IV Push once  dextrose Oral Gel 15 Gram(s) Oral once PRN  diltiazem    Tablet 30 milliGRAM(s) Oral every 6 hours  doxycycline IVPB      doxycycline IVPB 100 milliGRAM(s) IV Intermittent every 12 hours  escitalopram 10 milliGRAM(s) Oral daily  fentaNYL   Patch  25 MICROgram(s)/Hr 1 Patch Transdermal every 72 hours  glucagon  Injectable 1 milliGRAM(s) IntraMuscular once  insulin glargine Injectable (LANTUS) 5 Unit(s) SubCutaneous at bedtime  insulin lispro (ADMELOG) corrective regimen sliding scale   SubCutaneous three times a day before meals  magnesium sulfate  IVPB 2 Gram(s) IV Intermittent every 2 hours  metoprolol tartrate 50 milliGRAM(s) Oral four times a day  pantoprazole    Tablet 40 milliGRAM(s) Oral before breakfast  polyethylene glycol 3350 17 Gram(s) Oral daily  pregabalin 50 milliGRAM(s) Oral three times a day  senna 2 Tablet(s) Oral at bedtime  thyroid 15 milliGRAM(s) Oral <User Schedule>      Vital Signs Last 24 Hrs  T(C): 36.3 (30 Nov 2024 04:42), Max: 36.8 (29 Nov 2024 20:15)  T(F): 97.4 (30 Nov 2024 04:42), Max: 98.2 (29 Nov 2024 20:15)  HR: 120 (30 Nov 2024 04:42) (78 - 125)  BP: 107/73 (30 Nov 2024 04:42) (107/73 - 125/83)  RR: 18 (30 Nov 2024 04:42) (17 - 18)  SpO2: 95% (30 Nov 2024 09:59) (95% - 99%)    Parameters below as of 30 Nov 2024 09:59  Patient On (Oxygen Delivery Method): room air          11-29 @ 07:01  -  11-30 @ 07:00  --------------------------------------------------------  IN:    IV PiggyBack: 100 mL    Oral Fluid: 520 mL  Total IN: 620 mL    OUT:    Voided (mL): 850 mL  Total OUT: 850 mL    Total NET: -230 mL      11-30 @ 07:01  -  11-30 @ 10:24  --------------------------------------------------------  IN:    Oral Fluid: 120 mL  Total IN: 120 mL    OUT:  Total OUT: 0 mL    Total NET: 120 mL      PHYSICAL EXAM:  GEN: NAD  NEURO: Awake and alert  SKIN: Good color, non diaphoretic  HEENT: +L sided packing in place, no active bleeding noted from nares or to posterior OP   RESP: Non-labored breathing  ABDO: Soft, NT      LABS:  CBC-                        13.6   12.03 )-----------( 252      ( 30 Nov 2024 04:48 )             41.0     BMP/CMP-  30 Nov 2024 04:48    131    |  95     |  14     ----------------------------<  120    4.1     |  26     |  0.7      Ca    8.6        30 Nov 2024 04:48  Mg     1.8       30 Nov 2024 04:48    TPro  5.6    /  Alb  2.9    /  TBili  0.4    /  DBili  x      /  AST  14     /  ALT  8      /  AlkPhos  118    30 Nov 2024 04:48    Endocrine Panel-  Calcium: 8.6 mg/dL (11-30 @ 04:48)

## 2024-12-01 LAB
ALBUMIN SERPL ELPH-MCNC: 3.1 G/DL — LOW (ref 3.5–5.2)
ALP SERPL-CCNC: 129 U/L — HIGH (ref 30–115)
ALT FLD-CCNC: 8 U/L — SIGNIFICANT CHANGE UP (ref 0–41)
ANION GAP SERPL CALC-SCNC: 14 MMOL/L — SIGNIFICANT CHANGE UP (ref 7–14)
AST SERPL-CCNC: 17 U/L — SIGNIFICANT CHANGE UP (ref 0–41)
BILIRUB SERPL-MCNC: 0.4 MG/DL — SIGNIFICANT CHANGE UP (ref 0.2–1.2)
BUN SERPL-MCNC: 14 MG/DL — SIGNIFICANT CHANGE UP (ref 10–20)
CALCIUM SERPL-MCNC: 8.5 MG/DL — SIGNIFICANT CHANGE UP (ref 8.4–10.5)
CHLORIDE SERPL-SCNC: 91 MMOL/L — LOW (ref 98–110)
CO2 SERPL-SCNC: 23 MMOL/L — SIGNIFICANT CHANGE UP (ref 17–32)
CREAT SERPL-MCNC: 0.8 MG/DL — SIGNIFICANT CHANGE UP (ref 0.7–1.5)
EGFR: 70 ML/MIN/1.73M2 — SIGNIFICANT CHANGE UP
GAS PNL BLDA: SIGNIFICANT CHANGE UP
GLUCOSE BLDC GLUCOMTR-MCNC: 166 MG/DL — HIGH (ref 70–99)
GLUCOSE BLDC GLUCOMTR-MCNC: 172 MG/DL — HIGH (ref 70–99)
GLUCOSE BLDC GLUCOMTR-MCNC: 183 MG/DL — HIGH (ref 70–99)
GLUCOSE BLDC GLUCOMTR-MCNC: 184 MG/DL — HIGH (ref 70–99)
GLUCOSE BLDC GLUCOMTR-MCNC: 188 MG/DL — HIGH (ref 70–99)
GLUCOSE SERPL-MCNC: 177 MG/DL — HIGH (ref 70–99)
HCT VFR BLD CALC: 40.5 % — SIGNIFICANT CHANGE UP (ref 37–47)
HGB BLD-MCNC: 13.5 G/DL — SIGNIFICANT CHANGE UP (ref 12–16)
MAGNESIUM SERPL-MCNC: 2.1 MG/DL — SIGNIFICANT CHANGE UP (ref 1.8–2.4)
MCHC RBC-ENTMCNC: 30.3 PG — SIGNIFICANT CHANGE UP (ref 27–31)
MCHC RBC-ENTMCNC: 33.3 G/DL — SIGNIFICANT CHANGE UP (ref 32–37)
MCV RBC AUTO: 90.8 FL — SIGNIFICANT CHANGE UP (ref 81–99)
NRBC # BLD: 0 /100 WBCS — SIGNIFICANT CHANGE UP (ref 0–0)
PLATELET # BLD AUTO: 252 K/UL — SIGNIFICANT CHANGE UP (ref 130–400)
PMV BLD: 11.3 FL — HIGH (ref 7.4–10.4)
POTASSIUM SERPL-MCNC: 4.4 MMOL/L — SIGNIFICANT CHANGE UP (ref 3.5–5)
POTASSIUM SERPL-SCNC: 4.4 MMOL/L — SIGNIFICANT CHANGE UP (ref 3.5–5)
PROT SERPL-MCNC: 5.8 G/DL — LOW (ref 6–8)
RBC # BLD: 4.46 M/UL — SIGNIFICANT CHANGE UP (ref 4.2–5.4)
RBC # FLD: 16.4 % — HIGH (ref 11.5–14.5)
SODIUM SERPL-SCNC: 128 MMOL/L — LOW (ref 135–146)
WBC # BLD: 10.51 K/UL — SIGNIFICANT CHANGE UP (ref 4.8–10.8)
WBC # FLD AUTO: 10.51 K/UL — SIGNIFICANT CHANGE UP (ref 4.8–10.8)

## 2024-12-01 PROCEDURE — 99233 SBSQ HOSP IP/OBS HIGH 50: CPT

## 2024-12-01 PROCEDURE — 71045 X-RAY EXAM CHEST 1 VIEW: CPT | Mod: 26

## 2024-12-01 PROCEDURE — 70450 CT HEAD/BRAIN W/O DYE: CPT | Mod: 26

## 2024-12-01 RX ADMIN — DOXYCYCLINE HYCLATE 100 MILLIGRAM(S): 150 TABLET, COATED ORAL at 18:16

## 2024-12-01 RX ADMIN — APIXABAN 5 MILLIGRAM(S): 2.5 TABLET, FILM COATED ORAL at 05:07

## 2024-12-01 RX ADMIN — METOPROLOL TARTRATE 50 MILLIGRAM(S): 100 TABLET, FILM COATED ORAL at 11:50

## 2024-12-01 RX ADMIN — CLOPIDOGREL 75 MILLIGRAM(S): 75 TABLET, FILM COATED ORAL at 11:50

## 2024-12-01 RX ADMIN — Medication 1: at 11:55

## 2024-12-01 RX ADMIN — CHLORHEXIDINE GLUCONATE 1 APPLICATION(S): 1.2 RINSE ORAL at 11:57

## 2024-12-01 RX ADMIN — APIXABAN 5 MILLIGRAM(S): 2.5 TABLET, FILM COATED ORAL at 17:17

## 2024-12-01 RX ADMIN — DILTIAZEM HYDROCHLORIDE 30 MILLIGRAM(S): 240 CAPSULE, COATED, EXTENDED RELEASE ORAL at 17:17

## 2024-12-01 RX ADMIN — ESCITALOPRAM OXALATE 10 MILLIGRAM(S): 10 TABLET, FILM COATED ORAL at 11:50

## 2024-12-01 RX ADMIN — DILTIAZEM HYDROCHLORIDE 30 MILLIGRAM(S): 240 CAPSULE, COATED, EXTENDED RELEASE ORAL at 00:15

## 2024-12-01 RX ADMIN — METOPROLOL TARTRATE 50 MILLIGRAM(S): 100 TABLET, FILM COATED ORAL at 17:17

## 2024-12-01 RX ADMIN — Medication 1: at 17:16

## 2024-12-01 RX ADMIN — Medication 15 MILLIGRAM(S): at 07:17

## 2024-12-01 RX ADMIN — POLYETHYLENE GLYCOL 3350 17 GRAM(S): 17 POWDER, FOR SOLUTION ORAL at 11:49

## 2024-12-01 RX ADMIN — DILTIAZEM HYDROCHLORIDE 30 MILLIGRAM(S): 240 CAPSULE, COATED, EXTENDED RELEASE ORAL at 11:50

## 2024-12-01 RX ADMIN — DOXYCYCLINE HYCLATE 100 MILLIGRAM(S): 150 TABLET, COATED ORAL at 05:07

## 2024-12-01 RX ADMIN — PREGABALIN 50 MILLIGRAM(S): 75 CAPSULE ORAL at 22:03

## 2024-12-01 RX ADMIN — PREGABALIN 50 MILLIGRAM(S): 75 CAPSULE ORAL at 14:16

## 2024-12-01 RX ADMIN — METOPROLOL TARTRATE 50 MILLIGRAM(S): 100 TABLET, FILM COATED ORAL at 05:07

## 2024-12-01 RX ADMIN — PREGABALIN 50 MILLIGRAM(S): 75 CAPSULE ORAL at 05:07

## 2024-12-01 RX ADMIN — METOPROLOL TARTRATE 50 MILLIGRAM(S): 100 TABLET, FILM COATED ORAL at 00:15

## 2024-12-01 RX ADMIN — Medication 2 TABLET(S): at 22:03

## 2024-12-01 RX ADMIN — Medication 100 MILLIGRAM(S): at 17:17

## 2024-12-01 RX ADMIN — DILTIAZEM HYDROCHLORIDE 30 MILLIGRAM(S): 240 CAPSULE, COATED, EXTENDED RELEASE ORAL at 05:07

## 2024-12-01 RX ADMIN — INSULIN GLARGINE 5 UNIT(S): 100 INJECTION, SOLUTION SUBCUTANEOUS at 22:03

## 2024-12-01 RX ADMIN — PANTOPRAZOLE SODIUM 40 MILLIGRAM(S): 40 TABLET, DELAYED RELEASE ORAL at 05:07

## 2024-12-01 NOTE — PROGRESS NOTE ADULT - ASSESSMENT
Pt is a 89y F that ENT is following for L epistaxis, s/p L rhinorocket packing on 11/29.     ·	L nasal packing deflated; will reassess later today for possible removal   ·	Ppx abx while packing in place   ·	Trend h/h; transfuse prn  ·	Will d/w attng    Pt is a 89y F that ENT is following for L epistaxis, s/p L rhinorocket packing on 11/29.     ·	L nasal packing deflated; will reassess later today for possible removal   ·	Ppx abx while packing in place   ·	Trend h/h; transfuse prn  ·	Will d/w attng     ADDENDUM:  -Went to reassess patient for packing removal. At time of exam, difficult to arouse patient, this was a change from prior exam in AM.    -Covering RN notified and came to assess and rapid response was called.   -Will reassess at later time for packing removal

## 2024-12-01 NOTE — PROGRESS NOTE ADULT - SUBJECTIVE AND OBJECTIVE BOX
Patient is a 89y old  Female who presents with a chief complaint of Weakness (01 Dec 2024 10:25)      Patient seen and examined at bedside.    ALLERGIES:  Percocet 5/325 (Unknown)  Cipro (Diarrhea)    MEDICATIONS:  apixaban 5 milliGRAM(s) Oral every 12 hours  cefTRIAXone   IVPB 2000 milliGRAM(s) IV Intermittent every 24 hours  chlorhexidine 2% Cloths 1 Application(s) Topical daily  clopidogrel Tablet 75 milliGRAM(s) Oral daily  dextrose 5%. 1000 milliLiter(s) IV Continuous <Continuous>  dextrose 5%. 1000 milliLiter(s) IV Continuous <Continuous>  dextrose 50% Injectable 25 Gram(s) IV Push once  dextrose 50% Injectable 12.5 Gram(s) IV Push once  dextrose 50% Injectable 25 Gram(s) IV Push once  dextrose Oral Gel 15 Gram(s) Oral once PRN  diltiazem    Tablet 30 milliGRAM(s) Oral every 6 hours  doxycycline IVPB      doxycycline IVPB 100 milliGRAM(s) IV Intermittent every 12 hours  escitalopram 10 milliGRAM(s) Oral daily  fentaNYL   Patch  25 MICROgram(s)/Hr 1 Patch Transdermal every 72 hours  glucagon  Injectable 1 milliGRAM(s) IntraMuscular once  insulin glargine Injectable (LANTUS) 5 Unit(s) SubCutaneous at bedtime  insulin lispro (ADMELOG) corrective regimen sliding scale   SubCutaneous three times a day before meals  metoprolol tartrate 50 milliGRAM(s) Oral four times a day  pantoprazole    Tablet 40 milliGRAM(s) Oral before breakfast  polyethylene glycol 3350 17 Gram(s) Oral daily  pregabalin 50 milliGRAM(s) Oral three times a day  senna 2 Tablet(s) Oral at bedtime  thyroid 15 milliGRAM(s) Oral <User Schedule>    Vital Signs Last 24 Hrs  T(F): 97.5 (01 Dec 2024 13:00), Max: 98 (30 Nov 2024 20:35)  HR: 116 (01 Dec 2024 13:00) (74 - 116)  BP: 118/81 (01 Dec 2024 13:00) (102/64 - 119/65)  RR: 18 (01 Dec 2024 13:00) (18 - 18)  SpO2: 95% (01 Dec 2024 10:26) (95% - 95%)  I&O's Summary    30 Nov 2024 07:01  -  01 Dec 2024 07:00  --------------------------------------------------------  IN: 480 mL / OUT: 600 mL / NET: -120 mL    01 Dec 2024 07:01  -  01 Dec 2024 16:54  --------------------------------------------------------  IN: 118 mL / OUT: 150 mL / NET: -32 mL        PHYSICAL EXAM:  General: NAD, A/O x 3  ENT: MMM  Neck: Supple, No JVD  Lungs: Clear to auscultation bilaterally  Cardio: RRR, S1/S2, No murmurs  Abdomen: Soft, Nontender, Nondistended; Bowel sounds present  Extremities: No cyanosis, No edema    LABS:                        13.5   10.51 )-----------( 252      ( 01 Dec 2024 06:23 )             40.5     12-01    128  |  91  |  14  ----------------------------<  177  4.4   |  23  |  0.8    Ca    8.5      01 Dec 2024 06:23  Mg     2.1     12-01    TPro  5.8  /  Alb  3.1  /  TBili  0.4  /  DBili  x   /  AST  17  /  ALT  8   /  AlkPhos  129  12-01                            POCT Blood Glucose.: 184 mg/dL (01 Dec 2024 16:18)  POCT Blood Glucose.: 183 mg/dL (01 Dec 2024 11:23)  POCT Blood Glucose.: 166 mg/dL (01 Dec 2024 07:25)  POCT Blood Glucose.: 153 mg/dL (30 Nov 2024 21:20)      Urinalysis Basic - ( 01 Dec 2024 06:23 )    Color: x / Appearance: x / SG: x / pH: x  Gluc: 177 mg/dL / Ketone: x  / Bili: x / Urobili: x   Blood: x / Protein: x / Nitrite: x   Leuk Esterase: x / RBC: x / WBC x   Sq Epi: x / Non Sq Epi: x / Bacteria: x        Culture - Blood (collected 29 Nov 2024 19:26)  Source: .Blood BLOOD  Preliminary Report (01 Dec 2024 02:02):    No growth at 24 hours    Culture - Urine (collected 26 Nov 2024 14:00)  Source: Clean Catch None  Final Report (27 Nov 2024 18:43):    <10,000 CFU/mL Normal Urogenital Katherine    Culture - Blood (collected 26 Nov 2024 03:20)  Source: .Blood BLOOD  Gram Stain (26 Nov 2024 23:02):    Growth in anaerobic bottle: Gram positive cocci in pairs    Growth in aerobic bottle: Gram Positive Cocci in Pairs and Chains  Final Report (28 Nov 2024 10:31):    Growth in aerobic and anaerobic bottles: Streptococcus lutetiensis    "Susceptibilities not performed"    Direct identification is available within approximately 3-5    hours either by Blood Panel Multiplexed PCR or Direct    MALDI-TOF. Details: [email or web address]  Organism: Blood Culture PCR (28 Nov 2024 10:31)  Organism: Blood Culture PCR (28 Nov 2024 10:31)      Method Type: PCR      -  Streptococcus sp. (Not Grp A, B or S pneumoniae): Detec    Culture - Blood (collected 26 Nov 2024 03:20)  Source: .Blood BLOOD  Gram Stain (26 Nov 2024 22:40):    Growth in aerobic and anaerobic bottles: Gram positive cocci in pairs  Final Report (28 Nov 2024 10:30):    Growth in aerobic and anaerobic bottles: Streptococcus lutetiensis  Organism: Streptococcus lutetiensis (28 Nov 2024 10:30)  Organism: Streptococcus lutetiensis (28 Nov 2024 10:30)      Method Type: BC      -  Ceftriaxone: S <=0.25      -  Penicillin: S 0.12      -  Vancomycin: S 0.25          RADIOLOGY & ADDITIONAL TESTS:    Care Discussed with Consultants/Other Providers:

## 2024-12-01 NOTE — RAPID RESPONSE TEAM SUMMARY - NSSITUATIONBACKGROUNDRRT_GEN_ALL_CORE
Team called for acute mental status change. Patient was not responding to painful stimuli. Upon assessment patient spontaneously awoke and was responding appropriately to commands. Vitals were within normal limits, no events were noted on TELE, CT Head negative for acute intracranial pathology. As per aide who was  at bedside, patient does have similar episodes at home.

## 2024-12-01 NOTE — RAPID RESPONSE TEAM SUMMARY - NSREASONFORCALLINGRRT_GEN_ALL_CORE
.  ENDOSCOPIES    LABS -Labs 7/23/2024, WBC 7.4, hemoglobin 10.2, platelet 179.  Creatinine 2.4. -Labs 7/19/2024: WBC 9.8, hemoglobin 11.3, platelet 193.  INR 1.0.  Total bilirubin 0.3, alkaline phosphatase 67, AST 24, ALT 14, BUN 27, creatinine 2.8. -Labs 2/5/2024: WBC 6.3, hemoglobin 13.3, platelet 140.  Creatinine 2.5.   IMAGES   
Acute mental status changes

## 2024-12-01 NOTE — PROGRESS NOTE ADULT - ASSESSMENT
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 The pt was admitted to medicine for pneumonia.    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   #Immunocompromised   - lactate 1.8  - on 3L via NC satting at 98%  - blood culture + streptococcus lutetiensis   - daily blood cx until neg, 11/29 culture is NTD   - urine strep and legionella negative   - c/w ceftriaxone and doxycycline    #epistaxis - resolved    - hb stable   - ENT consulted   - monitor H&H  - resume Eliquis 5mg BID per ENT      #CAD  #Afib   #SSS s/p PPM  #chronic tachycardia - hx of afib  #a fib RVR w/ aberrancy   - echo noted EF< 65 to 70%  - c/w metoprolol 50mg q6hr   -c/w eliquis   -c/w plavix  - per EP: device is working properly, AFL, chronic condition on Eliquis  - 12/1 decreased breath sound on right with new opacity, repeat bnp ordered     #cancer related pain 2/2 mets  - C/w pregabalin and fentanyl patch    #anxiety  #depression  - C/w lexapro 10 once daily    #DM  - as per pt on tresiba 10-12 units at night  - c/w lantus and SS  - on jardiance 10mg once daily    #hypothyroidism  - TSH 2.48  - pt on armour thyroid (dessicated thyroid combo of t3/t4)  - resume home meds    #Poor PO intake  -ensure ordered  -monitor intake     DVT ppx: Eliquis   GI ppx: PPI   Diet- DASH/fluid restrict  Pending: negative blood cx, bnp, po intake  Family: daughter updated at bedside 12/1

## 2024-12-01 NOTE — PROGRESS NOTE ADULT - SUBJECTIVE AND OBJECTIVE BOX
ENT PROGRESS NOTE    Pt is a 89y F that ENT is following for L epistaxis, s/p L rhinorocket packing on 11/29. Pt seen at bedside today AM-- no further bleeding      REVIEW OF SYSTEMS   [x] A ten-point review of systems was otherwise negative except as noted.    Allergies  Percocet 5/325 (Unknown)  Cipro (Diarrhea)      MEDICATIONS:  apixaban 5 milliGRAM(s) Oral every 12 hours  cefTRIAXone   IVPB 2000 milliGRAM(s) IV Intermittent every 24 hours  chlorhexidine 2% Cloths 1 Application(s) Topical daily  clopidogrel Tablet 75 milliGRAM(s) Oral daily  dextrose 5%. 1000 milliLiter(s) IV Continuous <Continuous>  dextrose 5%. 1000 milliLiter(s) IV Continuous <Continuous>  dextrose 50% Injectable 25 Gram(s) IV Push once  dextrose 50% Injectable 12.5 Gram(s) IV Push once  dextrose 50% Injectable 25 Gram(s) IV Push once  dextrose Oral Gel 15 Gram(s) Oral once PRN  diltiazem    Tablet 30 milliGRAM(s) Oral every 6 hours  doxycycline IVPB      doxycycline IVPB 100 milliGRAM(s) IV Intermittent every 12 hours  escitalopram 10 milliGRAM(s) Oral daily  fentaNYL   Patch  25 MICROgram(s)/Hr 1 Patch Transdermal every 72 hours  glucagon  Injectable 1 milliGRAM(s) IntraMuscular once  insulin glargine Injectable (LANTUS) 5 Unit(s) SubCutaneous at bedtime  insulin lispro (ADMELOG) corrective regimen sliding scale   SubCutaneous three times a day before meals  metoprolol tartrate 50 milliGRAM(s) Oral four times a day  pantoprazole    Tablet 40 milliGRAM(s) Oral before breakfast  polyethylene glycol 3350 17 Gram(s) Oral daily  pregabalin 50 milliGRAM(s) Oral three times a day  senna 2 Tablet(s) Oral at bedtime  thyroid 15 milliGRAM(s) Oral <User Schedule>      Vital Signs Last 24 Hrs  T(C): 36.6 (01 Dec 2024 04:34), Max: 36.7 (30 Nov 2024 20:35)  T(F): 97.9 (01 Dec 2024 04:34), Max: 98 (30 Nov 2024 20:35)  HR: 74 (01 Dec 2024 04:34) (74 - 116)  BP: 119/65 (01 Dec 2024 04:34) (109/80 - 119/65)  BP(mean): 86 (30 Nov 2024 20:35) (86 - 86)  RR: 18 (01 Dec 2024 04:34) (18 - 18)        11-30 @ 07:01  -  12-01 @ 07:00  --------------------------------------------------------  IN:    Oral Fluid: 480 mL  Total IN: 480 mL    OUT:    Voided (mL): 600 mL  Total OUT: 600 mL    Total NET: -120 mL      12-01 @ 07:01  -  12-01 @ 10:26  --------------------------------------------------------  IN:    Oral Fluid: 118 mL  Total IN: 118 mL    OUT:  Total OUT: 0 mL    Total NET: 118 mL      PHYSICAL EXAM:  GEN: NAD  NEURO: Awake and alert  SKIN: Good color, non diaphoretic  HEENT: +L sided packing in place, no active bleeding noted from nares or to posterior OP. Packing balloon deflated.   RESP: Non-labored breathing  ABDO: Soft, NT      LABS:  CBC-                        13.5   10.51 )-----------( 252      ( 01 Dec 2024 06:23 )             40.5     BMP/CMP-  01 Dec 2024 06:23    128    |  91     |  14     ----------------------------<  177    4.4     |  23     |  0.8      Ca    8.5        01 Dec 2024 06:23  Mg     2.1       01 Dec 2024 06:23    TPro  5.8    /  Alb  3.1    /  TBili  0.4    /  DBili  x      /  AST  17     /  ALT  8      /  AlkPhos  129    01 Dec 2024 06:23    Endocrine Panel-  Calcium: 8.5 mg/dL (12-01 @ 06:23)       ENT PROGRESS NOTE    Pt is a 89y F that ENT is following for L epistaxis, s/p L rhinorocket packing on 11/29. Pt seen at bedside today AM with patients daughter at bedside-- no further bleeding.       REVIEW OF SYSTEMS   [x] A ten-point review of systems was otherwise negative except as noted.    Allergies  Percocet 5/325 (Unknown)  Cipro (Diarrhea)      MEDICATIONS:  apixaban 5 milliGRAM(s) Oral every 12 hours  cefTRIAXone   IVPB 2000 milliGRAM(s) IV Intermittent every 24 hours  chlorhexidine 2% Cloths 1 Application(s) Topical daily  clopidogrel Tablet 75 milliGRAM(s) Oral daily  dextrose 5%. 1000 milliLiter(s) IV Continuous <Continuous>  dextrose 5%. 1000 milliLiter(s) IV Continuous <Continuous>  dextrose 50% Injectable 25 Gram(s) IV Push once  dextrose 50% Injectable 12.5 Gram(s) IV Push once  dextrose 50% Injectable 25 Gram(s) IV Push once  dextrose Oral Gel 15 Gram(s) Oral once PRN  diltiazem    Tablet 30 milliGRAM(s) Oral every 6 hours  doxycycline IVPB      doxycycline IVPB 100 milliGRAM(s) IV Intermittent every 12 hours  escitalopram 10 milliGRAM(s) Oral daily  fentaNYL   Patch  25 MICROgram(s)/Hr 1 Patch Transdermal every 72 hours  glucagon  Injectable 1 milliGRAM(s) IntraMuscular once  insulin glargine Injectable (LANTUS) 5 Unit(s) SubCutaneous at bedtime  insulin lispro (ADMELOG) corrective regimen sliding scale   SubCutaneous three times a day before meals  metoprolol tartrate 50 milliGRAM(s) Oral four times a day  pantoprazole    Tablet 40 milliGRAM(s) Oral before breakfast  polyethylene glycol 3350 17 Gram(s) Oral daily  pregabalin 50 milliGRAM(s) Oral three times a day  senna 2 Tablet(s) Oral at bedtime  thyroid 15 milliGRAM(s) Oral <User Schedule>      Vital Signs Last 24 Hrs  T(C): 36.6 (01 Dec 2024 04:34), Max: 36.7 (30 Nov 2024 20:35)  T(F): 97.9 (01 Dec 2024 04:34), Max: 98 (30 Nov 2024 20:35)  HR: 74 (01 Dec 2024 04:34) (74 - 116)  BP: 119/65 (01 Dec 2024 04:34) (109/80 - 119/65)  BP(mean): 86 (30 Nov 2024 20:35) (86 - 86)  RR: 18 (01 Dec 2024 04:34) (18 - 18)        11-30 @ 07:01  -  12-01 @ 07:00  --------------------------------------------------------  IN:    Oral Fluid: 480 mL  Total IN: 480 mL    OUT:    Voided (mL): 600 mL  Total OUT: 600 mL    Total NET: -120 mL      12-01 @ 07:01  -  12-01 @ 10:26  --------------------------------------------------------  IN:    Oral Fluid: 118 mL  Total IN: 118 mL    OUT:  Total OUT: 0 mL    Total NET: 118 mL      PHYSICAL EXAM:  GEN: NAD  NEURO: Awake and alert  SKIN: Good color, non diaphoretic  HEENT: +L sided packing in place, no active bleeding noted from nares or to posterior OP. Packing balloon deflated.   RESP: Non-labored breathing  ABDO: Soft, NT      LABS:  CBC-                        13.5   10.51 )-----------( 252      ( 01 Dec 2024 06:23 )             40.5     BMP/CMP-  01 Dec 2024 06:23    128    |  91     |  14     ----------------------------<  177    4.4     |  23     |  0.8      Ca    8.5        01 Dec 2024 06:23  Mg     2.1       01 Dec 2024 06:23    TPro  5.8    /  Alb  3.1    /  TBili  0.4    /  DBili  x      /  AST  17     /  ALT  8      /  AlkPhos  129    01 Dec 2024 06:23    Endocrine Panel-  Calcium: 8.5 mg/dL (12-01 @ 06:23)

## 2024-12-02 LAB
ALBUMIN SERPL ELPH-MCNC: 3.4 G/DL — LOW (ref 3.5–5.2)
ALP SERPL-CCNC: 129 U/L — HIGH (ref 30–115)
ALT FLD-CCNC: 7 U/L — SIGNIFICANT CHANGE UP (ref 0–41)
ANION GAP SERPL CALC-SCNC: 11 MMOL/L — SIGNIFICANT CHANGE UP (ref 7–14)
AST SERPL-CCNC: 15 U/L — SIGNIFICANT CHANGE UP (ref 0–41)
BILIRUB SERPL-MCNC: 0.4 MG/DL — SIGNIFICANT CHANGE UP (ref 0.2–1.2)
BUN SERPL-MCNC: 16 MG/DL — SIGNIFICANT CHANGE UP (ref 10–20)
CALCIUM SERPL-MCNC: 9.2 MG/DL — SIGNIFICANT CHANGE UP (ref 8.4–10.5)
CHLORIDE SERPL-SCNC: 90 MMOL/L — LOW (ref 98–110)
CO2 SERPL-SCNC: 27 MMOL/L — SIGNIFICANT CHANGE UP (ref 17–32)
CREAT SERPL-MCNC: 0.7 MG/DL — SIGNIFICANT CHANGE UP (ref 0.7–1.5)
EGFR: 82 ML/MIN/1.73M2 — SIGNIFICANT CHANGE UP
GLUCOSE BLDC GLUCOMTR-MCNC: 166 MG/DL — HIGH (ref 70–99)
GLUCOSE BLDC GLUCOMTR-MCNC: 185 MG/DL — HIGH (ref 70–99)
GLUCOSE BLDC GLUCOMTR-MCNC: 194 MG/DL — HIGH (ref 70–99)
GLUCOSE BLDC GLUCOMTR-MCNC: 198 MG/DL — HIGH (ref 70–99)
GLUCOSE SERPL-MCNC: 223 MG/DL — HIGH (ref 70–99)
HCT VFR BLD CALC: 40.6 % — SIGNIFICANT CHANGE UP (ref 37–47)
HGB BLD-MCNC: 13.2 G/DL — SIGNIFICANT CHANGE UP (ref 12–16)
MAGNESIUM SERPL-MCNC: 1.9 MG/DL — SIGNIFICANT CHANGE UP (ref 1.8–2.4)
MCHC RBC-ENTMCNC: 29.7 PG — SIGNIFICANT CHANGE UP (ref 27–31)
MCHC RBC-ENTMCNC: 32.5 G/DL — SIGNIFICANT CHANGE UP (ref 32–37)
MCV RBC AUTO: 91.2 FL — SIGNIFICANT CHANGE UP (ref 81–99)
NRBC # BLD: 0 /100 WBCS — SIGNIFICANT CHANGE UP (ref 0–0)
NT-PROBNP SERPL-SCNC: 2371 PG/ML — HIGH (ref 0–300)
PLATELET # BLD AUTO: 252 K/UL — SIGNIFICANT CHANGE UP (ref 130–400)
PMV BLD: 10.8 FL — HIGH (ref 7.4–10.4)
POTASSIUM SERPL-MCNC: 4.4 MMOL/L — SIGNIFICANT CHANGE UP (ref 3.5–5)
POTASSIUM SERPL-SCNC: 4.4 MMOL/L — SIGNIFICANT CHANGE UP (ref 3.5–5)
PROT SERPL-MCNC: 5.9 G/DL — LOW (ref 6–8)
RBC # BLD: 4.45 M/UL — SIGNIFICANT CHANGE UP (ref 4.2–5.4)
RBC # FLD: 16.1 % — HIGH (ref 11.5–14.5)
SODIUM SERPL-SCNC: 128 MMOL/L — LOW (ref 135–146)
WBC # BLD: 9.41 K/UL — SIGNIFICANT CHANGE UP (ref 4.8–10.8)
WBC # FLD AUTO: 9.41 K/UL — SIGNIFICANT CHANGE UP (ref 4.8–10.8)

## 2024-12-02 PROCEDURE — 93010 ELECTROCARDIOGRAM REPORT: CPT

## 2024-12-02 PROCEDURE — 99222 1ST HOSP IP/OBS MODERATE 55: CPT

## 2024-12-02 PROCEDURE — 99232 SBSQ HOSP IP/OBS MODERATE 35: CPT

## 2024-12-02 RX ORDER — CHLORHEXIDINE GLUCONATE 1.2 MG/ML
15 RINSE ORAL
Refills: 0 | Status: DISCONTINUED | OUTPATIENT
Start: 2024-12-02 | End: 2024-12-05

## 2024-12-02 RX ORDER — BACITRACIN ZINC 500 UNIT/G
1 OINTMENT (GRAM) TOPICAL
Refills: 0 | Status: DISCONTINUED | OUTPATIENT
Start: 2024-12-02 | End: 2024-12-05

## 2024-12-02 RX ADMIN — CHLORHEXIDINE GLUCONATE 1 APPLICATION(S): 1.2 RINSE ORAL at 12:58

## 2024-12-02 RX ADMIN — METOPROLOL TARTRATE 50 MILLIGRAM(S): 100 TABLET, FILM COATED ORAL at 12:54

## 2024-12-02 RX ADMIN — PREGABALIN 50 MILLIGRAM(S): 75 CAPSULE ORAL at 05:05

## 2024-12-02 RX ADMIN — PANTOPRAZOLE SODIUM 40 MILLIGRAM(S): 40 TABLET, DELAYED RELEASE ORAL at 05:05

## 2024-12-02 RX ADMIN — POLYETHYLENE GLYCOL 3350 17 GRAM(S): 17 POWDER, FOR SOLUTION ORAL at 12:53

## 2024-12-02 RX ADMIN — DILTIAZEM HYDROCHLORIDE 30 MILLIGRAM(S): 240 CAPSULE, COATED, EXTENDED RELEASE ORAL at 00:50

## 2024-12-02 RX ADMIN — Medication 2 TABLET(S): at 21:56

## 2024-12-02 RX ADMIN — APIXABAN 5 MILLIGRAM(S): 2.5 TABLET, FILM COATED ORAL at 17:15

## 2024-12-02 RX ADMIN — DILTIAZEM HYDROCHLORIDE 30 MILLIGRAM(S): 240 CAPSULE, COATED, EXTENDED RELEASE ORAL at 12:53

## 2024-12-02 RX ADMIN — METOPROLOL TARTRATE 50 MILLIGRAM(S): 100 TABLET, FILM COATED ORAL at 17:15

## 2024-12-02 RX ADMIN — DOXYCYCLINE HYCLATE 100 MILLIGRAM(S): 150 TABLET, COATED ORAL at 05:04

## 2024-12-02 RX ADMIN — DILTIAZEM HYDROCHLORIDE 30 MILLIGRAM(S): 240 CAPSULE, COATED, EXTENDED RELEASE ORAL at 05:05

## 2024-12-02 RX ADMIN — DOXYCYCLINE HYCLATE 100 MILLIGRAM(S): 150 TABLET, COATED ORAL at 18:13

## 2024-12-02 RX ADMIN — Medication 1 APPLICATION(S): at 17:16

## 2024-12-02 RX ADMIN — ESCITALOPRAM OXALATE 10 MILLIGRAM(S): 10 TABLET, FILM COATED ORAL at 12:53

## 2024-12-02 RX ADMIN — CLOPIDOGREL 75 MILLIGRAM(S): 75 TABLET, FILM COATED ORAL at 12:53

## 2024-12-02 RX ADMIN — Medication 1: at 17:15

## 2024-12-02 RX ADMIN — METOPROLOL TARTRATE 50 MILLIGRAM(S): 100 TABLET, FILM COATED ORAL at 00:50

## 2024-12-02 RX ADMIN — Medication 1: at 08:21

## 2024-12-02 RX ADMIN — METOPROLOL TARTRATE 50 MILLIGRAM(S): 100 TABLET, FILM COATED ORAL at 05:05

## 2024-12-02 RX ADMIN — PREGABALIN 50 MILLIGRAM(S): 75 CAPSULE ORAL at 21:56

## 2024-12-02 RX ADMIN — Medication 100 MILLIGRAM(S): at 17:14

## 2024-12-02 RX ADMIN — PREGABALIN 50 MILLIGRAM(S): 75 CAPSULE ORAL at 13:01

## 2024-12-02 RX ADMIN — Medication 1: at 12:00

## 2024-12-02 RX ADMIN — APIXABAN 5 MILLIGRAM(S): 2.5 TABLET, FILM COATED ORAL at 05:05

## 2024-12-02 RX ADMIN — INSULIN GLARGINE 5 UNIT(S): 100 INJECTION, SOLUTION SUBCUTANEOUS at 21:56

## 2024-12-02 RX ADMIN — DILTIAZEM HYDROCHLORIDE 30 MILLIGRAM(S): 240 CAPSULE, COATED, EXTENDED RELEASE ORAL at 17:17

## 2024-12-02 NOTE — CHART NOTE - NSCHARTNOTEFT_GEN_A_CORE
Electrophysiology    Pts device ___DC PPM SJM___ was interrogated on 11-30-24  Device working properly  Events: no events, in AFL, chronic condition on Eliquis at  home, currently on hold due to epistaxis   tele events in question correlates with AF RVR with aberrancy   Pt is __NOT___ pacemaker dependent   AP  62  % /   58  %  AT/AF burden    9.2 % since last check 8/14/23  Underlying rhythm AFL  Mode DDDR   Battery 6.8yrs     results d/w with primary team  Reviewed by attending    Contact EP ACP with any questions [phone]
Palliative following for GOC.   Goals clear at this time- DNR/DNI with ongoing medical management. Patient is not pursuing cancer treatment. Goal is to get back home to status quo with home health aids. No hospice desired at this time.   Pain is well controlled on current regimen.   Will sign off- please reconsult PRN [phone].
Patient states that she has been taking [place] thyroid 15 mg tablet once daily in the morning on an empty stomach for 60 years.  She is reluctant to give it to pharmacy to label for dispensing because it has been lost twice in the past per the patient and patient says it's expensive.  I have discussed with patient at bedside with the nurse present about how patient is supposed to take this medication and she understands.  AND aid it is fine for patient to self administer the medicine in the presence of her nurse each morning and the nurse should log the medication as self-administered in the electronic medical record once taken.

## 2024-12-02 NOTE — PHYSICAL THERAPY INITIAL EVALUATION ADULT - SPECIFY REASON(S)
Chart was reviewed. PT spoke with MARCO A Maldonado. Patient's HR at rest is 115 on tele monitor. PT eval is on hold for now. PT will f/u when appropriate.

## 2024-12-02 NOTE — PROGRESS NOTE ADULT - ASSESSMENT
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 The pt was admitted to medicine for pneumonia.    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   #Immunocompromised   - lactate 1.8  - on 3L via NC satting at 98%  - blood culture + streptococcus lutetiensis   - daily blood cx until neg   - urine strep and legionella negative   - c/w ceftriaxone and doxycycline  - change to po Augmentin 875 mg q12h and o Doxycycline 100 mg q12h till 12/6    #epistaxis - resolved    - hb stable   - ENT consulted   - monitor H&H  - resume Eliquis 5mg BID per ENT      #CAD  #Afib   #SSS s/p PPM  #chronic tachycardia - hx of afib  #a fib RVR w/ aberrancy   - echo noted EF< 65 to 70%  - c/w metoprolol 50mg q6hr   - c/w eliquis   - c/w plavix  - per EP: device is working properly, AFL, chronic condition on Eliquis    #cancer related pain 2/2 mets  - C/w pregabalin and fentanyl patch    #anxiety  #depression  - C/w lexapro 10 once daily    #DM  - as per pt on tresiba 10-12 units at night  - c/w lantus and SS  - on jardiance 10mg once daily    #hypothyroidism  - TSH 2.48  - pt on [place] thyroid (dessicated thyroid combo of t3/t4)  - resume home meds    DVT ppx: Eliquis   GI ppx: PPI   Diet- DASH/fluid restrict  Pending: dc in 24 hr

## 2024-12-02 NOTE — PROGRESS NOTE ADULT - SUBJECTIVE AND OBJECTIVE BOX
SUBJECTIVE/OVERNIGHT EVENTS  Today is hospital day 6d. This morning patient was seen and examined at bedside, resting comfortably in bed. No acute or major events overnight.    MEDICATIONS  STANDING MEDICATIONS  apixaban 5 milliGRAM(s) Oral every 12 hours  bacitracin   Ointment 1 Application(s) Topical two times a day  cefTRIAXone   IVPB 2000 milliGRAM(s) IV Intermittent every 24 hours  chlorhexidine 2% Cloths 1 Application(s) Topical daily  clopidogrel Tablet 75 milliGRAM(s) Oral daily  dextrose 5%. 1000 milliLiter(s) IV Continuous <Continuous>  dextrose 5%. 1000 milliLiter(s) IV Continuous <Continuous>  dextrose 50% Injectable 25 Gram(s) IV Push once  dextrose 50% Injectable 12.5 Gram(s) IV Push once  dextrose 50% Injectable 25 Gram(s) IV Push once  diltiazem    Tablet 30 milliGRAM(s) Oral every 6 hours  doxycycline IVPB      doxycycline IVPB 100 milliGRAM(s) IV Intermittent every 12 hours  escitalopram 10 milliGRAM(s) Oral daily  fentaNYL   Patch  25 MICROgram(s)/Hr 1 Patch Transdermal every 72 hours  glucagon  Injectable 1 milliGRAM(s) IntraMuscular once  insulin glargine Injectable (LANTUS) 5 Unit(s) SubCutaneous at bedtime  insulin lispro (ADMELOG) corrective regimen sliding scale   SubCutaneous three times a day before meals  metoprolol tartrate 50 milliGRAM(s) Oral four times a day  pantoprazole    Tablet 40 milliGRAM(s) Oral before breakfast  polyethylene glycol 3350 17 Gram(s) Oral daily  pregabalin 50 milliGRAM(s) Oral three times a day  senna 2 Tablet(s) Oral at bedtime  thyroid 15 milliGRAM(s) Oral <User Schedule>    PRN MEDICATIONS  dextrose Oral Gel 15 Gram(s) Oral once PRN    VITALS  T(F): 97.8 (12-02-24 @ 12:30), Max: 97.8 (12-02-24 @ 04:43)  HR: 106 (12-02-24 @ 12:30) (106 - 118)  BP: 106/69 (12-02-24 @ 12:30) (94/63 - 136/82)  RR: 16 (12-02-24 @ 12:30) (16 - 18)  SpO2: 94% (12-02-24 @ 11:40) (94% - 95%)  POCT Blood Glucose.: 185 mg/dL (12-02-24 @ 11:18)  POCT Blood Glucose.: 198 mg/dL (12-02-24 @ 08:03)  POCT Blood Glucose.: 188 mg/dL (12-01-24 @ 21:49)  POCT Blood Glucose.: 172 mg/dL (12-01-24 @ 16:57)  POCT Blood Glucose.: 184 mg/dL (12-01-24 @ 16:18)    PHYSICAL EXAM  GENERAL  (+  ) NAD, lying in bed comfortably     (  ) obtunded     (  ) lethargic     (  ) somnolent    HEAD  (+  ) Atraumatic     (  ) hematoma     (  ) laceration (specify location:       )     NECK  (+  ) Supple     (  ) neck stiffness     (  ) nuchal rigidity     (  )  no JVD     (  ) JVD present ( -- cm)    HEART  Rate -->  ( + ) normal rate    (  ) bradycardic    (  ) tachycardic  Rhythm -->  (  ) regular    (  ) regularly irregular    (  ) irregularly irregular  Murmurs -->  (  ) normal s1/s2    (  ) systolic murmur    (  ) diastolic murmur    (  ) continuous murmur     (  ) S3 present    (  ) S4 present    LUNGS  (+  )Unlabored respirations     (  ) tachypnea  (  ) B/L air entry     (  ) decreased breath sounds in:  (location     )    (  ) no adventitious sound     (  ) crackles     (  ) wheezing      (  ) rhonchi      (specify location:       )  (  ) chest wall tenderness (specify location:       )    ABDOMEN  ( + ) Soft     (  ) tense   |   (  ) nondistended     (  ) distended   |   (  ) +BS     (  ) hypoactive bowel sounds     (  ) hyperactive bowel sounds  (  ) nontender     (  ) RUQ tenderness     (  ) RLQ tenderness     (  ) LLQ tenderness     (  ) epigastric tenderness     (  ) diffuse tenderness  (  ) Splenomegaly      (  ) Hepatomegaly      (  ) Jaundice     (  ) ecchymosis     EXTREMITIES  ( + ) trace b/l edema (  ) Rash     (  ) ecchymosis     (  ) varicose veins      (  ) pitting edema     (  ) non-pitting edema   (  ) ulceration     (  ) gangrene:     (location:     )    NERVOUS SYSTEM  ( + ) A&Ox3     (  ) confused     (  ) lethargic  CN II-XII:     (  ) Intact     (  ) focal deficits  (Specify:     )   Upper extremities:     (  ) strength X/5     (  ) focal deficit (specify:    )  Lower extremities:     (  ) strength  X/5    (  ) focal deficit (specify:    )    SKIN  ( + ) No rashes or lesions     (  ) maculopapular rash     (  ) pustules     (  ) vesicles     (  ) ulcer     (  ) ecchymosis     (specify location:     )    LABS             13.2   9.41  )-----------( 252      ( 12-02-24 @ 04:52 )             40.6     128  |  90  |  16  -------------------------<  223   12-02-24 @ 04:52  4.4  |  27  |  0.7    Ca      9.2     12-02-24 @ 04:52  Mg     1.9     12-02-24 @ 04:52    TPro  5.9  /  Alb  3.4  /  TBili  0.4  /  DBili  x   /  AST  15  /  ALT  7   /  AlkPhos  129  /  GGT  x     12-02-24 @ 04:52    Pro-Brain Natriuretic Peptide: 2371 pg/mL (12-02-24 @ 04:52)  Urinalysis Basic - ( 02 Dec 2024 04:52 )  Color: x / Appearance: x / SG: x / pH: x  Gluc: 223 mg/dL / Ketone: x  / Bili: x / Urobili: x   Blood: x / Protein: x / Nitrite: x   Leuk Esterase: x / RBC: x / WBC x   Sq Epi: x / Non Sq Epi: x / Bacteria: x    ABG - ( 01 Dec 2024 17:11 )  pH, Arterial: 7.40  pH, Blood: x     /  pCO2: 44    /  pO2: 95    / HCO3: 27    / Base Excess: 2.0   /  SaO2: 97.7      Culture - Blood (collected 01 Dec 2024 06:23)  Source: .Blood BLOOD  Preliminary Report (02 Dec 2024 12:01):    No growth at 24 hours    Culture - Blood (collected 30 Nov 2024 04:48)  Source: .Blood BLOOD  Preliminary Report (01 Dec 2024 18:01):    No growth at 24 hours    Culture - Blood (collected 29 Nov 2024 19:26)  Source: .Blood BLOOD  Preliminary Report (02 Dec 2024 02:01):    No growth at 48 Hours

## 2024-12-02 NOTE — PROGRESS NOTE ADULT - ASSESSMENT
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  As per patient's daughter at the bedside, patient supposed to go for mapping for radiation of the left scapula to attempt to improve her pain in that area.  About 2 months ago had a Pleurx left lung that was removed. Her heart rate has been in the 130s for a 3-4 weeks and her cardiologist Dr. Huffman who is aware of this had recently changed patient's metoprolol to 150 mg.  She also wears a fentanyl patch. She denies having a sore throat, cough, chest pain, fever, shortness of breath, abdominal pain, nausea, vomiting, diarrhea, constipation, urinary symptoms, rashes, recent travel, recent trauma, or recent sick contacts.    In the ED,  Vital Signs Last 24 Hrs HR: 122/m, RR: 20 / m. WBC 17    < from: CT Angio Chest PE Protocol w/ IV Cont (11.26.24 @ 03:42) >  1.  No PE  2.  CHF.  3.  Since 3/11/2023 interval left lung volume loss with leftward mediastinal shift. Large consolidation in the left lung, greater in the   left lower lobe. Given significant cancer history, images can besubmitted to treating institution to evaluate any interval changes if   clinically relevant, as component of findings may be related to treatment-related change.    IMPRESSION/RECOMMENDATIONS  Immunosuppression/Immunosenescence ( above age 60 yrs there is a exponential decline in immunity which could result in poor clinical outcomes.  Acute illness (Sepsi/ PNA ) which poses a threat to life or bodily function without treatment   Sepsis on presentation  PNA left  11/26 CT chest : Large consolidation in the left lung, greater in the left lower lobe.  12/1 CXR ; nochange c/w prior. ( Independent interpretation of test : PNA LLL  Septicemia with Sreptococci lutetiensis  11/26 BCx Streptococci lutetiensis. Independent analysis of CX results and interpretation.  11/26 ECHO no vegetations  11/29,30, 121 BCX NGTD    CBC :  ( WBC 9.4 ), ( Hg 13.2  ), CMP ( Cr 0.7   ) reviewed .   Procal 0.17 ( despite it would recommend ABx )  11/26 Urine for strep pneumonia/legionella antigen    diabetes  A-fib on Eliquis  SSS S/p PPM placement, CAD with 4 stents on Plavix, history of MI  Pancreatic cancer status post distal pancreatectomy in August 2021  Cholangiocarcinoma with mets to the liver status post partial liver resection, mets to the lung, and mets to the bone    -Off loading to prevent pressure sores and preventive measures to avoid aspiration  -Rocephin 2 gm iv q24h  -Doxycycline 100 mg iv q12h  -could change to po Augmentin 875 mg q12h and o Doxycycline 100 mg q12h till 12/6    Discussion of management/test results/antibiotic regimen  with primary medical team.

## 2024-12-02 NOTE — PROGRESS NOTE ADULT - NS ATTEND AMEND GEN_ALL_CORE FT
Patient seen and examined at bedside.    No active bleeding at time of evaluation.    Recommend nasal emollients and avoid nasal trauma.

## 2024-12-02 NOTE — PROGRESS NOTE ADULT - ASSESSMENT
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      Sepsis present on admission  Streptococcus lutetiensis bacteremia  LLL PNA  Chronic A-fib on Eliquis  H/O CAD s/p PCI  H/O Pancreatic cancer  H/O Cholangiocarcinoma with mets to bones and lungs  Immunosuppression/Immunosenescence  Epistaxis s/p nasal packing, s/p removal                   PLAN:    ·	Tele reviewed by me. A-fib rate controlled  ·	ECHO reviewed. EF is 65-70%. No evidence of vegetations.   ·	Pt was lethargic yesterday. CT head was done which is unremarkable   ·	Epistaxis from L nostril. S/P nasal packing removal by the ENT today. Watch for bleeding  ·	Restart her Eliquis  ·	Care d/w the pt's cardiologist. No further cardiac w/u  ·	Cont Metoprolol 50 mg po q 8h. Hold for SBP <90  ·	ECHO reviewed. EF is 65-70%  ·	CTA chest reviewed. No PE.  Since 3/11/2023 interval left lung volume loss with leftward mediastinal shift. Large consolidation in the left lung, greater in the left lower lobe.  ·	Blood cxs are grew Streptococcus lutetiensis sensitive to Ceftriaxone  ·	Repeat blood cxs from 11/30 and 12/1 are negative.   ·	ID f/u noted. On d/c switch her to Augmentin 875 mg po q 12h and Doxycycline 100 mg po q 12h until 12/6  ·	Pain control  ·	Cont her other home meds  ·	PT eval    Progress Note Handoff    Pending (specify):  Consults_________, Tests________, Test Results_______, Other__Watch for epistasis. Social service for d/c planning_______  Family discussion:  Diagnosis and plan of care d/w the daughter on bedside.   Disposition: Home___/SNF___/Other________/Unknown at this time________    Cali Paulson MD  Spectra: [phone]

## 2024-12-02 NOTE — PROGRESS NOTE ADULT - SUBJECTIVE AND OBJECTIVE BOX
ANNE VILLATORO  89y, Female    All available historical data reviewed    OVERNIGHT EVENTS:  s/p rapid response last night  now more alert    ROS:  General: Denies rigors, nightsweats  HEENT: Denies headache, rhinorrhea, sore throat, eye pain  CV: Denies CP, palpitations  PULM: Denies wheezing, hemoptysis  GI: Denies hematemesis, hematochezia, melena  : Denies discharge, hematuria  MSK: Denies arthralgias, myalgias  SKIN: Denies rash, lesions  NEURO: weakness  PSYCH: Denies depression, anxiety    VITALS:  T(F): 97.8, Max: 97.8 (12-02-24 @ 04:43)  HR: 106  BP: 106/69  RR: 16Vital Signs Last 24 Hrs  T(C): 36.6 (02 Dec 2024 12:30), Max: 36.6 (02 Dec 2024 04:43)  T(F): 97.8 (02 Dec 2024 12:30), Max: 97.8 (02 Dec 2024 04:43)  HR: 106 (02 Dec 2024 12:30) (106 - 118)  BP: 106/69 (02 Dec 2024 12:30) (94/63 - 136/82)  BP(mean): 73 (02 Dec 2024 11:40) (73 - 73)  RR: 16 (02 Dec 2024 12:30) (16 - 18)  SpO2: 94% (02 Dec 2024 11:40) (94% - 95%)    Parameters below as of 02 Dec 2024 11:40  Patient On (Oxygen Delivery Method): room air        TESTS & MEASUREMENTS:                        13.2   9.41  )-----------( 252      ( 02 Dec 2024 04:52 )             40.6     12-02    128[L]  |  90[L]  |  16  ----------------------------<  223[H]  4.4   |  27  |  0.7    Ca    9.2      02 Dec 2024 04:52  Mg     1.9     12-02    TPro  5.9[L]  /  Alb  3.4[L]  /  TBili  0.4  /  DBili  x   /  AST  15  /  ALT  7   /  AlkPhos  129[H]  12-02    LIVER FUNCTIONS - ( 02 Dec 2024 04:52 )  Alb: 3.4 g/dL / Pro: 5.9 g/dL / ALK PHOS: 129 U/L / ALT: 7 U/L / AST: 15 U/L / GGT: x             Culture - Blood (collected 12-01-24 @ 06:23)  Source: .Blood BLOOD  Preliminary Report (12-02-24 @ 12:01):    No growth at 24 hours    Culture - Blood (collected 11-30-24 @ 04:48)  Source: .Blood BLOOD  Preliminary Report (12-01-24 @ 18:01):    No growth at 24 hours    Culture - Blood (collected 11-29-24 @ 19:26)  Source: .Blood BLOOD  Preliminary Report (12-02-24 @ 02:01):    No growth at 48 Hours    Culture - Urine (collected 11-26-24 @ 14:00)  Source: Clean Catch None  Final Report (11-27-24 @ 18:43):    <10,000 CFU/mL Normal Urogenital Katherine    Urinalysis with Rflx Culture (collected 11-26-24 @ 14:00)    Culture - Blood (collected 11-26-24 @ 03:20)  Source: .Blood BLOOD  Gram Stain (11-26-24 @ 23:02):    Growth in anaerobic bottle: Gram positive cocci in pairs    Growth in aerobic bottle: Gram Positive Cocci in Pairs and Chains  Final Report (11-28-24 @ 10:31):    Growth in aerobic and anaerobic bottles: Streptococcus lutetiensis    "Susceptibilities not performed"    Direct identification is available within approximately 3-5    hours either by Blood Panel Multiplexed PCR or Direct    MALDI-TOF. Details: [email or web address]  Organism: Blood Culture PCR (11-28-24 @ 10:31)  Organism: Blood Culture PCR (11-28-24 @ 10:31)      Method Type: PCR      -  Streptococcus sp. (Not Grp A, B or S pneumoniae): Detec    Culture - Blood (collected 11-26-24 @ 03:20)  Source: .Blood BLOOD  Gram Stain (11-26-24 @ 22:40):    Growth in aerobic and anaerobic bottles: Gram positive cocci in pairs  Final Report (11-28-24 @ 10:30):    Growth in aerobic and anaerobic bottles: Streptococcus lutetiensis  Organism: Streptococcus lutetiensis (11-28-24 @ 10:30)  Organism: Streptococcus lutetiensis (11-28-24 @ 10:30)      Method Type: BC      -  Ceftriaxone: S <=0.25      -  Penicillin: S 0.12      -  Vancomycin: S 0.25      Urinalysis Basic - ( 02 Dec 2024 04:52 )    Color: x / Appearance: x / SG: x / pH: x  Gluc: 223 mg/dL / Ketone: x  / Bili: x / Urobili: x   Blood: x / Protein: x / Nitrite: x   Leuk Esterase: x / RBC: x / WBC x   Sq Epi: x / Non Sq Epi: x / Bacteria: x          Social History:  Tobacco Use: No  Alcohol Use: No  Drug Use: No    RADIOLOGY & ADDITIONAL TESTS:  Personal review of radiological diagnostics performed  Echo and EKG results noted when applicable.     MEDICATIONS:  apixaban 5 milliGRAM(s) Oral every 12 hours  bacitracin   Ointment 1 Application(s) Topical two times a day  cefTRIAXone   IVPB 2000 milliGRAM(s) IV Intermittent every 24 hours  chlorhexidine 2% Cloths 1 Application(s) Topical daily  clopidogrel Tablet 75 milliGRAM(s) Oral daily  dextrose 5%. 1000 milliLiter(s) IV Continuous <Continuous>  dextrose 5%. 1000 milliLiter(s) IV Continuous <Continuous>  dextrose 50% Injectable 25 Gram(s) IV Push once  dextrose 50% Injectable 12.5 Gram(s) IV Push once  dextrose 50% Injectable 25 Gram(s) IV Push once  dextrose Oral Gel 15 Gram(s) Oral once PRN  diltiazem    Tablet 30 milliGRAM(s) Oral every 6 hours  doxycycline IVPB      doxycycline IVPB 100 milliGRAM(s) IV Intermittent every 12 hours  escitalopram 10 milliGRAM(s) Oral daily  fentaNYL   Patch  25 MICROgram(s)/Hr 1 Patch Transdermal every 72 hours  glucagon  Injectable 1 milliGRAM(s) IntraMuscular once  insulin glargine Injectable (LANTUS) 5 Unit(s) SubCutaneous at bedtime  insulin lispro (ADMELOG) corrective regimen sliding scale   SubCutaneous three times a day before meals  metoprolol tartrate 50 milliGRAM(s) Oral four times a day  pantoprazole    Tablet 40 milliGRAM(s) Oral before breakfast  polyethylene glycol 3350 17 Gram(s) Oral daily  pregabalin 50 milliGRAM(s) Oral three times a day  senna 2 Tablet(s) Oral at bedtime  thyroid 15 milliGRAM(s) Oral <User Schedule>      ANTIBIOTICS:  cefTRIAXone   IVPB 2000 milliGRAM(s) IV Intermittent every 24 hours  doxycycline IVPB      doxycycline IVPB 100 milliGRAM(s) IV Intermittent every 12 hours

## 2024-12-02 NOTE — PROGRESS NOTE ADULT - SUBJECTIVE AND OBJECTIVE BOX
ANNE VILLATORO  89y Female    CHIEF COMPLAINT:    Patient is a 89y old  Female who presents with a chief complaint of Weakness (02 Dec 2024 13:16)      INTERVAL HPI/OVERNIGHT EVENTS:    Patient seen and examined. C/O feeling weak, No cough or sob. No fever. As per daughter she was lethargic yesterday    ROS: All other systems are negative.    Vital Signs:    T(F): 97.8 (24 @ 12:30), Max: 97.8 (24 @ 04:43)  HR: 106 (24 @ 12:30) (106 - 118)  BP: 106/69 (24 @ 12:30) (94/63 - 136/82)  RR: 16 (24 @ 12:30) (16 - 18)  SpO2: 94% (24 @ 11:40) (94% - 95%)  I&O's Summary    01 Dec 2024 07:01  -  02 Dec 2024 07:00  --------------------------------------------------------  IN: 718 mL / OUT: 150 mL / NET: 568 mL      Daily     Daily Weight in k (02 Dec 2024 04:43)  CAPILLARY BLOOD GLUCOSE      POCT Blood Glucose.: 185 mg/dL (02 Dec 2024 11:18)  POCT Blood Glucose.: 198 mg/dL (02 Dec 2024 08:03)  POCT Blood Glucose.: 188 mg/dL (01 Dec 2024 21:49)  POCT Blood Glucose.: 172 mg/dL (01 Dec 2024 16:57)  POCT Blood Glucose.: 184 mg/dL (01 Dec 2024 16:18)      PHYSICAL EXAM:    GENERAL:  NAD  SKIN: No rashes or lesions  HENT: Atraumatic. Normocephalic. PERRL. Moist membranes.  NECK: Supple, No JVD. No lymphadenopathy.  PULMONARY: Decreased BS in L lower chest post. No wheezing. No rales  CVS: Normal S1, S2. Rate and Rhythm are regular. No murmurs.  ABDOMEN/GI: Soft, Nontender, Nondistended; BS present  EXTREMITIES: Peripheral pulses intact. No edema B/L LE.  NEUROLOGIC:  No motor or sensory deficit.  PSYCH: Alert & oriented x 3    Consultant(s) Notes Reviewed:  [x ] YES  [ ] NO  Care Discussed with Consultants/Other Providers [ x] YES  [ ] NO    EKG reviewed  Telemetry reviewed    LABS:                        13.2   9.41  )-----------( 252      ( 02 Dec 2024 04:52 )             40.6     12    128[L]  |  90[L]  |  16  ----------------------------<  223[H]  4.4   |  27  |  0.7    Ca    9.2      02 Dec 2024 04:52  Mg     1.9     12    TPro  5.9[L]  /  Alb  3.4[L]  /  TBili  0.4  /  DBili  x   /  AST  15  /  ALT  7   /  AlkPhos  129[H]  12-            Culture - Blood (collected 01 Dec 2024 06:23)  Source: .Blood BLOOD  Preliminary Report (02 Dec 2024 12:01):    No growth at 24 hours    Culture - Blood (collected 2024 04:48)  Source: .Blood BLOOD  Preliminary Report (01 Dec 2024 18:01):    No growth at 24 hours    Culture - Blood (collected 2024 19:26)  Source: .Blood BLOOD  Preliminary Report (02 Dec 2024 02:01):    No growth at 48 Hours        RADIOLOGY & ADDITIONAL TESTS:    < from: CT Head No Cont (24 @ 18:01) >    IMPRESSION:      No CT evidence of acute intracranial pathology.    < end of copied text >  < from: Xray Chest 1 View- PORTABLE-Urgent (Xray Chest 1 View- PORTABLE-Urgent .) (24 @ 13:30) >  IMPRESSION:    Left lower lobe opacity/pleural effusion unchanged. No air leak.   Decreased vascular congestion.    < end of copied text >  < from: TTE Echo Complete w/o Contrast w/ Doppler (24 @ 12:46) >  Summary:   1. Left ventricular ejection fraction, by visual estimation, is 65 to   70%.   2. Technically difficult study.   3. Hyperdynamic global left ventricular systolic function.   4. Severely enlarged left atrium.   5. Moderate concentric left ventricular hypertrophy.   6. Normal right atrial size.   7. Degenerative mitral valve.   8. Mild mitral valve regurgitation.   9. Mitral annular calcification.  10. Thickening and calcification of the anterior and posterior mitral   valve leaflets.  11. Mild tricuspid regurgitation.  12. Sclerotic aortic valve with decreased opening.  13. No definite vegetations on this study. If clinically suspect   endocarditis, consider additional imaging.    < end of copied text >    Imaging or report Personally Reviewed:  [x ] YES  [ ] NO    Medications:  Standing  apixaban 5 milliGRAM(s) Oral every 12 hours  bacitracin   Ointment 1 Application(s) Topical two times a day  cefTRIAXone   IVPB 2000 milliGRAM(s) IV Intermittent every 24 hours  chlorhexidine 2% Cloths 1 Application(s) Topical daily  clopidogrel Tablet 75 milliGRAM(s) Oral daily  dextrose 5%. 1000 milliLiter(s) IV Continuous <Continuous>  dextrose 5%. 1000 milliLiter(s) IV Continuous <Continuous>  dextrose 50% Injectable 25 Gram(s) IV Push once  dextrose 50% Injectable 12.5 Gram(s) IV Push once  dextrose 50% Injectable 25 Gram(s) IV Push once  diltiazem    Tablet 30 milliGRAM(s) Oral every 6 hours  doxycycline IVPB      doxycycline IVPB 100 milliGRAM(s) IV Intermittent every 12 hours  escitalopram 10 milliGRAM(s) Oral daily  fentaNYL   Patch  25 MICROgram(s)/Hr 1 Patch Transdermal every 72 hours  glucagon  Injectable 1 milliGRAM(s) IntraMuscular once  insulin glargine Injectable (LANTUS) 5 Unit(s) SubCutaneous at bedtime  insulin lispro (ADMELOG) corrective regimen sliding scale   SubCutaneous three times a day before meals  metoprolol tartrate 50 milliGRAM(s) Oral four times a day  pantoprazole    Tablet 40 milliGRAM(s) Oral before breakfast  polyethylene glycol 3350 17 Gram(s) Oral daily  pregabalin 50 milliGRAM(s) Oral three times a day  senna 2 Tablet(s) Oral at bedtime  thyroid 15 milliGRAM(s) Oral <User Schedule>    PRN Meds  dextrose Oral Gel 15 Gram(s) Oral once PRN      Case discussed with resident    Care discussed with pt/family

## 2024-12-02 NOTE — PROGRESS NOTE ADULT - ASSESSMENT
Pt is a 89y F that ENT is following for L epistaxis, s/p L rhinorocket packing on 11/29. Pt seen at bedside today AM with patients aide at bedside-- no further bleeding. Last night, RR was called due to acute mental status change and was not responding to noxious stimuli. CT Head was negative and she remained vitally stable. Per Aide, she's had similar response at home. Today, patient packing remained deflated, no acitve bleeding or clots visualized     Plan:   - Left packing was removed   - Recommend bacitracin to b/l nares to keep area moist  - Continue to monitor for episodes of rebleeding  - Continue to monitor H/h, transfuse prn  - Avoid: Nasal trauma; no nose rubbing, blowing or manipulating nasal packing, bending with head blow the waist and heavy lifting  - Sneeze with mouth open and pinching nares.  - No acute ENT intervention at this time   - Will discuss with attending

## 2024-12-02 NOTE — PROGRESS NOTE ADULT - SUBJECTIVE AND OBJECTIVE BOX
ENT DAILY PROGRESS NOTE    Pt is a 89y F that ENT is following for L epistaxis, s/p L rhinorocket packing on 11/29. Pt seen at bedside today AM with patients aide at bedside-- no further bleeding. Last night, rapid response was called due to acute mental status change and was not responding to noxious stimuli. CT Head was negative and she remained vitally stable. Per Aide, she's had similar response at home. Today, patient packing remained deflated, no acitve bleeding or clots visualized       REVIEW OF SYSTEMS   [x] A ten-point review of systems was otherwise negative except as noted.  [ ] Due to altered mental status/intubation, subjective information were not able to be obtained from patient. History was obtained, to the extent possible, from review of the chart and collateral sources of information.    Allergies    Percocet 5/325 (Unknown)  Cipro (Diarrhea)    Intolerances        MEDICATIONS:  apixaban 5 milliGRAM(s) Oral every 12 hours  cefTRIAXone   IVPB 2000 milliGRAM(s) IV Intermittent every 24 hours  chlorhexidine 2% Cloths 1 Application(s) Topical daily  clopidogrel Tablet 75 milliGRAM(s) Oral daily  dextrose 5%. 1000 milliLiter(s) IV Continuous <Continuous>  dextrose 5%. 1000 milliLiter(s) IV Continuous <Continuous>  dextrose 50% Injectable 25 Gram(s) IV Push once  dextrose 50% Injectable 12.5 Gram(s) IV Push once  dextrose 50% Injectable 25 Gram(s) IV Push once  dextrose Oral Gel 15 Gram(s) Oral once PRN  diltiazem    Tablet 30 milliGRAM(s) Oral every 6 hours  doxycycline IVPB 100 milliGRAM(s) IV Intermittent every 12 hours  doxycycline IVPB      escitalopram 10 milliGRAM(s) Oral daily  fentaNYL   Patch  25 MICROgram(s)/Hr 1 Patch Transdermal every 72 hours  glucagon  Injectable 1 milliGRAM(s) IntraMuscular once  insulin glargine Injectable (LANTUS) 5 Unit(s) SubCutaneous at bedtime  insulin lispro (ADMELOG) corrective regimen sliding scale   SubCutaneous three times a day before meals  metoprolol tartrate 50 milliGRAM(s) Oral four times a day  pantoprazole    Tablet 40 milliGRAM(s) Oral before breakfast  polyethylene glycol 3350 17 Gram(s) Oral daily  pregabalin 50 milliGRAM(s) Oral three times a day  senna 2 Tablet(s) Oral at bedtime  thyroid 15 milliGRAM(s) Oral <User Schedule>      Vital Signs Last 24 Hrs  T(C): 36.6 (02 Dec 2024 04:43), Max: 36.6 (02 Dec 2024 04:43)  T(F): 97.8 (02 Dec 2024 04:43), Max: 97.8 (02 Dec 2024 04:43)  HR: 116 (02 Dec 2024 04:43) (105 - 118)  BP: 127/82 (02 Dec 2024 04:43) (102/64 - 136/82)  BP(mean): 94 (01 Dec 2024 13:00) (94 - 94)  RR: 18 (02 Dec 2024 04:43) (17 - 18)  SpO2: 95% (02 Dec 2024 00:53) (95% - 95%)    Parameters below as of 01 Dec 2024 20:40  Patient On (Oxygen Delivery Method): room air          12-01 @ 07:01  -  12-02 @ 07:00  --------------------------------------------------------  IN:    Oral Fluid: 718 mL  Total IN: 718 mL    OUT:    Voided (mL): 150 mL  Total OUT: 150 mL    Total NET: 568 mL          PHYSICAL EXAM:    GEN: NAD, awake and alert. No drooling or pooling of secretions. No stridor or stertor. Good vocal quality, no hoarseness.   SKIN: Good color, non diaphoretic  HEENT: NC/AT; NARES: Left packing in place, deflated, no active bleeding noted bilaterally. Oral mucosa pink and moist. No erythema or edema noted to buccal mucosa, tongue, FOM, uvula. Uvula midline +Posterior OP clear w/o active bleeding noted.  NECK:  Trachea midline. Neck supple, no TTP to B/L lateral neck, no cervical LAD.  RESP: No dyspnea, non-labored breathing. No use of accessory muscles.  CARDIO: +S1/S2  ABDO: Soft, NT.  EXT: SHAH x 4    LABS:  CBC-                        13.2   9.41  )-----------( 252      ( 02 Dec 2024 04:52 )             40.6     BMP/CMP-  02 Dec 2024 04:52    128    |  90     |  16     ----------------------------<  223    4.4     |  27     |  0.7      Ca    9.2        02 Dec 2024 04:52  Mg     1.9       02 Dec 2024 04:52    TPro  5.9    /  Alb  3.4    /  TBili  0.4    /  DBili  x      /  AST  15     /  ALT  7      /  AlkPhos  129    02 Dec 2024 04:52    Coagulation Studies-    Endocrine Panel-  Calcium: 9.2 mg/dL (12-02 @ 04:52)              RADIOLOGY & ADDITIONAL STUDIES:

## 2024-12-03 LAB
ALBUMIN SERPL ELPH-MCNC: 3.2 G/DL — LOW (ref 3.5–5.2)
ALP SERPL-CCNC: 134 U/L — HIGH (ref 30–115)
ALT FLD-CCNC: 7 U/L — SIGNIFICANT CHANGE UP (ref 0–41)
ANION GAP SERPL CALC-SCNC: 12 MMOL/L — SIGNIFICANT CHANGE UP (ref 7–14)
AST SERPL-CCNC: 16 U/L — SIGNIFICANT CHANGE UP (ref 0–41)
BILIRUB SERPL-MCNC: 0.4 MG/DL — SIGNIFICANT CHANGE UP (ref 0.2–1.2)
BUN SERPL-MCNC: 15 MG/DL — SIGNIFICANT CHANGE UP (ref 10–20)
CALCIUM SERPL-MCNC: 9.3 MG/DL — SIGNIFICANT CHANGE UP (ref 8.4–10.5)
CHLORIDE SERPL-SCNC: 91 MMOL/L — LOW (ref 98–110)
CO2 SERPL-SCNC: 27 MMOL/L — SIGNIFICANT CHANGE UP (ref 17–32)
CREAT SERPL-MCNC: 0.6 MG/DL — LOW (ref 0.7–1.5)
EGFR: 85 ML/MIN/1.73M2 — SIGNIFICANT CHANGE UP
GLUCOSE BLDC GLUCOMTR-MCNC: 176 MG/DL — HIGH (ref 70–99)
GLUCOSE BLDC GLUCOMTR-MCNC: 180 MG/DL — HIGH (ref 70–99)
GLUCOSE BLDC GLUCOMTR-MCNC: 199 MG/DL — HIGH (ref 70–99)
GLUCOSE BLDC GLUCOMTR-MCNC: 212 MG/DL — HIGH (ref 70–99)
GLUCOSE SERPL-MCNC: 172 MG/DL — HIGH (ref 70–99)
HCT VFR BLD CALC: 43.2 % — SIGNIFICANT CHANGE UP (ref 37–47)
HGB BLD-MCNC: 14 G/DL — SIGNIFICANT CHANGE UP (ref 12–16)
MAGNESIUM SERPL-MCNC: 2 MG/DL — SIGNIFICANT CHANGE UP (ref 1.8–2.4)
MCHC RBC-ENTMCNC: 29.7 PG — SIGNIFICANT CHANGE UP (ref 27–31)
MCHC RBC-ENTMCNC: 32.4 G/DL — SIGNIFICANT CHANGE UP (ref 32–37)
MCV RBC AUTO: 91.7 FL — SIGNIFICANT CHANGE UP (ref 81–99)
NRBC # BLD: 0 /100 WBCS — SIGNIFICANT CHANGE UP (ref 0–0)
PLATELET # BLD AUTO: 283 K/UL — SIGNIFICANT CHANGE UP (ref 130–400)
PMV BLD: 11.1 FL — HIGH (ref 7.4–10.4)
POTASSIUM SERPL-MCNC: 4.7 MMOL/L — SIGNIFICANT CHANGE UP (ref 3.5–5)
POTASSIUM SERPL-SCNC: 4.7 MMOL/L — SIGNIFICANT CHANGE UP (ref 3.5–5)
PROT SERPL-MCNC: 5.9 G/DL — LOW (ref 6–8)
RBC # BLD: 4.71 M/UL — SIGNIFICANT CHANGE UP (ref 4.2–5.4)
RBC # FLD: 16.5 % — HIGH (ref 11.5–14.5)
SODIUM SERPL-SCNC: 130 MMOL/L — LOW (ref 135–146)
WBC # BLD: 8.41 K/UL — SIGNIFICANT CHANGE UP (ref 4.8–10.8)
WBC # FLD AUTO: 8.41 K/UL — SIGNIFICANT CHANGE UP (ref 4.8–10.8)

## 2024-12-03 PROCEDURE — 93280 PM DEVICE PROGR EVAL DUAL: CPT | Mod: 26

## 2024-12-03 PROCEDURE — 99232 SBSQ HOSP IP/OBS MODERATE 35: CPT

## 2024-12-03 PROCEDURE — 93010 ELECTROCARDIOGRAM REPORT: CPT

## 2024-12-03 PROCEDURE — 93010 ELECTROCARDIOGRAM REPORT: CPT | Mod: 77

## 2024-12-03 RX ORDER — DILTIAZEM HYDROCHLORIDE 240 MG/1
180 CAPSULE, COATED, EXTENDED RELEASE ORAL DAILY
Refills: 0 | Status: DISCONTINUED | OUTPATIENT
Start: 2024-12-04 | End: 2024-12-05

## 2024-12-03 RX ORDER — POLYETHYLENE GLYCOL 3350 17 G/17G
17 POWDER, FOR SOLUTION ORAL
Refills: 0 | Status: DISCONTINUED | OUTPATIENT
Start: 2024-12-03 | End: 2024-12-05

## 2024-12-03 RX ORDER — FENTANYL 12 UG/H
1 PATCH, EXTENDED RELEASE TRANSDERMAL
Refills: 0 | Status: DISCONTINUED | OUTPATIENT
Start: 2024-12-03 | End: 2024-12-05

## 2024-12-03 RX ORDER — DILTIAZEM HYDROCHLORIDE 240 MG/1
60 CAPSULE, COATED, EXTENDED RELEASE ORAL EVERY 6 HOURS
Refills: 0 | Status: COMPLETED | OUTPATIENT
Start: 2024-12-03 | End: 2024-12-04

## 2024-12-03 RX ADMIN — INSULIN GLARGINE 5 UNIT(S): 100 INJECTION, SOLUTION SUBCUTANEOUS at 21:27

## 2024-12-03 RX ADMIN — DOXYCYCLINE HYCLATE 100 MILLIGRAM(S): 150 TABLET, COATED ORAL at 05:39

## 2024-12-03 RX ADMIN — DILTIAZEM HYDROCHLORIDE 30 MILLIGRAM(S): 240 CAPSULE, COATED, EXTENDED RELEASE ORAL at 11:58

## 2024-12-03 RX ADMIN — Medication 100 MILLIGRAM(S): at 17:43

## 2024-12-03 RX ADMIN — ESCITALOPRAM OXALATE 10 MILLIGRAM(S): 10 TABLET, FILM COATED ORAL at 11:58

## 2024-12-03 RX ADMIN — METOPROLOL TARTRATE 50 MILLIGRAM(S): 100 TABLET, FILM COATED ORAL at 05:38

## 2024-12-03 RX ADMIN — DILTIAZEM HYDROCHLORIDE 30 MILLIGRAM(S): 240 CAPSULE, COATED, EXTENDED RELEASE ORAL at 00:52

## 2024-12-03 RX ADMIN — APIXABAN 5 MILLIGRAM(S): 2.5 TABLET, FILM COATED ORAL at 17:42

## 2024-12-03 RX ADMIN — DILTIAZEM HYDROCHLORIDE 30 MILLIGRAM(S): 240 CAPSULE, COATED, EXTENDED RELEASE ORAL at 05:38

## 2024-12-03 RX ADMIN — Medication 1: at 08:02

## 2024-12-03 RX ADMIN — CHLORHEXIDINE GLUCONATE 15 MILLILITER(S): 1.2 RINSE ORAL at 05:46

## 2024-12-03 RX ADMIN — METOPROLOL TARTRATE 50 MILLIGRAM(S): 100 TABLET, FILM COATED ORAL at 17:42

## 2024-12-03 RX ADMIN — METOPROLOL TARTRATE 50 MILLIGRAM(S): 100 TABLET, FILM COATED ORAL at 00:52

## 2024-12-03 RX ADMIN — Medication 15 MILLIGRAM(S): at 06:07

## 2024-12-03 RX ADMIN — PREGABALIN 50 MILLIGRAM(S): 75 CAPSULE ORAL at 05:38

## 2024-12-03 RX ADMIN — Medication 5 MILLIGRAM(S): at 11:58

## 2024-12-03 RX ADMIN — Medication 1 APPLICATION(S): at 17:42

## 2024-12-03 RX ADMIN — POLYETHYLENE GLYCOL 3350 17 GRAM(S): 17 POWDER, FOR SOLUTION ORAL at 11:57

## 2024-12-03 RX ADMIN — CLOPIDOGREL 75 MILLIGRAM(S): 75 TABLET, FILM COATED ORAL at 11:58

## 2024-12-03 RX ADMIN — PREGABALIN 50 MILLIGRAM(S): 75 CAPSULE ORAL at 21:28

## 2024-12-03 RX ADMIN — DILTIAZEM HYDROCHLORIDE 60 MILLIGRAM(S): 240 CAPSULE, COATED, EXTENDED RELEASE ORAL at 17:42

## 2024-12-03 RX ADMIN — PREGABALIN 50 MILLIGRAM(S): 75 CAPSULE ORAL at 13:10

## 2024-12-03 RX ADMIN — POLYETHYLENE GLYCOL 3350 17 GRAM(S): 17 POWDER, FOR SOLUTION ORAL at 17:45

## 2024-12-03 RX ADMIN — Medication 1 APPLICATION(S): at 05:47

## 2024-12-03 RX ADMIN — CHLORHEXIDINE GLUCONATE 1 APPLICATION(S): 1.2 RINSE ORAL at 12:06

## 2024-12-03 RX ADMIN — Medication 2 TABLET(S): at 21:28

## 2024-12-03 RX ADMIN — DOXYCYCLINE HYCLATE 100 MILLIGRAM(S): 150 TABLET, COATED ORAL at 18:50

## 2024-12-03 RX ADMIN — PANTOPRAZOLE SODIUM 40 MILLIGRAM(S): 40 TABLET, DELAYED RELEASE ORAL at 05:38

## 2024-12-03 RX ADMIN — CHLORHEXIDINE GLUCONATE 15 MILLILITER(S): 1.2 RINSE ORAL at 17:42

## 2024-12-03 RX ADMIN — APIXABAN 5 MILLIGRAM(S): 2.5 TABLET, FILM COATED ORAL at 05:38

## 2024-12-03 RX ADMIN — Medication 1: at 17:00

## 2024-12-03 RX ADMIN — METOPROLOL TARTRATE 50 MILLIGRAM(S): 100 TABLET, FILM COATED ORAL at 11:58

## 2024-12-03 RX ADMIN — Medication 1: at 12:08

## 2024-12-03 NOTE — PROGRESS NOTE ADULT - SUBJECTIVE AND OBJECTIVE BOX
ANNE VILLATORO  89y Female    CHIEF COMPLAINT:    Patient is a 89y old  Female who presents with a chief complaint of Weakness (03 Dec 2024 11:32)      INTERVAL HPI/OVERNIGHT EVENTS:    Patient seen and examined. Complains that she has no energy and feels very weak. No fever, cough or sob    ROS: All other systems are negative.    Vital Signs:    T(F): 97.5 (12-03-24 @ 12:18), Max: 97.5 (12-03-24 @ 04:38)  HR: 115 (12-03-24 @ 12:18) (115 - 116)  BP: 109/77 (12-03-24 @ 12:18) (109/77 - 119/72)  RR: 18 (12-03-24 @ 12:18) (16 - 18)  SpO2: 96% (12-03-24 @ 12:18) (96% - 96%)  I&O's Summary    02 Dec 2024 07:01  -  03 Dec 2024 07:00  --------------------------------------------------------  IN: 340 mL / OUT: 700 mL / NET: -360 mL      Daily     Daily   CAPILLARY BLOOD GLUCOSE      POCT Blood Glucose.: 176 mg/dL (03 Dec 2024 11:36)  POCT Blood Glucose.: 180 mg/dL (03 Dec 2024 07:39)  POCT Blood Glucose.: 194 mg/dL (02 Dec 2024 21:04)  POCT Blood Glucose.: 166 mg/dL (02 Dec 2024 16:22)      PHYSICAL EXAM:    GENERAL:  NAD  SKIN: No rashes or lesions  HENT: Atraumatic. Normocephalic. PERRL. Moist membranes.  NECK: Supple, No JVD. No lymphadenopathy.  PULMONARY: Decreased BS in L lower chest post. No wheezing. No rales  CVS: Normal S1, S2. Rate and Rhythm are regular. No murmurs.  ABDOMEN/GI: Soft, Nontender, Nondistended; BS present  EXTREMITIES: Peripheral pulses intact. No edema B/L LE.  NEUROLOGIC:  No motor or sensory deficit.  PSYCH: Alert & oriented x 3    Consultant(s) Notes Reviewed:  [x ] YES  [ ] NO  Care Discussed with Consultants/Other Providers [ x] YES  [ ] NO    EKG reviewed  Telemetry reviewed    LABS:                        14.0   8.41  )-----------( 283      ( 03 Dec 2024 05:51 )             43.2     12-03    130[L]  |  91[L]  |  15  ----------------------------<  172[H]  4.7   |  27  |  0.6[L]    Ca    9.3      03 Dec 2024 05:51  Mg     2.0     12-03    TPro  5.9[L]  /  Alb  3.2[L]  /  TBili  0.4  /  DBili  x   /  AST  16  /  ALT  7   /  AlkPhos  134[H]  12-03            Culture - Blood (collected 01 Dec 2024 06:23)  Source: .Blood BLOOD  Preliminary Report (03 Dec 2024 12:01):    No growth at 48 Hours        RADIOLOGY & ADDITIONAL TESTS:      Imaging or report Personally Reviewed:  [ ] YES  [ ] NO    Medications:  Standing  apixaban 5 milliGRAM(s) Oral every 12 hours  bacitracin   Ointment 1 Application(s) Topical two times a day  cefTRIAXone   IVPB 2000 milliGRAM(s) IV Intermittent every 24 hours  chlorhexidine 0.12% Liquid 15 milliLiter(s) Swish and Spit two times a day  chlorhexidine 2% Cloths 1 Application(s) Topical daily  clopidogrel Tablet 75 milliGRAM(s) Oral daily  dextrose 5%. 1000 milliLiter(s) IV Continuous <Continuous>  dextrose 5%. 1000 milliLiter(s) IV Continuous <Continuous>  dextrose 50% Injectable 25 Gram(s) IV Push once  dextrose 50% Injectable 12.5 Gram(s) IV Push once  dextrose 50% Injectable 25 Gram(s) IV Push once  diltiazem    Tablet 60 milliGRAM(s) Oral every 6 hours  doxycycline IVPB      doxycycline IVPB 100 milliGRAM(s) IV Intermittent every 12 hours  escitalopram 10 milliGRAM(s) Oral daily  glucagon  Injectable 1 milliGRAM(s) IntraMuscular once  insulin glargine Injectable (LANTUS) 5 Unit(s) SubCutaneous at bedtime  insulin lispro (ADMELOG) corrective regimen sliding scale   SubCutaneous three times a day before meals  metoprolol tartrate 50 milliGRAM(s) Oral four times a day  pantoprazole    Tablet 40 milliGRAM(s) Oral before breakfast  polyethylene glycol 3350 17 Gram(s) Oral daily  pregabalin 50 milliGRAM(s) Oral three times a day  senna 2 Tablet(s) Oral at bedtime  thyroid 15 milliGRAM(s) Oral <User Schedule>    PRN Meds  dextrose Oral Gel 15 Gram(s) Oral once PRN      Case discussed with resident    Care discussed with pt/family

## 2024-12-03 NOTE — PROGRESS NOTE ADULT - ASSESSMENT
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  As per patient's daughter at the bedside, patient supposed to go for mapping for radiation of the left scapula to attempt to improve her pain in that area.  About 2 months ago had a Pleurx left lung that was removed. Her heart rate has been in the 130s for a 3-4 weeks and her cardiologist Dr. Huffman who is aware of this had recently changed patient's metoprolol to 150 mg.  She also wears a fentanyl patch. She denies having a sore throat, cough, chest pain, fever, shortness of breath, abdominal pain, nausea, vomiting, diarrhea, constipation, urinary symptoms, rashes, recent travel, recent trauma, or recent sick contacts.    In the ED,  Vital Signs Last 24 Hrs HR: 122/m, RR: 20 / m. WBC 17    < from: CT Angio Chest PE Protocol w/ IV Cont (11.26.24 @ 03:42) >  1.  No PE  2.  CHF.  3.  Since 3/11/2023 interval left lung volume loss with leftward mediastinal shift. Large consolidation in the left lung, greater in the   left lower lobe. Given significant cancer history, images can besubmitted to treating institution to evaluate any interval changes if   clinically relevant, as component of findings may be related to treatment-related change.    IMPRESSION/RECOMMENDATIONS  Immunosuppression/Immunosenescence ( above age 60 yrs there is a exponential decline in immunity which could result in poor clinical outcomes.  Acute illness (Sepsi/ PNA ) which poses a threat to life or bodily function without treatment   Sepsis on presentation  PNA left  11/26 CT chest : Large consolidation in the left lung, greater in the left lower lobe.  12/1 CXR ; nochange c/w prior. ( Independent interpretation of test : PNA LLL  Septicemia with Sreptococci lutetiensis  11/26 BCx Streptococci lutetiensis. Independent analysis of CX results and interpretation.  11/26 ECHO no vegetations  11/29,30, 121 BCX NGTD    CBC :  ( WBC 8.4 ), ( Hg 14  ), CMP ( Cr 0.6  ) reviewed .   Procal 0.17 ( despite it would recommend ABx )  11/26 Urine for strep pneumonia/legionella antigen NG    diabetes  A-fib on Eliquis  SSS S/p PPM placement, CAD with 4 stents on Plavix, history of MI  Pancreatic cancer status post distal pancreatectomy in August 2021  Cholangiocarcinoma with mets to the liver status post partial liver resection, mets to the lung, and mets to the bone    -Off loading to prevent pressure sores and preventive measures to avoid aspiration  -Rocephin 2 gm iv q24h  -Doxycycline 100 mg iv q12h  -could change to po Augmentin 875 mg q12h and o Doxycycline 100 mg q12h till 12/6    Discussion of management/test results/antibiotic regimen  with primary medical team.

## 2024-12-03 NOTE — HOSPICE CARE NOTE - CONVESATION DETAILS
PC placed to pt's daughter Cassie, hospice philosophy and services explained in detail, Cassie wishes for hospice for her mom is in agreement as per their earlier discussion. Pt is still medically active as per today's note. Hospice to remain available upon physician approval and safe d/c plan.

## 2024-12-03 NOTE — PROGRESS NOTE ADULT - SUBJECTIVE AND OBJECTIVE BOX
ANNE VILLATORO  89y, Female    All available historical data reviewed    OVERNIGHT EVENTS:  none  no fevers    ROS:  General: Denies rigors, nightsweats  HEENT: Denies headache, rhinorrhea, sore throat, eye pain  CV: Denies CP, palpitations  PULM: Denies wheezing, hemoptysis  GI: Denies hematemesis, hematochezia, melena  : Denies discharge, hematuria  MSK: Denies arthralgias, myalgias  SKIN: Denies rash, lesions  NEURO: weakness  PSYCH: Denies depression, anxiety    VITALS:  T(F): 97.5, Max: 97.8 (12-02-24 @ 12:30)  HR: 116  BP: 117/81  RR: 18Vital Signs Last 24 Hrs  T(C): 36.4 (03 Dec 2024 04:38), Max: 36.6 (02 Dec 2024 12:30)  T(F): 97.5 (03 Dec 2024 04:38), Max: 97.8 (02 Dec 2024 12:30)  HR: 116 (03 Dec 2024 04:38) (106 - 116)  BP: 117/81 (03 Dec 2024 04:38) (94/63 - 119/72)  BP(mean): 93 (03 Dec 2024 04:38) (73 - 93)  RR: 18 (03 Dec 2024 04:38) (16 - 18)  SpO2: 94% (02 Dec 2024 11:40) (94% - 94%)    Parameters below as of 02 Dec 2024 11:40  Patient On (Oxygen Delivery Method): room air        TESTS & MEASUREMENTS:                        14.0   8.41  )-----------( 283      ( 03 Dec 2024 05:51 )             43.2     12-03    130[L]  |  91[L]  |  15  ----------------------------<  172[H]  4.7   |  27  |  0.6[L]    Ca    9.3      03 Dec 2024 05:51  Mg     2.0     12-03    TPro  5.9[L]  /  Alb  3.2[L]  /  TBili  0.4  /  DBili  x   /  AST  16  /  ALT  7   /  AlkPhos  134[H]  12-03    LIVER FUNCTIONS - ( 03 Dec 2024 05:51 )  Alb: 3.2 g/dL / Pro: 5.9 g/dL / ALK PHOS: 134 U/L / ALT: 7 U/L / AST: 16 U/L / GGT: x             Culture - Blood (collected 12-01-24 @ 06:23)  Source: .Blood BLOOD  Preliminary Report (12-02-24 @ 12:01):    No growth at 24 hours    Culture - Blood (collected 11-30-24 @ 04:48)  Source: .Blood BLOOD  Preliminary Report (12-02-24 @ 18:01):    No growth at 48 Hours    Culture - Blood (collected 11-29-24 @ 19:26)  Source: .Blood BLOOD  Preliminary Report (12-03-24 @ 02:01):    No growth at 72 Hours    Culture - Urine (collected 11-26-24 @ 14:00)  Source: Clean Catch None  Final Report (11-27-24 @ 18:43):    <10,000 CFU/mL Normal Urogenital Katherine    Urinalysis with Rflx Culture (collected 11-26-24 @ 14:00)      Urinalysis Basic - ( 03 Dec 2024 05:51 )    Color: x / Appearance: x / SG: x / pH: x  Gluc: 172 mg/dL / Ketone: x  / Bili: x / Urobili: x   Blood: x / Protein: x / Nitrite: x   Leuk Esterase: x / RBC: x / WBC x   Sq Epi: x / Non Sq Epi: x / Bacteria: x          Social History:  Tobacco Use: No  Alcohol Use: No  Drug Use: No    RADIOLOGY & ADDITIONAL TESTS:  Personal review of radiological diagnostics performed  Echo and EKG results noted when applicable.     MEDICATIONS:  apixaban 5 milliGRAM(s) Oral every 12 hours  bacitracin   Ointment 1 Application(s) Topical two times a day  cefTRIAXone   IVPB 2000 milliGRAM(s) IV Intermittent every 24 hours  chlorhexidine 0.12% Liquid 15 milliLiter(s) Swish and Spit two times a day  chlorhexidine 2% Cloths 1 Application(s) Topical daily  clopidogrel Tablet 75 milliGRAM(s) Oral daily  dextrose 5%. 1000 milliLiter(s) IV Continuous <Continuous>  dextrose 5%. 1000 milliLiter(s) IV Continuous <Continuous>  dextrose 50% Injectable 25 Gram(s) IV Push once  dextrose 50% Injectable 12.5 Gram(s) IV Push once  dextrose 50% Injectable 25 Gram(s) IV Push once  dextrose Oral Gel 15 Gram(s) Oral once PRN  diltiazem    Tablet 30 milliGRAM(s) Oral every 6 hours  doxycycline IVPB      doxycycline IVPB 100 milliGRAM(s) IV Intermittent every 12 hours  escitalopram 10 milliGRAM(s) Oral daily  fentaNYL   Patch  25 MICROgram(s)/Hr 1 Patch Transdermal every 72 hours  glucagon  Injectable 1 milliGRAM(s) IntraMuscular once  insulin glargine Injectable (LANTUS) 5 Unit(s) SubCutaneous at bedtime  insulin lispro (ADMELOG) corrective regimen sliding scale   SubCutaneous three times a day before meals  metoprolol tartrate 50 milliGRAM(s) Oral four times a day  pantoprazole    Tablet 40 milliGRAM(s) Oral before breakfast  polyethylene glycol 3350 17 Gram(s) Oral daily  pregabalin 50 milliGRAM(s) Oral three times a day  senna 2 Tablet(s) Oral at bedtime  thyroid 15 milliGRAM(s) Oral <User Schedule>      ANTIBIOTICS:  cefTRIAXone   IVPB 2000 milliGRAM(s) IV Intermittent every 24 hours  doxycycline IVPB      doxycycline IVPB 100 milliGRAM(s) IV Intermittent every 12 hours

## 2024-12-03 NOTE — PHYSICAL THERAPY INITIAL EVALUATION ADULT - PERTINENT HX OF CURRENT PROBLEM, REHAB EVAL
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  As per patient's daughter at the bedside, patient supposed to go for mapping for radiation of the left scapula to attempt to improve her pain in that area.  About 2 months ago had a Pleurx left lung that was removed. Her heart rate has been in the 130s for a 3-4 weeks and her cardiologist Dr. Huffman who is aware of this had recently changed patient's metoprolol to 150 mg.  She also wears a fentanyl patch. She denies having a sore throat, cough, chest pain, fever, shortness of breath, abdominal pain, nausea, vomiting, diarrhea, constipation, urinary symptoms, rashes, recent travel, recent trauma, or recent sick contacts.

## 2024-12-03 NOTE — PROGRESS NOTE ADULT - SUBJECTIVE AND OBJECTIVE BOX
SUBJECTIVE/OVERNIGHT EVENTS  Today is hospital day 7d. This morning patient was seen and examined at bedside, resting comfortably in bed. No acute or major events overnight.    MEDICATIONS  STANDING MEDICATIONS  apixaban 5 milliGRAM(s) Oral every 12 hours  bacitracin   Ointment 1 Application(s) Topical two times a day  bisacodyl 5 milliGRAM(s) Oral once  cefTRIAXone   IVPB 2000 milliGRAM(s) IV Intermittent every 24 hours  chlorhexidine 0.12% Liquid 15 milliLiter(s) Swish and Spit two times a day  chlorhexidine 2% Cloths 1 Application(s) Topical daily  clopidogrel Tablet 75 milliGRAM(s) Oral daily  dextrose 5%. 1000 milliLiter(s) IV Continuous <Continuous>  dextrose 5%. 1000 milliLiter(s) IV Continuous <Continuous>  dextrose 50% Injectable 25 Gram(s) IV Push once  dextrose 50% Injectable 12.5 Gram(s) IV Push once  dextrose 50% Injectable 25 Gram(s) IV Push once  diltiazem    Tablet 30 milliGRAM(s) Oral every 6 hours  doxycycline IVPB      doxycycline IVPB 100 milliGRAM(s) IV Intermittent every 12 hours  escitalopram 10 milliGRAM(s) Oral daily  fentaNYL   Patch  25 MICROgram(s)/Hr 1 Patch Transdermal every 72 hours  glucagon  Injectable 1 milliGRAM(s) IntraMuscular once  insulin glargine Injectable (LANTUS) 5 Unit(s) SubCutaneous at bedtime  insulin lispro (ADMELOG) corrective regimen sliding scale   SubCutaneous three times a day before meals  metoprolol tartrate 50 milliGRAM(s) Oral four times a day  pantoprazole    Tablet 40 milliGRAM(s) Oral before breakfast  polyethylene glycol 3350 17 Gram(s) Oral daily  pregabalin 50 milliGRAM(s) Oral three times a day  senna 2 Tablet(s) Oral at bedtime  thyroid 15 milliGRAM(s) Oral <User Schedule>    PRN MEDICATIONS  dextrose Oral Gel 15 Gram(s) Oral once PRN    VITALS  T(F): 97.5 (12-03-24 @ 04:38), Max: 97.8 (12-02-24 @ 12:30)  HR: 116 (12-03-24 @ 04:38) (106 - 116)  BP: 117/81 (12-03-24 @ 04:38) (94/63 - 119/72)  RR: 18 (12-03-24 @ 04:38) (16 - 18)  SpO2: 94% (12-02-24 @ 11:40) (94% - 94%)  POCT Blood Glucose.: 180 mg/dL (12-03-24 @ 07:39)  POCT Blood Glucose.: 194 mg/dL (12-02-24 @ 21:04)  POCT Blood Glucose.: 166 mg/dL (12-02-24 @ 16:22)    PHYSICAL EXAM  GENERAL  ( + ) NAD, lying in bed comfortably     (  ) obtunded     (  ) lethargic     (  ) somnolent    HEAD  ( + ) Atraumatic     (  ) hematoma     (  ) laceration (specify location:       )     NECK  ( + ) Supple     (  ) neck stiffness     (  ) nuchal rigidity     (  )  no JVD     (  ) JVD present ( -- cm)    HEART  Rate -->  (  ) normal rate    (  ) bradycardic    ( + ) tachycardic  Rhythm -->  (  ) regular    (  ) regularly irregular    (  ) irregularly irregular  Murmurs -->  (  ) normal s1/s2    (  ) systolic murmur    (  ) diastolic murmur    (  ) continuous murmur     (  ) S3 present    (  ) S4 present    LUNGS  ( + )Unlabored respirations     (  ) tachypnea  ( + ) B/L air entry     (  ) decreased breath sounds in:  (location     )    (  ) no adventitious sound     (  ) crackles     (  ) wheezing      (  ) rhonchi      (specify location:       )  (  ) chest wall tenderness (specify location:       )    ABDOMEN  (+  ) Soft     (  ) tense   |   (  ) nondistended     (  ) distended   |   (  ) +BS     (  ) hypoactive bowel sounds     (  ) hyperactive bowel sounds  (  +) nontender     (  ) RUQ tenderness     (  ) RLQ tenderness     (  ) LLQ tenderness     (  ) epigastric tenderness     (  ) diffuse tenderness  (  ) Splenomegaly      (  ) Hepatomegaly      (  ) Jaundice     (  ) ecchymosis     EXTREMITIES  (+  ) Normal     (  ) Rash     (  ) ecchymosis     (  ) varicose veins      (  ) pitting edema     (  ) non-pitting edema   (  ) ulceration     (  ) gangrene:     (location:     )    NERVOUS SYSTEM  (  +) A&Ox3     (  ) confused     (  ) lethargic  CN II-XII:     (  ) Intact     (  ) focal deficits  (Specify:     )   Upper extremities:     (  ) strength X/5     (  ) focal deficit (specify:    )  Lower extremities:     (  ) strength  X/5    (  ) focal deficit (specify:    )    SKIN  ( + ) No rashes or lesions     (  ) maculopapular rash     (  ) pustules     (  ) vesicles     (  ) ulcer     (  ) ecchymosis     (specify location:     )    LABS             14.0   8.41  )-----------( 283      ( 12-03-24 @ 05:51 )             43.2     130  |  91  |  15  -------------------------<  172   12-03-24 @ 05:51  4.7  |  27  |  0.6    Ca      9.3     12-03-24 @ 05:51  Mg     2.0     12-03-24 @ 05:51    TPro  5.9  /  Alb  3.2  /  TBili  0.4  /  DBili  x   /  AST  16  /  ALT  7   /  AlkPhos  134  /  GGT  x     12-03-24 @ 05:51    Pro-Brain Natriuretic Peptide: 2371 pg/mL (12-02-24 @ 04:52)    Urinalysis Basic - ( 03 Dec 2024 05:51 )    Color: x / Appearance: x / SG: x / pH: x  Gluc: 172 mg/dL / Ketone: x  / Bili: x / Urobili: x   Blood: x / Protein: x / Nitrite: x   Leuk Esterase: x / RBC: x / WBC x   Sq Epi: x / Non Sq Epi: x / Bacteria: x    ABG - ( 01 Dec 2024 17:11 )  pH, Arterial: 7.40  pH, Blood: x     /  pCO2: 44    /  pO2: 95    / HCO3: 27    / Base Excess: 2.0   /  SaO2: 97.7      Culture - Blood (collected 01 Dec 2024 06:23)  Source: .Blood BLOOD  Preliminary Report (02 Dec 2024 12:01):    No growth at 24 hours

## 2024-12-03 NOTE — PROGRESS NOTE ADULT - ASSESSMENT
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 The pt was admitted to medicine for pneumonia.    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   #Immunocompromised   - lactate 1.8  - on 3L via NC satting at 98%  - blood culture + streptococcus lutetiensis   - daily blood cx until neg   - urine strep and legionella negative   - c/w ceftriaxone and doxycycline  - change to po Augmentin 875 mg q12h and o Doxycycline 100 mg q12h till 12/6 on dc     #epistaxis - resolved    - hb stable   - ENT consulted   - monitor H&H  - resume Eliquis 5mg BID per ENT      #CAD  #Afib   #SSS s/p PPM  #chronic tachycardia - hx of afib  #a fib RVR w/ aberrancy   - echo noted EF< 65 to 70%  - c/w metoprolol 50mg q6hr   - c/w eliquis   - c/w plavix  - per EP: device is working properly, AFL, chronic condition on Eliquis    #cancer related pain 2/2 mets  - C/w pregabalin and fentanyl patch    #anxiety  #depression  - C/w lexapro 10 once daily    #DM  - as per pt on tresiba 10-12 units at night  - c/w lantus and SS  - on jardiance 10mg once daily    #hypothyroidism  - TSH 2.48  - pt on armour thyroid (dessicated thyroid combo of t3/t4)  - resume home meds    DVT ppx: Eliquis   GI ppx: PPI   Diet- DASH/fluid restrict  Pending: dc in 24 hr  89-year-old female with a past medical history of diabetes, A-fib on Eliquis, SSS S/p PPM placement, CAD with 4 stents on Plavix, history of MI, history of pancreatic cancer status post distal pancreatectomy in August 2021, 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 The pt was admitted to medicine for pneumonia.    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   #Immunocompromised   - lactate 1.8  - on 3L via NC satting at 98%  - blood culture + streptococcus lutetiensis   - daily blood cx until neg   - urine strep and legionella negative   - c/w ceftriaxone and doxycycline  - change to po Augmentin 875 mg q12h and o Doxycycline 100 mg q12h till 12/6 on dc     #epistaxis - resolved    - hb stable   - ENT consulted   - monitor H&H  - resume Eliquis 5mg BID per ENT      #CAD  #Afib   #SSS s/p PPM  #chronic tachycardia - hx of afib  #a fib RVR w/ aberrancy   - echo noted EF< 65 to 70%  - c/w metoprolol 50mg q6hr   - c/w eliquis   - c/w plavix  - per EP: device is working properly, AFL, chronic condition on Eliquis  - c/w diltiazem 30mg q6 for now   - switch to diltiazem 60mg q8 tomorrow     #cancer related pain 2/2 mets  - C/w pregabalin and fentanyl patch    #anxiety  #depression  - C/w lexapro 10 once daily    #DM  - as per pt on tresiba 10-12 units at night  - c/w lantus and SS  - on jardiance 10mg once daily    #hypothyroidism  - TSH 2.48  - pt on armour thyroid (dessicated thyroid combo of t3/t4)  - resume home meds    DVT ppx: Eliquis   GI ppx: PPI   Diet- DASH/fluid restrict  Pending: dc in 24 hr  89-year-old female with a past medical history of diabetes, A-fib on Eliquis, SSS S/p PPM placement, CAD with 4 stents on Plavix, history of MI, history of pancreatic cancer status post distal pancreatectomy in August 2021, 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 The pt was admitted to medicine for pneumonia.    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   #Immunocompromised   - lactate 1.8  - on 3L via NC satting at 98%  - blood culture + streptococcus lutetiensis   - daily blood cx until neg   - urine strep and legionella negative   - c/w ceftriaxone and doxycycline  - change to po Augmentin 875 mg q12h and o Doxycycline 100 mg q12h till 12/6 on dc     #epistaxis - resolved    - hb stable   - ENT consulted   - monitor H&H  - resume Eliquis 5mg BID per ENT      #CAD  #Afib   #SSS s/p PPM  #chronic tachycardia - hx of afib  #a fib RVR w/ aberrancy   - echo noted EF< 65 to 70%  - c/w metoprolol 50mg q6hr   - c/w eliquis   - c/w plavix  - per EP: device is working properly, AFL, chronic condition on Eliquis  - c/w diltiazem 60mg for now   - switch to diltiazem 180mg tomorrow     #cancer related pain 2/2 mets  - C/w pregabalin and fentanyl patch    #anxiety  #depression  - C/w lexapro 10 once daily    #DM  - as per pt on tresiba 10-12 units at night  - c/w lantus and SS  - on jardiance 10mg once daily    #hypothyroidism  - TSH 2.48  - pt on armour thyroid (dessicated thyroid combo of t3/t4)  - resume home meds    DVT ppx: Eliquis   GI ppx: PPI   Diet- DASH/fluid restrict  Pending: dc in 24 hr

## 2024-12-03 NOTE — PROGRESS NOTE ADULT - ASSESSMENT
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      Sepsis present on admission  Streptococcus lutetiensis bacteremia  LLL PNA  Chronic A-fib on Eliquis  H/O CAD s/p PCI  H/O Pancreatic cancer  H/O Cholangiocarcinoma with mets to bones and lungs  Immunosuppression/Immunosenescence  Epistaxis s/p nasal packing, s/p removal                   PLAN:    ·	Pt's daughter has decided for Hospice care. Consult was called.   ·	Tele reviewed by me. Multiple episodes of wide complex of tachycardia.   ·	ECHO reviewed. EF is 65-70%. No evidence of vegetations.   ·	CT head is unremarkable   ·	Epistaxis from L nostril. S/P nasal packing removal by the ENT. Watch for bleeding  ·	Cont her Eliquis  ·	Care d/w the pt's cardiologist. No further cardiac w/u  ·	Increas Metoprolol to 50 mg po q 6h. Hold for SBP <90. Diltiazem increased to 60 mg po q 6h   ·	ECHO reviewed. EF is 65-70%  ·	CTA chest reviewed. No PE.  Since 3/11/2023 interval left lung volume loss with leftward mediastinal shift. Large consolidation in the left lung, greater in the left lower lobe.  ·	Blood cxs are grew Streptococcus lutetiensis sensitive to Ceftriaxone  ·	Repeat blood cxs from 11/30 and 12/1 are negative.   ·	ID f/u noted. On d/c switch her to Augmentin 875 mg po q 12h and Doxycycline 100 mg po q 12h until 12/6  ·	Pain control  ·	Cont her other home meds  ·	PT eval    Progress Note Handoff    Pending (specify):  Consults__Hospice_______, Tests________, Test Results_______, Other_________  Family discussion:  Diagnosis and plan of care d/w the daughter on bedside.   Disposition: Home___/SNF___/Other________/Unknown at this time________    Cali Paulson MD  Spectra: [phone]

## 2024-12-03 NOTE — PHYSICAL THERAPY INITIAL EVALUATION ADULT - GENERAL OBSERVATIONS, REHAB EVAL
Patient was seen from 8:15-8:30, 9:10-9:25 for PT IE. Patient was rec'd in semi reclined in bed, +IVL, +tele, +primafit, NAD, agreeable to participate in PT.

## 2024-12-04 LAB
ALBUMIN SERPL ELPH-MCNC: 2.9 G/DL — LOW (ref 3.5–5.2)
ALP SERPL-CCNC: 126 U/L — HIGH (ref 30–115)
ALT FLD-CCNC: 7 U/L — SIGNIFICANT CHANGE UP (ref 0–41)
ANION GAP SERPL CALC-SCNC: 12 MMOL/L — SIGNIFICANT CHANGE UP (ref 7–14)
AST SERPL-CCNC: 14 U/L — SIGNIFICANT CHANGE UP (ref 0–41)
BILIRUB SERPL-MCNC: 0.4 MG/DL — SIGNIFICANT CHANGE UP (ref 0.2–1.2)
BUN SERPL-MCNC: 18 MG/DL — SIGNIFICANT CHANGE UP (ref 10–20)
CALCIUM SERPL-MCNC: 8.8 MG/DL — SIGNIFICANT CHANGE UP (ref 8.4–10.5)
CHLORIDE SERPL-SCNC: 91 MMOL/L — LOW (ref 98–110)
CO2 SERPL-SCNC: 25 MMOL/L — SIGNIFICANT CHANGE UP (ref 17–32)
CREAT SERPL-MCNC: 0.6 MG/DL — LOW (ref 0.7–1.5)
EGFR: 85 ML/MIN/1.73M2 — SIGNIFICANT CHANGE UP
GLUCOSE BLDC GLUCOMTR-MCNC: 211 MG/DL — HIGH (ref 70–99)
GLUCOSE BLDC GLUCOMTR-MCNC: 220 MG/DL — HIGH (ref 70–99)
GLUCOSE BLDC GLUCOMTR-MCNC: 228 MG/DL — HIGH (ref 70–99)
GLUCOSE SERPL-MCNC: 217 MG/DL — HIGH (ref 70–99)
HCT VFR BLD CALC: 41.5 % — SIGNIFICANT CHANGE UP (ref 37–47)
HGB BLD-MCNC: 13.5 G/DL — SIGNIFICANT CHANGE UP (ref 12–16)
MAGNESIUM SERPL-MCNC: 1.7 MG/DL — LOW (ref 1.8–2.4)
MCHC RBC-ENTMCNC: 29.7 PG — SIGNIFICANT CHANGE UP (ref 27–31)
MCHC RBC-ENTMCNC: 32.5 G/DL — SIGNIFICANT CHANGE UP (ref 32–37)
MCV RBC AUTO: 91.4 FL — SIGNIFICANT CHANGE UP (ref 81–99)
NRBC # BLD: 0 /100 WBCS — SIGNIFICANT CHANGE UP (ref 0–0)
PLATELET # BLD AUTO: 251 K/UL — SIGNIFICANT CHANGE UP (ref 130–400)
PMV BLD: 11.2 FL — HIGH (ref 7.4–10.4)
POTASSIUM SERPL-MCNC: 5 MMOL/L — SIGNIFICANT CHANGE UP (ref 3.5–5)
POTASSIUM SERPL-SCNC: 5 MMOL/L — SIGNIFICANT CHANGE UP (ref 3.5–5)
PROT SERPL-MCNC: 5.5 G/DL — LOW (ref 6–8)
RBC # BLD: 4.54 M/UL — SIGNIFICANT CHANGE UP (ref 4.2–5.4)
RBC # FLD: 16.5 % — HIGH (ref 11.5–14.5)
SODIUM SERPL-SCNC: 128 MMOL/L — LOW (ref 135–146)
WBC # BLD: 9.84 K/UL — SIGNIFICANT CHANGE UP (ref 4.8–10.8)
WBC # FLD AUTO: 9.84 K/UL — SIGNIFICANT CHANGE UP (ref 4.8–10.8)

## 2024-12-04 PROCEDURE — 99231 SBSQ HOSP IP/OBS SF/LOW 25: CPT

## 2024-12-04 RX ADMIN — Medication 15 MILLIGRAM(S): at 07:00

## 2024-12-04 RX ADMIN — INSULIN GLARGINE 5 UNIT(S): 100 INJECTION, SOLUTION SUBCUTANEOUS at 21:21

## 2024-12-04 RX ADMIN — METOPROLOL TARTRATE 50 MILLIGRAM(S): 100 TABLET, FILM COATED ORAL at 06:06

## 2024-12-04 RX ADMIN — DILTIAZEM HYDROCHLORIDE 180 MILLIGRAM(S): 240 CAPSULE, COATED, EXTENDED RELEASE ORAL at 06:11

## 2024-12-04 RX ADMIN — APIXABAN 5 MILLIGRAM(S): 2.5 TABLET, FILM COATED ORAL at 17:01

## 2024-12-04 RX ADMIN — METOPROLOL TARTRATE 50 MILLIGRAM(S): 100 TABLET, FILM COATED ORAL at 23:56

## 2024-12-04 RX ADMIN — Medication 2: at 11:33

## 2024-12-04 RX ADMIN — DOXYCYCLINE HYCLATE 100 MILLIGRAM(S): 150 TABLET, COATED ORAL at 16:57

## 2024-12-04 RX ADMIN — Medication 1 APPLICATION(S): at 06:07

## 2024-12-04 RX ADMIN — Medication 2: at 16:50

## 2024-12-04 RX ADMIN — Medication 2: at 08:17

## 2024-12-04 RX ADMIN — CHLORHEXIDINE GLUCONATE 1 APPLICATION(S): 1.2 RINSE ORAL at 12:55

## 2024-12-04 RX ADMIN — METOPROLOL TARTRATE 50 MILLIGRAM(S): 100 TABLET, FILM COATED ORAL at 12:40

## 2024-12-04 RX ADMIN — Medication 2 TABLET(S): at 21:22

## 2024-12-04 RX ADMIN — METOPROLOL TARTRATE 50 MILLIGRAM(S): 100 TABLET, FILM COATED ORAL at 00:47

## 2024-12-04 RX ADMIN — ESCITALOPRAM OXALATE 10 MILLIGRAM(S): 10 TABLET, FILM COATED ORAL at 12:41

## 2024-12-04 RX ADMIN — POLYETHYLENE GLYCOL 3350 17 GRAM(S): 17 POWDER, FOR SOLUTION ORAL at 06:06

## 2024-12-04 RX ADMIN — POLYETHYLENE GLYCOL 3350 17 GRAM(S): 17 POWDER, FOR SOLUTION ORAL at 17:01

## 2024-12-04 RX ADMIN — Medication 25 GRAM(S): at 12:43

## 2024-12-04 RX ADMIN — Medication 1 APPLICATION(S): at 17:01

## 2024-12-04 RX ADMIN — Medication 100 MILLIGRAM(S): at 16:54

## 2024-12-04 RX ADMIN — DOXYCYCLINE HYCLATE 100 MILLIGRAM(S): 150 TABLET, COATED ORAL at 06:06

## 2024-12-04 RX ADMIN — CHLORHEXIDINE GLUCONATE 15 MILLILITER(S): 1.2 RINSE ORAL at 06:06

## 2024-12-04 RX ADMIN — CHLORHEXIDINE GLUCONATE 15 MILLILITER(S): 1.2 RINSE ORAL at 17:01

## 2024-12-04 RX ADMIN — METOPROLOL TARTRATE 50 MILLIGRAM(S): 100 TABLET, FILM COATED ORAL at 17:01

## 2024-12-04 RX ADMIN — CLOPIDOGREL 75 MILLIGRAM(S): 75 TABLET, FILM COATED ORAL at 12:41

## 2024-12-04 RX ADMIN — PANTOPRAZOLE SODIUM 40 MILLIGRAM(S): 40 TABLET, DELAYED RELEASE ORAL at 06:07

## 2024-12-04 RX ADMIN — Medication 5 MILLIGRAM(S): at 15:25

## 2024-12-04 RX ADMIN — Medication 25 GRAM(S): at 14:20

## 2024-12-04 RX ADMIN — DILTIAZEM HYDROCHLORIDE 60 MILLIGRAM(S): 240 CAPSULE, COATED, EXTENDED RELEASE ORAL at 00:47

## 2024-12-04 RX ADMIN — APIXABAN 5 MILLIGRAM(S): 2.5 TABLET, FILM COATED ORAL at 06:07

## 2024-12-04 NOTE — PROGRESS NOTE ADULT - ASSESSMENT
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 The pt was admitted to medicine for pneumonia.    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   #Immunocompromised   - lactate 1.8  - on 3L via NC satting at 98%  - blood culture + streptococcus lutetiensis   - daily blood cx until neg   - urine strep and legionella negative   - c/w ceftriaxone and doxycycline  - change to po Augmentin 875 mg q12h and o Doxycycline 100 mg q12h till 12/6 on dc     #epistaxis - resolved    - hb stable   - ENT consulted   - monitor H&H  - resume Eliquis 5mg BID per ENT      #CAD  #Afib   #SSS s/p PPM  #chronic tachycardia - hx of afib  #a fib RVR w/ aberrancy   - echo noted EF< 65 to 70%  - c/w metoprolol 50mg q6hr   - c/w eliquis   - c/w plavix  - per EP: device is working properly, AFL, chronic condition on Eliquis  - c/w diltiazem 180mg     #cancer related pain 2/2 mets  - C/w pregabalin and fentanyl patch    #anxiety  #depression  - C/w lexapro 10 once daily    #DM  - as per pt on tresiba 10-12 units at night  - c/w lantus and SS  - on jardiance 10mg once daily    #hypothyroidism  - TSH 2.48  - pt on armour thyroid (dessicated thyroid combo of t3/t4)  - resume home meds    DVT ppx: Eliquis   GI ppx: PPI   Diet- DASH/fluid restrict  Pending: awaiting hospice

## 2024-12-04 NOTE — PROGRESS NOTE ADULT - ASSESSMENT
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  As per patient's daughter at the bedside, patient supposed to go for mapping for radiation of the left scapula to attempt to improve her pain in that area.  About 2 months ago had a Pleurx left lung that was removed. Her heart rate has been in the 130s for a 3-4 weeks and her cardiologist Dr. Huffman who is aware of this had recently changed patient's metoprolol to 150 mg.  She also wears a fentanyl patch. She denies having a sore throat, cough, chest pain, fever, shortness of breath, abdominal pain, nausea, vomiting, diarrhea, constipation, urinary symptoms, rashes, recent travel, recent trauma, or recent sick contacts.    In the ED,  Vital Signs Last 24 Hrs HR: 122/m, RR: 20 / m. WBC 17    < from: CT Angio Chest PE Protocol w/ IV Cont (11.26.24 @ 03:42) >  1.  No PE  2.  CHF.  3.  Since 3/11/2023 interval left lung volume loss with leftward mediastinal shift. Large consolidation in the left lung, greater in the   left lower lobe. Given significant cancer history, images can besubmitted to treating institution to evaluate any interval changes if   clinically relevant, as component of findings may be related to treatment-related change.    IMPRESSION/RECOMMENDATIONS  Immunosuppression/Immunosenescence ( above age 60 yrs there is a exponential decline in immunity which could result in poor clinical outcomes.  Acute illness (Sepsi/ PNA ) which poses a threat to life or bodily function without treatment   Sepsis on presentation  PNA left  11/26 CT chest : Large consolidation in the left lung, greater in the left lower lobe.  12/1 CXR ; nochange c/w prior. ( Independent interpretation of test : PNA LLL  Septicemia with Sreptococci lutetiensis  11/26 BCx Streptococci lutetiensis. Independent analysis of CX results and interpretation.  11/26 ECHO no vegetations  11/29,30, 121 BCX NGTD    CBC :  ( WBC 9.8 ), ( Hg 13.5 ), CMP ( Cr 0.6  ) reviewed .   Procal 0.17 ( despite it would recommend ABx )  11/26 Urine for strep pneumonia/legionella antigen NG    diabetes  A-fib on Eliquis  SSS S/p PPM placement, CAD with 4 stents on Plavix, history of MI  Pancreatic cancer status post distal pancreatectomy in August 2021  Cholangiocarcinoma with mets to the liver status post partial liver resection, mets to the lung, and mets to the bone    -Off loading to prevent pressure sores and preventive measures to avoid aspiration  -Rocephin 2 gm iv q24h  -Doxycycline 100 mg iv q12h  -will hold on 12/6    Discussion of management/test results/antibiotic regimen  with primary medical team.  No protocol for requested medication.    Medication:    Disp Refills Start End    Tirzepatide-Weight Management 5 MG/0.5ML Solution Auto-injector 2 mL 0 5/7/2024 --    Sig - Route: Inject 5 mg into the skin every 7 days. Indications: OBESITY - Subcutaneous      Last office visit date:   Recent Visits  Date Type Provider Dept   04/09/24 Office Visit Elana Kate MD [facility]   01/22/24 Office Visit Elana Kate MD [facility]   10/16/23 Office Visit Elana Kate MD [facility]   10/09/23 Office Visit El[facility] MD [facility]   Showing recent visits within past 365 days with a meds authorizing provider and meeting all other requirements  Future Appointments  Date Type Provider Dept   07/18/24 Appointment Elana Kate MD [facility]   Showing future appointments within next 90 days with a meds authorizing provider and meeting all other requirements    Pharmacy: [id number] - [place], [place] - [address] [place] [address]    Order pended, routed to clinician for review.

## 2024-12-04 NOTE — PROGRESS NOTE ADULT - SUBJECTIVE AND OBJECTIVE BOX
SUBJECTIVE/OVERNIGHT EVENTS  Today is hospital day 8d. This morning patient was seen and examined at bedside, resting comfortably in bed. No acute or major events overnight.    MEDICATIONS  STANDING MEDICATIONS  apixaban 5 milliGRAM(s) Oral every 12 hours  bacitracin   Ointment 1 Application(s) Topical two times a day  cefTRIAXone   IVPB 2000 milliGRAM(s) IV Intermittent every 24 hours  chlorhexidine 0.12% Liquid 15 milliLiter(s) Swish and Spit two times a day  chlorhexidine 2% Cloths 1 Application(s) Topical daily  clopidogrel Tablet 75 milliGRAM(s) Oral daily  dextrose 5%. 1000 milliLiter(s) IV Continuous <Continuous>  dextrose 5%. 1000 milliLiter(s) IV Continuous <Continuous>  dextrose 50% Injectable 25 Gram(s) IV Push once  dextrose 50% Injectable 12.5 Gram(s) IV Push once  dextrose 50% Injectable 25 Gram(s) IV Push once  diltiazem    milliGRAM(s) Oral daily  doxycycline IVPB      doxycycline IVPB 100 milliGRAM(s) IV Intermittent every 12 hours  escitalopram 10 milliGRAM(s) Oral daily  fentaNYL   Patch  25 MICROgram(s)/Hr 1 Patch Transdermal every 72 hours  glucagon  Injectable 1 milliGRAM(s) IntraMuscular once  insulin glargine Injectable (LANTUS) 5 Unit(s) SubCutaneous at bedtime  insulin lispro (ADMELOG) corrective regimen sliding scale   SubCutaneous three times a day before meals  magnesium sulfate  IVPB 2 Gram(s) IV Intermittent every 2 hours  metoprolol tartrate 50 milliGRAM(s) Oral four times a day  pantoprazole    Tablet 40 milliGRAM(s) Oral before breakfast  polyethylene glycol 3350 17 Gram(s) Oral two times a day  senna 2 Tablet(s) Oral at bedtime  thyroid 15 milliGRAM(s) Oral <User Schedule>    PRN MEDICATIONS  dextrose Oral Gel 15 Gram(s) Oral once PRN    VITALS  T(F): 97.8 (12-04-24 @ 04:33), Max: 97.8 (12-03-24 @ 20:15)  HR: 91 (12-04-24 @ 04:33) (91 - 112)  BP: 127/63 (12-04-24 @ 12:57) (99/61 - 127/63)  RR: 18 (12-04-24 @ 10:00) (18 - 18)  SpO2: 97% (12-04-24 @ 10:00) (97% - 100%)  POCT Blood Glucose.: 211 mg/dL (12-04-24 @ 11:03)  POCT Blood Glucose.: 212 mg/dL (12-03-24 @ 21:17)  POCT Blood Glucose.: 199 mg/dL (12-03-24 @ 16:36)    PHYSICAL EXAM  GENERAL  ( + ) NAD, lying in bed comfortably     (  ) obtunded     (  ) lethargic     (  ) somnolent    HEAD  (+  ) Atraumatic     (  ) hematoma     (  ) laceration (specify location:       )     NECK  ( + ) Supple     (  ) neck stiffness     (  ) nuchal rigidity     (  )  no JVD     (  ) JVD present ( -- cm)    HEART  Rate -->  ( + ) normal rate    (  ) bradycardic    (  ) tachycardic  Rhythm -->  (  ) regular    (  ) regularly irregular    (  ) irregularly irregular  Murmurs -->  (  ) normal s1/s2    (  ) systolic murmur    (  ) diastolic murmur    (  ) continuous murmur     (  ) S3 present    (  ) S4 present    LUNGS  ( + )Unlabored respirations     (  ) tachypnea  ( + ) B/L air entry     (  ) decreased breath sounds in:  (location     )    (  ) no adventitious sound     (  ) crackles     (  ) wheezing      (  ) rhonchi      (specify location:       )  (  ) chest wall tenderness (specify location:       )    ABDOMEN  (+  ) Soft     (  ) tense   |   (  ) nondistended     (  ) distended   |   (  ) +BS     (  ) hypoactive bowel sounds     (  ) hyperactive bowel sounds  (  ) nontender     (  ) RUQ tenderness     (  ) RLQ tenderness     (  ) LLQ tenderness     (  ) epigastric tenderness     (  ) diffuse tenderness  (  ) Splenomegaly      (  ) Hepatomegaly      (  ) Jaundice     (  ) ecchymosis     EXTREMITIES  ( + ) Normal     (  ) Rash     (  ) ecchymosis     (  ) varicose veins      (  ) pitting edema     (  ) non-pitting edema   (  ) ulceration     (  ) gangrene:     (location:     )    NERVOUS SYSTEM  (+  ) A&Ox3     (  ) confused     (  ) lethargic  CN II-XII:     (  ) Intact     (  ) focal deficits  (Specify:     )   Upper extremities:     (  ) strength X/5     (  ) focal deficit (specify:    )  Lower extremities:     (  ) strength  X/5    (  ) focal deficit (specify:    )    SKIN  (  +) No rashes or lesions     (  ) maculopapular rash     (  ) pustules     (  ) vesicles     (  ) ulcer     (  ) ecchymosis     (specify location:     )    LABS             13.5   9.84  )-----------( 251      ( 12-04-24 @ 06:43 )             41.5     128  |  91  |  18  -------------------------<  217   12-04-24 @ 06:43  5.0  |  25  |  0.6    Ca      8.8     12-04-24 @ 06:43  Mg     1.7     12-04-24 @ 06:43    TPro  5.5  /  Alb  2.9  /  TBili  0.4  /  DBili  x   /  AST  14  /  ALT  7   /  AlkPhos  126  /  GGT  x     12-04-24 @ 06:43      Pro-Brain Natriuretic Peptide: 2371 pg/mL (12-02-24 @ 04:52)    Urinalysis Basic - ( 04 Dec 2024 06:43 )    Color: x / Appearance: x / SG: x / pH: x  Gluc: 217 mg/dL / Ketone: x  / Bili: x / Urobili: x   Blood: x / Protein: x / Nitrite: x   Leuk Esterase: x / RBC: x / WBC x   Sq Epi: x / Non Sq Epi: x / Bacteria: x    Culture - Blood (collected 02 Dec 2024 04:52)  Source: .Blood BLOOD  Preliminary Report (03 Dec 2024 15:01):    No growth at 24 hours

## 2024-12-04 NOTE — PROGRESS NOTE ADULT - SUBJECTIVE AND OBJECTIVE BOX
ANNE VILLATORO  89y Female    CHIEF COMPLAINT:    Patient is a 89y old  Female who presents with a chief complaint of Weakness (04 Dec 2024 13:26)      INTERVAL HPI/OVERNIGHT EVENTS:    Patient seen and examined. Complains that she is feeling weak and tired. Will be discharge home tomorrow under hospice care.     ROS: All other systems are negative.    Vital Signs:    T(F): 97.8 (24 @ 04:33), Max: 97.8 (24 @ 20:15)  HR: 91 (24 @ 04:33) (91 - 112)  BP: 127/63 (24 @ 12:57) (99/61 - 127/63)  RR: 18 (24 @ 10:00) (18 - 18)  SpO2: 97% (24 @ 10:00) (97% - 100%)  I&O's Summary    03 Dec 2024 07:01  -  04 Dec 2024 07:00  --------------------------------------------------------  IN: 456 mL / OUT: 200 mL / NET: 256 mL    04 Dec 2024 07:01  -  04 Dec 2024 14:42  --------------------------------------------------------  IN: 340 mL / OUT: 275 mL / NET: 65 mL      Daily     Daily Weight in k (04 Dec 2024 04:33)  CAPILLARY BLOOD GLUCOSE      POCT Blood Glucose.: 211 mg/dL (04 Dec 2024 11:03)  POCT Blood Glucose.: 212 mg/dL (03 Dec 2024 21:17)  POCT Blood Glucose.: 199 mg/dL (03 Dec 2024 16:36)      PHYSICAL EXAM:    GENERAL:  NAD  SKIN: No rashes or lesions  HENT: Atraumatic. Normocephalic. PERRL. Moist membranes.  NECK: Supple, No JVD. No lymphadenopathy.  PULMONARY: Decreased BS in L lower chest post. No wheezing. No rales  CVS: Normal S1, S2. Rate and Rhythm are regular. No murmurs.  ABDOMEN/GI: Soft, Nontender, Nondistended; BS present  EXTREMITIES: Peripheral pulses intact. No edema B/L LE.  NEUROLOGIC:  No motor or sensory deficit.  PSYCH: Alert & oriented x 3    Consultant(s) Notes Reviewed:  [x ] YES  [ ] NO  Care Discussed with Consultants/Other Providers [ x] YES  [ ] NO    EKG reviewed  Telemetry reviewed    LABS:                        13.5   9.84  )-----------( 251      ( 04 Dec 2024 06:43 )             41.5     12-04    128[L]  |  91[L]  |  18  ----------------------------<  217[H]  5.0   |  25  |  0.6[L]    Ca    8.8      04 Dec 2024 06:43  Mg     1.7     12-04    TPro  5.5[L]  /  Alb  2.9[L]  /  TBili  0.4  /  DBili  x   /  AST  14  /  ALT  7   /  AlkPhos  126[H]  12-04            Culture - Blood (collected 02 Dec 2024 04:52)  Source: .Blood BLOOD  Preliminary Report (03 Dec 2024 15:01):    No growth at 24 hours        RADIOLOGY & ADDITIONAL TESTS:      Imaging or report Personally Reviewed:  [ ] YES  [ ] NO    Medications:  Standing  apixaban 5 milliGRAM(s) Oral every 12 hours  bacitracin   Ointment 1 Application(s) Topical two times a day  bisacodyl 5 milliGRAM(s) Oral once  cefTRIAXone   IVPB 2000 milliGRAM(s) IV Intermittent every 24 hours  chlorhexidine 0.12% Liquid 15 milliLiter(s) Swish and Spit two times a day  chlorhexidine 2% Cloths 1 Application(s) Topical daily  clopidogrel Tablet 75 milliGRAM(s) Oral daily  dextrose 5%. 1000 milliLiter(s) IV Continuous <Continuous>  dextrose 5%. 1000 milliLiter(s) IV Continuous <Continuous>  dextrose 50% Injectable 25 Gram(s) IV Push once  dextrose 50% Injectable 12.5 Gram(s) IV Push once  dextrose 50% Injectable 25 Gram(s) IV Push once  diltiazem    milliGRAM(s) Oral daily  doxycycline IVPB      doxycycline IVPB 100 milliGRAM(s) IV Intermittent every 12 hours  escitalopram 10 milliGRAM(s) Oral daily  fentaNYL   Patch  25 MICROgram(s)/Hr 1 Patch Transdermal every 72 hours  glucagon  Injectable 1 milliGRAM(s) IntraMuscular once  insulin glargine Injectable (LANTUS) 5 Unit(s) SubCutaneous at bedtime  insulin lispro (ADMELOG) corrective regimen sliding scale   SubCutaneous three times a day before meals  metoprolol tartrate 50 milliGRAM(s) Oral four times a day  pantoprazole    Tablet 40 milliGRAM(s) Oral before breakfast  polyethylene glycol 3350 17 Gram(s) Oral two times a day  senna 2 Tablet(s) Oral at bedtime  thyroid 15 milliGRAM(s) Oral <User Schedule>    PRN Meds  dextrose Oral Gel 15 Gram(s) Oral once PRN      Case discussed with resident    Care discussed with pt/family

## 2024-12-04 NOTE — PROGRESS NOTE ADULT - SUBJECTIVE AND OBJECTIVE BOX
ANNE VILLATORO  89y, Female    All available historical data reviewed    OVERNIGHT EVENTS:  none    ROS:  General: Denies rigors, nightsweats  HEENT: Denies headache, rhinorrhea, sore throat, eye pain  CV: Denies CP, palpitations  PULM: Denies wheezing, hemoptysis  GI: Denies hematemesis, hematochezia, melena  : Denies discharge, hematuria  MSK: Denies arthralgias, myalgias  SKIN: Denies rash, lesions  NEURO: weakness  PSYCH: Denies depression, anxiety    VITALS:  T(F): 97.8, Max: 97.8 (12-03-24 @ 20:15)  HR: 91  BP: 127/63  RR: 18Vital Signs Last 24 Hrs  T(C): 36.6 (04 Dec 2024 04:33), Max: 36.6 (03 Dec 2024 20:15)  T(F): 97.8 (04 Dec 2024 04:33), Max: 97.8 (03 Dec 2024 20:15)  HR: 91 (04 Dec 2024 04:33) (91 - 112)  BP: 127/63 (04 Dec 2024 12:57) (99/61 - 127/63)  BP(mean): 92 (04 Dec 2024 00:49) (92 - 92)  RR: 18 (04 Dec 2024 10:00) (18 - 18)  SpO2: 97% (04 Dec 2024 10:00) (97% - 100%)    Parameters below as of 04 Dec 2024 10:00  Patient On (Oxygen Delivery Method): nasal cannula  O2 Flow (L/min): 2      TESTS & MEASUREMENTS:                        13.5   9.84  )-----------( 251      ( 04 Dec 2024 06:43 )             41.5     12-04    128[L]  |  91[L]  |  18  ----------------------------<  217[H]  5.0   |  25  |  0.6[L]    Ca    8.8      04 Dec 2024 06:43  Mg     1.7     12-04    TPro  5.5[L]  /  Alb  2.9[L]  /  TBili  0.4  /  DBili  x   /  AST  14  /  ALT  7   /  AlkPhos  126[H]  12-04    LIVER FUNCTIONS - ( 04 Dec 2024 06:43 )  Alb: 2.9 g/dL / Pro: 5.5 g/dL / ALK PHOS: 126 U/L / ALT: 7 U/L / AST: 14 U/L / GGT: x             Culture - Blood (collected 12-02-24 @ 04:52)  Source: .Blood BLOOD  Preliminary Report (12-04-24 @ 15:01):    No growth at 48 Hours    Culture - Blood (collected 12-01-24 @ 06:23)  Source: .Blood BLOOD  Preliminary Report (12-04-24 @ 12:01):    No growth at 72 Hours    Culture - Blood (collected 11-30-24 @ 04:48)  Source: .Blood BLOOD  Preliminary Report (12-03-24 @ 18:01):    No growth at 72 Hours    Culture - Blood (collected 11-29-24 @ 19:26)  Source: .Blood BLOOD  Preliminary Report (12-04-24 @ 02:00):    No growth at 4 days      Urinalysis Basic - ( 04 Dec 2024 06:43 )    Color: x / Appearance: x / SG: x / pH: x  Gluc: 217 mg/dL / Ketone: x  / Bili: x / Urobili: x   Blood: x / Protein: x / Nitrite: x   Leuk Esterase: x / RBC: x / WBC x   Sq Epi: x / Non Sq Epi: x / Bacteria: x          Social History:  Tobacco Use: No  Alcohol Use: No  Drug Use: No    RADIOLOGY & ADDITIONAL TESTS:  Personal review of radiological diagnostics performed  Echo and EKG results noted when applicable.     MEDICATIONS:  apixaban 5 milliGRAM(s) Oral every 12 hours  bacitracin   Ointment 1 Application(s) Topical two times a day  cefTRIAXone   IVPB 2000 milliGRAM(s) IV Intermittent every 24 hours  chlorhexidine 0.12% Liquid 15 milliLiter(s) Swish and Spit two times a day  chlorhexidine 2% Cloths 1 Application(s) Topical daily  clopidogrel Tablet 75 milliGRAM(s) Oral daily  dextrose 5%. 1000 milliLiter(s) IV Continuous <Continuous>  dextrose 5%. 1000 milliLiter(s) IV Continuous <Continuous>  dextrose 50% Injectable 25 Gram(s) IV Push once  dextrose 50% Injectable 12.5 Gram(s) IV Push once  dextrose 50% Injectable 25 Gram(s) IV Push once  dextrose Oral Gel 15 Gram(s) Oral once PRN  diltiazem    milliGRAM(s) Oral daily  doxycycline IVPB      doxycycline IVPB 100 milliGRAM(s) IV Intermittent every 12 hours  escitalopram 10 milliGRAM(s) Oral daily  fentaNYL   Patch  25 MICROgram(s)/Hr 1 Patch Transdermal every 72 hours  glucagon  Injectable 1 milliGRAM(s) IntraMuscular once  insulin glargine Injectable (LANTUS) 5 Unit(s) SubCutaneous at bedtime  insulin lispro (ADMELOG) corrective regimen sliding scale   SubCutaneous three times a day before meals  metoprolol tartrate 50 milliGRAM(s) Oral four times a day  pantoprazole    Tablet 40 milliGRAM(s) Oral before breakfast  polyethylene glycol 3350 17 Gram(s) Oral two times a day  senna 2 Tablet(s) Oral at bedtime  thyroid 15 milliGRAM(s) Oral <User Schedule>      ANTIBIOTICS:  cefTRIAXone   IVPB 2000 milliGRAM(s) IV Intermittent every 24 hours  doxycycline IVPB      doxycycline IVPB 100 milliGRAM(s) IV Intermittent every 12 hours

## 2024-12-04 NOTE — HOSPICE CARE NOTE - CONVESATION DETAILS
Spoke with BRENDAN Padilla regarding discharge plan. DME to be delivered tomorrow, patient can be discharged tomorrow afternoon and hospice admission set for Friday 12/6/24. Daughter Cassie aware and in agreement with plan. Patient has a 24 hr private HHA.

## 2024-12-04 NOTE — PROGRESS NOTE ADULT - ASSESSMENT
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      Sepsis present on admission  Streptococcus lutetiensis bacteremia  LLL PNA  Chronic A-fib on Eliquis  H/O CAD s/p PCI  H/O Pancreatic cancer  H/O Cholangiocarcinoma with mets to bones and lungs  Immunosuppression/Immunosenescence  Epistaxis s/p nasal packing, s/p removal                   PLAN:    ·	Pt's daughter has decided for Hospice care.  ·	Pt will be discharged home under Hospice care in AM  ·	ECHO reviewed. EF is 65-70%. No evidence of vegetations.   ·	CT head is unremarkable   ·	Epistaxis from L nostril. S/P nasal packing removal by the ENT. Watch for bleeding  ·	Cont her Eliquis  ·	Care d/w the pt's cardiologist. No further cardiac w/u  ·	Cont Metoprolol to 50 mg po q 6h. Hold for SBP <90. Cont Diltiazem  mg po daily  ·	ECHO reviewed. EF is 65-70%  ·	CTA chest reviewed. No PE.  Since 3/11/2023 interval left lung volume loss with leftward mediastinal shift. Large consolidation in the left lung, greater in the left lower lobe.  ·	Blood cxs are grew Streptococcus lutetiensis sensitive to Ceftriaxone  ·	Repeat blood cxs from 11/30 and 12/1 are negative.   ·	ID f/u noted. On d/c switch her to Augmentin 875 mg po q 12h and Doxycycline 100 mg po q 12h until 12/6  ·	Pain control  ·	Cont her other home meds    Progress Note Handoff    Pending (specify):  Consults________, Tests________, Test Results_______, Other_Anticepate d/c in AM________  Family discussion:  Diagnosis and plan of care d/w the daughter on bedside.   Disposition: Home___/SNF___/Other________/Unknown at this time________    Cali Paulson MD  Spectra: [phone]

## 2024-12-05 ENCOUNTER — TRANSCRIPTION ENCOUNTER (OUTPATIENT)
Age: 88
End: 2024-12-05

## 2024-12-05 VITALS
DIASTOLIC BLOOD PRESSURE: 83 MMHG | HEART RATE: 66 BPM | SYSTOLIC BLOOD PRESSURE: 118 MMHG | TEMPERATURE: 98 F | RESPIRATION RATE: 18 BRPM

## 2024-12-05 LAB
ALBUMIN SERPL ELPH-MCNC: 3.1 G/DL — LOW (ref 3.5–5.2)
ALP SERPL-CCNC: 133 U/L — HIGH (ref 30–115)
ALT FLD-CCNC: 5 U/L — SIGNIFICANT CHANGE UP (ref 0–41)
ANION GAP SERPL CALC-SCNC: 9 MMOL/L — SIGNIFICANT CHANGE UP (ref 7–14)
AST SERPL-CCNC: 14 U/L — SIGNIFICANT CHANGE UP (ref 0–41)
BILIRUB SERPL-MCNC: 0.4 MG/DL — SIGNIFICANT CHANGE UP (ref 0.2–1.2)
BUN SERPL-MCNC: 18 MG/DL — SIGNIFICANT CHANGE UP (ref 10–20)
CALCIUM SERPL-MCNC: 8.9 MG/DL — SIGNIFICANT CHANGE UP (ref 8.4–10.5)
CHLORIDE SERPL-SCNC: 90 MMOL/L — LOW (ref 98–110)
CO2 SERPL-SCNC: 26 MMOL/L — SIGNIFICANT CHANGE UP (ref 17–32)
CREAT SERPL-MCNC: 0.6 MG/DL — LOW (ref 0.7–1.5)
CULTURE RESULTS: SIGNIFICANT CHANGE UP
CULTURE RESULTS: SIGNIFICANT CHANGE UP
EGFR: 85 ML/MIN/1.73M2 — SIGNIFICANT CHANGE UP
GLUCOSE BLDC GLUCOMTR-MCNC: 220 MG/DL — HIGH (ref 70–99)
GLUCOSE BLDC GLUCOMTR-MCNC: 222 MG/DL — HIGH (ref 70–99)
GLUCOSE BLDC GLUCOMTR-MCNC: 246 MG/DL — HIGH (ref 70–99)
GLUCOSE SERPL-MCNC: 198 MG/DL — HIGH (ref 70–99)
HCT VFR BLD CALC: 40.1 % — SIGNIFICANT CHANGE UP (ref 37–47)
HCT VFR BLD CALC: 42.4 % — SIGNIFICANT CHANGE UP (ref 37–47)
HGB BLD-MCNC: 13.5 G/DL — SIGNIFICANT CHANGE UP (ref 12–16)
HGB BLD-MCNC: 13.9 G/DL — SIGNIFICANT CHANGE UP (ref 12–16)
MAGNESIUM SERPL-MCNC: 1.9 MG/DL — SIGNIFICANT CHANGE UP (ref 1.8–2.4)
MCHC RBC-ENTMCNC: 29.7 PG — SIGNIFICANT CHANGE UP (ref 27–31)
MCHC RBC-ENTMCNC: 30.4 PG — SIGNIFICANT CHANGE UP (ref 27–31)
MCHC RBC-ENTMCNC: 32.8 G/DL — SIGNIFICANT CHANGE UP (ref 32–37)
MCHC RBC-ENTMCNC: 33.7 G/DL — SIGNIFICANT CHANGE UP (ref 32–37)
MCV RBC AUTO: 90.3 FL — SIGNIFICANT CHANGE UP (ref 81–99)
MCV RBC AUTO: 90.6 FL — SIGNIFICANT CHANGE UP (ref 81–99)
NRBC # BLD: 0 /100 WBCS — SIGNIFICANT CHANGE UP (ref 0–0)
NRBC # BLD: 0 /100 WBCS — SIGNIFICANT CHANGE UP (ref 0–0)
PLATELET # BLD AUTO: 275 K/UL — SIGNIFICANT CHANGE UP (ref 130–400)
PLATELET # BLD AUTO: 278 K/UL — SIGNIFICANT CHANGE UP (ref 130–400)
PMV BLD: 10.7 FL — HIGH (ref 7.4–10.4)
PMV BLD: 11.1 FL — HIGH (ref 7.4–10.4)
POTASSIUM SERPL-MCNC: 4.7 MMOL/L — SIGNIFICANT CHANGE UP (ref 3.5–5)
POTASSIUM SERPL-SCNC: 4.7 MMOL/L — SIGNIFICANT CHANGE UP (ref 3.5–5)
PROT SERPL-MCNC: 5.6 G/DL — LOW (ref 6–8)
RBC # BLD: 4.44 M/UL — SIGNIFICANT CHANGE UP (ref 4.2–5.4)
RBC # BLD: 4.68 M/UL — SIGNIFICANT CHANGE UP (ref 4.2–5.4)
RBC # FLD: 16.3 % — HIGH (ref 11.5–14.5)
RBC # FLD: 16.4 % — HIGH (ref 11.5–14.5)
SODIUM SERPL-SCNC: 125 MMOL/L — LOW (ref 135–146)
SPECIMEN SOURCE: SIGNIFICANT CHANGE UP
SPECIMEN SOURCE: SIGNIFICANT CHANGE UP
WBC # BLD: 11.68 K/UL — HIGH (ref 4.8–10.8)
WBC # BLD: 14.42 K/UL — HIGH (ref 4.8–10.8)
WBC # FLD AUTO: 11.68 K/UL — HIGH (ref 4.8–10.8)
WBC # FLD AUTO: 14.42 K/UL — HIGH (ref 4.8–10.8)

## 2024-12-05 PROCEDURE — 99239 HOSP IP/OBS DSCHRG MGMT >30: CPT

## 2024-12-05 RX ORDER — DILTIAZEM HYDROCHLORIDE 240 MG/1
1 CAPSULE, COATED, EXTENDED RELEASE ORAL
Refills: 0
Start: 2024-12-05

## 2024-12-05 RX ORDER — DILTIAZEM HYDROCHLORIDE 240 MG/1
1 CAPSULE, COATED, EXTENDED RELEASE ORAL
Refills: 0 | DISCHARGE
Start: 2024-12-05

## 2024-12-05 RX ORDER — LACTULOSE 10 G/15ML
10 SOLUTION ORAL ONCE
Refills: 0 | Status: COMPLETED | OUTPATIENT
Start: 2024-12-05 | End: 2024-12-05

## 2024-12-05 RX ORDER — METOPROLOL TARTRATE 100 MG/1
1 TABLET, FILM COATED ORAL
Qty: 30 | Refills: 3
Start: 2024-12-05 | End: 2025-04-03

## 2024-12-05 RX ORDER — SENNOSIDES 8.6 MG
2 TABLET ORAL
Qty: 0 | Refills: 0 | DISCHARGE
Start: 2024-12-05

## 2024-12-05 RX ORDER — METOPROLOL TARTRATE 100 MG/1
1 TABLET, FILM COATED ORAL
Refills: 0 | DISCHARGE

## 2024-12-05 RX ORDER — DILTIAZEM HYDROCHLORIDE 240 MG/1
1 CAPSULE, COATED, EXTENDED RELEASE ORAL
Qty: 30 | Refills: 1
Start: 2024-12-05 | End: 2025-02-02

## 2024-12-05 RX ORDER — DOXYCYCLINE HYCLATE 150 MG/1
1 TABLET, COATED ORAL
Qty: 2 | Refills: 0
Start: 2024-12-05 | End: 2024-12-05

## 2024-12-05 RX ORDER — POLYETHYLENE GLYCOL 3350 17 G/17G
17 POWDER, FOR SOLUTION ORAL
Qty: 0 | Refills: 0 | DISCHARGE
Start: 2024-12-05

## 2024-12-05 RX ORDER — AMOXICILLIN/POTASSIUM CLAV 250-125 MG
875 TABLET ORAL
Qty: 2 | Refills: 0
Start: 2024-12-05 | End: 2024-12-05

## 2024-12-05 RX ADMIN — METOPROLOL TARTRATE 50 MILLIGRAM(S): 100 TABLET, FILM COATED ORAL at 12:20

## 2024-12-05 RX ADMIN — DILTIAZEM HYDROCHLORIDE 180 MILLIGRAM(S): 240 CAPSULE, COATED, EXTENDED RELEASE ORAL at 05:58

## 2024-12-05 RX ADMIN — CLOPIDOGREL 75 MILLIGRAM(S): 75 TABLET, FILM COATED ORAL at 12:20

## 2024-12-05 RX ADMIN — DOXYCYCLINE HYCLATE 100 MILLIGRAM(S): 150 TABLET, COATED ORAL at 06:00

## 2024-12-05 RX ADMIN — APIXABAN 5 MILLIGRAM(S): 2.5 TABLET, FILM COATED ORAL at 05:58

## 2024-12-05 RX ADMIN — Medication 15 MILLIGRAM(S): at 06:08

## 2024-12-05 RX ADMIN — METOPROLOL TARTRATE 50 MILLIGRAM(S): 100 TABLET, FILM COATED ORAL at 05:58

## 2024-12-05 RX ADMIN — Medication 2: at 07:47

## 2024-12-05 RX ADMIN — Medication 2: at 16:40

## 2024-12-05 RX ADMIN — APIXABAN 5 MILLIGRAM(S): 2.5 TABLET, FILM COATED ORAL at 18:20

## 2024-12-05 RX ADMIN — METOPROLOL TARTRATE 50 MILLIGRAM(S): 100 TABLET, FILM COATED ORAL at 18:20

## 2024-12-05 RX ADMIN — PANTOPRAZOLE SODIUM 40 MILLIGRAM(S): 40 TABLET, DELAYED RELEASE ORAL at 05:58

## 2024-12-05 RX ADMIN — Medication 1 APPLICATION(S): at 05:58

## 2024-12-05 RX ADMIN — POLYETHYLENE GLYCOL 3350 17 GRAM(S): 17 POWDER, FOR SOLUTION ORAL at 05:58

## 2024-12-05 RX ADMIN — ESCITALOPRAM OXALATE 10 MILLIGRAM(S): 10 TABLET, FILM COATED ORAL at 12:20

## 2024-12-05 NOTE — PROGRESS NOTE ADULT - EYES
PERRL/EOMI/conjunctiva clear/normal

## 2024-12-05 NOTE — DISCHARGE NOTE PROVIDER - NSDCFUSCHEDAPPT_GEN_ALL_CORE_FT
Amber Schmidt  [facility]  [facility] [address] [facility]  Scheduled Appointment: 01/06/2025

## 2024-12-05 NOTE — PROGRESS NOTE ADULT - SUBJECTIVE AND OBJECTIVE BOX
ANNE VILLATORO  89y Female    CHIEF COMPLAINT:    Patient is a 89y old  Female who presents with a chief complaint of Weakness (05 Dec 2024 09:58)      INTERVAL HPI/OVERNIGHT EVENTS:    Patient seen and examined. Feels better today. No new complaint.     ROS: All other systems are negative.    Vital Signs:    T(F): 97.9 (24 @ 04:26), Max: 97.9 (24 @ 04:26)  HR: 86 (24 @ 04:26) (86 - 106)  BP: 125/82 (24 @ 04:26) (111/76 - 141/88)  RR: 18 (24 @ 04:26) (18 - 18)  SpO2: 98% (24 @ 20:28) (98% - 98%)  I&O's Summary    04 Dec 2024 07:01  -  05 Dec 2024 07:00  --------------------------------------------------------  IN: 1094 mL / OUT: 475 mL / NET: 619 mL      Daily     Daily Weight in k (05 Dec 2024 04:26)  CAPILLARY BLOOD GLUCOSE      POCT Blood Glucose.: 220 mg/dL (05 Dec 2024 07:33)  POCT Blood Glucose.: 220 mg/dL (04 Dec 2024 21:16)  POCT Blood Glucose.: 228 mg/dL (04 Dec 2024 16:17)  POCT Blood Glucose.: 211 mg/dL (04 Dec 2024 11:03)      PHYSICAL EXAM:    GENERAL:  NAD  SKIN: No rashes or lesions  HENT: Atraumatic. Normocephalic. PERRL. Moist membranes.  NECK: Supple, No JVD. No lymphadenopathy.  PULMONARY: Decreased Bs in L lower chest post. No wheezing. No rales  CVS: Normal S1, S2. Rate and Rhythm are regular. No murmurs.  ABDOMEN/GI: Soft, Nontender, Nondistended; BS present  EXTREMITIES: Peripheral pulses intact. No edema B/L LE.  NEUROLOGIC:  No motor or sensory deficit.  PSYCH: Alert & oriented x 3    Consultant(s) Notes Reviewed:  [x ] YES  [ ] NO  Care Discussed with Consultants/Other Providers [ x] YES  [ ] NO    EKG reviewed  Telemetry reviewed    LABS:                        13.9   11.68 )-----------( 275      ( 05 Dec 2024 06:23 )             42.4         125[L]  |  90[L]  |  18  ----------------------------<  198[H]  4.7   |  26  |  0.6[L]    Ca    8.9      05 Dec 2024 06:23  Mg     1.9         TPro  5.6[L]  /  Alb  3.1[L]  /  TBili  0.4  /  DBili  x   /  AST  14  /  ALT  5   /  AlkPhos  133[H]              Culture - Blood (collected 03 Dec 2024 05:51)  Source: .Blood BLOOD  Preliminary Report (04 Dec 2024 16:01):    No growth at 24 hours        RADIOLOGY & ADDITIONAL TESTS:      Imaging or report Personally Reviewed:  [ ] YES  [ ] NO    Medications:  Standing  apixaban 5 milliGRAM(s) Oral every 12 hours  bacitracin   Ointment 1 Application(s) Topical two times a day  chlorhexidine 0.12% Liquid 15 milliLiter(s) Swish and Spit two times a day  chlorhexidine 2% Cloths 1 Application(s) Topical daily  clopidogrel Tablet 75 milliGRAM(s) Oral daily  dextrose 5%. 1000 milliLiter(s) IV Continuous <Continuous>  dextrose 5%. 1000 milliLiter(s) IV Continuous <Continuous>  dextrose 50% Injectable 25 Gram(s) IV Push once  dextrose 50% Injectable 12.5 Gram(s) IV Push once  dextrose 50% Injectable 25 Gram(s) IV Push once  diltiazem    milliGRAM(s) Oral daily  doxycycline IVPB      doxycycline IVPB 100 milliGRAM(s) IV Intermittent every 12 hours  escitalopram 10 milliGRAM(s) Oral daily  fentaNYL   Patch  25 MICROgram(s)/Hr 1 Patch Transdermal every 72 hours  glucagon  Injectable 1 milliGRAM(s) IntraMuscular once  insulin glargine Injectable (LANTUS) 5 Unit(s) SubCutaneous at bedtime  insulin lispro (ADMELOG) corrective regimen sliding scale   SubCutaneous three times a day before meals  metoprolol tartrate 50 milliGRAM(s) Oral four times a day  pantoprazole    Tablet 40 milliGRAM(s) Oral before breakfast  polyethylene glycol 3350 17 Gram(s) Oral two times a day  senna 2 Tablet(s) Oral at bedtime  thyroid 15 milliGRAM(s) Oral <User Schedule>    PRN Meds  dextrose Oral Gel 15 Gram(s) Oral once PRN      Case discussed with resident    Care discussed with pt/family

## 2024-12-05 NOTE — PROGRESS NOTE ADULT - TIME BILLING
I have personally seen and examined this patient. I have reviewed all pertinent clinical information and reviewed all relevant imaging ( and noted the impression from the Radiologist ) and diagnostic studies personally. I counseled the patient about the diagnostic testing and treatment plan. I discussed my recommendations with the primary team.
I have personally seen and examined this patient. I have reviewed all pertinent clinical information and reviewed all relevant imaging ( and noted the impression from the Radiologist ) and diagnostic studies personally. I counseled the patient about the diagnostic testing and treatment plan. I discussed my recommendations with the primary team.
Patient seen at bedside, time spent evaluating and treating the patient's acute illness as well as time spent reviewing labs, radiology, discussing with patient and/or patient's family and discussing the case with a multidisciplinary team.
-I independently reviewed the completed labs ( CBC, CMP, cultures  along with sensitivities ) and imaging (CT/CXR as available with independent interpretation )  -My assessment required an independent historian  -I discussed my diagnostic/therapeutic recommendations with the primary team housestaff/Attending  -I assisted in the decision regarding continued need for hospitalization / or escalation of care as needed.
-I independently reviewed the completed labs ( CBC, CMP, cultures  along with sensitivities ) and imaging (CT/CXR as available with independent interpretation )  -My assessment required an independent historian  -I discussed my diagnostic/therapeutic recommendations with the primary team housestaff/Attending  -I assisted in the decision regarding continued need for hospitalization / or escalation of care as needed.

## 2024-12-05 NOTE — DISCHARGE NOTE PROVIDER - NSDCMRMEDTOKEN_GEN_ALL_CORE_FT
[place] Thyroid 60 mg oral tablet: 1 tab(s) orally once a day  dexAMETHasone 2 mg oral tablet: 1 tab(s) orally once a day  Eliquis 5 mg oral tablet: 1 tab(s) orally 2 times a day  fentaNYL 25 mcg/hr transdermal film, extended release: 1 patch transdermally every 3 days  Jardiance 10 mg oral tablet: 1 tab(s) orally once a day  Lexapro 10 mg oral tablet: 1 tab(s) orally once a day  metoprolol tartrate 50 mg oral tablet: 1 tab(s) orally 3 times a day  pantoprazole 40 mg oral delayed release tablet: 1 tab(s) orally once a day  Plavix 75 mg oral tablet: 1 tab(s) orally once a day  pregabalin 50 mg oral capsule: 1 cap(s) orally 3 times a day  Tresiba 100 units/mL subcutaneous solution: 10 international unit(s) subcutaneous once a day (at bedtime)   amoxicillin-clavulanate 875 mg-125 mg oral tablet: 875 milligram(s) orally 2 times a day  [place] Thyroid 60 mg oral tablet: 1 tab(s) orally once a day  dexAMETHasone 2 mg oral tablet: 1 tab(s) orally once a day  dilTIAZem 180 mg/24 hours oral capsule, extended release: 1 cap(s) orally once a day  doxycycline monohydrate 100 mg oral tablet: 1 tab(s) orally 2 times a day  Eliquis 5 mg oral tablet: 1 tab(s) orally 2 times a day  fentaNYL 25 mcg/hr transdermal film, extended release: 1 patch transdermally every 3 days  Jardiance 10 mg oral tablet: 1 tab(s) orally once a day  Lexapro 10 mg oral tablet: 1 tab(s) orally once a day  metoprolol succinate 200 mg oral tablet, extended release: 1 tab(s) orally once a day  pantoprazole 40 mg oral delayed release tablet: 1 tab(s) orally once a day  Plavix 75 mg oral tablet: 1 tab(s) orally once a day  polyethylene glycol 3350 oral powder for reconstitution: 17 gram(s) orally 2 times a day  pregabalin 50 mg oral capsule: 1 cap(s) orally 3 times a day  senna leaf extract oral tablet: 2 tab(s) orally once a day (at bedtime)  Tresiba 100 units/mL subcutaneous solution: 10 international unit(s) subcutaneous once a day (at bedtime)

## 2024-12-05 NOTE — DISCHARGE NOTE NURSING/CASE MANAGEMENT/SOCIAL WORK - NSDCPEELIQUIS_GEN_ALL_CORE
Apixaban/Eliquis - Compliance/Apixaban/Eliquis - Dietary Advice/Apixaban/Eliquis - Follow up monitoring/Apixaban/Eliquis - Potential for adverse drug reactions and interactions Yes

## 2024-12-05 NOTE — PROGRESS NOTE ADULT - NEUROLOGICAL
responds to pain/responds to verbal commands
responds to pain/responds to verbal commands
normal/cranial nerves II-XII intact/sensation intact
responds to pain/responds to verbal commands
normal/cranial nerves II-XII intact/sensation intact

## 2024-12-05 NOTE — DISCHARGE NOTE NURSING/CASE MANAGEMENT/SOCIAL WORK - PATIENT PORTAL LINK FT
You can access the FollowMyHealth Patient Portal offered by [facility] by registering at the following website: [email or web address]. By joining [facility]’s FollowMyHealth portal, you will also be able to view your health information using other applications (apps) compatible with our system.

## 2024-12-05 NOTE — PROGRESS NOTE ADULT - ASSESSMENT
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  As per patient's daughter at the bedside, patient supposed to go for mapping for radiation of the left scapula to attempt to improve her pain in that area.  About 2 months ago had a Pleurx left lung that was removed. Her heart rate has been in the 130s for a 3-4 weeks and her cardiologist Dr. Huffman who is aware of this had recently changed patient's metoprolol to 150 mg.  She also wears a fentanyl patch. She denies having a sore throat, cough, chest pain, fever, shortness of breath, abdominal pain, nausea, vomiting, diarrhea, constipation, urinary symptoms, rashes, recent travel, recent trauma, or recent sick contacts.    In the ED,  Vital Signs Last 24 Hrs HR: 122/m, RR: 20 / m. WBC 17    < from: CT Angio Chest PE Protocol w/ IV Cont (11.26.24 @ 03:42) >  1.  No PE  2.  CHF.  3.  Since 3/11/2023 interval left lung volume loss with leftward mediastinal shift. Large consolidation in the left lung, greater in the   left lower lobe. Given significant cancer history, images can besubmitted to treating institution to evaluate any interval changes if   clinically relevant, as component of findings may be related to treatment-related change.    IMPRESSION/RECOMMENDATIONS  Immunosuppression/Immunosenescence ( above age 60 yrs there is a exponential decline in immunity which could result in poor clinical outcomes.  Acute illness (Sepsi/ PNA ) which poses a threat to life or bodily function without treatment   Sepsis on presentation : resolved  PNA left  11/26 CT chest : Large consolidation in the left lung, greater in the left lower lobe.  12/1 CXR ; nochange c/w prior. ( Independent interpretation of test : PNA LLL  Septicemia with Sreptococci lutetiensis  11/26 BCx Streptococci lutetiensis. Independent analysis of CX results and interpretation.  11/26 ECHO no vegetations  11/29,30, 12/1,2,3  BCX NGTD    CBC :  ( WBC 11.6 ), ( Hg 13.9 ), CMP ( Cr 0.6  ) reviewed .   Procal 0.17 ( despite it would recommend ABx )  11/26 Urine for strep pneumonia/legionella antigen NG    diabetes  A-fib on Eliquis  SSS S/p PPM placement, CAD with 4 stents on Plavix, history of MI  Pancreatic cancer status post distal pancreatectomy in August 2021  Cholangiocarcinoma with mets to the liver status post partial liver resection, mets to the lung, and mets to the bone    -Off loading to prevent pressure sores and preventive measures to avoid aspiration  -Rocephin 2 gm iv q24h  -Doxycycline 100 mg iv q12h  -will hold on 12/6    Discussion of management/test results/antibiotic regimen  with primary medical team.

## 2024-12-05 NOTE — PROGRESS NOTE ADULT - MUSCULOSKELETAL
no calf tenderness/no chest wall tenderness/decreased strength
no calf tenderness/no strength/no chest wall tenderness
no calf tenderness/no chest wall tenderness/decreased strength
no calf tenderness/no strength/no chest wall tenderness
no calf tenderness/no strength/no chest wall tenderness

## 2024-12-05 NOTE — DISCHARGE NOTE NURSING/CASE MANAGEMENT/SOCIAL WORK - FINANCIAL ASSISTANCE
[facility] provides services at a reduced cost to those who are determined to be eligible through [facility]’s financial assistance program. Information regarding [facility]’s financial assistance program can be found by going to [email or web address] or by calling [phone].

## 2024-12-05 NOTE — PROGRESS NOTE ADULT - GASTROINTESTINAL
normal/soft/nontender/nondistended/normal active bowel sounds
normal/soft/nontender/nondistended/normal active bowel sounds
soft/nontender/no guarding/no rigidity
normal/soft/nontender/nondistended/normal active bowel sounds
normal/soft/nontender/nondistended/normal active bowel sounds

## 2024-12-05 NOTE — DISCHARGE NOTE NURSING/CASE MANAGEMENT/SOCIAL WORK - NSFLUVACAGEDISCH_IMM_ALL_CORE
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         Review of Systems  Anesthesia Hx:  No problems with previous Anesthesia Denies Hx of Anesthetic complications    Social:  Non-Smoker    Hematology/Oncology:  Hematology Normal        Cardiovascular:   Exercise tolerance: good Dysrhythmias atrial fibrillation    Musculoskeletal:   Arthritis     Neurological:   Neuromuscular Disease, (cervical radiculopathy)        Physical Exam  General:  Obesity    Airway/Jaw/Neck:  Airway Findings: Mouth Opening: Normal Tongue: Normal  General Airway Assessment: Adult  Mallampati: II  TM Distance: Normal, at least 6 cm       Chest/Lungs:  Chest/Lungs Findings: Clear to auscultation, Normal Respiratory Rate     Heart/Vascular:  Heart Findings: Rate: Normal  Rhythm: Regular Rhythm  Sounds: Normal        Mental Status:  Mental Status Findings:  Alert and Oriented, Cooperative         Anesthesia Plan  Type of Anesthesia, risks & benefits discussed:  Anesthesia Type:  MAC  Patient's Preference: MAC  Intra-op Monitoring Plan: standard ASA monitors  Intra-op Monitoring Plan Comments:   Post Op Pain Control Plan: multimodal analgesia  Post Op Pain Control Plan Comments:   Induction:   IV  Beta Blocker:  Patient is on a Beta-Blocker and has received one dose within the past 24 hours (No further documentation required).       Informed Consent: Patient understands risks and agrees with Anesthesia plan.  Questions answered. Anesthesia consent signed with patient.  ASA Score: 2     Day of Surgery Review of History & Physical: I have interviewed and examined the patient. I have reviewed the patient's H&P dated:    H&P update referred to the provider.         Ready For Surgery From Anesthesia Perspective.       
Adult

## 2024-12-05 NOTE — DISCHARGE NOTE NURSING/CASE MANAGEMENT/SOCIAL WORK - NSDCVIVACCINE_GEN_ALL_CORE_FT
Tdap; 11-Mar-2023 07:42; Dion Potts (MARCO A); [employer]; [id number]   (Exp. Date: 15-Feb-2025); IntraMuscular; Deltoid Left.; 0.5 milliLiter(s); VIS (VIS Published: 09-May-2013, VIS Presented: 11-Mar-2023);

## 2024-12-05 NOTE — PROGRESS NOTE ADULT - ASSESSMENT
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 Fluid restriction to 1.5 L/D.   ·	ECHO reviewed. EF is 65-70%. No evidence of vegetations.   ·	CT head is unremarkable   ·	Epistaxis from L nostril. S/P nasal packing removal by the ENT. Watch for bleeding  ·	Cont her Eliquis  ·	Care d/w the pt's cardiologist. No further cardiac w/u  ·	Cont Metoprolol to 50 mg po q 6h. Hold for SBP <90. Cont Diltiazem  mg po daily  ·	ECHO reviewed. EF is 65-70%  ·	CTA chest reviewed. No PE.  Since 3/11/2023 interval left lung volume loss with leftward mediastinal shift. Large consolidation in the left lung, greater in the left lower lobe.  ·	Blood cxs are grew Streptococcus lutetiensis sensitive to Ceftriaxone  ·	Repeat blood cxs from 11/30 and 12/1 are negative.   ·	ID f/u noted. On d/c switch her to Augmentin 875 mg po q 12h and Doxycycline 100 mg po q 12h until 12/6  ·	Pain control  ·	Cont her other home meds    * Med rec reviewed. Plan of care d/w the daughter. Time spent 37 minutes.

## 2024-12-05 NOTE — DISCHARGE NOTE PROVIDER - NSDCCPCAREPLAN_GEN_ALL_CORE_FT
PRINCIPAL DISCHARGE DIAGNOSIS  Diagnosis: Weakness  Assessment and Plan of Treatment: You were hospitalized because you had a lung infection (pneumonia) that made it hard to breathe and led to a more serious infection in your bloodstream called sepsis. We identified the bacteria causing the infection and started you on IV antibiotics. The infection is clearing up, and we're switching you to antibiotics you can take by mouth. You will take Augmentin 2 times a days and Doxycyclin 2 times a day for 1 more day (until 12/6)   You also have a history of heart issues, including an irregular heartbeat and a pacemaker. We checked your heart, and your pacemaker is working properly. Your heart is pumping a little less effectively than ideal. You're on medications to help regulate your heart rate and prevent blood clots.  Given your stage 4 cholangiocarcinoma that has spread to your lungs, your daughter has decided that hospice care is the best path forward. Hospice focuses on comfort and quality of life.  Lastly, you have a low sodium level, so it's important to limit your fluids to 1.5 liters a day.   After discharge please follow up with your PCP.      SECONDARY DISCHARGE DIAGNOSES  Diagnosis: Leukocytosis  Assessment and Plan of Treatment:     Diagnosis: Hypoxia  Assessment and Plan of Treatment:     Diagnosis: Pleural effusion, right  Assessment and Plan of Treatment:     Diagnosis: Sinus tachycardia  Assessment and Plan of Treatment:

## 2024-12-05 NOTE — PROGRESS NOTE ADULT - SUBJECTIVE AND OBJECTIVE BOX
ANNE VILLATORO  89y, Female    All available historical data reviewed    OVERNIGHT EVENTS:  feels well and has no new complaints  No fevers  nc 2 lit    ROS:  General: Denies rigors, nightsweats  HEENT: Denies headache, rhinorrhea, sore throat, eye pain  CV: Denies CP, palpitations  PULM: Denies wheezing, hemoptysis  GI: Denies hematemesis, hematochezia, melena  : Denies discharge, hematuria  MSK: Denies arthralgias, myalgias  SKIN: Denies rash, lesions  NEURO: weakness  PSYCH: Denies depression, anxiety    VITALS:  T(F): 97.9, Max: 97.9 (12-05-24 @ 04:26)  HR: 86  BP: 125/82  RR: 18Vital Signs Last 24 Hrs  T(C): 36.6 (05 Dec 2024 04:26), Max: 36.6 (05 Dec 2024 04:26)  T(F): 97.9 (05 Dec 2024 04:26), Max: 97.9 (05 Dec 2024 04:26)  HR: 86 (05 Dec 2024 04:26) (86 - 106)  BP: 125/82 (05 Dec 2024 04:26) (111/76 - 141/88)  BP(mean): 105 (04 Dec 2024 23:32) (105 - 105)  RR: 18 (05 Dec 2024 04:26) (18 - 18)  SpO2: 98% (04 Dec 2024 20:28) (97% - 98%)    Parameters below as of 04 Dec 2024 20:28  Patient On (Oxygen Delivery Method): nasal cannula  O2 Flow (L/min): 2      TESTS & MEASUREMENTS:                        13.9   11.68 )-----------( 275      ( 05 Dec 2024 06:23 )             42.4     12-05    125[L]  |  90[L]  |  18  ----------------------------<  198[H]  4.7   |  26  |  0.6[L]    Ca    8.9      05 Dec 2024 06:23  Mg     1.9     12-05    TPro  5.6[L]  /  Alb  3.1[L]  /  TBili  0.4  /  DBili  x   /  AST  14  /  ALT  5   /  AlkPhos  133[H]  12-05    LIVER FUNCTIONS - ( 05 Dec 2024 06:23 )  Alb: 3.1 g/dL / Pro: 5.6 g/dL / ALK PHOS: 133 U/L / ALT: 5 U/L / AST: 14 U/L / GGT: x             Culture - Blood (collected 12-03-24 @ 05:51)  Source: .Blood BLOOD  Preliminary Report (12-04-24 @ 16:01):    No growth at 24 hours    Culture - Blood (collected 12-02-24 @ 04:52)  Source: .Blood BLOOD  Preliminary Report (12-04-24 @ 15:01):    No growth at 48 Hours    Culture - Blood (collected 12-01-24 @ 06:23)  Source: .Blood BLOOD  Preliminary Report (12-04-24 @ 12:01):    No growth at 72 Hours    Culture - Blood (collected 11-30-24 @ 04:48)  Source: .Blood BLOOD  Preliminary Report (12-04-24 @ 18:01):    No growth at 4 days    Culture - Blood (collected 11-29-24 @ 19:26)  Source: .Blood BLOOD  Final Report (12-05-24 @ 02:00):    No growth at 5 days      Urinalysis Basic - ( 05 Dec 2024 06:23 )    Color: x / Appearance: x / SG: x / pH: x  Gluc: 198 mg/dL / Ketone: x  / Bili: x / Urobili: x   Blood: x / Protein: x / Nitrite: x   Leuk Esterase: x / RBC: x / WBC x   Sq Epi: x / Non Sq Epi: x / Bacteria: x          Social History:  Tobacco Use: No  Alcohol Use: No  Drug Use: No    RADIOLOGY & ADDITIONAL TESTS:  Personal review of radiological diagnostics performed  Echo and EKG results noted when applicable.     MEDICATIONS:  apixaban 5 milliGRAM(s) Oral every 12 hours  bacitracin   Ointment 1 Application(s) Topical two times a day  chlorhexidine 0.12% Liquid 15 milliLiter(s) Swish and Spit two times a day  chlorhexidine 2% Cloths 1 Application(s) Topical daily  clopidogrel Tablet 75 milliGRAM(s) Oral daily  dextrose 5%. 1000 milliLiter(s) IV Continuous <Continuous>  dextrose 5%. 1000 milliLiter(s) IV Continuous <Continuous>  dextrose 50% Injectable 25 Gram(s) IV Push once  dextrose 50% Injectable 12.5 Gram(s) IV Push once  dextrose 50% Injectable 25 Gram(s) IV Push once  dextrose Oral Gel 15 Gram(s) Oral once PRN  diltiazem    milliGRAM(s) Oral daily  doxycycline IVPB      doxycycline IVPB 100 milliGRAM(s) IV Intermittent every 12 hours  escitalopram 10 milliGRAM(s) Oral daily  fentaNYL   Patch  25 MICROgram(s)/Hr 1 Patch Transdermal every 72 hours  glucagon  Injectable 1 milliGRAM(s) IntraMuscular once  insulin glargine Injectable (LANTUS) 5 Unit(s) SubCutaneous at bedtime  insulin lispro (ADMELOG) corrective regimen sliding scale   SubCutaneous three times a day before meals  metoprolol tartrate 50 milliGRAM(s) Oral four times a day  pantoprazole    Tablet 40 milliGRAM(s) Oral before breakfast  polyethylene glycol 3350 17 Gram(s) Oral two times a day  senna 2 Tablet(s) Oral at bedtime  thyroid 15 milliGRAM(s) Oral <User Schedule>      ANTIBIOTICS:  doxycycline IVPB      doxycycline IVPB 100 milliGRAM(s) IV Intermittent every 12 hours

## 2024-12-05 NOTE — DISCHARGE NOTE PROVIDER - HOSPITAL COURSE
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 The pt was admitted to medicine for pneumonia.    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 They was found to have streptococcal bacteremia as blood cultures were positive for Streptococcus lutetiensis. Urine cultures for strep and legionella were negative. The patient was initially treated with ceftriaxone and doxycycline, which is being transitioned to oral Augmentin 875 mg every 12 hours and oral doxycycline 100 mg every 12 hours until 12/6 on discharge. Repeat blood cultures were negative.     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 They presented with atrial fibrillation with rapid ventricular response and aberrancy. An echocardiogram showed an ejection fraction of less than 65-70%. Electrophysiology confirmed that the pacemaker device is functioning properly and noted atrial flutter as a chronic condition. The patient is currently on metoprolol 50mg every 6 hours, Eliquis, Plavix, and diltiazem 180mg. Pt's daughter has decided for hospice care.    All other chronic conditions have been managed.     Discussion of discharge plan of care, including discharge diagnoses, medication reconciliation, and follow-ups was conducted with Dr. Paulson on 12/5/24, and discharge was approved.   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 The pt was admitted to medicine for pneumonia.    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 They was found to have streptococcal bacteremia as blood cultures were positive for Streptococcus lutetiensis. Urine cultures for strep and legionella were negative. The patient was initially treated with ceftriaxone and doxycycline, which is being transitioned to oral Augmentin 875 mg every 12 hours and oral doxycycline 100 mg every 12 hours until 12/6 on discharge. Repeat blood cultures were negative.     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 They presented with atrial fibrillation with rapid ventricular response and aberrancy. An echocardiogram showed an ejection fraction of less than 65-70%. Electrophysiology confirmed that the pacemaker device is functioning properly and noted atrial flutter as a chronic condition. The patient is currently on metoprolol 50mg every 6 hours, Eliquis, Plavix, and diltiazem 180mg. Pt's daughter has decided for hospice care. Pt also has hyponatremia and should restrict fluid intake to 1.5L per day.     All other chronic conditions have been managed.     Discussion of discharge plan of care, including discharge diagnoses, medication reconciliation, and follow-ups was conducted with Dr. Paulson on 12/5/24, and discharge was approved.

## 2024-12-05 NOTE — DISCHARGE NOTE PROVIDER - CARE PROVIDER_API CALL
John, Publius  Pulmonary Disease  [address] [place] [address]  Phone: [phone]  Fax: [phone]  Follow Up Time: 1 week

## 2024-12-05 NOTE — PROGRESS NOTE ADULT - PROVIDER SPECIALTY LIST ADULT
Hospitalist
Internal Medicine
ENT
ENT
Infectious Disease
Internal Medicine
ENT
Hospitalist
Hospitalist
Internal Medicine
Palliative Care
Infectious Disease

## 2024-12-06 LAB
CULTURE RESULTS: SIGNIFICANT CHANGE UP
SPECIMEN SOURCE: SIGNIFICANT CHANGE UP

## 2024-12-07 LAB
CULTURE RESULTS: SIGNIFICANT CHANGE UP
SPECIMEN SOURCE: SIGNIFICANT CHANGE UP

## 2024-12-08 LAB
CULTURE RESULTS: SIGNIFICANT CHANGE UP
SPECIMEN SOURCE: SIGNIFICANT CHANGE UP

## 2024-12-09 LAB
CULTURE RESULTS: SIGNIFICANT CHANGE UP
SPECIMEN SOURCE: SIGNIFICANT CHANGE UP

## 2024-12-19 DIAGNOSIS — E11.22 TYPE 2 DIABETES MELLITUS WITH DIABETIC CHRONIC KIDNEY DISEASE: ICD-10-CM

## 2024-12-19 DIAGNOSIS — N18.30 CHRONIC KIDNEY DISEASE, STAGE 3 UNSPECIFIED: ICD-10-CM

## 2024-12-19 DIAGNOSIS — I48.92 UNSPECIFIED ATRIAL FLUTTER: ICD-10-CM

## 2024-12-19 DIAGNOSIS — Z79.01 LONG TERM (CURRENT) USE OF ANTICOAGULANTS: ICD-10-CM

## 2024-12-19 DIAGNOSIS — Z96.642 PRESENCE OF LEFT ARTIFICIAL HIP JOINT: ICD-10-CM

## 2024-12-19 DIAGNOSIS — Z88.5 ALLERGY STATUS TO NARCOTIC AGENT: ICD-10-CM

## 2024-12-19 DIAGNOSIS — Z51.5 ENCOUNTER FOR PALLIATIVE CARE: ICD-10-CM

## 2024-12-19 DIAGNOSIS — Z95.0 PRESENCE OF CARDIAC PACEMAKER: ICD-10-CM

## 2024-12-19 DIAGNOSIS — Z88.1 ALLERGY STATUS TO OTHER ANTIBIOTIC AGENTS: ICD-10-CM

## 2024-12-19 DIAGNOSIS — Z85.07 PERSONAL HISTORY OF MALIGNANT NEOPLASM OF PANCREAS: ICD-10-CM

## 2024-12-19 DIAGNOSIS — R04.0 EPISTAXIS: ICD-10-CM

## 2024-12-19 DIAGNOSIS — Z66 DO NOT RESUSCITATE: ICD-10-CM

## 2024-12-19 DIAGNOSIS — C22.1 INTRAHEPATIC BILE DUCT CARCINOMA: ICD-10-CM

## 2024-12-19 DIAGNOSIS — Z79.4 LONG TERM (CURRENT) USE OF INSULIN: ICD-10-CM

## 2024-12-19 DIAGNOSIS — Z79.85 LONG-TERM (CURRENT) USE OF INJECTABLE NON-INSULIN ANTIDIABETIC DRUGS: ICD-10-CM

## 2024-12-19 DIAGNOSIS — D84.81 IMMUNODEFICIENCY DUE TO CONDITIONS CLASSIFIED ELSEWHERE: ICD-10-CM

## 2024-12-19 DIAGNOSIS — E03.9 HYPOTHYROIDISM, UNSPECIFIED: ICD-10-CM

## 2024-12-19 DIAGNOSIS — A40.8 OTHER STREPTOCOCCAL SEPSIS: ICD-10-CM

## 2024-12-19 DIAGNOSIS — I25.2 OLD MYOCARDIAL INFARCTION: ICD-10-CM

## 2024-12-19 DIAGNOSIS — I49.5 SICK SINUS SYNDROME: ICD-10-CM

## 2024-12-19 DIAGNOSIS — I48.20 CHRONIC ATRIAL FIBRILLATION, UNSPECIFIED: ICD-10-CM

## 2024-12-19 DIAGNOSIS — C79.51 SECONDARY MALIGNANT NEOPLASM OF BONE: ICD-10-CM

## 2024-12-19 DIAGNOSIS — G89.3 NEOPLASM RELATED PAIN (ACUTE) (CHRONIC): ICD-10-CM

## 2024-12-19 DIAGNOSIS — J18.9 PNEUMONIA, UNSPECIFIED ORGANISM: ICD-10-CM

## 2024-12-19 DIAGNOSIS — C78.00 SECONDARY MALIGNANT NEOPLASM OF UNSPECIFIED LUNG: ICD-10-CM

## 2024-12-19 DIAGNOSIS — Z95.5 PRESENCE OF CORONARY ANGIOPLASTY IMPLANT AND GRAFT: ICD-10-CM

## 2024-12-19 DIAGNOSIS — E22.2 SYNDROME OF INAPPROPRIATE SECRETION OF ANTIDIURETIC HORMONE: ICD-10-CM

## 2024-12-19 DIAGNOSIS — Z79.02 LONG TERM (CURRENT) USE OF ANTITHROMBOTICS/ANTIPLATELETS: ICD-10-CM

## 2024-12-19 DIAGNOSIS — I25.10 ATHEROSCLEROTIC HEART DISEASE OF NATIVE CORONARY ARTERY WITHOUT ANGINA PECTORIS: ICD-10-CM

## 2024-12-19 DIAGNOSIS — Z79.890 HORMONE REPLACEMENT THERAPY: ICD-10-CM

## 2025-01-06 ENCOUNTER — NON-APPOINTMENT (OUTPATIENT)
Age: 89
End: 2025-01-06

## 2025-01-06 ENCOUNTER — APPOINTMENT (OUTPATIENT)
Dept: ELECTROPHYSIOLOGY | Facility: CLINIC | Age: 89
End: 2025-01-06

## 2025-01-06 ENCOUNTER — APPOINTMENT (OUTPATIENT)
Dept: CARDIOLOGY | Facility: CLINIC | Age: 89
End: 2025-01-06
Payer: MEDICARE

## 2025-01-06 PROCEDURE — 93294 REM INTERROG EVL PM/LDLS PM: CPT

## 2025-01-06 PROCEDURE — 93296 REM INTERROG EVL PM/IDS: CPT

## 2025-02-27 ENCOUNTER — NON-APPOINTMENT (OUTPATIENT)
Age: 89
End: 2025-02-27

## 2025-02-27 DIAGNOSIS — N81.11 CYSTOCELE, MIDLINE: ICD-10-CM

## 2025-02-27 DIAGNOSIS — Z78.9 OTHER SPECIFIED HEALTH STATUS: ICD-10-CM

## 2025-02-27 DIAGNOSIS — Z87.898 PERSONAL HISTORY OF OTHER SPECIFIED CONDITIONS: ICD-10-CM

## 2025-02-27 DIAGNOSIS — Z92.89 PERSONAL HISTORY OF OTHER MEDICAL TREATMENT: ICD-10-CM

## 2025-02-27 DIAGNOSIS — Z83.3 FAMILY HISTORY OF DIABETES MELLITUS: ICD-10-CM

## 2025-02-27 DIAGNOSIS — I10 ESSENTIAL (PRIMARY) HYPERTENSION: ICD-10-CM

## 2025-02-27 DIAGNOSIS — Z82.49 FAMILY HISTORY OF ISCHEMIC HEART DISEASE AND OTHER DISEASES OF THE CIRCULATORY SYSTEM: ICD-10-CM

## 2025-02-27 RX ORDER — NYSTATIN AND TRIAMCINOLONE ACETONIDE 100000; 1 MG/G; MG/G
100000-0.1 CREAM TOPICAL
Refills: 0 | Status: ACTIVE | COMMUNITY

## 2025-02-27 RX ORDER — EMPAGLIFLOZIN 10 MG/1
10 TABLET, FILM COATED ORAL
Refills: 0 | Status: ACTIVE | COMMUNITY

## 2025-02-27 RX ORDER — CLOBETASOL PROPIONATE CREAM USP, 0.05% 0.5 MG/G
0.05 CREAM TOPICAL TWICE DAILY
Refills: 0 | Status: ACTIVE | COMMUNITY

## 2025-02-27 RX ORDER — ESCITALOPRAM OXALATE 20 MG/1
20 TABLET, FILM COATED ORAL
Refills: 0 | Status: ACTIVE | COMMUNITY

## 2025-04-02 NOTE — DISCHARGE NOTE NURSING/CASE MANAGEMENT/SOCIAL WORK - NSDCPEFALRISK_GEN_ALL_CORE
[de-identified] : EDGAR LIM Was seen on April 2.  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  She prefers curettage to suction.  She notes this point she feels pretty good and is not as blocked as usual.  She also has complaints of an intermittent watery nasal discharge.  The patient had no other ear nose or throat complaints at this visit. For information on Fall & Injury Prevention, visit: [email or web address] OR  [email or web address] OR  https://www.cdc.gov/steadi/patient.html Complex Repair And Bilobe Flap Text: The defect edges were debeveled with a #15 scalpel blade.  The primary defect was closed partially with a complex linear closure.  Given the location of the remaining defect, shape of the defect and the proximity to free margins a bilobe flap was deemed most appropriate for complete closure of the defect.  Using a sterile surgical marker, an appropriate advancement flap was drawn incorporating the defect and placing the expected incisions within the relaxed skin tension lines where possible.    The area thus outlined was incised deep to adipose tissue with a #15 scalpel blade.  The skin margins were undermined to an appropriate distance in all directions utilizing iris scissors.

## 2025-04-08 ENCOUNTER — APPOINTMENT (OUTPATIENT)
Dept: CARDIOLOGY | Facility: CLINIC | Age: 89
End: 2025-04-08

## 2025-06-30 NOTE — ED PROVIDER NOTE - PROGRESS NOTE ADDITIONAL1
Clinic Care Coordination Contact  Community Health Worker Initial Outreach    CHW Initial Information Gathering:  Referral Source: ED Follow-Up  CHW Additional Questions  If ED/Hospital discharge, follow-up appointment scheduled as recommended?: No  Patient agreeable to assistance with scheduling?: No  Patient declined (specify): Patient is aware of recommended follow-up  MyChart active?: Yes    Patient accepts CC: No, declined. No additional support is needed at this time. Patient will be sent Care Coordination introduction letter for future reference.     Future Appointments   Date Time Provider Department Center   7/17/2025  9:00 AM Mynor Correa MD [facility]   8/14/2025  8:30 AM Crescencio Escalante APRN [facility]   10/31/2025 10:00 AM Joan Vargas MD [facility]       [facility]  Care Coordination        
Additional Progress Note...